# Patient Record
Sex: FEMALE | Race: WHITE | NOT HISPANIC OR LATINO | Employment: OTHER | URBAN - METROPOLITAN AREA
[De-identification: names, ages, dates, MRNs, and addresses within clinical notes are randomized per-mention and may not be internally consistent; named-entity substitution may affect disease eponyms.]

---

## 2017-01-31 ENCOUNTER — TELEPHONE (OUTPATIENT)
Dept: ONCOLOGY | Facility: HOSPITAL | Age: 72
End: 2017-01-31

## 2017-01-31 ENCOUNTER — LAB (OUTPATIENT)
Dept: LAB | Facility: HOSPITAL | Age: 72
End: 2017-01-31

## 2017-01-31 ENCOUNTER — OFFICE VISIT (OUTPATIENT)
Dept: ONCOLOGY | Facility: CLINIC | Age: 72
End: 2017-01-31

## 2017-01-31 VITALS
DIASTOLIC BLOOD PRESSURE: 70 MMHG | SYSTOLIC BLOOD PRESSURE: 120 MMHG | HEART RATE: 72 BPM | TEMPERATURE: 97.7 F | BODY MASS INDEX: 27.54 KG/M2 | RESPIRATION RATE: 16 BRPM | WEIGHT: 155.4 LBS | HEIGHT: 63 IN

## 2017-01-31 DIAGNOSIS — C50.512 MALIGNANT NEOPLASM OF LOWER-OUTER QUADRANT OF LEFT FEMALE BREAST (HCC): ICD-10-CM

## 2017-01-31 DIAGNOSIS — C50.512 MALIGNANT NEOPLASM OF LOWER-OUTER QUADRANT OF LEFT FEMALE BREAST (HCC): Primary | ICD-10-CM

## 2017-01-31 LAB
ALBUMIN SERPL-MCNC: 4 G/DL (ref 3.5–5.2)
ALBUMIN/GLOB SERPL: 1.4 G/DL (ref 1.1–2.4)
ALP SERPL-CCNC: 82 U/L (ref 38–116)
ALT SERPL W P-5'-P-CCNC: 15 U/L (ref 0–33)
ANION GAP SERPL CALCULATED.3IONS-SCNC: 10.3 MMOL/L
AST SERPL-CCNC: 22 U/L (ref 0–32)
BASOPHILS # BLD AUTO: 0.03 10*3/MM3 (ref 0–0.1)
BASOPHILS NFR BLD AUTO: 0.4 % (ref 0–1.1)
BILIRUB SERPL-MCNC: 0.4 MG/DL (ref 0.1–1.2)
BUN BLD-MCNC: 8 MG/DL (ref 6–20)
BUN/CREAT SERPL: 9.4 (ref 7.3–30)
CALCIUM SPEC-SCNC: 9.6 MG/DL (ref 8.5–10.2)
CHLORIDE SERPL-SCNC: 101 MMOL/L (ref 98–107)
CO2 SERPL-SCNC: 28.7 MMOL/L (ref 22–29)
CREAT BLD-MCNC: 0.85 MG/DL (ref 0.6–1.1)
DEPRECATED RDW RBC AUTO: 43.5 FL (ref 37–49)
EOSINOPHIL # BLD AUTO: 0.21 10*3/MM3 (ref 0–0.36)
EOSINOPHIL NFR BLD AUTO: 3 % (ref 1–5)
ERYTHROCYTE [DISTWIDTH] IN BLOOD BY AUTOMATED COUNT: 13.5 % (ref 11.7–14.5)
GFR SERPL CREATININE-BSD FRML MDRD: 66 ML/MIN/1.73
GLOBULIN UR ELPH-MCNC: 2.8 GM/DL (ref 1.8–3.5)
GLUCOSE BLD-MCNC: 91 MG/DL (ref 74–124)
HCT VFR BLD AUTO: 38.6 % (ref 34–45)
HGB BLD-MCNC: 12.4 G/DL (ref 11.5–14.9)
IMM GRANULOCYTES # BLD: 0.01 10*3/MM3 (ref 0–0.03)
IMM GRANULOCYTES NFR BLD: 0.1 % (ref 0–0.5)
LYMPHOCYTES # BLD AUTO: 1.44 10*3/MM3 (ref 1–3.5)
LYMPHOCYTES NFR BLD AUTO: 20.5 % (ref 20–49)
MCH RBC QN AUTO: 28.2 PG (ref 27–33)
MCHC RBC AUTO-ENTMCNC: 32.1 G/DL (ref 32–35)
MCV RBC AUTO: 87.7 FL (ref 83–97)
MONOCYTES # BLD AUTO: 0.4 10*3/MM3 (ref 0.25–0.8)
MONOCYTES NFR BLD AUTO: 5.7 % (ref 4–12)
NEUTROPHILS # BLD AUTO: 4.95 10*3/MM3 (ref 1.5–7)
NEUTROPHILS NFR BLD AUTO: 70.3 % (ref 39–75)
NRBC BLD MANUAL-RTO: 0 /100 WBC (ref 0–0)
PLATELET # BLD AUTO: 272 10*3/MM3 (ref 150–375)
PMV BLD AUTO: 9.4 FL (ref 8.9–12.1)
POTASSIUM BLD-SCNC: 3.9 MMOL/L (ref 3.5–4.7)
PROT SERPL-MCNC: 6.8 G/DL (ref 6.3–8)
RBC # BLD AUTO: 4.4 10*6/MM3 (ref 3.9–5)
SODIUM BLD-SCNC: 140 MMOL/L (ref 134–145)
WBC NRBC COR # BLD: 7.04 10*3/MM3 (ref 4–10)

## 2017-01-31 PROCEDURE — 99215 OFFICE O/P EST HI 40 MIN: CPT | Performed by: INTERNAL MEDICINE

## 2017-01-31 PROCEDURE — 85025 COMPLETE CBC W/AUTO DIFF WBC: CPT | Performed by: INTERNAL MEDICINE

## 2017-01-31 PROCEDURE — 80053 COMPREHEN METABOLIC PANEL: CPT | Performed by: INTERNAL MEDICINE

## 2017-01-31 PROCEDURE — 36415 COLL VENOUS BLD VENIPUNCTURE: CPT | Performed by: INTERNAL MEDICINE

## 2017-01-31 RX ORDER — DIPHENHYDRAMINE HCL 50 MG
CAPSULE ORAL
Qty: 1 CAPSULE | Refills: 0 | Status: SHIPPED | OUTPATIENT
Start: 2017-01-31 | End: 2023-01-24

## 2017-01-31 RX ORDER — PREDNISONE 50 MG/1
TABLET ORAL
Qty: 3 TABLET | Refills: 0 | Status: SHIPPED | OUTPATIENT
Start: 2017-01-31 | End: 2017-11-28

## 2017-01-31 NOTE — TELEPHONE ENCOUNTER
----- Message from Dennis Chanel II, MD sent at 1/31/2017 11:07 AM EST -----   She will have a CT abdomen with IV contrast.  She has an IV contrast allergy.  Can you please arrange for the IV contrast pre-medicines?.  Also, can you please give her the over-the-counter Benadryl instructions.  Thank you

## 2017-01-31 NOTE — TELEPHONE ENCOUNTER
escribed premedications of prednisone and benadryl with instructions to patients pharmacy. Pt saw MD today and is aware of scans.

## 2017-01-31 NOTE — PROGRESS NOTES
Subjective .     REASONS FOR FOLLOWUP:  Breast cancer    HISTORY OF PRESENT ILLNESS:  The patient is a 71 y.o. year old female  who is here for follow-up with the above-mentioned history.    States she had right low back pain radiating to the right leg since October.  Resolved with 1 week of steroids given 2 weeks ago by her PCP.  However, pain seemed to move into the right abdomen.  At times it feels it is below the right lower ribs and at other times it seems to be in the right upper quadrant.  States this makes it difficult to lift her disabled daughter.    Past Medical History   Diagnosis Date   • Arthritis    • Cancer      breast cancer   • Disease of thyroid gland      Hypothyroidism   • H/O TIA (transient ischemic attack) and stroke 2005   • Hyperlipidemia    • Hypertension    • Stroke 2005   • Trigeminal neuralgia      Past Surgical History   Procedure Laterality Date   • Hysterectomy       At age 31   • Tubal abdominal ligation  1974       HEMATOLOGIC/ONCOLOGIC HISTORY:  (History from previous dates can be found in the separate document.)    MEDICATIONS    Current Outpatient Prescriptions:   •  amitriptyline (ELAVIL) 25 MG tablet, Take 25 mg by mouth every night., Disp: , Rfl:   •  atorvastatin (LIPITOR) 10 MG tablet, Take 10 mg by mouth daily., Disp: , Rfl:   •  chlorthalidone (HYGROTEN) 25 MG tablet, Take 25 mg by mouth daily., Disp: , Rfl:   •  clopidogrel (PLAVIX) 75 MG tablet, Take 75 mg by mouth daily., Disp: , Rfl:   •  esomeprazole (NexIUM) 40 MG capsule, Take 40 mg by mouth every morning before breakfast., Disp: , Rfl:   •  exemestane (AROMASIN) 25 MG chemo tablet, Take 1 tablet by mouth daily., Disp: 90 tablet, Rfl: 3  •  levothyroxine (SYNTHROID, LEVOTHROID) 75 MCG tablet, Take 75 mcg by mouth. As Directed, Disp: , Rfl:   •  metoprolol succinate XL (TOPROL-XL) 25 MG 24 hr tablet, Take 25 mg by mouth 2 (two) times a day., Disp: , Rfl:   •  potassium chloride (KLOR-CON) 20 MEQ packet, Take 20 mEq  by mouth daily., Disp: , Rfl:     ALLERGIES:     Allergies   Allergen Reactions   • Contrast Dye      IVP Dye   • Lisinopril    • Tartrazine Hives       SOCIAL HISTORY:       Social History     Social History   • Marital status:      Spouse name: Jack   • Number of children: N/A   • Years of education: High School     Occupational History   •  Retired     Social History Main Topics   • Smoking status: Never Smoker   • Smokeless tobacco: Never Used   • Alcohol use Yes      Comment: rare   • Drug use: No   • Sexual activity: Not on file     Other Topics Concern   • Not on file     Social History Narrative         FAMILY HISTORY:  Family History   Problem Relation Age of Onset   • Cancer Mother      head and neck   • Stroke Mother    • Heart disease Mother    • Gallbladder disease Mother    • Breast cancer Sister 57   • Heart disease Sister    • Hypertension Sister    • Cancer Sister    • Gallbladder disease Sister    • Cancer Brother      head and neck   • Heart disease Brother    • Hypertension Brother    • Gallbladder disease Brother    • Heart disease Father    • Hypertension Father    • Gallbladder disease Father    • Cancer Maternal Aunt    • Cancer Maternal Uncle        REVIEW OF SYSTEMS:  GENERAL: No change in appetite or weight;   No fevers, chills, sweats.    SKIN: No nonhealing lesions.   No rashes.  HEME/LYMPH: No easy bruising, bleeding.   No swollen nodes.   EYES: No vision changes or diplopia.   ENT: No tinnitus, hearing loss, gum bleeding, epistaxis, hoarseness or dysphagia.   RESPIRATORY: No cough, shortness of breath, hemoptysis or wheezing.   CVS: No chest pain, palpitations, orthopnea, dyspnea on exertion or PND.   GI: see HPI   : No lower tract obstructive symptoms, dysuria or hematuria.   MUSCULOSKELETAL: see HPI   NEUROLOGICAL: No global weakness, loss of consciousness or seizures.   PSYCHIATRIC: No increased nervousness, mood changes or depression.        Objective    Vitals:     "01/31/17 1022   BP: 120/70   Pulse: 72   Resp: 16   Temp: 97.7 °F (36.5 °C)   Weight: 155 lb 6.4 oz (70.5 kg)   Height: 62.99\" (160 cm)   PainSc: 4  Comment: pain comes from her left side     Current Status 1/31/2017   ECOG score 0      PHYSICAL EXAM:    GENERAL:  Well-developed, well-nourished in no acute distress.   SKIN:  Warm, dry without rashes, purpura or petechiae.  HEAD:  Normocephalic.  EYES:  Pupils equal, round and reactive to light.  EOMs intact.  Conjunctivae normal.  EARS:  Hearing intact.  NOSE:  Septum midline.  No excoriations or nasal discharge.  MOUTH:  Tongue is well-papillated; no stomatitis or ulcers.  Lips normal.  THROAT:  Oropharynx without lesions or exudates.  NECK:  Supple with good range of motion; no thyromegaly or masses, no JVD.  LYMPHATICS:  No cervical, supraclavicular, axillary or inguinal adenopathy.  CHEST:  Lungs clear to percussion and auscultation. Good airflow.  CARDIAC:  Regular rate and rhythm without murmurs, rubs or gallops. Normal S1,S2.  BREAST: no masses by palpation or inspection   ABDOMEN:  Soft, nontender with no organomegaly or masses.  EXTREMITIES:  No clubbing, cyanosis or edema.  NEUROLOGICAL:  Cranial Nerves II-XII grossly intact.  No focal neurological deficits.  PSYCHIATRIC:  Normal affect and mood.      RECENT LABS:        WBC   Date/Time Value Ref Range Status   01/31/2017 10:13 AM 7.04 4.00 - 10.00 10*3/mm3 Final   03/13/2014 12:50 PM 7.19 4.50 - 10.70 K/Cumm Final     HEMOGLOBIN   Date/Time Value Ref Range Status   01/31/2017 10:13 AM 12.4 11.5 - 14.9 g/dL Final   03/13/2014 12:50 PM 12.3 11.9 - 15.5 g/dL Final     PLATELETS   Date/Time Value Ref Range Status   01/31/2017 10:13  150 - 375 10*3/mm3 Final   03/13/2014 12:50  140 - 500 K/Cumm Final       Assessment/Plan     ASSESSMENT:  Problem List Items Addressed This Visit     None         1.  Stage IIIA, grade 3, 3.9 cm left breast cancer. Four out of 24 nodes positive. ER 2%, FL negative, " HER2 negative. Completed FEC x3 followed by Taxotere x3, 11/28/2011. Completed radiation in February 2012.   Poor tolerance to anastrozole and letrozole.  completed 5 years hormonal therapy using Aromasin today, 1/31/17.  (She was initially a patient at [facility]. She transferred here as her son-in-law, Jeremiah Dumont, is a patient here).     2.  Chronic right low back/hip pain radiating anteriorly.  In May 2016, CAT scan and bone scan negative for cancer as the cause of the pain.  Defer further management of this to her PCP.    3.  In the past, she has complained of: Forgetfulness and transposition of numbers when she is writing numbers at times. This has been present since around April 2015. She states it is not worsening.   She did not complain of this today.    4.  History of stroke around 2005. Because of this I do not think she would be a good candidate for tamoxifen.     5.  Osteopenia, which has improved to normal bone density.  DEXA 5/17/16 normal bone density.  T score -0.6, compared to.   -1.1 2/20/14.  She is on calcium and vitamin D.  Defer further management of this to her PCP now that she is off aromatase inhibitors.    6.  Right upper quadrant/right lower anterior rib pain.  Present since October 2016.  CT abdomen and bone scan.     This was a new issue for me to address today with additional workup planned.    7.  Hypokalemia. Her PCP manages this.  Potassium is normal today.      8.  IV contrast allergy.  Previous rash.  I will have the triage nurse call her to arrange IV contrast pre-medicines which patient states it worked well in the past.    PLAN:   · CT abdomen with IV contrast (IV contrast allergy pre-medicines)  · Bone scan  · M.D. visit to review the results of the above  · Stop Aromasin as she has completed 5 years of therapy  · Last mammogram 11/1/16, BI-RADS 2.

## 2017-02-01 ENCOUNTER — TELEPHONE (OUTPATIENT)
Dept: ONCOLOGY | Facility: CLINIC | Age: 72
End: 2017-02-01

## 2017-02-01 NOTE — TELEPHONE ENCOUNTER
----- Message from Mimi Garcia sent at 2/1/2017 10:45 AM EST -----   Pt is calling because she is having a CT done tomorrow and is allergic to the dye        [phone]

## 2017-02-01 NOTE — TELEPHONE ENCOUNTER
Pt. States she is sched. For a ct of the abd tomorrow and she is allergic to the dye.  Will need to be pre medicated.  Informed pt. That dr. Chanel already sent the prednisone to her pharm.  Pt. Is aware to take the benadryl 50mg 1 hr prior to the scan.  V/u.

## 2017-02-02 ENCOUNTER — HOSPITAL ENCOUNTER (OUTPATIENT)
Dept: NUCLEAR MEDICINE | Facility: HOSPITAL | Age: 72
Discharge: HOME | End: 2017-02-02
Attending: INTERNAL MEDICINE

## 2017-02-02 ENCOUNTER — HOSPITAL ENCOUNTER (OUTPATIENT)
Dept: CT IMAGING | Facility: HOSPITAL | Age: 72
Discharge: HOME | End: 2017-02-02
Attending: INTERNAL MEDICINE | Admitting: INTERNAL MEDICINE

## 2017-02-02 DIAGNOSIS — C50.512 MALIGNANT NEOPLASM OF LOWER-OUTER QUADRANT OF LEFT FEMALE BREAST (HCC): ICD-10-CM

## 2017-02-02 LAB — CREAT BLDA-MCNC: 0.9 MG/DL (ref 0.6–1.3)

## 2017-02-02 PROCEDURE — 0 IOPAMIDOL 61 % SOLUTION: Performed by: INTERNAL MEDICINE

## 2017-02-02 PROCEDURE — 74160 CT ABDOMEN W/CONTRAST: CPT

## 2017-02-02 PROCEDURE — 78306 BONE IMAGING WHOLE BODY: CPT

## 2017-02-02 PROCEDURE — 82565 ASSAY OF CREATININE: CPT

## 2017-02-02 PROCEDURE — 0 TECHNETIUM MEDRONATE KIT: Performed by: INTERNAL MEDICINE

## 2017-02-02 PROCEDURE — A9503 TC99M MEDRONATE: HCPCS | Performed by: INTERNAL MEDICINE

## 2017-02-02 RX ORDER — TC 99M MEDRONATE 20 MG/10ML
21.3 INJECTION, POWDER, LYOPHILIZED, FOR SOLUTION INTRAVENOUS
Status: COMPLETED | OUTPATIENT
Start: 2017-02-02 | End: 2017-02-02

## 2017-02-02 RX ADMIN — Medication 21.3 MILLICURIE: at 10:45

## 2017-02-02 RX ADMIN — IOPAMIDOL 85 ML: 612 INJECTION, SOLUTION INTRAVENOUS at 15:15

## 2017-02-06 ENCOUNTER — APPOINTMENT (OUTPATIENT)
Dept: LAB | Facility: HOSPITAL | Age: 72
End: 2017-02-06

## 2017-02-06 ENCOUNTER — OFFICE VISIT (OUTPATIENT)
Dept: ONCOLOGY | Facility: CLINIC | Age: 72
End: 2017-02-06

## 2017-02-06 VITALS
BODY MASS INDEX: 27.71 KG/M2 | DIASTOLIC BLOOD PRESSURE: 70 MMHG | TEMPERATURE: 98.2 F | OXYGEN SATURATION: 95 % | SYSTOLIC BLOOD PRESSURE: 120 MMHG | HEART RATE: 86 BPM | WEIGHT: 156.4 LBS | RESPIRATION RATE: 16 BRPM | HEIGHT: 63 IN

## 2017-02-06 DIAGNOSIS — C50.512 MALIGNANT NEOPLASM OF LOWER-OUTER QUADRANT OF LEFT FEMALE BREAST (HCC): Primary | ICD-10-CM

## 2017-02-06 PROCEDURE — 99215 OFFICE O/P EST HI 40 MIN: CPT | Performed by: INTERNAL MEDICINE

## 2017-02-06 PROCEDURE — G0463 HOSPITAL OUTPT CLINIC VISIT: HCPCS | Performed by: INTERNAL MEDICINE

## 2017-02-06 NOTE — PROGRESS NOTES
Subjective .     REASONS FOR FOLLOWUP:  Breast cancer    HISTORY OF PRESENT ILLNESS:  The patient is a 72 y.o. year old female  who is here for follow-up with the above-mentioned history.    The pain in her right upper quadrant abdomen/right lower ribs seems to worsened over the past several days.  She reminds me she lifts her 125 pound disabled daughter frequently.  No change in baseline mild constipation.  No other new symptoms over the last week.  CT and bone scan show no clear evidence of recurrence.    Past Medical History   Diagnosis Date   • Arthritis    • Cancer      breast cancer   • Disease of thyroid gland      Hypothyroidism   • H/O TIA (transient ischemic attack) and stroke 2005   • Hyperlipidemia    • Hypertension    • Stroke 2005   • Trigeminal neuralgia      Past Surgical History   Procedure Laterality Date   • Hysterectomy       At age 31   • Tubal abdominal ligation  1974       HEMATOLOGIC/ONCOLOGIC HISTORY:  (History from previous dates can be found in the separate document.)    MEDICATIONS    Current Outpatient Prescriptions:   •  amitriptyline (ELAVIL) 25 MG tablet, Take 25 mg by mouth every night., Disp: , Rfl:   •  atorvastatin (LIPITOR) 10 MG tablet, Take 10 mg by mouth daily., Disp: , Rfl:   •  chlorthalidone (HYGROTEN) 25 MG tablet, Take 25 mg by mouth daily., Disp: , Rfl:   •  clopidogrel (PLAVIX) 75 MG tablet, Take 75 mg by mouth daily., Disp: , Rfl:   •  diphenhydrAMINE (BENADRYL) 50 MG capsule, Take 1 hour prior to CT scan, Disp: 1 capsule, Rfl: 0  •  esomeprazole (NexIUM) 40 MG capsule, Take 40 mg by mouth every morning before breakfast., Disp: , Rfl:   •  exemestane (AROMASIN) 25 MG chemo tablet, Take 1 tablet by mouth daily., Disp: 90 tablet, Rfl: 3  •  levothyroxine (SYNTHROID, LEVOTHROID) 75 MCG tablet, Take 75 mcg by mouth. As Directed, Disp: , Rfl:   •  metoprolol succinate XL (TOPROL-XL) 25 MG 24 hr tablet, Take 25 mg by mouth 2 (two) times a day., Disp: , Rfl:   •  potassium  chloride (KLOR-CON) 20 MEQ packet, Take 20 mEq by mouth daily., Disp: , Rfl:   •  predniSONE (DELTASONE) 50 MG tablet, Take one tablet 13 hrs, 7 hours and 1 hour prior to CT scan, Disp: 3 tablet, Rfl: 0    ALLERGIES:     Allergies   Allergen Reactions   • Contrast Dye      IVP Dye   • Lisinopril    • Tartrazine Hives       SOCIAL HISTORY:       Social History     Social History   • Marital status:      Spouse name: Jack   • Number of children: N/A   • Years of education: High School     Occupational History   •  Retired     Social History Main Topics   • Smoking status: Never Smoker   • Smokeless tobacco: Never Used   • Alcohol use Yes      Comment: rare   • Drug use: No   • Sexual activity: Not on file     Other Topics Concern   • Not on file     Social History Narrative         FAMILY HISTORY:  Family History   Problem Relation Age of Onset   • Cancer Mother      head and neck   • Stroke Mother    • Heart disease Mother    • Gallbladder disease Mother    • Breast cancer Sister 57   • Heart disease Sister    • Hypertension Sister    • Cancer Sister    • Gallbladder disease Sister    • Cancer Brother      head and neck   • Heart disease Brother    • Hypertension Brother    • Gallbladder disease Brother    • Heart disease Father    • Hypertension Father    • Gallbladder disease Father    • Cancer Maternal Aunt    • Cancer Maternal Uncle        REVIEW OF SYSTEMS:  GENERAL: No change in appetite or weight;   No fevers, chills, sweats.    SKIN: No nonhealing lesions.   No rashes.  HEME/LYMPH: No easy bruising, bleeding.   No swollen nodes.   EYES: No vision changes or diplopia.   ENT: No tinnitus, hearing loss, gum bleeding, epistaxis, hoarseness or dysphagia.   RESPIRATORY: No cough, shortness of breath, hemoptysis or wheezing.   CVS: No chest pain, palpitations, orthopnea, dyspnea on exertion or PND.   GI: see HPI   : No lower tract obstructive symptoms, dysuria or hematuria.   MUSCULOSKELETAL: see HPI  "  NEUROLOGICAL: No global weakness, loss of consciousness or seizures.   PSYCHIATRIC: No increased nervousness, mood changes or depression.        Objective    Vitals:    02/06/17 0803   BP: 120/70   Pulse: 86   Resp: 16   Temp: 98.2 °F (36.8 °C)   TempSrc: Oral   SpO2: 95%   Weight: 156 lb 6.4 oz (70.9 kg)   Height: 62.99\" (160 cm)   PainSc:   3   PainLoc: Hip     Current Status 2/6/2017   ECOG score 0      PHYSICAL EXAM:    GENERAL:  Well-developed, well-nourished in no acute distress.   SKIN:  Warm, dry without rashes, purpura or petechiae.  HEAD:  Normocephalic.  EYES:  Pupils equal, round and reactive to light.  EOMs intact.  Conjunctivae normal.  EARS:  Hearing intact.  NOSE:  Septum midline.  No excoriations or nasal discharge.  MOUTH:  Tongue is well-papillated; no stomatitis or ulcers.  Lips normal.  THROAT:  Oropharynx without lesions or exudates.  NECK:  Supple with good range of motion; no thyromegaly or masses, no JVD.  LYMPHATICS:  No cervical, supraclavicular, axillary or inguinal adenopathy.  CHEST:  Lungs clear to percussion and auscultation. Good airflow.  CARDIAC:  Regular rate and rhythm without murmurs, rubs or gallops. Normal S1,S2.  BREAST: no masses by palpation or inspection   ABDOMEN:  Soft, nontender with no organomegaly or masses.  EXTREMITIES:  No clubbing, cyanosis or edema.  NEUROLOGICAL:  Cranial Nerves II-XII grossly intact.  No focal neurological deficits.  PSYCHIATRIC:  Normal affect and mood.      RECENT LABS:        WBC   Date/Time Value Ref Range Status   01/31/2017 10:13 AM 7.04 4.00 - 10.00 10*3/mm3 Final   03/13/2014 12:50 PM 7.19 4.50 - 10.70 K/Cumm Final     HEMOGLOBIN   Date/Time Value Ref Range Status   01/31/2017 10:13 AM 12.4 11.5 - 14.9 g/dL Final   03/13/2014 12:50 PM 12.3 11.9 - 15.5 g/dL Final     PLATELETS   Date/Time Value Ref Range Status   01/31/2017 10:13  150 - 375 10*3/mm3 Final   03/13/2014 12:50  140 - 500 K/Cumm Final       Assessment/Plan "     ASSESSMENT:  Problem List Items Addressed This Visit        High    Malignant neoplasm of lower-outer quadrant of left female breast - Primary    Relevant Orders    CBC & Differential    Comprehensive Metabolic Panel         1.  Stage IIIA, grade 3, 3.9 cm left breast cancer. Four out of 24 nodes positive. ER 2%, [place] negative, HER2 negative. Completed FEC x3 followed by Taxotere x3, 11/28/2011. Completed radiation in February 2012.   Poor tolerance to anastrozole and letrozole.  completed 5 years hormonal therapy using Aromasin, 1/31/17.  (She was initially a patient at [facility]. She transferred here as her son-in-law, Jeremiah Dumont, is a patient here).     2.  Chronic right low back/hip pain radiating anteriorly.  In May 2016, CAT scan and bone scan negative for cancer as the cause of the pain.  Defer further management of this to her PCP.    3.  In the past, she has complained of: Forgetfulness and transposition of numbers when she is writing numbers at times. This had been present since around April 2015. She states it is not worsening.   She did not complain of this today.    4.  History of stroke around 2005. Because of this I do not think she would be a good candidate for tamoxifen.     5.  Osteopenia, which has improved to normal bone density.  DEXA 5/17/16 normal bone density.  T score -0.6, compared to.   -1.1 2/20/14.  She is on calcium and vitamin D.  Defer further management of this to her PCP now that she is off aromatase inhibitors.    6.  Right upper quadrant/right lower anterior rib pain.  Present since October 2016.    Due to RUQ abdominal pain/right lower anterior rib pain since October 2016, bone scan and CT abdomen with contrast 2/2/17: No evidence of recurrence.  (8 mm low-attenuation liver lesion seen on the 5/17/16 CT but less conspicuous on order CT.  This was felt to be due to older CT without contrast.  This was felt to likely be benign.  Plan no further scheduled follow-up of this-patient  agrees.).    7.  Hypokalemia. Her PCP manages this.  Potassium is normal today.      8.  IV contrast allergy.  Previous rash.  I will have the triage nurse call her to arrange IV contrast pre-medicines which patient states it worked well in the past.    PLAN:   · M.D. CBC CMP 3 months  · Last mammogram 11/1/16, BI-RADS 2.  · Off hormonal therapy    CT images personally reviewed by me.

## 2017-04-11 ENCOUNTER — DOCUMENTATION (OUTPATIENT)
Dept: ONCOLOGY | Facility: HOSPITAL | Age: 72
End: 2017-04-11

## 2017-05-02 ENCOUNTER — OFFICE VISIT (OUTPATIENT)
Dept: ONCOLOGY | Facility: CLINIC | Age: 72
End: 2017-05-02

## 2017-05-02 ENCOUNTER — LAB (OUTPATIENT)
Dept: LAB | Facility: HOSPITAL | Age: 72
End: 2017-05-02

## 2017-05-02 VITALS
HEART RATE: 76 BPM | HEIGHT: 63 IN | DIASTOLIC BLOOD PRESSURE: 70 MMHG | WEIGHT: 155.4 LBS | BODY MASS INDEX: 27.54 KG/M2 | RESPIRATION RATE: 16 BRPM | SYSTOLIC BLOOD PRESSURE: 122 MMHG | TEMPERATURE: 97.7 F

## 2017-05-02 DIAGNOSIS — C50.512 MALIGNANT NEOPLASM OF LOWER-OUTER QUADRANT OF LEFT FEMALE BREAST (HCC): Primary | ICD-10-CM

## 2017-05-02 LAB
ALBUMIN SERPL-MCNC: 4.2 G/DL (ref 3.5–5.2)
ALBUMIN/GLOB SERPL: 1.7 G/DL (ref 1.1–2.4)
ALP SERPL-CCNC: 110 U/L (ref 38–116)
ALT SERPL W P-5'-P-CCNC: 19 U/L (ref 0–33)
ANION GAP SERPL CALCULATED.3IONS-SCNC: 13.9 MMOL/L
AST SERPL-CCNC: 25 U/L (ref 0–32)
BASOPHILS # BLD AUTO: 0.03 10*3/MM3 (ref 0–0.1)
BASOPHILS NFR BLD AUTO: 0.4 % (ref 0–1.1)
BILIRUB SERPL-MCNC: 0.5 MG/DL (ref 0.1–1.2)
BUN BLD-MCNC: 8 MG/DL (ref 6–20)
BUN/CREAT SERPL: 11.3 (ref 7.3–30)
CALCIUM SPEC-SCNC: 9.7 MG/DL (ref 8.5–10.2)
CHLORIDE SERPL-SCNC: 99 MMOL/L (ref 98–107)
CO2 SERPL-SCNC: 27.1 MMOL/L (ref 22–29)
CREAT BLD-MCNC: 0.71 MG/DL (ref 0.6–1.1)
DEPRECATED RDW RBC AUTO: 43.8 FL (ref 37–49)
EOSINOPHIL # BLD AUTO: 0.16 10*3/MM3 (ref 0–0.36)
EOSINOPHIL NFR BLD AUTO: 2.2 % (ref 1–5)
ERYTHROCYTE [DISTWIDTH] IN BLOOD BY AUTOMATED COUNT: 13.4 % (ref 11.7–14.5)
GFR SERPL CREATININE-BSD FRML MDRD: 81 ML/MIN/1.73
GLOBULIN UR ELPH-MCNC: 2.5 GM/DL (ref 1.8–3.5)
GLUCOSE BLD-MCNC: 101 MG/DL (ref 74–124)
HCT VFR BLD AUTO: 38.6 % (ref 34–45)
HGB BLD-MCNC: 12.9 G/DL (ref 11.5–14.9)
HOLD SPECIMEN: NORMAL
IMM GRANULOCYTES # BLD: 0.02 10*3/MM3 (ref 0–0.03)
IMM GRANULOCYTES NFR BLD: 0.3 % (ref 0–0.5)
LYMPHOCYTES # BLD AUTO: 1.51 10*3/MM3 (ref 1–3.5)
LYMPHOCYTES NFR BLD AUTO: 21.1 % (ref 20–49)
MCH RBC QN AUTO: 29.7 PG (ref 27–33)
MCHC RBC AUTO-ENTMCNC: 33.4 G/DL (ref 32–35)
MCV RBC AUTO: 88.7 FL (ref 83–97)
MONOCYTES # BLD AUTO: 0.48 10*3/MM3 (ref 0.25–0.8)
MONOCYTES NFR BLD AUTO: 6.7 % (ref 4–12)
NEUTROPHILS # BLD AUTO: 4.97 10*3/MM3 (ref 1.5–7)
NEUTROPHILS NFR BLD AUTO: 69.3 % (ref 39–75)
NRBC BLD MANUAL-RTO: 0 /100 WBC (ref 0–0)
PLATELET # BLD AUTO: 259 10*3/MM3 (ref 150–375)
PMV BLD AUTO: 9 FL (ref 8.9–12.1)
POTASSIUM BLD-SCNC: 3.9 MMOL/L (ref 3.5–4.7)
PROT SERPL-MCNC: 6.7 G/DL (ref 6.3–8)
RBC # BLD AUTO: 4.35 10*6/MM3 (ref 3.9–5)
SODIUM BLD-SCNC: 140 MMOL/L (ref 134–145)
WBC NRBC COR # BLD: 7.17 10*3/MM3 (ref 4–10)

## 2017-05-02 PROCEDURE — 85025 COMPLETE CBC W/AUTO DIFF WBC: CPT

## 2017-05-02 PROCEDURE — 80053 COMPREHEN METABOLIC PANEL: CPT

## 2017-05-02 PROCEDURE — 99213 OFFICE O/P EST LOW 20 MIN: CPT | Performed by: INTERNAL MEDICINE

## 2017-05-02 PROCEDURE — 36415 COLL VENOUS BLD VENIPUNCTURE: CPT

## 2017-10-26 ENCOUNTER — APPOINTMENT (OUTPATIENT)
Dept: LAB | Facility: HOSPITAL | Age: 72
End: 2017-10-26

## 2017-10-26 ENCOUNTER — APPOINTMENT (OUTPATIENT)
Dept: ONCOLOGY | Facility: CLINIC | Age: 72
End: 2017-10-26

## 2017-11-28 ENCOUNTER — LAB (OUTPATIENT)
Dept: LAB | Facility: HOSPITAL | Age: 72
End: 2017-11-28

## 2017-11-28 ENCOUNTER — OFFICE VISIT (OUTPATIENT)
Dept: ONCOLOGY | Facility: CLINIC | Age: 72
End: 2017-11-28

## 2017-11-28 VITALS
HEART RATE: 76 BPM | DIASTOLIC BLOOD PRESSURE: 72 MMHG | TEMPERATURE: 97.5 F | WEIGHT: 156 LBS | SYSTOLIC BLOOD PRESSURE: 122 MMHG | RESPIRATION RATE: 16 BRPM | BODY MASS INDEX: 28.71 KG/M2 | HEIGHT: 62 IN

## 2017-11-28 DIAGNOSIS — C50.512 MALIGNANT NEOPLASM OF LOWER-OUTER QUADRANT OF LEFT FEMALE BREAST (HCC): ICD-10-CM

## 2017-11-28 DIAGNOSIS — Z12.31 ENCOUNTER FOR SCREENING MAMMOGRAM FOR BREAST CANCER: ICD-10-CM

## 2017-11-28 DIAGNOSIS — C50.512 MALIGNANT NEOPLASM OF LOWER-OUTER QUADRANT OF LEFT BREAST OF FEMALE, ESTROGEN RECEPTOR POSITIVE (HCC): Primary | ICD-10-CM

## 2017-11-28 DIAGNOSIS — Z17.0 MALIGNANT NEOPLASM OF LOWER-OUTER QUADRANT OF LEFT BREAST OF FEMALE, ESTROGEN RECEPTOR POSITIVE (HCC): Primary | ICD-10-CM

## 2017-11-28 LAB
BASOPHILS # BLD AUTO: 0.04 10*3/MM3 (ref 0–0.1)
BASOPHILS NFR BLD AUTO: 0.7 % (ref 0–1.1)
DEPRECATED RDW RBC AUTO: 42.9 FL (ref 37–49)
EOSINOPHIL # BLD AUTO: 0.17 10*3/MM3 (ref 0–0.36)
EOSINOPHIL NFR BLD AUTO: 3.1 % (ref 1–5)
ERYTHROCYTE [DISTWIDTH] IN BLOOD BY AUTOMATED COUNT: 13.2 % (ref 11.7–14.5)
HCT VFR BLD AUTO: 38 % (ref 34–45)
HGB BLD-MCNC: 13 G/DL (ref 11.5–14.9)
IMM GRANULOCYTES # BLD: 0.01 10*3/MM3 (ref 0–0.03)
IMM GRANULOCYTES NFR BLD: 0.2 % (ref 0–0.5)
LYMPHOCYTES # BLD AUTO: 1.29 10*3/MM3 (ref 1–3.5)
LYMPHOCYTES NFR BLD AUTO: 23.2 % (ref 20–49)
MCH RBC QN AUTO: 30.2 PG (ref 27–33)
MCHC RBC AUTO-ENTMCNC: 34.2 G/DL (ref 32–35)
MCV RBC AUTO: 88.4 FL (ref 83–97)
MONOCYTES # BLD AUTO: 0.46 10*3/MM3 (ref 0.25–0.8)
MONOCYTES NFR BLD AUTO: 8.3 % (ref 4–12)
NEUTROPHILS # BLD AUTO: 3.59 10*3/MM3 (ref 1.5–7)
NEUTROPHILS NFR BLD AUTO: 64.5 % (ref 39–75)
NRBC BLD MANUAL-RTO: 0 /100 WBC (ref 0–0)
PLATELET # BLD AUTO: 253 10*3/MM3 (ref 150–375)
PMV BLD AUTO: 9.9 FL (ref 8.9–12.1)
RBC # BLD AUTO: 4.3 10*6/MM3 (ref 3.9–5)
WBC NRBC COR # BLD: 5.56 10*3/MM3 (ref 4–10)

## 2017-11-28 PROCEDURE — 85025 COMPLETE CBC W/AUTO DIFF WBC: CPT | Performed by: INTERNAL MEDICINE

## 2017-11-28 PROCEDURE — 36416 COLLJ CAPILLARY BLOOD SPEC: CPT | Performed by: INTERNAL MEDICINE

## 2017-11-28 PROCEDURE — 99214 OFFICE O/P EST MOD 30 MIN: CPT | Performed by: INTERNAL MEDICINE

## 2017-11-28 RX ORDER — HYDROCHLOROTHIAZIDE 12.5 MG/1
12.5 TABLET ORAL DAILY
COMMUNITY
End: 2019-05-09 | Stop reason: SDUPTHER

## 2017-11-28 NOTE — PROGRESS NOTES
Subjective .     REASONS FOR FOLLOWUP:  Breast cancer    HISTORY OF PRESENT ILLNESS:  The patient is a 72 y.o. year old female  who is here for follow-up with the above-mentioned history.    Has noticed some swelling of her left arm, mild.  Nothing makes this worse or better.    Denies neurological symptoms.  Denies nausea   Denies weight loss.  Denies pain.      Past Medical History:   Diagnosis Date   • Arthritis    • Cancer     Left breast cancer   • CHF (congestive heart failure)    • Disease of thyroid gland     Hypothyroidism   • GERD (gastroesophageal reflux disease)    • H/O TIA (transient ischemic attack) and stroke 2005   • Hyperlipidemia    • Hypertension    • Peptic ulceration    • Stroke 2005    CVA   • Trigeminal neuralgia      Past Surgical History:   Procedure Laterality Date   • BREAST BIOPSY Left 2011   • HYSTERECTOMY      At age 31   • MASTECTOMY Left 06/30/2011    Dr. Kaitlyn Luna   • TUBAL ABDOMINAL LIGATION  1974       HEMATOLOGIC/ONCOLOGIC HISTORY:  (History from previous dates can be found in the separate document.)    MEDICATIONS    Current Outpatient Prescriptions:   •  amitriptyline (ELAVIL) 25 MG tablet, Take 25 mg by mouth every night., Disp: , Rfl:   •  atorvastatin (LIPITOR) 10 MG tablet, Take 10 mg by mouth daily., Disp: , Rfl:   •  chlorthalidone (HYGROTEN) 25 MG tablet, Take 25 mg by mouth daily., Disp: , Rfl:   •  clopidogrel (PLAVIX) 75 MG tablet, Take 75 mg by mouth daily., Disp: , Rfl:   •  diphenhydrAMINE (BENADRYL) 50 MG capsule, Take 1 hour prior to CT scan, Disp: 1 capsule, Rfl: 0  •  esomeprazole (NexIUM) 40 MG capsule, Take 40 mg by mouth every morning before breakfast., Disp: , Rfl:   •  hydrochlorothiazide (HYDRODIURIL) 12.5 MG tablet, Take 12.5 mg by mouth Daily., Disp: , Rfl:   •  levothyroxine (SYNTHROID, LEVOTHROID) 75 MCG tablet, Take 75 mcg by mouth. As Directed, Disp: , Rfl:   •  metoprolol succinate XL (TOPROL-XL) 25 MG 24 hr tablet, Take 25 mg by mouth 2  (two) times a day., Disp: , Rfl:   •  potassium chloride (KLOR-CON) 20 MEQ packet, Take 20 mEq by mouth 2 (Two) Times a Day. Two tabs in the morning and 1 tab in the evening, Disp: , Rfl:     ALLERGIES:     Allergies   Allergen Reactions   • Contrast Dye      IVP Dye   • Lisinopril    • Tartrazine Hives       SOCIAL HISTORY:       Social History     Social History   • Marital status:      Spouse name: Jack   • Number of children: 2   • Years of education: High School     Occupational History   •  Retired     Social History Main Topics   • Smoking status: Never Smoker   • Smokeless tobacco: Never Used   • Alcohol use Yes      Comment: rare   • Drug use: No   • Sexual activity: Not on file     Other Topics Concern   • Not on file     Social History Narrative         FAMILY HISTORY:  Family History   Problem Relation Age of Onset   • Cancer Mother 68     head and neck   • Stroke Mother    • Heart disease Mother    • Gallbladder disease Mother    • Throat cancer Mother 68   • Breast cancer Sister 57   • Heart disease Sister    • Hypertension Sister    • Cancer Sister    • Gallbladder disease Sister    • Diabetes Sister    • Cancer Brother      head and neck   • Heart disease Brother    • Hypertension Brother    • Gallbladder disease Brother    • Lung cancer Brother 67   • Throat cancer Brother 67   • Hypertension Father    • Gallbladder disease Father    • Emphysema Father    • Heart disease Father      Enlarged heart   • Cancer Maternal Aunt    • Cancer Maternal Uncle    • Heart attack Brother    • Alcohol abuse Brother    • Hypertension Sister    • Heart disease Sister    • Hypertension Sister    • Heart disease Sister    • Hypertension Sister        REVIEW OF SYSTEMS:  GENERAL: No change in appetite or weight;   No fevers, chills, sweats.    SKIN: No nonhealing lesions.   No rashes.  HEME/LYMPH: No easy bruising, bleeding.   No swollen nodes.   EYES: No vision changes or diplopia.   ENT: No tinnitus, hearing  "loss, gum bleeding, epistaxis, hoarseness or dysphagia.   RESPIRATORY: No cough, shortness of breath, hemoptysis or wheezing.   CVS: No chest pain, palpitations, orthopnea, dyspnea on exertion or PND.   GI: No melena or hematochezia.   No abdominal pain.  No nausea, vomiting, constipation, diarrhea  : No lower tract obstructive symptoms, dysuria or hematuria.   MUSCULOSKELETAL: No bone pain.  No joint stiffness.   NEUROLOGICAL: No global weakness, loss of consciousness or seizures.   PSYCHIATRIC: No increased nervousness, mood changes or depression.             Objective    Vitals:    11/28/17 1004   BP: 122/72   Pulse: 76   Resp: 16   Temp: 97.5 °F (36.4 °C)   Weight: 156 lb (70.8 kg)   Height: 62.21\" (158 cm)  Comment: new ht.   PainSc: 0-No pain     Current Status 11/28/2017   ECOG score 0      PHYSICAL EXAM:    GENERAL:  Well-developed, well-nourished in no acute distress.   SKIN:  Warm, dry without rashes, purpura or petechiae.  HEAD:  Normocephalic.  EYES:  Pupils equal, round and reactive to light.  EOMs intact.  Conjunctivae normal.  EARS:  Hearing intact.  NOSE:  Septum midline.  No excoriations or nasal discharge.  MOUTH:  Tongue is well-papillated; no stomatitis or ulcers.  Lips normal.  THROAT:  Oropharynx without lesions or exudates.  NECK:  Supple with good range of motion; no thyromegaly or masses, no JVD.  LYMPHATICS:  No cervical, supraclavicular, axillary or inguinal adenopathy.  CHEST:  Lungs clear to percussion and auscultation. Good airflow.  CARDIAC:  Regular rate and rhythm without murmurs, rubs or gallops. Normal S1,S2.  BREAST: no masses by palpation or inspection   ABDOMEN:  Soft, nontender with no organomegaly or masses.  EXTREMITIES:  No clubbing, cyanosis.  Mild swelling left upper extremity  NEUROLOGICAL:  Cranial Nerves II-XII grossly intact.  No focal neurological deficits.  PSYCHIATRIC:  Normal affect and mood.      RECENT LABS:        WBC   Date/Time Value Ref Range Status "   11/28/2017 09:46 AM 5.56 4.00 - 10.00 10*3/mm3 Final     Hemoglobin   Date/Time Value Ref Range Status   11/28/2017 09:46 AM 13.0 11.5 - 14.9 g/dL Final     Platelets   Date/Time Value Ref Range Status   11/28/2017 09:46  150 - 375 10*3/mm3 Final       Assessment/Plan     ASSESSMENT:  Problem List Items Addressed This Visit        High    Malignant neoplasm of lower-outer quadrant of left female breast - Primary    Relevant Orders    Mammo Screening Right With CAD    Ambulatory Referral to Physical Therapy Lymphedema    CBC & Differential       Medium    Encounter for screening mammogram for breast cancer    Relevant Orders    Mammo Screening Right With CAD    Ambulatory Referral to Physical Therapy Lymphedema    CBC & Differential         *Stage IIIA, grade 3, 3.9 cm left breast cancer. Four out of 24 nodes positive. ER 2%, RI negative, HER2 negative. Completed FEC x3 followed by Taxotere x3, 11/28/2011. Completed radiation in February 2012.   Poor tolerance to anastrozole and letrozole.  completed 5 years hormonal therapy using Aromasin, 1/31/17.  (She was initially a patient at [facility]. She transferred here as her son-in-law, Jeremiah Dumont, is a patient here).     *Chronic right low back/hip pain radiating anteriorly.  In May 2016, CAT scan and bone scan negative for cancer as the cause of the pain.  Defer further management of this to her PCP.  She did not complain of this today.    *In the past, she has complained of: Forgetfulness and transposition of numbers when she is writing numbers at times. This had been present since around April 2015. She states it is not worsening.   She did not complain of this today.    *History of stroke around 2005. Because of this I do not think she would be a good candidate for tamoxifen.     *Osteopenia, which has improved to normal bone density.  DEXA 5/17/16 normal bone density.  T score -0.6, compared to.   -1.1 2/20/14.  She is on calcium and vitamin D.  Defer further  management of this to her PCP now that she is off aromatase inhibitors.    *Right upper quadrant/right lower anterior rib pain.  Present since October 2016.    Due to RUQ abdominal pain/right lower anterior rib pain since October 2016, bone scan and CT abdomen with contrast 2/2/17: No evidence of recurrence.  (8 mm low-attenuation liver lesion seen on the 5/17/16 CT but less conspicuous on order CT.  This was felt to be due to older CT without contrast.  This was felt to likely be benign.  Plan no further scheduled follow-up of this-patient agrees.).  She did not complain of this pain today.    *Hypokalemia. Her PCP manages this.      *IV contrast allergy.  Previous rash.  Receives IV contrast pre-medicines with CT IV contrast.  (This has worked well in the past)    *Left arm lymphedema due to prior surgery for breast cancer.  Refer to the lymphedema clinic.  This was a new issue for me to address today.    PLAN:   · M.D. CBC 6 months  · Last mammogram 11/1/16, BI-RADS 2.  I ordered her next mammogram today  · Lymphedema clinic referral  · Off hormonal therapy

## 2017-12-12 ENCOUNTER — APPOINTMENT (OUTPATIENT)
Dept: WOMENS IMAGING | Facility: HOSPITAL | Age: 72
End: 2017-12-12

## 2017-12-12 PROCEDURE — 77063 BREAST TOMOSYNTHESIS BI: CPT | Performed by: RADIOLOGY

## 2017-12-12 PROCEDURE — G0202 SCR MAMMO BI INCL CAD: HCPCS | Performed by: RADIOLOGY

## 2017-12-19 ENCOUNTER — HOSPITAL ENCOUNTER (OUTPATIENT)
Dept: PHYSICAL THERAPY | Facility: HOSPITAL | Age: 72
Setting detail: THERAPIES SERIES
Discharge: HOME | End: 2017-12-19
Attending: INTERNAL MEDICINE

## 2017-12-19 DIAGNOSIS — Z17.0 MALIGNANT NEOPLASM OF LOWER-OUTER QUADRANT OF LEFT BREAST OF FEMALE, ESTROGEN RECEPTOR POSITIVE (HCC): ICD-10-CM

## 2017-12-19 DIAGNOSIS — I97.2 POSTMASTECTOMY LYMPHEDEMA SYNDROME: Primary | ICD-10-CM

## 2017-12-19 DIAGNOSIS — C50.512 MALIGNANT NEOPLASM OF LOWER-OUTER QUADRANT OF LEFT BREAST OF FEMALE, ESTROGEN RECEPTOR POSITIVE (HCC): ICD-10-CM

## 2017-12-19 PROCEDURE — 97161 PT EVAL LOW COMPLEX 20 MIN: CPT | Performed by: PHYSICAL THERAPIST

## 2017-12-19 PROCEDURE — 97110 THERAPEUTIC EXERCISES: CPT | Performed by: PHYSICAL THERAPIST

## 2017-12-19 PROCEDURE — G8985 CARRY GOAL STATUS: HCPCS | Performed by: PHYSICAL THERAPIST

## 2017-12-19 PROCEDURE — G8984 CARRY CURRENT STATUS: HCPCS | Performed by: PHYSICAL THERAPIST

## 2017-12-19 NOTE — THERAPY EVALUATION
Physical Therapy Lymphedema Initial Evaluation  [facility]     Patient Name: Carlotta Pires  : 1945  MRN: [id number]  Today's Date: 2017      Visit Date: 2017    Visit Dx:    ICD-10-CM ICD-9-CM   1. 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 Malignant neoplasm of lower-outer quadrant of left breast of female, estrogen receptor positive C50.512 174.5    Z17.0 V86.0       Patient Active Problem List   Diagnosis   • Malignant neoplasm of lower-outer quadrant of left female breast   • Osteopenia   • Encounter for screening mammogram for breast cancer        Past Medical History:   Diagnosis Date   • Arthritis    • Cancer     Left breast cancer   • CHF (congestive heart failure)    • Disease of thyroid gland     Hypothyroidism   • GERD (gastroesophageal reflux disease)    • H/O TIA (transient ischemic attack) and stroke    • Hyperlipidemia    • Hypertension    • Peptic ulceration    • Stroke     CVA   • Trigeminal neuralgia         Past Surgical History:   Procedure Laterality Date   • BREAST BIOPSY Left    • HYSTERECTOMY      At age 31   • MASTECTOMY Left 2011    Dr. Kaitlyn Luna   • TUBAL ABDOMINAL LIGATION         Visit Dx:    ICD-10-CM ICD-9-CM   1. 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 Malignant neoplasm of lower-outer quadrant of left breast of female, estrogen receptor positive C50.512 174.5    Z17.0 V86.0             Patient History       17 1100          History    Chief Complaint Other 1 (comment)   lymphedema left UE  -SC      Date Current Problem(s) Began --   2011      Brief Description of Current Complaint patient with left breast surgery with 4/24 L ALN (+) in , completed chemotherapy and radiation by 2012, took hormone blocker for 5 years, completed in 2017, states swelling in left abdomen, chest wall and left UE on/off since surgery however increased edema in left elbow, arm more recently, last 6 months, comes  and goes but more recently there. Mentioned to oncologist, referred to lymphedema clinic.  has been referred to lymphedema clinic the past but has never gone because she was never concerned until now, with what her elbow looks like (fat pad medial cubital fossa). 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 She states she is having more low back pain so she is using her arms more to manage her daughter, could be contributing to symptoms.   -SC      Patient/Caregiver Goals Decrease swelling;Know what to do to help the symptoms  -SC      Current Tobacco Use none  -SC      Smoking Status no  -SC      Patient's Rating of General Health Good  -SC      Hand Dominance right-handed  -SC      Occupation/sports/leisure activities caregiver  -SC      Patient seeing anyone else for problem(s)? Yes   Dr. Chanel  -SC      How has patient tried to help current problem? rest, elevate  -SC      History of Previous Related Injuries 2011 breast surgery  -SC      Are you or can you be pregnant No  -SC      Pain     Pain Location Elbow;Arm;Hand  -SC      Pain at Present 3  -SC      Pain at Best 1  -SC      Pain at Worst 5  -SC      Pain Frequency Constant/continuous  -SC      Pain Description Aching;Tightness  -SC      What Performance Factors Make the Current Problem(s) WORSE? increased activity, lifting, pushing, pulling, caregiving  -SC      What Performance Factors Make the Current Problem(s) BETTER? rest  -SC      Pain Comments mild left elbow and generalized left UE  -SC      Fall Risk Assessment    Any falls in the past year: No  -SC      Previous Functional Level --   Independent  -SC      Services    Are you currently receiving Home Health services No  -SC      Daily Activities    Primary Language English  -SC      Are you able to read Yes  -SC      Are you able to write Yes  -SC      How does patient learn best? Listening  -SC      Teaching needs identified Home  Exercise Program;Management of Condition  -SC      Patient is concerned about/has problems with Grasping objects lifting;Performing home management (household chores, shopping, care of dependents);Repetitive movements of the hand, arm, shoulder  -SC      Does patient have problems with the following? --   none listed  -SC      Barriers to learning None  -SC      Explanation of Functional Status Problem independent  -SC      Pt Participated in POC and Goals Yes  -SC      Safety    Are you being hurt, hit, or frightened by anyone at home or in your life? No  -SC      Are you being neglected by a caregiver No  -SC        User Key  (r) = Recorded By, (t) = Taken By, (c) = Cosigned By    Initials Name Provider Type    SC Emelia Hassan, PT Physical Therapist                Lymphedema       12/19/17 1100          Lymphedema Assessment    Lymphedema Classification LUE:;secondary;stage 1 (Spontaneously Reversible)  -SC      Lymphedema Cancer Related Sx axillary dissection;radical mastectomy  -SC      Lymphedema Surgery Comments 2011  -SC      Lymph Nodes Removed # 24  -SC      Positive Lymph Nodes # 4  -SC      Stage of Cancer Stage III   A  -SC      Chemo Received yes  -SC      Chemo Treatments #/Timeframe 2011  -SC      Radiation Therapy Received yes  -SC      Radiation Treatments #/Timeframe 2012  -SC      Infections or Cellulitis? no  -SC      Subjective Pain    Able to rate subjective pain? yes  -SC      Pre-Treatment Pain Level 3  -SC      Lymphedema Edema Assessment    Ptting Edema Category By grade out of 4  -SC      Pitting Edema + 1/4 (+1 of 4);Mild  -SC      Edema Assessment Comment very mild left elbow to axilla  -SC      Skin Changes/Observations    Skin Observations Comment no skin issues  -SC      Lymphedema Sensation    Lymphedema Sensation Tests light touch  -SC      Lymphedema Light Touch LUE:;WNL  -SC      Lymphedema Measurements    Measurement Type(s) Circumferential  -SC      Circumferential Areas  Upper extremities  -SC      LUE Circumferential (cm)    Measurement Location 1 axilla  -SC      Left 1 29.6 cm  -SC      Measurement Location 2 15 cm above elbow  -SC      Left 2 29 cm  -SC      Measurement Location 3 10 cm above elbow  -SC      Left 3 27.5 cm  -SC      Measurement Location 4 5 cm above elbow  -SC      Left 4 25.5 cm  -SC      Measurement Location 5 elbow  -SC      Left 5 22.9 cm  -SC      Measurement Location 6 5 cm below elbow  -SC      Left 6 21 cm  -SC      Measurement Location 7 10 cm below elbow  -SC      Left 7 18.5 cm  -SC      Measurement Location 8 15 cm below elbow  -SC      Left 8 14.9 cm  -SC      Measurement Location 9 wrist  -SC      Left 9 14.7 cm  -SC      Measurement Location 10 midpalm  -SC      Left 10 16.2 cm  -SC      Measurement Location 11 MCPs  -SC      Left 11 16 cm  -SC      RUE Circumferential (cm)    Right 1 28.5 cm  -SC      Right 2 26.5 cm  -SC      Right 3 25 cm  -SC      Right 4 24 cm  -SC      Right 5 22 cm  -SC      Right 6 21 cm  -SC      Right 7 18.5 cm  -SC      Right 8 15 cm  -SC      Right 9 14 cm  -SC      Right 10 15 cm  -SC      Right 11 16.5 cm  -SC      Compression/Skin Care    Compression/Skin Care Comments fitting completed for daytime OTS compression sleeve left UE  -SC        User Key  (r) = Recorded By, (t) = Taken By, (c) = Cosigned By    Initials Name Provider Type    SC Emelia Hassan, PT Physical Therapist                          Therapy Education  Education Details: compression, formal lymphatic CDT.   Given: HEP, Symptoms/condition management, Pain management, Mobility training, Edema management, Other (comment)  Program: New  How Provided: Verbal, Demonstration  Provided to: Patient  Level of Understanding: Teach back education performed, Verbalized, Demonstrated            Exercises       12/19/17 1100          Subjective Pain    Able to rate subjective pain? yes  -SC      Pre-Treatment Pain Level 3  -SC        User Key  (r) = Recorded  By, (t) = Taken By, (c) = Cosigned By    Initials Name Provider Type    MARYAM Hassan, PT Physical Therapist                              PT OP Goals       12/19/17 1100       Long Term Goals    LTG Date to Achieve 03/19/18  -SC     LTG 1 Patient independent and compliant with initial Home Exercise Program focused on diaphragmatic breathing, flexibility, range of motion, mobility and strength for improved posture, function, with improved sleeping patterns and decreased pain/symptoms of upper extremity.   -SC     LTG 2 Patient demonstrate proper awareness of What is Lymphedema and 18 Steps of Prevention for improved prevention, management, care of symptoms and ease of transition to self-care of condition.   -SC     LTG 3 Patient independent and compliant with daytime OTS prevention compression garments as indicated for decreased risk of lymphedema and infection and safety with transition of self-care of condition.   -SC     Time Calculation    PT Goal Re-Cert Due Date 03/19/18  -SC       User Key  (r) = Recorded By, (t) = Taken By, (c) = Cosigned By    Initials Name Provider Type    MARYAM Hassan, PT Physical Therapist                PT Assessment/Plan       12/19/17 1100       PT Assessment    Functional Limitations Limitation in home management;Limitations in community activities;Performance in leisure activities;Performance in self-care ADL  -SC     Impairments Edema;Impaired lymphatic circulation;Pain  -SC     Assessment Comments Patient is a 72 year old female, history left breast surgery with axillary dissection, completing chemoradiation [id number], completed 5 years of hormone blocker Jan 2017, states swelling on/off left chest/abomen and left UE since surgery however recent increase left elbow that was concerning in the last 6 months. Was coming and going however now symptoms more consistent. At this time patient appears to have stage 1 to early stage 2 post mastectomy lymphedema syndrome left  UE grossly. Non pitting, non fibrotic, soft. Responded well to trial of compression garment. Will order garment for patient to wear daily for 2 weeks then have patient return for reasessment and determine need for formal therapy. Patient to contact PT during that time with any marked changes in symptoms. Patient agrees.   -SC     Please refer to paper survey for additional self-reported information Yes  -SC     Rehab Potential Good  -SC     Patient/caregiver participated in establishment of treatment plan and goals Yes  -SC     Patient would benefit from skilled therapy intervention Yes  -SC     PT Plan    PT Frequency Other (comment)  -SC     Predicted Duration of Therapy Intervention (days/wks) return in 2 weeks unless otherwise indicated to assess edema with compression garment.   -SC     Planned CPT's? PT EVAL LOW COMPLEXITY: 24534;PT RE-EVAL: 45213;PT THER PROC EA 15 MIN: 86123;PT THER ACT EA 15 MIN: 99319;PT MANUAL THERAPY EA 15 MIN: 35981;PT SELF CARE/HOME MGMT/TRAIN EA 15: 76096;PT BIS XTRACELL FLUID ANALYSIS: 14113  -SC     PT Plan Comments possible bioimpedance, assess compression, assess circumferential measurements, determine need for formal therapy.   -SC       User Key  (r) = Recorded By, (t) = Taken By, (c) = Cosigned By    Initials Name Provider Type    SC Emelia Hassan, PT Physical Therapist             Outcome Measure Options: Disabilities of the Arm, Shoulder, and Hand (DASH)  DASH  Open a tight or new jar.: Severe Difficulty  Write: Mild Difficulty  Turn a key: No Difficulty  Prepare a meal: No Difficulty  Push open a heavy door: No Difficulty  Place an object on a shelf above your head: No Difficulty  Do heavy household chores (e.g., wash walls, wash floors): Moderate Difficulty  Garden or do yard work: Mild Difficulty  Make a bed: No Difficulty  Carry a shopping bag or briefcase: No Difficulty  Carry a heavy object (over 10 lbs): Mild Difficulty  Change a lightbulb overhead: Moderate  Difficulty  Wash or blow dry your hair: No Difficulty  Wash your back: No Difficulty  Put on a pullover sweater: Mild Difficulty  Use a knife to cut food: No Difficulty  Recreational activities in which require little effort (e.g., cardplaying, knitting, etc.): No Difficulty  Manage transportation needs (getting from one place to another): No Difficulty  During the past week, to what extent has your arm, shoulder, or hand problem interfered with your normal social activites with family, friends, neighbors or groups?: Not at all  During the past week, were you limited in your work or other regular daily activities as a result of your arm, shoulder or hand problem?: Moderately Limited  Arm, Shoulder, or hand pain: Moderate  Arm, shoulder or hand pain when you performed any specific activity: Moderate  Tingling (pins and needles) in your arm, shoulder, or hand: Mild  Weakness in your arm, shoulder or hand: Moderate  Stiffness in your arm, shoulder or hand: Moderate  During the past week, how much difficulty have you had sleeping because of the pain in your arm, shoulder or hand?: No difficulty  I feel less capable, less confident or less useful because of my arm, shoulder or hand problem: Agree  DASH Sum : 52  Number of Questions Answered: 27  DASH Score: 23.15         Time Calculation:   Start Time: 1100  Stop Time: 1145  Time Calculation (min): 45 min     Therapy Charges for Today     Code Description Service Date Service Provider Modifiers Qty    55018285349 HC PT CARRY MOV HAND OBJ CURRENT 12/19/2017 Emelia Hassan, PT GP, CJ 1    18408004142 HC PT CARRY MOV HAND OBJ PROJECTED 12/19/2017 Emelia Hassan PT GP, CI 1    52924165063 HC PT THER PROC EA 15 MIN 12/19/2017 Emelia Hassan PT GP 1    41677853922 HC PT EVAL LOW COMPLEXITY 2 12/19/2017 Emelia Hassan, PT GP 1          PT G-Codes  PT Professional Judgement Used?: Yes  Outcome Measure Options: Disabilities of the Arm, Shoulder, and Hand  (DASH)  Score: 23.5/100  Functional Limitation: Carrying, moving and handling objects  Carrying, Moving and Handling Objects Current Status (): At least 20 percent but less than 40 percent impaired, limited or restricted  Carrying, Moving and Handling Objects Goal Status (): At least 1 percent but less than 20 percent impaired, limited or restricted         Emelia Arroyo, PT  12/19/2017

## 2018-05-29 ENCOUNTER — OFFICE VISIT (OUTPATIENT)
Dept: ONCOLOGY | Facility: CLINIC | Age: 73
End: 2018-05-29

## 2018-05-29 ENCOUNTER — LAB (OUTPATIENT)
Dept: LAB | Facility: HOSPITAL | Age: 73
End: 2018-05-29

## 2018-05-29 VITALS
WEIGHT: 156.8 LBS | TEMPERATURE: 98.1 F | DIASTOLIC BLOOD PRESSURE: 82 MMHG | HEART RATE: 66 BPM | SYSTOLIC BLOOD PRESSURE: 132 MMHG | RESPIRATION RATE: 16 BRPM | HEIGHT: 62 IN | BODY MASS INDEX: 28.85 KG/M2 | OXYGEN SATURATION: 99 %

## 2018-05-29 DIAGNOSIS — C50.512 MALIGNANT NEOPLASM OF LOWER-OUTER QUADRANT OF LEFT BREAST OF FEMALE, ESTROGEN RECEPTOR POSITIVE (HCC): ICD-10-CM

## 2018-05-29 DIAGNOSIS — Z17.0 MALIGNANT NEOPLASM OF LOWER-OUTER QUADRANT OF LEFT BREAST OF FEMALE, ESTROGEN RECEPTOR POSITIVE (HCC): ICD-10-CM

## 2018-05-29 DIAGNOSIS — Z17.0 MALIGNANT NEOPLASM OF LOWER-OUTER QUADRANT OF LEFT BREAST OF FEMALE, ESTROGEN RECEPTOR POSITIVE (HCC): Primary | ICD-10-CM

## 2018-05-29 DIAGNOSIS — Z12.31 ENCOUNTER FOR SCREENING MAMMOGRAM FOR BREAST CANCER: ICD-10-CM

## 2018-05-29 DIAGNOSIS — C50.512 MALIGNANT NEOPLASM OF LOWER-OUTER QUADRANT OF LEFT BREAST OF FEMALE, ESTROGEN RECEPTOR POSITIVE (HCC): Primary | ICD-10-CM

## 2018-05-29 LAB
BASOPHILS # BLD AUTO: 0.03 10*3/MM3 (ref 0–0.1)
BASOPHILS NFR BLD AUTO: 0.5 % (ref 0–1.1)
DEPRECATED RDW RBC AUTO: 43.8 FL (ref 37–49)
EOSINOPHIL # BLD AUTO: 0.27 10*3/MM3 (ref 0–0.36)
EOSINOPHIL NFR BLD AUTO: 4.6 % (ref 1–5)
ERYTHROCYTE [DISTWIDTH] IN BLOOD BY AUTOMATED COUNT: 13.5 % (ref 11.7–14.5)
HCT VFR BLD AUTO: 39.2 % (ref 34–45)
HGB BLD-MCNC: 13.1 G/DL (ref 11.5–14.9)
IMM GRANULOCYTES # BLD: 0.03 10*3/MM3 (ref 0–0.03)
IMM GRANULOCYTES NFR BLD: 0.5 % (ref 0–0.5)
LYMPHOCYTES # BLD AUTO: 1.41 10*3/MM3 (ref 1–3.5)
LYMPHOCYTES NFR BLD AUTO: 23.8 % (ref 20–49)
MCH RBC QN AUTO: 29.8 PG (ref 27–33)
MCHC RBC AUTO-ENTMCNC: 33.4 G/DL (ref 32–35)
MCV RBC AUTO: 89.1 FL (ref 83–97)
MONOCYTES # BLD AUTO: 0.6 10*3/MM3 (ref 0.25–0.8)
MONOCYTES NFR BLD AUTO: 10.1 % (ref 4–12)
NEUTROPHILS # BLD AUTO: 3.58 10*3/MM3 (ref 1.5–7)
NEUTROPHILS NFR BLD AUTO: 60.5 % (ref 39–75)
NRBC BLD MANUAL-RTO: 0 /100 WBC (ref 0–0)
PLATELET # BLD AUTO: 215 10*3/MM3 (ref 150–375)
PMV BLD AUTO: 9.6 FL (ref 8.9–12.1)
RBC # BLD AUTO: 4.4 10*6/MM3 (ref 3.9–5)
WBC NRBC COR # BLD: 5.92 10*3/MM3 (ref 4–10)

## 2018-05-29 PROCEDURE — 99214 OFFICE O/P EST MOD 30 MIN: CPT | Performed by: INTERNAL MEDICINE

## 2018-05-29 PROCEDURE — 85025 COMPLETE CBC W/AUTO DIFF WBC: CPT | Performed by: INTERNAL MEDICINE

## 2018-05-29 PROCEDURE — 36416 COLLJ CAPILLARY BLOOD SPEC: CPT | Performed by: INTERNAL MEDICINE

## 2018-05-29 NOTE — PROGRESS NOTES
Subjective .     REASONS FOR FOLLOWUP:  Breast cancer    HISTORY OF PRESENT ILLNESS:  The patient is a 73 y.o. year old female  who is here for follow-up with the above-mentioned history.    Over the past week and a half, has had 3 episodes of room spinning.  States this was quite significant when occurred.  Around the same time, she had an upper respiratory infection/allergy flare.  During the exam today, when she laid down on the examining table, the room began to spin.    Denies headache, nausea, visual change, weakness or numbness.    Denies pain.    Past Medical History:   Diagnosis Date   • Arthritis    • Cancer     Left breast cancer   • CHF (congestive heart failure)    • Disease of thyroid gland     Hypothyroidism   • GERD (gastroesophageal reflux disease)    • H/O TIA (transient ischemic attack) and stroke 2005   • Hyperlipidemia    • Hypertension    • Peptic ulceration    • Stroke 2005    CVA   • Trigeminal neuralgia      Past Surgical History:   Procedure Laterality Date   • BREAST BIOPSY Left 2011   • HYSTERECTOMY      At age 31   • MASTECTOMY Left 06/30/2011    Dr. Kaitlyn Luna   • TUBAL ABDOMINAL LIGATION  1974       HEMATOLOGIC/ONCOLOGIC HISTORY:  (History from previous dates can be found in the separate document.)    MEDICATIONS    Current Outpatient Prescriptions:   •  amitriptyline (ELAVIL) 25 MG tablet, Take 25 mg by mouth every night., Disp: , Rfl:   •  atorvastatin (LIPITOR) 10 MG tablet, Take 10 mg by mouth daily., Disp: , Rfl:   •  chlorthalidone (HYGROTEN) 25 MG tablet, Take 25 mg by mouth daily., Disp: , Rfl:   •  clopidogrel (PLAVIX) 75 MG tablet, Take 75 mg by mouth daily., Disp: , Rfl:   •  diphenhydrAMINE (BENADRYL) 50 MG capsule, Take 1 hour prior to CT scan, Disp: 1 capsule, Rfl: 0  •  esomeprazole (NexIUM) 40 MG capsule, Take 40 mg by mouth every morning before breakfast., Disp: , Rfl:   •  hydrochlorothiazide (HYDRODIURIL) 12.5 MG tablet, Take 12.5 mg by mouth Daily., Disp: ,  Rfl:   •  levothyroxine (SYNTHROID, LEVOTHROID) 75 MCG tablet, Take 75 mcg by mouth. As Directed, Disp: , Rfl:   •  metoprolol succinate XL (TOPROL-XL) 25 MG 24 hr tablet, Take 25 mg by mouth 2 (two) times a day., Disp: , Rfl:   •  potassium chloride (KLOR-CON) 20 MEQ packet, Take 20 mEq by mouth 2 (Two) Times a Day. Two tabs in the morning and 1 tab in the evening, Disp: , Rfl:     ALLERGIES:     Allergies   Allergen Reactions   • Contrast Dye      IVP Dye   • Lisinopril    • Tartrazine Hives       SOCIAL HISTORY:       Social History     Social History   • Marital status:      Spouse name: Jack   • Number of children: 2   • Years of education: High School     Occupational History   •  Retired     Social History Main Topics   • Smoking status: Never Smoker   • Smokeless tobacco: Never Used   • Alcohol use Yes      Comment: rare   • Drug use: No   • Sexual activity: Defer     Other Topics Concern   • Not on file     Social History Narrative   • No narrative on file         FAMILY HISTORY:  Family History   Problem Relation Age of Onset   • Cancer Mother 68        head and neck   • Stroke Mother    • Heart disease Mother    • Gallbladder disease Mother    • Throat cancer Mother 68   • Breast cancer Sister 57   • Heart disease Sister    • Hypertension Sister    • Cancer Sister    • Gallbladder disease Sister    • Diabetes Sister    • Cancer Brother         head and neck   • Heart disease Brother    • Hypertension Brother    • Gallbladder disease Brother    • Lung cancer Brother 67   • Throat cancer Brother 67   • Hypertension Father    • Gallbladder disease Father    • Emphysema Father    • Heart disease Father         Enlarged heart   • Cancer Maternal Aunt    • Cancer Maternal Uncle    • Heart attack Brother    • Alcohol abuse Brother    • Hypertension Sister    • Heart disease Sister    • Hypertension Sister    • Heart disease Sister    • Hypertension Sister        REVIEW OF SYSTEMS:  Review of Systems  "  Constitutional: Negative for activity change.   HENT: Negative for nosebleeds and trouble swallowing.    Respiratory: Negative for shortness of breath and wheezing.    Cardiovascular: Negative for chest pain and palpitations.   Gastrointestinal: Negative for constipation, diarrhea and nausea.   Genitourinary: Negative for dysuria and hematuria.   Musculoskeletal: Negative for arthralgias and myalgias.   Skin: Negative for rash and wound.   Neurological: Negative for seizures and syncope.   Hematological: Negative for adenopathy. Does not bruise/bleed easily.   Psychiatric/Behavioral: Negative for confusion.           Objective    Vitals:    05/29/18 1009   BP: 132/82   Pulse: 66   Resp: 16   Temp: 98.1 °F (36.7 °C)   SpO2: 99%  Comment: at rest   Weight: 71.1 kg (156 lb 12.8 oz)   Height: 157 cm (61.81\")  Comment: new ht.   PainSc: 0-No pain     Current Status 5/29/2018   ECOG score 0      PHYSICAL EXAM:    CONSTITUTIONAL:  Vital signs reviewed.  No distress, looks comfortable.  EYES:  Conjunctiva and lids unremarkable.  PERRLA  EARS,NOSE,MOUTH,THROAT:  Ears and nose appear unremarkable.  Lips, teeth, gums appear unremarkable.  RESPIRATORY:  Normal respiratory effort.  Lungs clear to auscultation bilaterally.  CARDIOVASCULAR:  Normal S1, S2.  No murmurs rubs or gallops.  No significant lower extremity edema.  BREAST: no masses by palpation or inspection GASTROINTESTINAL: Abdomen appears unremarkable.  Nontender.  No hepatomegaly.  No splenomegaly.  LYMPHATIC:  No cervical, supraclavicular, axillary lymphadenopathy.  SKIN:  Warm.  No rashes.  PSYCHIATRIC:  Normal judgment and insight.  Normal mood and affect.     RECENT LABS:        WBC   Date/Time Value Ref Range Status   05/29/2018 09:40 AM 5.92 4.00 - 10.00 10*3/mm3 Final     Hemoglobin   Date/Time Value Ref Range Status   05/29/2018 09:40 AM 13.1 11.5 - 14.9 g/dL Final     Platelets   Date/Time Value Ref Range Status   05/29/2018 09:40  150 - 375 10*3/mm3 " Final       Assessment/Plan     ASSESSMENT:  Problem List Items Addressed This Visit        High    Malignant neoplasm of lower-outer quadrant of left female breast - Primary         *Stage IIIA, grade 3, 3.9 cm left breast cancer. Four out of 24 nodes positive. ER 2%, MA negative, HER2 negative. Completed FEC x3 followed by Taxotere x3, 11/28/2011. Completed radiation in February 2012.   Poor tolerance to anastrozole and letrozole.  completed 5 years hormonal therapy using Aromasin, 1/31/17.  (She was initially a patient at [facility]. She transferred here as her son-in-law, Jeremiah Dumont, is a patient here).   Remains in remission.    *Chronic right low back/hip pain radiating anteriorly.  In May 2016, CAT scan and bone scan negative for cancer as the cause of the pain.  Defer further management of this to her PCP.  She did not complain of this today.    *In the past, she has complained of: Forgetfulness and transposition of numbers when she is writing numbers at times. This had been present since around April 2015. She states it is not worsening.   She did not complain of this today.    *History of stroke around 2005. Because of this I do not think she would be a good candidate for tamoxifen.     *Osteopenia, which has improved to normal bone density.  DEXA 5/17/16 normal bone density.  T score -0.6, compared to.   -1.1 2/20/14.  She is on calcium and vitamin D.  Defer further management of this to her PCP now that she is off aromatase inhibitors.    *Right upper quadrant/right lower anterior rib pain.  Present since October 2016.    Due to RUQ abdominal pain/right lower anterior rib pain since October 2016, bone scan and CT abdomen with contrast 2/2/17: No evidence of recurrence.  (8 mm low-attenuation liver lesion seen on the 5/17/16 CT but less conspicuous on order CT.  This was felt to be due to older CT without contrast.  This was felt to likely be benign.  Plan no further scheduled follow-up of this-patient  agrees.).  She did not complain of this pain today.    *Hypokalemia. Her PCP manages this.      *IV contrast allergy.  Previous rash.  Receives IV contrast pre-medicines with CT IV contrast.  (This has worked well in the past)    *Left arm lymphedema due to prior surgery for breast cancer.  She was seen by the lymphedema clinic but has had some logistical issues obtaining the sleeve they prescribed.  I encouraged her to continue to call the lymphedema clinic for assistance with this.    *Vertigo.  Room spinning on 3 occasions over the past 1-1/2 weeks.  Symptoms began around the same time of an upper respiratory infection/allergy flare.  Room began to spin today when she laid down on the examining table.  No other neurological symptoms.  Therefore, I doubt this is a symptom of brain metastasis.  I told her if symptoms worsen or she develops new symptoms, she could call us and we can order an MRI of the brain.  However, I recommended she see an ENT physician.  This was a new issue for me to address today.    PLAN:   · M.D. CBC 6 months  · Last mammogram 12/12/17, BI-RADS 2.    · (We order her mammograms)  · ENT referral  · Off hormonal therapy

## 2018-08-06 ENCOUNTER — TELEPHONE (OUTPATIENT)
Dept: PHYSICAL THERAPY | Facility: HOSPITAL | Age: 73
End: 2018-08-06

## 2018-08-06 ENCOUNTER — DOCUMENTATION (OUTPATIENT)
Dept: PHYSICAL THERAPY | Facility: HOSPITAL | Age: 73
End: 2018-08-06

## 2018-08-06 DIAGNOSIS — C50.512 MALIGNANT NEOPLASM OF LOWER-OUTER QUADRANT OF LEFT BREAST OF FEMALE, ESTROGEN RECEPTOR POSITIVE (HCC): ICD-10-CM

## 2018-08-06 DIAGNOSIS — I97.2 POSTMASTECTOMY LYMPHEDEMA SYNDROME: Primary | ICD-10-CM

## 2018-08-06 DIAGNOSIS — Z17.0 MALIGNANT NEOPLASM OF LOWER-OUTER QUADRANT OF LEFT BREAST OF FEMALE, ESTROGEN RECEPTOR POSITIVE (HCC): ICD-10-CM

## 2018-08-06 NOTE — THERAPY DISCHARGE NOTE
Outpatient Physical Therapy Discharge Summary         Patient Name: Carlotta Pires  : 1945  MRN: [id number]    Today's Date: 2018    Visit Dx:    ICD-10-CM ICD-9-CM   1. 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 Malignant neoplasm of lower-outer quadrant of left breast of female, estrogen receptor positive (CMS/HCC) C50.512 174.5    Z17.0 V86.0             PT OP Goals     Row Name 18 1221          Long Term Goals    LTG 1 Patient independent and compliant with initial Home Exercise Program focused on diaphragmatic breathing, flexibility, range of motion, mobility and strength for improved posture, function, with improved sleeping patterns and decreased pain/symptoms of upper extremity.   -SC     LTG 1 Progress Not Met  -SC     LTG 1 Progress Comments did not return after initial evaluation  -SC     LTG 2 Patient demonstrate proper awareness of What is Lymphedema and 18 Steps of Prevention for improved prevention, management, care of symptoms and ease of transition to self-care of condition.   -SC     LTG 2 Progress Not Met  -SC     LTG 2 Progress Comments patient did not return after initial evaluation  -SC     LTG 3 Patient independent and compliant with daytime OTS prevention compression garments as indicated for decreased risk of lymphedema and infection and safety with transition of self-care of condition.   -SC     LTG 3 Progress Not Met  -SC     LTG 3 Progress Comments patient did not receive her compression and transfered to another facility.   -SC       User Key  (r) = Recorded By, (t) = Taken By, (c) = Cosigned By    Initials Name Provider Type    Emelia Jaquez, PT Physical Therapist          OP PT Discharge Summary  Date of Discharge: 18  Reason for Discharge: Transfer to other facility/level of care (patient did not receive her compression in a timely matter, switched lymphedema clinics)  Outcomes Achieved: Other (patient self-d/c)  Discharge Destination:  Outpatient services  Discharge Instructions/Additional Comments: patient transfered care to another lymphedema clinic due to did not receive compression in a timely fashion. We do have in our records that all her information was faxed to [facility]. Patient did not receive a phone call from [facility] to get compression per patient. Patient did not contact therapist that she has not received her products, so we were unaware of the situation. She did decide to transfer her care to another facility at this time.       Time Calculation:        Therapy Suggested Charges     Code   Minutes Charges    None                       Emelia Arroyo, PT  8/6/2018

## 2018-08-06 NOTE — TELEPHONE ENCOUNTER
Contacted patient to discuss return to therapy. Patient did not receive her compression garment. Patient is very unhappy with her care and would not like to return to the lymphedema clinic due to not receiving her compression. Patient was informed that we were having a great deal of trouble getting compression from the two medical reps that we are currently using. I did apologize to her that things were not taken care of properly. She states that she is receiving care at another location. Will be d/c from [facility] at this time. I did apologize to the patient and we are very sorry that this happened for this patient.

## 2018-08-30 ENCOUNTER — TELEPHONE (OUTPATIENT)
Dept: ONCOLOGY | Facility: CLINIC | Age: 73
End: 2018-08-30

## 2018-08-30 NOTE — TELEPHONE ENCOUNTER
Order for mastectomy supplies signed per dr. Chanel and faxed to jayson at [phone].  Confirmation rec.

## 2018-10-18 ENCOUNTER — OFFICE VISIT (OUTPATIENT)
Dept: GASTROENTEROLOGY | Facility: CLINIC | Age: 73
End: 2018-10-18

## 2018-10-18 VITALS
HEIGHT: 62 IN | WEIGHT: 155.5 LBS | SYSTOLIC BLOOD PRESSURE: 143 MMHG | HEART RATE: 76 BPM | DIASTOLIC BLOOD PRESSURE: 75 MMHG | BODY MASS INDEX: 28.61 KG/M2

## 2018-10-18 DIAGNOSIS — Z12.11 ENCOUNTER FOR SCREENING FOR MALIGNANT NEOPLASM OF COLON: Primary | ICD-10-CM

## 2018-10-18 DIAGNOSIS — R10.13 EPIGASTRIC PAIN: ICD-10-CM

## 2018-10-18 PROCEDURE — 99214 OFFICE O/P EST MOD 30 MIN: CPT | Performed by: INTERNAL MEDICINE

## 2018-10-18 RX ORDER — SODIUM CHLORIDE, SODIUM LACTATE, POTASSIUM CHLORIDE, CALCIUM CHLORIDE 600; 310; 30; 20 MG/100ML; MG/100ML; MG/100ML; MG/100ML
30 INJECTION, SOLUTION INTRAVENOUS CONTINUOUS
Status: CANCELLED | OUTPATIENT
Start: 2018-11-13

## 2018-10-18 RX ORDER — MELATONIN 10 MG
1 CAPSULE ORAL NIGHTLY PRN
COMMUNITY

## 2018-10-18 RX ORDER — MAGNESIUM 200 MG
TABLET ORAL
COMMUNITY
End: 2019-04-24

## 2018-10-18 RX ORDER — ASPIRIN 81 MG/1
81 TABLET ORAL DAILY
COMMUNITY

## 2018-10-18 NOTE — PROGRESS NOTES
Subjective   Carlotta Pires is a 73 y.o.. female is here today for follow-up.    Chief Complaint   Patient presents with   • Rectal Pain   • Constipation     Change Bowel Habits   • Diarrhea   • Chest Pain     Pressure   • Heartburn       History of Present Illness  Patient over the last several months is had intermittent suppressive epigastric pain that radiates up into her chest.  The pain was so severe that she underwent extensive cardiac evaluation up to and including cardiac catheterization and that evidently did not demonstrate any significant disease.  Her gallbladder is present.  She has breast cancer, so far thought to be in remission.  Her last colonoscopy was 2007 and she is due for updated screening.  She finds that Nexium does give her some relief.  She is not aware of anything that could've provoke this pain.    The following portions of the patient's history were reviewed and updated as appropriate: allergies, current medications, past family history, past medical history, past social history, past surgical history and problem list.      Current Outpatient Prescriptions:   •  amitriptyline (ELAVIL) 25 MG tablet, Take 25 mg by mouth every night., Disp: , Rfl:   •  aspirin 81 MG EC tablet, Take 81 mg by mouth Daily., Disp: , Rfl:   •  atorvastatin (LIPITOR) 10 MG tablet, Take 10 mg by mouth daily., Disp: , Rfl:   •  clopidogrel (PLAVIX) 75 MG tablet, Take 75 mg by mouth daily., Disp: , Rfl:   •  diphenhydrAMINE (BENADRYL) 50 MG capsule, Take 1 hour prior to CT scan, Disp: 1 capsule, Rfl: 0  •  esomeprazole (NexIUM) 40 MG capsule, Take 40 mg by mouth every morning before breakfast., Disp: , Rfl:   •  hydrochlorothiazide (HYDRODIURIL) 12.5 MG tablet, Take 12.5 mg by mouth Daily., Disp: , Rfl:   •  levothyroxine (SYNTHROID, LEVOTHROID) 75 MCG tablet, Take 75 mcg by mouth. As Directed, Disp: , Rfl:   •  Magnesium 200 MG tablet, Take  by mouth., Disp: , Rfl:   •  Melatonin 10 MG capsule, Take  by mouth.,  Disp: , Rfl:   •  metoprolol succinate XL (TOPROL-XL) 25 MG 24 hr tablet, Take 25 mg by mouth 2 (two) times a day., Disp: , Rfl:   •  potassium chloride (KLOR-CON) 20 MEQ packet, Take 20 mEq by mouth 2 (Two) Times a Day. Two tabs in the morning and 1 tab in the evening, Disp: , Rfl:     Family History   Problem Relation Age of Onset   • Cancer Mother 68        head and neck   • Stroke Mother    • Heart disease Mother    • Gallbladder disease Mother    • Throat cancer Mother 68   • Breast cancer Sister 57   • Heart disease Sister    • Hypertension Sister    • Cancer Sister    • Gallbladder disease Sister    • Diabetes Sister    • Cancer Brother         head and neck   • Heart disease Brother    • Hypertension Brother    • Gallbladder disease Brother    • Lung cancer Brother 67   • Throat cancer Brother 67   • Hypertension Father    • Gallbladder disease Father    • Emphysema Father    • Heart disease Father         Enlarged heart   • Cancer Maternal Aunt    • Cancer Maternal Uncle    • Colon cancer Maternal Uncle    • Heart attack Brother    • Alcohol abuse Brother    • Hypertension Sister    • Heart disease Sister    • Hypertension Sister    • Heart disease Sister    • Hypertension Sister        Review of Systems   Respiratory: Negative for shortness of breath.    Cardiovascular: Negative for chest pain.   All other systems reviewed and are negative.      Objective   Physical Exam   Constitutional: She appears well-developed and well-nourished.   Abdominal:   Her abdominal examination is benign.   Nursing note and vitals reviewed.      Pertinent laboratory results were reviewed.  and Pertinent old records were reviewed.     Assessment/Plan   Problems Addressed this Visit        Nervous and Auditory    Epigastric pain    Relevant Orders    US Liver    Case Request (Completed)       Other    Encounter for screening for malignant neoplasm of colon - Primary    Relevant Orders    US Liver    Case Request (Completed)         A lot to get a right upper quadrant ultrasound and an EGD to evaluate her episodic nausea and abdominal pain.  She is due for colonoscopy on a screening basis and that will be scheduled as well.

## 2018-10-22 ENCOUNTER — APPOINTMENT (OUTPATIENT)
Dept: ULTRASOUND IMAGING | Facility: HOSPITAL | Age: 73
End: 2018-10-22
Attending: INTERNAL MEDICINE

## 2018-10-22 ENCOUNTER — TELEPHONE (OUTPATIENT)
Dept: GASTROENTEROLOGY | Facility: CLINIC | Age: 73
End: 2018-10-22

## 2018-10-22 NOTE — TELEPHONE ENCOUNTER
----- Message from Adolph Martinez MD sent at 10/18/2018  7:53 PM EDT -----  Stop Plavix for 7 days.   ----- Message -----  From: Tiffanie Kirby MA  Sent: 10/18/2018   4:00 PM  To: Adolph Martinez MD, Monica Sung, RN    [birth year] do you want her to stop Plavix before her EGD and colonoscopy. If so how long?    Will contact patient Cardiologist to be sure it is ok for her to stop Plavix x 7 days.

## 2018-10-23 ENCOUNTER — HOSPITAL ENCOUNTER (OUTPATIENT)
Dept: ULTRASOUND IMAGING | Facility: HOSPITAL | Age: 73
Discharge: HOME | End: 2018-10-23
Attending: INTERNAL MEDICINE | Admitting: INTERNAL MEDICINE

## 2018-10-23 DIAGNOSIS — R10.13 EPIGASTRIC PAIN: ICD-10-CM

## 2018-10-23 DIAGNOSIS — Z12.11 ENCOUNTER FOR SCREENING FOR MALIGNANT NEOPLASM OF COLON: ICD-10-CM

## 2018-10-23 PROCEDURE — 76705 ECHO EXAM OF ABDOMEN: CPT

## 2018-10-25 DIAGNOSIS — R93.89 ABNORMAL FINDING ON RADIOLOGY EXAM: Primary | ICD-10-CM

## 2018-10-25 RX ORDER — PREDNISONE 1 MG/1
10 TABLET ORAL DAILY
Qty: 15 TABLET | Refills: 0 | Status: SHIPPED | OUTPATIENT
Start: 2018-10-25 | End: 2019-04-24

## 2018-10-29 ENCOUNTER — TELEPHONE (OUTPATIENT)
Dept: GASTROENTEROLOGY | Facility: CLINIC | Age: 73
End: 2018-10-29

## 2018-10-29 NOTE — TELEPHONE ENCOUNTER
----- Message from Dakota Menchaca sent at 10/29/2018  2:20 PM EDT -----  Regarding: MRI  Contact: [phone]  PT IS WANTING TO KNOW IF THE MRI WAS EVER SCHEDULED FOR HER. PLEASE CALL HER BACK  Let patient know CT is scheduled for 11-18-18. She is to arrive at 9 am. Prep information sent to patient.

## 2018-11-08 ENCOUNTER — HOSPITAL ENCOUNTER (OUTPATIENT)
Dept: CT IMAGING | Facility: HOSPITAL | Age: 73
Discharge: HOME | End: 2018-11-08
Attending: INTERNAL MEDICINE | Admitting: INTERNAL MEDICINE

## 2018-11-08 DIAGNOSIS — R93.89 ABNORMAL FINDING ON RADIOLOGY EXAM: ICD-10-CM

## 2018-11-08 LAB — CREAT BLDA-MCNC: 0.7 MG/DL (ref 0.6–1.3)

## 2018-11-08 PROCEDURE — 74170 CT ABD WO CNTRST FLWD CNTRST: CPT

## 2018-11-08 PROCEDURE — 25010000002 IOPAMIDOL 61 % SOLUTION: Performed by: INTERNAL MEDICINE

## 2018-11-08 PROCEDURE — 82565 ASSAY OF CREATININE: CPT

## 2018-11-08 RX ADMIN — IOPAMIDOL 85 ML: 612 INJECTION, SOLUTION INTRAVENOUS at 10:05

## 2018-11-09 ENCOUNTER — TELEPHONE (OUTPATIENT)
Dept: GASTROENTEROLOGY | Facility: CLINIC | Age: 73
End: 2018-11-09

## 2018-11-09 NOTE — TELEPHONE ENCOUNTER
Attempted to call pt regarding CT results but no answer.    Reached pt, notified of CT results as reviewed by Dr Martinez:  Nothing suspicious going on, all stable.

## 2018-11-09 NOTE — TELEPHONE ENCOUNTER
----- Message from Adolph Martinez MD sent at 11/9/2018  1:37 PM EST -----  Advise patient the results were normal. Nothing at all suspicious going on here.

## 2018-11-13 ENCOUNTER — HOSPITAL ENCOUNTER (OUTPATIENT)
Facility: HOSPITAL | Age: 73
Setting detail: HOSPITAL OUTPATIENT SURGERY
Discharge: HOME | End: 2018-11-13
Attending: INTERNAL MEDICINE | Admitting: INTERNAL MEDICINE

## 2018-11-13 ENCOUNTER — ANESTHESIA (OUTPATIENT)
Dept: GASTROENTEROLOGY | Facility: HOSPITAL | Age: 73
End: 2018-11-13

## 2018-11-13 ENCOUNTER — ANESTHESIA EVENT (OUTPATIENT)
Dept: GASTROENTEROLOGY | Facility: HOSPITAL | Age: 73
End: 2018-11-13

## 2018-11-13 VITALS
OXYGEN SATURATION: 95 % | SYSTOLIC BLOOD PRESSURE: 108 MMHG | TEMPERATURE: 98.4 F | RESPIRATION RATE: 18 BRPM | DIASTOLIC BLOOD PRESSURE: 62 MMHG | BODY MASS INDEX: 28.17 KG/M2 | WEIGHT: 154 LBS | HEART RATE: 71 BPM

## 2018-11-13 DIAGNOSIS — Z12.11 ENCOUNTER FOR SCREENING FOR MALIGNANT NEOPLASM OF COLON: ICD-10-CM

## 2018-11-13 DIAGNOSIS — R10.13 EPIGASTRIC PAIN: ICD-10-CM

## 2018-11-13 PROCEDURE — 45378 DIAGNOSTIC COLONOSCOPY: CPT | Performed by: INTERNAL MEDICINE

## 2018-11-13 PROCEDURE — 25010000002 PHENYLEPHRINE PER 1 ML: Performed by: ANESTHESIOLOGY

## 2018-11-13 PROCEDURE — 25010000002 PROPOFOL 10 MG/ML EMULSION: Performed by: ANESTHESIOLOGY

## 2018-11-13 PROCEDURE — 88305 TISSUE EXAM BY PATHOLOGIST: CPT | Performed by: INTERNAL MEDICINE

## 2018-11-13 PROCEDURE — 43239 EGD BIOPSY SINGLE/MULTIPLE: CPT | Performed by: INTERNAL MEDICINE

## 2018-11-13 RX ORDER — LIDOCAINE HYDROCHLORIDE 20 MG/ML
INJECTION, SOLUTION INFILTRATION; PERINEURAL AS NEEDED
Status: DISCONTINUED | OUTPATIENT
Start: 2018-11-13 | End: 2018-11-13 | Stop reason: SURG

## 2018-11-13 RX ORDER — PROPOFOL 10 MG/ML
VIAL (ML) INTRAVENOUS CONTINUOUS PRN
Status: DISCONTINUED | OUTPATIENT
Start: 2018-11-13 | End: 2018-11-13 | Stop reason: SURG

## 2018-11-13 RX ORDER — PROPOFOL 10 MG/ML
VIAL (ML) INTRAVENOUS AS NEEDED
Status: DISCONTINUED | OUTPATIENT
Start: 2018-11-13 | End: 2018-11-13 | Stop reason: SURG

## 2018-11-13 RX ORDER — SODIUM CHLORIDE, SODIUM LACTATE, POTASSIUM CHLORIDE, CALCIUM CHLORIDE 600; 310; 30; 20 MG/100ML; MG/100ML; MG/100ML; MG/100ML
1000 INJECTION, SOLUTION INTRAVENOUS CONTINUOUS
Status: DISCONTINUED | OUTPATIENT
Start: 2018-11-13 | End: 2018-11-13 | Stop reason: HOSPADM

## 2018-11-13 RX ADMIN — PHENYLEPHRINE HYDROCHLORIDE 100 MCG: 10 INJECTION INTRAVENOUS at 10:51

## 2018-11-13 RX ADMIN — EPHEDRINE SULFATE 10 MG: 50 INJECTION INTRAMUSCULAR; INTRAVENOUS; SUBCUTANEOUS at 10:55

## 2018-11-13 RX ADMIN — LIDOCAINE HYDROCHLORIDE 50 MG: 20 INJECTION, SOLUTION INFILTRATION; PERINEURAL at 10:38

## 2018-11-13 RX ADMIN — PROPOFOL 140 MCG/KG/MIN: 10 INJECTION, EMULSION INTRAVENOUS at 10:38

## 2018-11-13 RX ADMIN — PHENYLEPHRINE HYDROCHLORIDE 100 MCG: 10 INJECTION INTRAVENOUS at 11:05

## 2018-11-13 RX ADMIN — EPHEDRINE SULFATE 10 MG: 50 INJECTION INTRAMUSCULAR; INTRAVENOUS; SUBCUTANEOUS at 10:47

## 2018-11-13 RX ADMIN — PHENYLEPHRINE HYDROCHLORIDE 100 MCG: 10 INJECTION INTRAVENOUS at 10:55

## 2018-11-13 RX ADMIN — EPHEDRINE SULFATE 10 MG: 50 INJECTION INTRAMUSCULAR; INTRAVENOUS; SUBCUTANEOUS at 10:50

## 2018-11-13 RX ADMIN — SODIUM CHLORIDE, POTASSIUM CHLORIDE, SODIUM LACTATE AND CALCIUM CHLORIDE 1000 ML: 600; 310; 30; 20 INJECTION, SOLUTION INTRAVENOUS at 10:21

## 2018-11-13 RX ADMIN — PROPOFOL 125 MG: 10 INJECTION, EMULSION INTRAVENOUS at 10:38

## 2018-11-13 RX ADMIN — PHENYLEPHRINE HYDROCHLORIDE 100 MCG: 10 INJECTION INTRAVENOUS at 11:00

## 2018-11-13 NOTE — ANESTHESIA POSTPROCEDURE EVALUATION
Patient: Carlotta Pires    Procedure Summary     Date:  11/13/18 Room / Location:   [facility]    Anesthesia Start:  1031 Anesthesia Stop:  1116    Procedures:       ESOPHAGOGASTRODUODENOSCOPY with bx (N/A Esophagus)      COLONOSCOPY to cecum (N/A ) Diagnosis:       Epigastric pain      Encounter for screening for malignant neoplasm of colon      (Epigastric pain [R10.13])      (Encounter for screening for malignant neoplasm of colon [Z12.11])    Surgeon:  Adolph Martinez MD Provider:  Dane Parr MD    Anesthesia Type:  MAC ASA Status:  3          Anesthesia Type: MAC  Last vitals  BP   (!) 86/27 (11/13/18 1119)   Temp   36.9 °C (98.4 °F) (11/13/18 1007)   Pulse   75 (11/13/18 1119)   Resp   14 (11/13/18 1119)     SpO2   98 % (11/13/18 1119)     Post Anesthesia Care and Evaluation    Patient location during evaluation: bedside  Patient participation: complete - patient participated  Level of consciousness: awake and alert  Pain score: 0  Pain management: adequate  Airway patency: patent  Anesthetic complications: No anesthetic complications  PONV Status: none  Cardiovascular status: acceptable  Respiratory status: acceptable  Hydration status: acceptable  Post Neuraxial Block status: Motor and sensory function returned to baseline

## 2018-11-13 NOTE — ANESTHESIA PREPROCEDURE EVALUATION
Anesthesia Evaluation     Patient summary reviewed                Airway   Mallampati: III  TM distance: >3 FB  Neck ROM: full  No difficulty expected and Possible difficult intubation  Dental    (+) partials    Pulmonary     breath sounds clear to auscultation  Cardiovascular   Exercise tolerance: good (4-7 METS)    Rhythm: regular  Rate: normal        Neuro/Psych  GI/Hepatic/Renal/Endo      Musculoskeletal     Abdominal    Substance History      OB/GYN          Other                        Anesthesia Plan    ASA 3     MAC     intravenous induction   Anesthetic plan, all risks, benefits, and alternatives have been provided, discussed and informed consent has been obtained with: patient.

## 2018-11-14 LAB
CYTO UR: NORMAL
LAB AP CASE REPORT: NORMAL
PATH REPORT.FINAL DX SPEC: NORMAL
PATH REPORT.GROSS SPEC: NORMAL

## 2018-11-26 ENCOUNTER — OFFICE VISIT (OUTPATIENT)
Dept: ONCOLOGY | Facility: CLINIC | Age: 73
End: 2018-11-26

## 2018-11-26 ENCOUNTER — LAB (OUTPATIENT)
Dept: LAB | Facility: HOSPITAL | Age: 73
End: 2018-11-26

## 2018-11-26 VITALS
WEIGHT: 156.6 LBS | BODY MASS INDEX: 28.82 KG/M2 | OXYGEN SATURATION: 99 % | HEART RATE: 78 BPM | RESPIRATION RATE: 14 BRPM | HEIGHT: 62 IN | TEMPERATURE: 98 F | DIASTOLIC BLOOD PRESSURE: 76 MMHG | SYSTOLIC BLOOD PRESSURE: 153 MMHG

## 2018-11-26 DIAGNOSIS — Z17.0 MALIGNANT NEOPLASM OF LOWER-OUTER QUADRANT OF LEFT BREAST OF FEMALE, ESTROGEN RECEPTOR POSITIVE (HCC): Primary | ICD-10-CM

## 2018-11-26 DIAGNOSIS — C50.512 MALIGNANT NEOPLASM OF LOWER-OUTER QUADRANT OF LEFT BREAST OF FEMALE, ESTROGEN RECEPTOR POSITIVE (HCC): Primary | ICD-10-CM

## 2018-11-26 DIAGNOSIS — C50.512 MALIGNANT NEOPLASM OF LOWER-OUTER QUADRANT OF LEFT BREAST OF FEMALE, ESTROGEN RECEPTOR POSITIVE (HCC): ICD-10-CM

## 2018-11-26 DIAGNOSIS — Z17.0 MALIGNANT NEOPLASM OF LOWER-OUTER QUADRANT OF LEFT BREAST OF FEMALE, ESTROGEN RECEPTOR POSITIVE (HCC): ICD-10-CM

## 2018-11-26 LAB
BASOPHILS # BLD AUTO: 0.04 10*3/MM3 (ref 0–0.1)
BASOPHILS NFR BLD AUTO: 0.7 % (ref 0–1.1)
DEPRECATED RDW RBC AUTO: 42.5 FL (ref 37–49)
EOSINOPHIL # BLD AUTO: 0.2 10*3/MM3 (ref 0–0.36)
EOSINOPHIL NFR BLD AUTO: 3.7 % (ref 1–5)
ERYTHROCYTE [DISTWIDTH] IN BLOOD BY AUTOMATED COUNT: 13.2 % (ref 11.7–14.5)
HCT VFR BLD AUTO: 39.9 % (ref 34–45)
HGB BLD-MCNC: 13.7 G/DL (ref 11.5–14.9)
IMM GRANULOCYTES # BLD: 0.02 10*3/MM3 (ref 0–0.03)
IMM GRANULOCYTES NFR BLD: 0.4 % (ref 0–0.5)
LYMPHOCYTES # BLD AUTO: 1.41 10*3/MM3 (ref 1–3.5)
LYMPHOCYTES NFR BLD AUTO: 26.2 % (ref 20–49)
MCH RBC QN AUTO: 30 PG (ref 27–33)
MCHC RBC AUTO-ENTMCNC: 34.3 G/DL (ref 32–35)
MCV RBC AUTO: 87.5 FL (ref 83–97)
MONOCYTES # BLD AUTO: 0.61 10*3/MM3 (ref 0.25–0.8)
MONOCYTES NFR BLD AUTO: 11.3 % (ref 4–12)
NEUTROPHILS # BLD AUTO: 3.11 10*3/MM3 (ref 1.5–7)
NEUTROPHILS NFR BLD AUTO: 57.7 % (ref 39–75)
NRBC BLD MANUAL-RTO: 0 /100 WBC (ref 0–0)
PLATELET # BLD AUTO: 211 10*3/MM3 (ref 150–375)
PMV BLD AUTO: 9.9 FL (ref 8.9–12.1)
RBC # BLD AUTO: 4.56 10*6/MM3 (ref 3.9–5)
WBC NRBC COR # BLD: 5.39 10*3/MM3 (ref 4–10)

## 2018-11-26 PROCEDURE — 99214 OFFICE O/P EST MOD 30 MIN: CPT | Performed by: INTERNAL MEDICINE

## 2018-11-26 PROCEDURE — 85025 COMPLETE CBC W/AUTO DIFF WBC: CPT | Performed by: INTERNAL MEDICINE

## 2018-11-26 PROCEDURE — 36415 COLL VENOUS BLD VENIPUNCTURE: CPT | Performed by: INTERNAL MEDICINE

## 2018-11-26 NOTE — PROGRESS NOTES
Subjective .     REASONS FOR FOLLOWUP:  Breast cancer    HISTORY OF PRESENT ILLNESS:  The patient is a 73 y.o. year old female  who is here for follow-up with the above-mentioned history.    Denies pain other than heartburn for which she saw Dr. Earnest Martinez and had an EGD and colonoscopy.  The heartburn is now better.    Denies shortness of breath or weight change.  No significant problems with nausea.    Past Medical History:   Diagnosis Date   • Arthritis    • Cancer (CMS/HCC)     Left breast cancer   • CHF (congestive heart failure) (CMS/HCC)    • Disease of thyroid gland     Hypothyroidism   • GERD (gastroesophageal reflux disease)    • H/O TIA (transient ischemic attack) and stroke 2005   • Hyperlipidemia    • Hypertension    • Peptic ulceration    • Stroke (CMS/HCC) 2005    CVA   • Trigeminal neuralgia      Past Surgical History:   Procedure Laterality Date   • BREAST BIOPSY Left 2011   • COLONOSCOPY  09/28/2012    Adolph Martinez MD   • EYE SURGERY     • HYSTERECTOMY      At age 31   • MASTECTOMY Left 06/30/2011    Dr. Kaitlyn Luna   • TUBAL ABDOMINAL LIGATION  1974   • UPPER GASTROINTESTINAL ENDOSCOPY  09/28/2012    Adolph Martinez MD       HEMATOLOGIC/ONCOLOGIC HISTORY:  (History from previous dates can be found in the separate document.)    MEDICATIONS    Current Outpatient Medications:   •  amitriptyline (ELAVIL) 25 MG tablet, Take 25 mg by mouth every night., Disp: , Rfl:   •  aspirin 81 MG EC tablet, Take 81 mg by mouth Daily., Disp: , Rfl:   •  atorvastatin (LIPITOR) 10 MG tablet, Take 10 mg by mouth daily., Disp: , Rfl:   •  clopidogrel (PLAVIX) 75 MG tablet, Take 75 mg by mouth daily., Disp: , Rfl:   •  diphenhydrAMINE (BENADRYL) 50 MG capsule, Take 1 hour prior to CT scan, Disp: 1 capsule, Rfl: 0  •  esomeprazole (NexIUM) 40 MG capsule, Take 40 mg by mouth every morning before breakfast., Disp: , Rfl:   •  hydrochlorothiazide (HYDRODIURIL) 12.5 MG tablet, Take 12.5 mg by mouth Daily., Disp: ,  Rfl:   •  levothyroxine (SYNTHROID, LEVOTHROID) 75 MCG tablet, Take 75 mcg by mouth. As Directed, Disp: , Rfl:   •  Magnesium 200 MG tablet, Take  by mouth., Disp: , Rfl:   •  Melatonin 10 MG capsule, Take  by mouth., Disp: , Rfl:   •  metoprolol succinate XL (TOPROL-XL) 25 MG 24 hr tablet, Take 25 mg by mouth 2 (two) times a day., Disp: , Rfl:   •  potassium chloride (KLOR-CON) 20 MEQ packet, Take 20 mEq by mouth 2 (Two) Times a Day. Two tabs in the morning and 1 tab in the evening, Disp: , Rfl:   •  predniSONE (DELTASONE) 5 MG tablet, Take 2 tablets by mouth Daily., Disp: 15 tablet, Rfl: 0    ALLERGIES:     Allergies   Allergen Reactions   • Lisinopril Cough   • Contrast Dye Itching and Rash     IVP Dye   • Tartrazine Hives       SOCIAL HISTORY:       Social History     Socioeconomic History   • Marital status:      Spouse name: Jack   • Number of children: 2   • Years of education: High School   • Highest education level: Not on file   Social Needs   • Financial resource strain: Not on file   • Food insecurity - worry: Not on file   • Food insecurity - inability: Not on file   • Transportation needs - medical: Not on file   • Transportation needs - non-medical: Not on file   Occupational History     Employer: RETIRED   Tobacco Use   • Smoking status: Never Smoker   • Smokeless tobacco: Never Used   Substance and Sexual Activity   • Alcohol use: Yes     Comment: rare   • Drug use: No   • Sexual activity: Defer   Other Topics Concern   • Not on file   Social History Narrative   • Not on file         FAMILY HISTORY:  Family History   Problem Relation Age of Onset   • Cancer Mother 68        head and neck   • Stroke Mother    • Heart disease Mother    • Gallbladder disease Mother    • Throat cancer Mother 68   • Breast cancer Sister 57   • Heart disease Sister    • Hypertension Sister    • Cancer Sister    • Gallbladder disease Sister    • Diabetes Sister    • Cancer Brother         head and neck   • Heart  "disease Brother    • Hypertension Brother    • Gallbladder disease Brother    • Lung cancer Brother 67   • Throat cancer Brother 67   • Hypertension Father    • Gallbladder disease Father    • Emphysema Father    • Heart disease Father         Enlarged heart   • Cancer Maternal Aunt    • Cancer Maternal Uncle    • Colon cancer Maternal Uncle    • Heart attack Brother    • Alcohol abuse Brother    • Hypertension Sister    • Heart disease Sister    • Hypertension Sister    • Heart disease Sister    • Hypertension Sister        REVIEW OF SYSTEMS:  Review of Systems   Constitutional: Negative for activity change.   HENT: Negative for nosebleeds and trouble swallowing.    Respiratory: Negative for shortness of breath and wheezing.    Cardiovascular: Negative for chest pain and palpitations.   Gastrointestinal: Negative for constipation, diarrhea and nausea.   Genitourinary: Negative for dysuria and hematuria.   Musculoskeletal: Negative for arthralgias and myalgias.   Skin: Negative for rash and wound.   Neurological: Negative for seizures and syncope.   Hematological: Negative for adenopathy. Does not bruise/bleed easily.   Psychiatric/Behavioral: Negative for confusion.           Objective    Vitals:    11/26/18 1154   BP: 153/76   Pulse: 78   Resp: 14   Temp: 98 °F (36.7 °C)   TempSrc: Oral   SpO2: 99%   Weight: 71 kg (156 lb 9.6 oz)   Height: 157.2 cm (61.9\")  Comment: new ht   PainSc: 0-No pain     Current Status 11/26/2018   ECOG score 0      PHYSICAL EXAM:    CONSTITUTIONAL:  Vital signs reviewed.  No distress, looks comfortable.  EYES:  Conjunctiva and lids unremarkable.  PERRLA  EARS,NOSE,MOUTH,THROAT:  Ears and nose appear unremarkable.  Lips, teeth, gums appear unremarkable.  RESPIRATORY:  Normal respiratory effort.  Lungs clear to auscultation bilaterally.  CARDIOVASCULAR:  Normal S1, S2.  No murmurs rubs or gallops.  No significant lower extremity edema.  BREAST: no masses by palpation or inspection "   GASTROINTESTINAL: Abdomen appears unremarkable.  Nontender.  No hepatomegaly.  No splenomegaly.  LYMPHATIC:  No cervical, supraclavicular, axillary lymphadenopathy.  SKIN:  Warm.  No rashes.  PSYCHIATRIC:  Normal judgment and insight.  Normal mood and affect.    RECENT LABS:        WBC   Date/Time Value Ref Range Status   11/26/2018 11:37 AM 5.39 4.00 - 10.00 10*3/mm3 Final     Hemoglobin   Date/Time Value Ref Range Status   11/26/2018 11:37 AM 13.7 11.5 - 14.9 g/dL Final     Platelets   Date/Time Value Ref Range Status   11/26/2018 11:37  150 - 375 10*3/mm3 Final       Assessment/Plan     ASSESSMENT:  Problem List Items Addressed This Visit     None         *Stage IIIA, grade 3, 3.9 cm left breast cancer. Four out of 24 nodes positive. ER 2%, FL negative, HER2 negative. Completed FEC x3 followed by Taxotere x3, 11/28/2011. Completed radiation in February 2012.   Poor tolerance to anastrozole and letrozole.  completed 5 years hormonal therapy using Aromasin, 1/31/17.  (She was initially a patient at [facility]. She transferred here as her son-in-law, Jeremiah Dumont, is a patient here).   Remission continues.    *Chronic right low back/hip pain radiating anteriorly.  In May 2016, CAT scan and bone scan negative for cancer as the cause of the pain.  Defer further management of this to her PCP.  She did not complain of this today.    *In the past, she has complained of: Forgetfulness and transposition of numbers when she is writing numbers at times. This had been present since around April 2015. She states it is not worsening.   She did not complain of this today.    *History of stroke around 2005. Because of this I do not think she would be a good candidate for tamoxifen.     *Osteopenia, which has improved to normal bone density.  DEXA 5/17/16 normal bone density.  T score -0.6, compared to.   -1.1 2/20/14.  She is on calcium and vitamin D.  Defer further management of this to her PCP now that she is off aromatase  inhibitors.    *Right upper quadrant/right lower anterior rib pain.  Present since October 2016.    Due to RUQ abdominal pain/right lower anterior rib pain since October 2016, bone scan and CT abdomen with contrast 2/2/17: No evidence of recurrence.  (8 mm low-attenuation liver lesion seen on the 5/17/16 CT but less conspicuous on order CT.  This was felt to be due to older CT without contrast.  This was felt to likely be benign.  Plan no further scheduled follow-up of this-patient agrees.).  She did not complain of this pain today.    *Hypokalemia. Her PCP manages this.      *IV contrast allergy.  Previous rash.  Receives IV contrast pre-medicines with CT IV contrast.  (This has worked well in the past)    *Left arm lymphedema due to prior surgery for breast cancer.  She was seen by the lymphedema clinic but has had some logistical issues obtaining the sleeve they prescribed.  I encouraged her to continue to call the lymphedema clinic for assistance with this.    *Previously complained of vertigo and was referred to her ENT.    *Heartburn.  Had EGD and colonoscopy by Dr. Martinez November 2018.  No malignancy was found.  Reports reviewed and the pathology reviewed.    PLAN:   · M.D. CBC 6 months  · Last mammogram 12/12/17, BI-RADS 2.    · (We order her mammograms)  · Off hormonal therapy

## 2018-12-13 ENCOUNTER — APPOINTMENT (OUTPATIENT)
Dept: WOMENS IMAGING | Facility: HOSPITAL | Age: 73
End: 2018-12-13

## 2018-12-13 PROCEDURE — 77067 SCR MAMMO BI INCL CAD: CPT | Performed by: RADIOLOGY

## 2018-12-13 PROCEDURE — 77063 BREAST TOMOSYNTHESIS BI: CPT | Performed by: RADIOLOGY

## 2019-04-24 ENCOUNTER — OFFICE VISIT (OUTPATIENT)
Dept: CARDIOLOGY | Facility: CLINIC | Age: 74
End: 2019-04-24

## 2019-04-24 VITALS
HEART RATE: 75 BPM | HEIGHT: 62 IN | BODY MASS INDEX: 29.3 KG/M2 | DIASTOLIC BLOOD PRESSURE: 100 MMHG | WEIGHT: 159.2 LBS | SYSTOLIC BLOOD PRESSURE: 158 MMHG

## 2019-04-24 DIAGNOSIS — I10 ESSENTIAL HYPERTENSION: Primary | ICD-10-CM

## 2019-04-24 DIAGNOSIS — I42.0 DILATED CARDIOMYOPATHY (HCC): ICD-10-CM

## 2019-04-24 PROCEDURE — 99204 OFFICE O/P NEW MOD 45 MIN: CPT | Performed by: INTERNAL MEDICINE

## 2019-04-24 PROCEDURE — 93000 ELECTROCARDIOGRAM COMPLETE: CPT | Performed by: INTERNAL MEDICINE

## 2019-04-24 RX ORDER — LOSARTAN POTASSIUM 50 MG/1
50 TABLET ORAL DAILY
Qty: 90 TABLET | Refills: 3 | Status: SHIPPED | OUTPATIENT
Start: 2019-04-24 | End: 2019-05-09 | Stop reason: SDUPTHER

## 2019-04-24 NOTE — PROGRESS NOTES
Date of Office Visit: 2019  Encounter Provider: Reginald Barker MD  Place of Service: [facility] CARDIOLOGY  Patient Name: Carlotta Pires  :1945  [id number]    Chief Complaint   Patient presents with   • Cardiomyopathy   :     HPI: Carlotta Pires is a 74 y.o. female she is here as a new patient she previously seen Dr. Lemos but is switching care over to us.  She has a little bit of an interesting story that sounds like she had some chest pain at one point had a cath that was normal she says that she had heart failure and had a cardiomyopathy at one point but does not have PND orthopnea or edema now she is under a lot of stress she says better and that is why she thinks her blood pressure is a little bit high she has had prior metastatic breast cancer treated with chemo of which for sure she received Adriamycin.  She is not having syncope palpitations chest pain now her daughter is quite ill and she has been taking care of her for a long period of time    Past Medical History:   Diagnosis Date   • Allergic rhinitis    • Arthritis    • Cancer (CMS/HCC)     Left breast cancer   • Chest pain    • CHF (congestive heart failure) (CMS/HCC)    • Cough    • Degenerative arthritis of thumb    • Disease of thyroid gland     Hypothyroidism   • Encounter for removal of sutures    • GERD (gastroesophageal reflux disease)    • H/O TIA (transient ischemic attack) and stroke    • Hyperkalemia    • Hyperlipidemia    • Hypertension    • Hypothyroidism    • Insomnia    • Lumbar radiculopathy    • Osteoporosis    • Palpitations    • Peptic ulceration    • Personal history of malignant neoplasm of breast    • Scalp laceration    • Sciatica    • Stroke (CMS/HCC)     CVA   • Trigeminal neuralgia    • Trigeminal neuralgia        Past Surgical History:   Procedure Laterality Date   • BREAST BIOPSY Left    • COLONOSCOPY  2012    Adolph Martinez MD   • COLONOSCOPY N/A 2018     Procedure: COLONOSCOPY to cecum;  Surgeon: Adolph Martinez MD;  Location: [facility];  Service: Gastroenterology   • ENDOSCOPY N/A 11/13/2018    Procedure: ESOPHAGOGASTRODUODENOSCOPY with bx;  Surgeon: Adolph Martinez MD;  Location: [facility];  Service: Gastroenterology   • EYE SURGERY     • HYSTERECTOMY      At age 31   • MASTECTOMY Left 06/30/2011    Dr. Kaitlyn Luna   • TUBAL ABDOMINAL LIGATION  1974   • UPPER GASTROINTESTINAL ENDOSCOPY  09/28/2012    Adolph Martinez MD       Social History     Socioeconomic History   • Marital status:      Spouse name: Jack   • Number of children: 2   • Years of education: High School   • Highest education level: Not on file   Occupational History     Employer: RETIRED   Tobacco Use   • Smoking status: Never Smoker   • Smokeless tobacco: Never Used   Substance and Sexual Activity   • Alcohol use: Yes     Comment: rare   • Drug use: No   • Sexual activity: Defer       Family History   Problem Relation Age of Onset   • Cancer Mother 68        head and neck   • Stroke Mother    • Heart disease Mother    • Gallbladder disease Mother    • Throat cancer Mother 68   • Breast cancer Sister 57   • Heart disease Sister    • Hypertension Sister    • Cancer Sister    • Gallbladder disease Sister    • Diabetes Sister    • Cancer Brother         head and neck   • Heart disease Brother    • Hypertension Brother    • Gallbladder disease Brother    • Lung cancer Brother 67   • Throat cancer Brother 67   • Hypertension Father    • Gallbladder disease Father    • Emphysema Father    • Heart disease Father         Enlarged heart   • Cancer Maternal Aunt    • Cancer Maternal Uncle    • Colon cancer Maternal Uncle    • Heart attack Brother    • Alcohol abuse Brother    • Hypertension Sister    • Heart disease Sister    • Hypertension Sister    • Heart disease Sister    • Hypertension Sister        Review of Systems   Constitution: Negative for decreased appetite, fever,  malaise/fatigue and weight loss.   HENT: Negative for nosebleeds.    Eyes: Negative for double vision.   Cardiovascular: Negative for chest pain, claudication, cyanosis, dyspnea on exertion, irregular heartbeat, leg swelling, near-syncope, orthopnea, palpitations, paroxysmal nocturnal dyspnea and syncope.   Respiratory: Negative for cough, hemoptysis and shortness of breath.    Hematologic/Lymphatic: Negative for bleeding problem.   Skin: Negative for rash.   Musculoskeletal: Negative for falls and myalgias.   Gastrointestinal: Negative for hematochezia, jaundice, melena, nausea and vomiting.   Genitourinary: Negative for hematuria.   Neurological: Negative for dizziness and seizures.   Psychiatric/Behavioral: Negative for altered mental status and memory loss.       Allergies   Allergen Reactions   • Lisinopril Cough   • Contrast Dye Itching and Rash     IVP Dye   • Tartrazine Hives         Current Outpatient Medications:   •  amitriptyline (ELAVIL) 25 MG tablet, Take 25 mg by mouth every night., Disp: , Rfl:   •  aspirin 81 MG EC tablet, Take 81 mg by mouth Daily., Disp: , Rfl:   •  atorvastatin (LIPITOR) 10 MG tablet, Take 10 mg by mouth daily., Disp: , Rfl:   •  clopidogrel (PLAVIX) 75 MG tablet, Take 75 mg by mouth daily., Disp: , Rfl:   •  diphenhydrAMINE (BENADRYL) 50 MG capsule, Take 1 hour prior to CT scan, Disp: 1 capsule, Rfl: 0  •  esomeprazole (NexIUM) 40 MG capsule, Take 40 mg by mouth every morning before breakfast., Disp: , Rfl:   •  hydrochlorothiazide (HYDRODIURIL) 12.5 MG tablet, Take 12.5 mg by mouth Daily., Disp: , Rfl:   •  levothyroxine (SYNTHROID, LEVOTHROID) 75 MCG tablet, Take 75 mcg by mouth. As Directed, Disp: , Rfl:   •  Melatonin 10 MG capsule, Take  by mouth., Disp: , Rfl:   •  metoprolol succinate XL (TOPROL-XL) 25 MG 24 hr tablet, Take 50 mg by mouth 2 (Two) Times a Day., Disp: , Rfl:   •  losartan (COZAAR) 50 MG tablet, Take 1 tablet by mouth Daily., Disp: 90 tablet, Rfl: 3     "  Objective:     Vitals:    04/24/19 1153   BP: 158/100   Pulse: 75   Weight: 72.2 kg (159 lb 3.2 oz)   Height: 156.2 cm (61.5\")     Body mass index is 29.59 kg/m².    Physical Exam   Constitutional: She is oriented to person, place, and time. She appears well-developed and well-nourished.   HENT:   Head: Normocephalic.   Eyes: No scleral icterus.   Neck: No JVD present. No thyromegaly present.   Cardiovascular: Normal rate, regular rhythm and normal heart sounds. Exam reveals no gallop and no friction rub.   No murmur heard.  Pulmonary/Chest: Effort normal and breath sounds normal. She has no wheezes. She has no rales.   Abdominal: Soft. There is no hepatosplenomegaly. There is no tenderness.   Musculoskeletal: Normal range of motion. She exhibits no edema.   Lymphadenopathy:     She has no cervical adenopathy.   Neurological: She is alert and oriented to person, place, and time.   Skin: Skin is warm and dry. No rash noted.   Psychiatric: She has a normal mood and affect.         ECG 12 Lead  Date/Time: 4/24/2019 12:40 PM  Performed by: Reginald Barker MD  Authorized by: Reginald Barker MD   Previous ECG: no previous ECG available  Rhythm: sinus rhythm  Other findings: non-specific ST-T wave changes    Clinical impression: abnormal EKG             Assessment:       Diagnosis Plan   1. Essential hypertension  Adult Transthoracic Echo Complete W/ Cont if Necessary Per Protocol    Basic Metabolic Panel   2. Dilated cardiomyopathy (CMS/HCC)  Adult Transthoracic Echo Complete W/ Cont if Necessary Per Protocol    Basic Metabolic Panel          Plan:       I think she is probably doing pretty well although she has this history of cardiomyopathy and received Adriamycin before.  I am going to ask that we get an echo on her and see what her LV function is like.  I am going to start her on losartan because her blood pressure is high.  That may also help her hold onto potassium.  She had had some low potassium and magnesium " which is probably related to her diuretic.  If that recurs, we may have to adjust that.  I will have her come back and see me in a year or sooner if she has trouble.          As always, it has been a pleasure to participate in your patient's care.      Sincerely,       Reginald Barker MD

## 2019-05-03 ENCOUNTER — HOSPITAL ENCOUNTER (OUTPATIENT)
Dept: CARDIOLOGY | Facility: HOSPITAL | Age: 74
Discharge: HOME | End: 2019-05-03
Admitting: INTERNAL MEDICINE

## 2019-05-03 VITALS
BODY MASS INDEX: 29.26 KG/M2 | WEIGHT: 159 LBS | DIASTOLIC BLOOD PRESSURE: 60 MMHG | HEIGHT: 62 IN | SYSTOLIC BLOOD PRESSURE: 110 MMHG

## 2019-05-03 DIAGNOSIS — I10 ESSENTIAL HYPERTENSION: ICD-10-CM

## 2019-05-03 DIAGNOSIS — I42.0 DILATED CARDIOMYOPATHY (HCC): ICD-10-CM

## 2019-05-03 PROCEDURE — 93306 TTE W/DOPPLER COMPLETE: CPT | Performed by: INTERNAL MEDICINE

## 2019-05-03 PROCEDURE — 25010000002 PERFLUTREN (DEFINITY) 8.476 MG IN SODIUM CHLORIDE 0.9 % 10 ML INJECTION: Performed by: INTERNAL MEDICINE

## 2019-05-03 PROCEDURE — 0399T ADULT TRANSTHORACIC ECHO COMPLETE W/ CONT IF NECESSARY PER PROTOCOL: CPT | Performed by: INTERNAL MEDICINE

## 2019-05-03 PROCEDURE — 0399T HC MYOCARDL STRAIN IMAG QUAN ASSMT PER SESS: CPT

## 2019-05-03 PROCEDURE — 93306 TTE W/DOPPLER COMPLETE: CPT

## 2019-05-03 RX ADMIN — PERFLUTREN 1.5 ML: 6.52 INJECTION, SUSPENSION INTRAVENOUS at 13:26

## 2019-05-06 LAB
ASCENDING AORTA: 2.5 CM
BH CV ECHO MEAS - ACS: 2 CM
BH CV ECHO MEAS - AO MAX PG (FULL): 0.99 MMHG
BH CV ECHO MEAS - AO MAX PG: 3.1 MMHG
BH CV ECHO MEAS - AO MEAN PG (FULL): 0.78 MMHG
BH CV ECHO MEAS - AO MEAN PG: 1.9 MMHG
BH CV ECHO MEAS - AO ROOT AREA (BSA CORRECTED): 1.9
BH CV ECHO MEAS - AO ROOT AREA: 8.8 CM^2
BH CV ECHO MEAS - AO ROOT DIAM: 3.3 CM
BH CV ECHO MEAS - AO V2 MAX: 88.6 CM/SEC
BH CV ECHO MEAS - AO V2 MEAN: 66.6 CM/SEC
BH CV ECHO MEAS - AO V2 VTI: 24.1 CM
BH CV ECHO MEAS - AVA(I,A): 2.3 CM^2
BH CV ECHO MEAS - AVA(I,D): 2.3 CM^2
BH CV ECHO MEAS - AVA(V,A): 2.7 CM^2
BH CV ECHO MEAS - AVA(V,D): 2.7 CM^2
BH CV ECHO MEAS - BSA(HAYCOCK): 1.8 M^2
BH CV ECHO MEAS - BSA: 1.7 M^2
BH CV ECHO MEAS - BZI_BMI: 29.1 KILOGRAMS/M^2
BH CV ECHO MEAS - BZI_METRIC_HEIGHT: 157.5 CM
BH CV ECHO MEAS - BZI_METRIC_WEIGHT: 72.1 KG
BH CV ECHO MEAS - EDV(MOD-SP2): 95 ML
BH CV ECHO MEAS - EDV(MOD-SP4): 89 ML
BH CV ECHO MEAS - EF(MOD-BP): 56 %
BH CV ECHO MEAS - EF(MOD-SP2): 54.7 %
BH CV ECHO MEAS - EF(MOD-SP4): 57.3 %
BH CV ECHO MEAS - ESV(MOD-SP2): 43 ML
BH CV ECHO MEAS - ESV(MOD-SP4): 38 ML
BH CV ECHO MEAS - IVSD: 0.9 CM
BH CV ECHO MEAS - LAT PEAK E' VEL: 8 CM/SEC
BH CV ECHO MEAS - LV DIASTOLIC VOL/BSA (35-75): 51.3 ML/M^2
BH CV ECHO MEAS - LV MAX PG: 2.1 MMHG
BH CV ECHO MEAS - LV MEAN PG: 1.1 MMHG
BH CV ECHO MEAS - LV SYSTOLIC VOL/BSA (12-30): 21.9 ML/M^2
BH CV ECHO MEAS - LV V1 MAX: 73.2 CM/SEC
BH CV ECHO MEAS - LV V1 MEAN: 50.7 CM/SEC
BH CV ECHO MEAS - LV V1 VTI: 17.5 CM
BH CV ECHO MEAS - LVIDD: 5.6 CM
BH CV ECHO MEAS - LVIDS: 4.5 CM
BH CV ECHO MEAS - LVLD AP2: 6.6 CM
BH CV ECHO MEAS - LVLD AP4: 6.3 CM
BH CV ECHO MEAS - LVLS AP2: 5.1 CM
BH CV ECHO MEAS - LVLS AP4: 5.2 CM
BH CV ECHO MEAS - LVOT AREA (M): 3.1 CM^2
BH CV ECHO MEAS - LVOT AREA: 3.2 CM^2
BH CV ECHO MEAS - LVOT DIAM: 2 CM
BH CV ECHO MEAS - LVPWD: 0.9 CM
BH CV ECHO MEAS - MED PEAK E' VEL: 7 CM/SEC
BH CV ECHO MEAS - MR MAX PG: 15.3 MMHG
BH CV ECHO MEAS - MR MAX VEL: 195.5 CM/SEC
BH CV ECHO MEAS - MV A DUR: 0.11 SEC
BH CV ECHO MEAS - MV A MAX VEL: 82 CM/SEC
BH CV ECHO MEAS - MV DEC SLOPE: 122.8 CM/SEC^2
BH CV ECHO MEAS - MV DEC TIME: 0.36 SEC
BH CV ECHO MEAS - MV E MAX VEL: 44.6 CM/SEC
BH CV ECHO MEAS - MV E/A: 0.54
BH CV ECHO MEAS - MV MAX PG: 3 MMHG
BH CV ECHO MEAS - MV MEAN PG: 0.92 MMHG
BH CV ECHO MEAS - MV P1/2T MAX VEL: 45.1 CM/SEC
BH CV ECHO MEAS - MV P1/2T: 107.6 MSEC
BH CV ECHO MEAS - MV V2 MAX: 86.2 CM/SEC
BH CV ECHO MEAS - MV V2 MEAN: 43.2 CM/SEC
BH CV ECHO MEAS - MV V2 VTI: 16.9 CM
BH CV ECHO MEAS - MVA P1/2T LCG: 4.9 CM^2
BH CV ECHO MEAS - MVA(P1/2T): 2 CM^2
BH CV ECHO MEAS - MVA(VTI): 3.3 CM^2
BH CV ECHO MEAS - PA ACC TIME: 0.15 SEC
BH CV ECHO MEAS - PA MAX PG (FULL): 2.7 MMHG
BH CV ECHO MEAS - PA MAX PG: 3.9 MMHG
BH CV ECHO MEAS - PA PR(ACCEL): 10.9 MMHG
BH CV ECHO MEAS - PA V2 MAX: 98.2 CM/SEC
BH CV ECHO MEAS - PULM A REVS DUR: 0.09 SEC
BH CV ECHO MEAS - PULM A REVS VEL: 35.9 CM/SEC
BH CV ECHO MEAS - PULM DIAS VEL: 43.2 CM/SEC
BH CV ECHO MEAS - PULM S/D: 1.6
BH CV ECHO MEAS - PULM SYS VEL: 66.9 CM/SEC
BH CV ECHO MEAS - PVA(V,A): 1.5 CM^2
BH CV ECHO MEAS - PVA(V,D): 1.5 CM^2
BH CV ECHO MEAS - QP/QS: 0.63
BH CV ECHO MEAS - RV MAX PG: 1.1 MMHG
BH CV ECHO MEAS - RV MEAN PG: 0.64 MMHG
BH CV ECHO MEAS - RV V1 MAX: 52.9 CM/SEC
BH CV ECHO MEAS - RV V1 MEAN: 37.6 CM/SEC
BH CV ECHO MEAS - RV V1 VTI: 12.6 CM
BH CV ECHO MEAS - RVOT AREA: 2.8 CM^2
BH CV ECHO MEAS - RVOT DIAM: 1.9 CM
BH CV ECHO MEAS - SI(AO): 122.3 ML/M^2
BH CV ECHO MEAS - SI(LVOT): 32.6 ML/M^2
BH CV ECHO MEAS - SI(MOD-SP2): 30 ML/M^2
BH CV ECHO MEAS - SI(MOD-SP4): 29.4 ML/M^2
BH CV ECHO MEAS - SUP REN AO DIAM: 1.9 CM
BH CV ECHO MEAS - SV(AO): 212.1 ML
BH CV ECHO MEAS - SV(LVOT): 56.5 ML
BH CV ECHO MEAS - SV(MOD-SP2): 52 ML
BH CV ECHO MEAS - SV(MOD-SP4): 51 ML
BH CV ECHO MEAS - SV(RVOT): 35.3 ML
BH CV ECHO MEAS - TAPSE (>1.6): 1.9 CM2
BH CV ECHO MEASUREMENTS AVERAGE E/E' RATIO: 5.95
BH CV XLRA - RV BASE: 2.9 CM
BH CV XLRA - TDI S': 10 CM/SEC
LEFT ATRIUM VOLUME INDEX: 29 ML/M2
MAXIMAL PREDICTED HEART RATE: 148 BPM
SINUS: 3.1 CM
STJ: 2.3 CM
STRESS TARGET HR: 126 BPM

## 2019-05-09 ENCOUNTER — TELEPHONE (OUTPATIENT)
Dept: CARDIOLOGY | Facility: CLINIC | Age: 74
End: 2019-05-09

## 2019-05-09 RX ORDER — CLOPIDOGREL BISULFATE 75 MG/1
75 TABLET ORAL DAILY
Qty: 90 TABLET | Refills: 3 | Status: SHIPPED | OUTPATIENT
Start: 2019-05-09 | End: 2020-04-09 | Stop reason: SDUPTHER

## 2019-05-09 RX ORDER — LOSARTAN POTASSIUM 50 MG/1
50 TABLET ORAL DAILY
Qty: 90 TABLET | Refills: 3 | Status: SHIPPED | OUTPATIENT
Start: 2019-05-09 | End: 2019-05-09 | Stop reason: SDUPTHER

## 2019-05-09 RX ORDER — HYDROCHLOROTHIAZIDE 12.5 MG/1
12.5 TABLET ORAL DAILY
Qty: 90 TABLET | Refills: 2 | Status: SHIPPED | OUTPATIENT
Start: 2019-05-09 | End: 2019-05-09 | Stop reason: SDUPTHER

## 2019-05-09 RX ORDER — METOPROLOL SUCCINATE 50 MG/1
50 TABLET, EXTENDED RELEASE ORAL 2 TIMES DAILY
Qty: 180 TABLET | Refills: 3 | Status: SHIPPED | OUTPATIENT
Start: 2019-05-09 | End: 2020-04-09 | Stop reason: SDUPTHER

## 2019-05-09 RX ORDER — CLOPIDOGREL BISULFATE 75 MG/1
75 TABLET ORAL DAILY
Qty: 90 TABLET | Refills: 3 | Status: SHIPPED | OUTPATIENT
Start: 2019-05-09 | End: 2019-05-09 | Stop reason: SDUPTHER

## 2019-05-09 RX ORDER — ATORVASTATIN CALCIUM 10 MG/1
10 TABLET, FILM COATED ORAL DAILY
Qty: 90 TABLET | Refills: 3 | Status: SHIPPED | OUTPATIENT
Start: 2019-05-09 | End: 2020-04-09 | Stop reason: SDUPTHER

## 2019-05-09 RX ORDER — METOPROLOL SUCCINATE 50 MG/1
50 TABLET, EXTENDED RELEASE ORAL 2 TIMES DAILY
Qty: 180 TABLET | Refills: 3 | Status: SHIPPED | OUTPATIENT
Start: 2019-05-09 | End: 2019-05-09 | Stop reason: SDUPTHER

## 2019-05-09 RX ORDER — LOSARTAN POTASSIUM 50 MG/1
50 TABLET ORAL DAILY
Qty: 90 TABLET | Refills: 3 | Status: SHIPPED | OUTPATIENT
Start: 2019-05-09 | End: 2020-04-09 | Stop reason: SDUPTHER

## 2019-05-09 RX ORDER — HYDROCHLOROTHIAZIDE 12.5 MG/1
12.5 TABLET ORAL DAILY
Qty: 90 TABLET | Refills: 2 | Status: SHIPPED | OUTPATIENT
Start: 2019-05-09 | End: 2020-04-09 | Stop reason: SDUPTHER

## 2019-05-29 ENCOUNTER — TELEPHONE (OUTPATIENT)
Dept: CARDIOLOGY | Facility: CLINIC | Age: 74
End: 2019-05-29

## 2019-05-29 ENCOUNTER — LAB (OUTPATIENT)
Dept: LAB | Facility: HOSPITAL | Age: 74
End: 2019-05-29

## 2019-05-29 DIAGNOSIS — I10 ESSENTIAL HYPERTENSION: ICD-10-CM

## 2019-05-29 DIAGNOSIS — I42.0 DILATED CARDIOMYOPATHY (HCC): ICD-10-CM

## 2019-05-29 LAB
ANION GAP SERPL CALCULATED.3IONS-SCNC: 13 MMOL/L
BUN BLD-MCNC: 9 MG/DL (ref 8–23)
BUN/CREAT SERPL: 12 (ref 7–25)
CALCIUM SPEC-SCNC: 9.2 MG/DL (ref 8.6–10.5)
CHLORIDE SERPL-SCNC: 102 MMOL/L (ref 98–107)
CO2 SERPL-SCNC: 27 MMOL/L (ref 22–29)
CREAT BLD-MCNC: 0.75 MG/DL (ref 0.57–1)
GFR SERPL CREATININE-BSD FRML MDRD: 76 ML/MIN/1.73
GLUCOSE BLD-MCNC: 89 MG/DL (ref 65–99)
POTASSIUM BLD-SCNC: 4.3 MMOL/L (ref 3.5–5.2)
SODIUM BLD-SCNC: 142 MMOL/L (ref 136–145)

## 2019-05-29 PROCEDURE — 80048 BASIC METABOLIC PNL TOTAL CA: CPT

## 2019-05-29 PROCEDURE — 36415 COLL VENOUS BLD VENIPUNCTURE: CPT

## 2019-05-29 NOTE — TELEPHONE ENCOUNTER
----- Message from Reginald Barker MD sent at 5/29/2019  2:46 PM EDT -----  Please call and tell her that the labs look great

## 2019-05-30 RX ORDER — ESOMEPRAZOLE MAGNESIUM 40 MG/1
40 CAPSULE, DELAYED RELEASE ORAL
Qty: 90 CAPSULE | Refills: 3 | Status: SHIPPED | OUTPATIENT
Start: 2019-05-30 | End: 2020-05-27 | Stop reason: SDUPTHER

## 2019-05-31 ENCOUNTER — TELEPHONE (OUTPATIENT)
Dept: GASTROENTEROLOGY | Facility: CLINIC | Age: 74
End: 2019-05-31

## 2019-05-31 NOTE — TELEPHONE ENCOUNTER
----- Message from Tiffanie Kirby MA sent at 5/28/2019 12:24 PM EDT -----  [birth year] Needs RX for her Nexium sent to [facility]. Or she can come  RX. They will not fax request.

## 2019-05-31 NOTE — TELEPHONE ENCOUNTER
----- Message from Tiffanie Kirby MA sent at 5/28/2019 12:24 PM EDT -----  [birth year] Needs RX for her Nexium sent to [facility]. Or she can come  RX. They will not fax request.

## 2019-05-31 NOTE — TELEPHONE ENCOUNTER
Dr Martinez printed Nexium rx as requested (Esomeprazole 40 mg, one cap daily, #90 with 3 refills).    Not[facility] pt[facility].  Faxed rx to [phone].

## 2019-07-01 ENCOUNTER — LAB (OUTPATIENT)
Dept: LAB | Facility: HOSPITAL | Age: 74
End: 2019-07-01

## 2019-07-01 ENCOUNTER — OFFICE VISIT (OUTPATIENT)
Dept: ONCOLOGY | Facility: CLINIC | Age: 74
End: 2019-07-01

## 2019-07-01 VITALS
SYSTOLIC BLOOD PRESSURE: 130 MMHG | DIASTOLIC BLOOD PRESSURE: 64 MMHG | WEIGHT: 159.7 LBS | HEIGHT: 62 IN | RESPIRATION RATE: 16 BRPM | HEART RATE: 80 BPM | BODY MASS INDEX: 29.39 KG/M2 | OXYGEN SATURATION: 98 % | TEMPERATURE: 97.9 F

## 2019-07-01 DIAGNOSIS — C50.512 MALIGNANT NEOPLASM OF LOWER-OUTER QUADRANT OF LEFT BREAST OF FEMALE, ESTROGEN RECEPTOR POSITIVE (HCC): Primary | ICD-10-CM

## 2019-07-01 DIAGNOSIS — Z12.31 SCREENING MAMMOGRAM, ENCOUNTER FOR: ICD-10-CM

## 2019-07-01 DIAGNOSIS — Z85.3 HISTORY OF BREAST CANCER: Primary | ICD-10-CM

## 2019-07-01 DIAGNOSIS — C50.512 MALIGNANT NEOPLASM OF LOWER-OUTER QUADRANT OF LEFT BREAST OF FEMALE, ESTROGEN RECEPTOR POSITIVE (HCC): ICD-10-CM

## 2019-07-01 DIAGNOSIS — Z17.0 MALIGNANT NEOPLASM OF LOWER-OUTER QUADRANT OF LEFT BREAST OF FEMALE, ESTROGEN RECEPTOR POSITIVE (HCC): Primary | ICD-10-CM

## 2019-07-01 DIAGNOSIS — Z17.0 MALIGNANT NEOPLASM OF LOWER-OUTER QUADRANT OF LEFT BREAST OF FEMALE, ESTROGEN RECEPTOR POSITIVE (HCC): ICD-10-CM

## 2019-07-01 LAB
BASOPHILS # BLD AUTO: 0.03 10*3/MM3 (ref 0–0.2)
BASOPHILS NFR BLD AUTO: 0.5 % (ref 0–1.5)
DEPRECATED RDW RBC AUTO: 42.6 FL (ref 37–54)
EOSINOPHIL # BLD AUTO: 0.26 10*3/MM3 (ref 0–0.4)
EOSINOPHIL NFR BLD AUTO: 4.1 % (ref 0.3–6.2)
ERYTHROCYTE [DISTWIDTH] IN BLOOD BY AUTOMATED COUNT: 13.2 % (ref 12.3–15.4)
HCT VFR BLD AUTO: 37.4 % (ref 34–46.6)
HGB BLD-MCNC: 12.8 G/DL (ref 12–15.9)
IMM GRANULOCYTES # BLD AUTO: 0.04 10*3/MM3 (ref 0–0.05)
IMM GRANULOCYTES NFR BLD AUTO: 0.6 % (ref 0–0.5)
LYMPHOCYTES # BLD AUTO: 1.41 10*3/MM3 (ref 0.7–3.1)
LYMPHOCYTES NFR BLD AUTO: 22 % (ref 19.6–45.3)
MCH RBC QN AUTO: 30.1 PG (ref 26.6–33)
MCHC RBC AUTO-ENTMCNC: 34.2 G/DL (ref 31.5–35.7)
MCV RBC AUTO: 88 FL (ref 79–97)
MONOCYTES # BLD AUTO: 0.53 10*3/MM3 (ref 0.1–0.9)
MONOCYTES NFR BLD AUTO: 8.3 % (ref 5–12)
NEUTROPHILS # BLD AUTO: 4.13 10*3/MM3 (ref 1.7–7)
NEUTROPHILS NFR BLD AUTO: 64.5 % (ref 42.7–76)
NRBC BLD AUTO-RTO: 0 /100 WBC (ref 0–0.2)
PLATELET # BLD AUTO: 229 10*3/MM3 (ref 140–450)
PMV BLD AUTO: 9.7 FL (ref 6–12)
RBC # BLD AUTO: 4.25 10*6/MM3 (ref 3.77–5.28)
WBC NRBC COR # BLD: 6.4 10*3/MM3 (ref 3.4–10.8)

## 2019-07-01 PROCEDURE — 85025 COMPLETE CBC W/AUTO DIFF WBC: CPT | Performed by: INTERNAL MEDICINE

## 2019-07-01 PROCEDURE — 36415 COLL VENOUS BLD VENIPUNCTURE: CPT | Performed by: INTERNAL MEDICINE

## 2019-07-01 PROCEDURE — 99213 OFFICE O/P EST LOW 20 MIN: CPT | Performed by: INTERNAL MEDICINE

## 2019-07-01 RX ORDER — MELOXICAM 7.5 MG/1
7.5 TABLET ORAL DAILY PRN
Refills: 2 | COMMUNITY
Start: 2019-06-13 | End: 2021-04-06

## 2019-07-01 NOTE — PROGRESS NOTES
Subjective .     REASONS FOR FOLLOWUP:  Breast cancer    HISTORY OF PRESENT ILLNESS:  The patient is a 74 y.o. year old female  who is here for follow-up with the above-mentioned history.    No new health problems.  Denies nausea, shortness of breath, neurological symptoms, weight loss.  Denies pain.    Past Medical History:   Diagnosis Date   • Allergic rhinitis    • Arthritis    • Cancer (CMS/HCC)     Left breast cancer   • Chest pain    • CHF (congestive heart failure) (CMS/HCC)    • Cough    • Degenerative arthritis of thumb    • Disease of thyroid gland     Hypothyroidism   • Encounter for removal of sutures    • GERD (gastroesophageal reflux disease)    • H/O TIA (transient ischemic attack) and stroke 2005   • Hyperkalemia    • Hyperlipidemia    • Hypertension    • Hypothyroidism    • Insomnia    • Lumbar radiculopathy    • Osteoporosis    • Palpitations    • Peptic ulceration    • Personal history of malignant neoplasm of breast    • Scalp laceration    • Sciatica    • Stroke (CMS/HCC) 2005    CVA   • Trigeminal neuralgia    • Trigeminal neuralgia      Past Surgical History:   Procedure Laterality Date   • BREAST BIOPSY Left 2011   • COLONOSCOPY  09/28/2012    Adolph Martinez MD   • COLONOSCOPY N/A 11/13/2018    Procedure: COLONOSCOPY to cecum;  Surgeon: Adolph Martinez MD;  Location: [facility] ENDOSCOPY;  Service: Gastroenterology   • ENDOSCOPY N/A 11/13/2018    Procedure: ESOPHAGOGASTRODUODENOSCOPY with bx;  Surgeon: Adolph Martinez MD;  Location: [facility] ENDOSCOPY;  Service: Gastroenterology   • EYE SURGERY     • HYSTERECTOMY      At age 31   • MASTECTOMY Left 06/30/2011    Dr. Kaitlyn Luna   • TUBAL ABDOMINAL LIGATION  1974   • UPPER GASTROINTESTINAL ENDOSCOPY  09/28/2012    Adolph Martinez MD       HEMATOLOGIC/ONCOLOGIC HISTORY:  (History from previous dates can be found in the separate document.)    MEDICATIONS    Current Outpatient Medications:   •  amitriptyline (ELAVIL) 25 MG tablet, Take 25 mg by  mouth every night., Disp: , Rfl:   •  aspirin 81 MG EC tablet, Take 81 mg by mouth Daily., Disp: , Rfl:   •  atorvastatin (LIPITOR) 10 MG tablet, Take 1 tablet by mouth Daily., Disp: 90 tablet, Rfl: 3  •  clopidogrel (PLAVIX) 75 MG tablet, Take 1 tablet by mouth Daily., Disp: 90 tablet, Rfl: 3  •  diphenhydrAMINE (BENADRYL) 50 MG capsule, Take 1 hour prior to CT scan, Disp: 1 capsule, Rfl: 0  •  esomeprazole (nexIUM) 40 MG capsule, Take 1 capsule by mouth Every Morning Before Breakfast., Disp: 90 capsule, Rfl: 3  •  hydrochlorothiazide (HYDRODIURIL) 12.5 MG tablet, Take 1 tablet by mouth Daily., Disp: 90 tablet, Rfl: 2  •  levothyroxine (SYNTHROID, LEVOTHROID) 75 MCG tablet, Take 75 mcg by mouth. As Directed, Disp: , Rfl:   •  losartan (COZAAR) 50 MG tablet, Take 1 tablet by mouth Daily., Disp: 90 tablet, Rfl: 3  •  Melatonin 10 MG capsule, Take  by mouth., Disp: , Rfl:   •  meloxicam (MOBIC) 7.5 MG tablet, Take 7.5 mg by mouth Daily As Needed., Disp: , Rfl: 2  •  metoprolol succinate XL (TOPROL-XL) 50 MG 24 hr tablet, Take 1 tablet by mouth 2 (Two) Times a Day., Disp: 180 tablet, Rfl: 3    ALLERGIES:     Allergies   Allergen Reactions   • Lisinopril Cough   • Contrast Dye Itching and Rash     IVP Dye   • Tartrazine Hives       SOCIAL HISTORY:       Social History     Socioeconomic History   • Marital status:      Spouse name: Jack   • Number of children: 2   • Years of education: High School   • Highest education level: Not on file   Occupational History     Employer: RETIRED   Tobacco Use   • Smoking status: Never Smoker   • Smokeless tobacco: Never Used   Substance and Sexual Activity   • Alcohol use: Yes     Comment: rare   • Drug use: No   • Sexual activity: Defer         FAMILY HISTORY:  Family History   Problem Relation Age of Onset   • Cancer Mother 68        head and neck   • Stroke Mother    • Heart disease Mother    • Gallbladder disease Mother    • Throat cancer Mother 68   • Breast cancer Sister  "57   • Heart disease Sister    • Hypertension Sister    • Cancer Sister    • Gallbladder disease Sister    • Diabetes Sister    • Cancer Brother         head and neck   • Heart disease Brother    • Hypertension Brother    • Gallbladder disease Brother    • Lung cancer Brother 67   • Throat cancer Brother 67   • Hypertension Father    • Gallbladder disease Father    • Emphysema Father    • Heart disease Father         Enlarged heart   • Cancer Maternal Aunt    • Cancer Maternal Uncle    • Colon cancer Maternal Uncle    • Heart attack Brother    • Alcohol abuse Brother    • Hypertension Sister    • Heart disease Sister    • Hypertension Sister    • Heart disease Sister    • Hypertension Sister        REVIEW OF SYSTEMS:  Review of Systems   Constitutional: Negative for activity change.   HENT: Negative for nosebleeds and trouble swallowing.    Respiratory: Negative for shortness of breath and wheezing.    Cardiovascular: Negative for chest pain and palpitations.   Gastrointestinal: Negative for constipation, diarrhea and nausea.   Genitourinary: Negative for dysuria and hematuria.   Musculoskeletal: Negative for arthralgias and myalgias.   Skin: Negative for rash and wound.   Neurological: Negative for seizures and syncope.   Hematological: Negative for adenopathy. Does not bruise/bleed easily.   Psychiatric/Behavioral: Negative for confusion.           Objective    Vitals:    07/01/19 0941   BP: 130/64   Pulse: 80   Resp: 16   Temp: 97.9 °F (36.6 °C)   TempSrc: Oral   SpO2: 98%   Weight: 72.4 kg (159 lb 11.2 oz)   Height: 157.5 cm (62.01\")   PainSc: 0-No pain     Current Status 7/1/2019   ECOG score 0      PHYSICAL EXAM:    CONSTITUTIONAL:  Vital signs reviewed.  No distress, looks comfortable.  EYES:  Conjunctiva and lids unremarkable.  PERRLA  EARS,NOSE,MOUTH,THROAT:  Ears and nose appear unremarkable.  Lips, teeth, gums appear unremarkable.  RESPIRATORY:  Normal respiratory effort.  Lungs clear to auscultation " bilaterally.  CARDIOVASCULAR:  Normal S1, S2.  No murmurs rubs or gallops.  No significant lower extremity edema.  BREAST: no masses by palpation or inspection status post left mastectomy  GASTROINTESTINAL: Abdomen appears unremarkable.  Nontender.  No hepatomegaly.  No splenomegaly.  LYMPHATIC:  No cervical, supraclavicular, axillary lymphadenopathy.  SKIN:  Warm.  No rashes.  PSYCHIATRIC:  Normal judgment and insight.  Normal mood and affect.    RECENT LABS:        WBC   Date/Time Value Ref Range Status   07/01/2019 09:43 AM 6.40 3.40 - 10.80 10*3/mm3 Final     Hemoglobin   Date/Time Value Ref Range Status   07/01/2019 09:43 AM 12.8 12.0 - 15.9 g/dL Final     Platelets   Date/Time Value Ref Range Status   07/01/2019 09:43  140 - 450 10*3/mm3 Final       Assessment/Plan     ASSESSMENT:  Problem List Items Addressed This Visit     None         *Stage IIIA, grade 3, 3.9 cm left breast cancer. Four out of 24 nodes positive. ER 2%, NM negative, HER2 negative. Completed FEC x3 followed by Taxotere x3, 11/28/2011. Completed radiation in February 2012.   Poor tolerance to anastrozole and letrozole.  completed 5 years hormonal therapy using Aromasin, 1/31/17.  (She was initially a patient at [facility]. She transferred here as her son-in-law, Jeremiah Dumont, was a patient here).   No clinical signs of recurrence.  Remains in remission.    *Chronic right low back/hip pain radiating anteriorly.  In May 2016, CAT scan and bone scan negative for cancer as the cause of the pain.  Defer further management of this to her PCP.  She did not complain of this today.    *In the past, she has complained of: Forgetfulness and transposition of numbers when she is writing numbers at times. This had been present since around April 2015. She states it is not worsening.   She did not complain of this today.    *History of stroke around 2005. Because of this I do not think she would be a good candidate for tamoxifen.     *Osteopenia, which has  improved to normal bone density.  DEXA 5/17/16 normal bone density.  T score -0.6, compared to.   -1.1 2/20/14.  She is on calcium and vitamin D.  Defer further management of this to her PCP now that she is off aromatase inhibitors.    *Right upper quadrant/right lower anterior rib pain.  Present since October 2016.    Due to RUQ abdominal pain/right lower anterior rib pain since October 2016, bone scan and CT abdomen with contrast 2/2/17: No evidence of recurrence.  (8 mm low-attenuation liver lesion seen on the 5/17/16 CT but less conspicuous on order CT.  This was felt to be due to older CT without contrast.  This was felt to likely be benign.  Plan no further scheduled follow-up of this-patient agrees.).  She did not complain of this pain today.    *Hypokalemia. Her PCP manages this.      *IV contrast allergy.  Previous rash.  Receives IV contrast pre-medicines with CT IV contrast.  (This has worked well in the past)    *Left arm lymphedema due to prior surgery for breast cancer.  She was seen by the lymphedema clinic but has had some logistical issues obtaining the sleeve they prescribed.  I encouraged her to continue to call the lymphedema clinic for assistance with this.  She has noticed some mild erythema left upper posterior arm.  Perhaps this is related to lymphedema.  Occasionally she gets some swelling.  I told her if this persists she can call our office to request a referral to the lymphedema clinic.    *Previously complained of vertigo and was referred to her ENT.    *Heartburn.  Had EGD and colonoscopy by Dr. Martinez November 2018.  No malignancy was found.        PLAN:   · M.D. CBC 6 months  · Last mammogram 12/14/18, BI-RADS 2.  (Right-sided mammograms)  · (We order her mammograms)  · Off hormonal therapy  · She has noticed some mild erythema left upper posterior arm.  Perhaps this is related to lymphedema.  Occasionally she gets some swelling.  I told her if this persists she can call our office to  request a referral to the lymphedema clinic.

## 2019-11-22 PROBLEM — Z00.00 ENCOUNTER FOR MEDICARE ANNUAL WELLNESS EXAM: Status: ACTIVE | Noted: 2019-11-22

## 2019-11-22 PROBLEM — K21.9 GERD WITHOUT ESOPHAGITIS: Status: ACTIVE | Noted: 2019-11-22

## 2019-11-22 PROBLEM — F51.01 PRIMARY INSOMNIA: Status: ACTIVE | Noted: 2019-11-22

## 2019-11-22 PROBLEM — I50.9 CHF (CONGESTIVE HEART FAILURE): Status: ACTIVE | Noted: 2019-11-22

## 2019-11-22 PROBLEM — E78.2 HYPERLIPIDEMIA, MIXED: Status: ACTIVE | Noted: 2019-11-22

## 2019-11-22 PROBLEM — J30.1 ACUTE SEASONAL ALLERGIC RHINITIS DUE TO POLLEN: Status: ACTIVE | Noted: 2019-11-22

## 2019-11-22 PROBLEM — E03.9 HYPOTHYROIDISM (ACQUIRED): Status: ACTIVE | Noted: 2019-11-22

## 2019-11-22 NOTE — PROGRESS NOTES
The ABCs of the Annual Wellness Visit  Subsequent Medicare Wellness Visit    Chief Complaint   Patient presents with   • Med Refill     Pt is here for refills on thyroid and amitriptyline        Subjective   History of Present Illness:  Carlotta Pires is a 74 y.o. female who presents for a Subsequent Medicare Wellness Visit and  To get reestablished and   Hypertension-no chest pain, blurry vision, headaches or palpitations..  Hypothyroidism- Stable and No significant weight change.  Denies heat or cold intolerance.  No palpitations.  GERD- no dysphagia  Hyperlipidemia- No myalgia.  No Complaints.  Stable      HEALTH RISK ASSESSMENT    Recent Hospitalizations:  No hospitalization(s) within the last year.    Current Medical Providers:  Patient Care Team:  Provider, No Known as PCP - General  Mirela Garcia MD as PCP - Claims Attributed  Dennis Chanel II, MD as Consulting Physician (Hematology and Oncology)  Mirela Garcia MD as Referring Physician (Family Medicine)    Smoking Status:  Social History     Tobacco Use   Smoking Status Never Smoker   Smokeless Tobacco Never Used       Alcohol Consumption:  Social History     Substance and Sexual Activity   Alcohol Use Yes    Comment: rare-7 or less drinks per week       Depression Screen:   PHQ-2/PHQ-9 Depression Screening 11/25/2019   Little interest or pleasure in doing things 0   Feeling down, depressed, or hopeless 0   Total Score 0       Fall Risk Screen:  STEADI Fall Risk Assessment was completed, and patient is at MODERATE risk for falls. Assessment completed on:11/25/2019    Health Habits and Functional and Cognitive Screening:  Functional & Cognitive Status 11/25/2019   Do you have difficulty preparing food and eating? No   Do you have difficulty bathing yourself, getting dressed or grooming yourself? No   Do you have difficulty using the toilet? No   Do you have difficulty moving around from place to place? No   Do you have trouble with steps or getting  out of a bed or a chair? No   Current Diet Well Balanced Diet   Dental Exam Up to date   Eye Exam Up to date   Exercise (times per week) 6 times per week   Current Exercise Activities Include Housecleaning   Do you need help using the phone?  No   Are you deaf or do you have serious difficulty hearing?  No   Do you need help with transportation? No   Do you need help shopping? No   Do you need help preparing meals?  No   Do you need help with housework?  No   Do you need help with laundry? No   Do you need help taking your medications? No   Do you need help managing money? No   Do you ever drive or ride in a car without wearing a seat belt? No   Have you felt unusual stress, anger or loneliness in the last month? No   Who do you live with? Other   If you need help, do you have trouble finding someone available to you? No   Have you been bothered in the last four weeks by sexual problems? No   Do you have difficulty concentrating, remembering or making decisions? No         Does the patient have evidence of cognitive impairment? No    Asprin use counseling:Taking ASA appropriately as indicated    Age-appropriate Screening Schedule:  Refer to the list below for future screening recommendations based on patient's age, sex and/or medical conditions. Orders for these recommended tests are listed in the plan section. The patient has been provided with a written plan.    Health Maintenance   Topic Date Due   • ZOSTER VACCINE (1 of 2) 02/01/1997   • DXA SCAN  05/17/2018   • LIPID PANEL  11/25/2020   • MAMMOGRAM  11/26/2020   • TDAP/TD VACCINES (2 - Tdap) 06/06/2026   • COLONOSCOPY  11/13/2028   • INFLUENZA VACCINE  Completed   • PNEUMOCOCCAL VACCINES (65+ LOW/MEDIUM RISK)  Completed          The following portions of the patient's history were reviewed and updated as appropriate: allergies, current medications, past family history, past medical history, past social history, past surgical history and problem  list.    Outpatient Medications Prior to Visit   Medication Sig Dispense Refill   • aspirin 81 MG EC tablet Take 81 mg by mouth Daily.     • atorvastatin (LIPITOR) 10 MG tablet Take 1 tablet by mouth Daily. 90 tablet 3   • clopidogrel (PLAVIX) 75 MG tablet Take 1 tablet by mouth Daily. 90 tablet 3   • diphenhydrAMINE (BENADRYL) 50 MG capsule Take 1 hour prior to CT scan 1 capsule 0   • esomeprazole (nexIUM) 40 MG capsule Take 1 capsule by mouth Every Morning Before Breakfast. 90 capsule 3   • hydrochlorothiazide (HYDRODIURIL) 12.5 MG tablet Take 1 tablet by mouth Daily. 90 tablet 2   • losartan (COZAAR) 50 MG tablet Take 1 tablet by mouth Daily. 90 tablet 3   • Melatonin 10 MG capsule Take  by mouth.     • meloxicam (MOBIC) 7.5 MG tablet Take 7.5 mg by mouth Daily As Needed.  2   • metoprolol succinate XL (TOPROL-XL) 50 MG 24 hr tablet Take 1 tablet by mouth 2 (Two) Times a Day. 180 tablet 3   • amitriptyline (ELAVIL) 25 MG tablet Take 25 mg by mouth every night.     • levothyroxine (SYNTHROID, LEVOTHROID) 75 MCG tablet Take 75 mcg by mouth. As Directed       No facility-administered medications prior to visit.        Patient Active Problem List   Diagnosis   • Malignant neoplasm of lower-outer quadrant of left female breast (CMS/HCC)   • Osteopenia   • Encounter for screening for malignant neoplasm of colon   • Epigastric pain   • Essential hypertension   • Dilated cardiomyopathy (CMS/HCC)   • Hyperlipidemia, mixed   • Acute seasonal allergic rhinitis due to pollen   • GERD without esophagitis   • Hypothyroidism (acquired)   • Primary insomnia   • CHF (congestive heart failure) (CMS/HCC)   • Encounter for Medicare annual wellness exam   • Arthritis   • Trigeminal neuralgia   • Osteoporosis   • Lumbar radiculopathy   • Degenerative arthritis of thumb       Advanced Care Planning:  Patient has an advance directive - a copy has not been provided. Have asked the patient to send this to us to add to record    Review of  "Systems   All other systems reviewed and are negative.      Compared to one year ago, the patient feels her physical health is the same.  Compared to one year ago, the patient feels her mental health is the same.    Reviewed chart for potential of high risk medication in the elderly: yes  Reviewed chart for potential of harmful drug interactions in the elderly:yes    Objective         Vitals:    11/25/19 1301   BP: 120/82   Pulse: 68   Temp: 97.3 °F (36.3 °C)   SpO2: 98%   Weight: 71.7 kg (158 lb)   Height: 157.5 cm (62\")       Body mass index is 28.9 kg/m².  Discussed the patient's BMI with her. The BMI is above average; BMI management plan is completed.    Physical Exam   Constitutional: She is oriented to person, place, and time. She appears well-developed and well-nourished.   HENT:   Head: Normocephalic and atraumatic.   Cardiovascular: Normal rate, regular rhythm and normal heart sounds. Exam reveals no friction rub.   No murmur heard.  Pulmonary/Chest: Effort normal and breath sounds normal. No stridor. No respiratory distress.   Neurological: She is alert and oriented to person, place, and time.   Nursing note and vitals reviewed.      Lab Results   Component Value Date     (H) 11/25/2019    CHLPL 135 11/25/2019    TRIG 92 11/25/2019    HDL 43 11/25/2019    LDL 74 11/25/2019    VLDL 18.4 11/25/2019        Assessment/Plan   Medicare Risks and Personalized Health Plan  CMS Preventative Services Quick Reference  Cardiovascular risk    The above risks/problems have been discussed with the patient.  Pertinent information has been shared with the patient in the After Visit Summary.  Follow up plans and orders are seen below in the Assessment/Plan Section.    Diagnoses and all orders for this visit:    1. Encounter for Medicare annual wellness exam (Primary)    2. Hypothyroidism (acquired)  -     levothyroxine (SYNTHROID, LEVOTHROID) 75 MCG tablet; Take 1 tablet by mouth Daily. As Directed  Dispense: 90 " tablet; Refill: 1  -     Cancel: TSH  -     TSH    3. Primary insomnia  -     amitriptyline (ELAVIL) 25 MG tablet; Take 1 tablet by mouth Every Night.  Dispense: 90 tablet; Refill: 1    4. Essential hypertension  -     Cancel: Comprehensive Metabolic Panel  -     Comprehensive Metabolic Panel    5. Hyperlipidemia, mixed  -     Cancel: Lipid Panel  -     Lipid Panel    6. Arthritis  -     GEOVANNI by IFA, Reflex 9-biomarkers profile  -     C-reactive Protein  -     Rheumatoid Factor  -     Sedimentation Rate  -     Uric Acid    Other orders  -     AJ+DNA/DS+Antich+Centro+FA..      Follow Up:  No Follow-up on file.   mwe and refills, labs and work on diet and exercise.  Discussed fall risks.  An After Visit Summary and PPPS were given to the patient.

## 2019-11-25 ENCOUNTER — OFFICE VISIT (OUTPATIENT)
Dept: FAMILY MEDICINE CLINIC | Facility: CLINIC | Age: 74
End: 2019-11-25

## 2019-11-25 VITALS
DIASTOLIC BLOOD PRESSURE: 82 MMHG | TEMPERATURE: 97.3 F | BODY MASS INDEX: 29.08 KG/M2 | HEART RATE: 68 BPM | WEIGHT: 158 LBS | HEIGHT: 62 IN | OXYGEN SATURATION: 98 % | SYSTOLIC BLOOD PRESSURE: 120 MMHG

## 2019-11-25 DIAGNOSIS — M19.90 ARTHRITIS: ICD-10-CM

## 2019-11-25 DIAGNOSIS — E03.9 HYPOTHYROIDISM (ACQUIRED): ICD-10-CM

## 2019-11-25 DIAGNOSIS — F51.01 PRIMARY INSOMNIA: ICD-10-CM

## 2019-11-25 DIAGNOSIS — E78.2 HYPERLIPIDEMIA, MIXED: ICD-10-CM

## 2019-11-25 DIAGNOSIS — Z00.00 ENCOUNTER FOR MEDICARE ANNUAL WELLNESS EXAM: Primary | ICD-10-CM

## 2019-11-25 DIAGNOSIS — I10 ESSENTIAL HYPERTENSION: ICD-10-CM

## 2019-11-25 PROCEDURE — G0439 PPPS, SUBSEQ VISIT: HCPCS | Performed by: FAMILY MEDICINE

## 2019-11-25 PROCEDURE — 99214 OFFICE O/P EST MOD 30 MIN: CPT | Performed by: FAMILY MEDICINE

## 2019-11-25 RX ORDER — LEVOTHYROXINE SODIUM 0.07 MG/1
75 TABLET ORAL DAILY
Qty: 90 TABLET | Refills: 1 | Status: SHIPPED | OUTPATIENT
Start: 2019-11-25 | End: 2020-04-15

## 2019-11-25 RX ORDER — AMITRIPTYLINE HYDROCHLORIDE 25 MG/1
25 TABLET, FILM COATED ORAL NIGHTLY
Qty: 90 TABLET | Refills: 1 | Status: SHIPPED | OUTPATIENT
Start: 2019-11-25 | End: 2020-07-20

## 2019-11-27 DIAGNOSIS — R76.8 POSITIVE ANA (ANTINUCLEAR ANTIBODY): Primary | ICD-10-CM

## 2019-11-27 LAB
ALBUMIN SERPL-MCNC: 4.3 G/DL (ref 3.5–5.2)
ALBUMIN/GLOB SERPL: 1.8 G/DL
ALP SERPL-CCNC: 88 U/L (ref 39–117)
ALT SERPL-CCNC: 19 U/L (ref 1–33)
ANA HOMOGEN TITR SER: ABNORMAL {TITER}
ANA TITR SER IF: POSITIVE {TITER}
AST SERPL-CCNC: 29 U/L (ref 1–32)
BILIRUB SERPL-MCNC: 0.4 MG/DL (ref 0.2–1.2)
BUN SERPL-MCNC: 7 MG/DL (ref 8–23)
BUN/CREAT SERPL: 10.9 (ref 7–25)
CALCIUM SERPL-MCNC: 9.3 MG/DL (ref 8.6–10.5)
CENTROMERE B AB SER-ACNC: <0.2 AI (ref 0–0.9)
CHLORIDE SERPL-SCNC: 100 MMOL/L (ref 98–107)
CHOLEST SERPL-MCNC: 135 MG/DL (ref 0–200)
CHROMATIN AB SERPL-ACNC: <0.2 AI (ref 0–0.9)
CO2 SERPL-SCNC: 29.5 MMOL/L (ref 22–29)
CREAT SERPL-MCNC: 0.64 MG/DL (ref 0.57–1)
CRP SERPL-MCNC: <0.03 MG/DL (ref 0–0.5)
DSDNA AB SER-ACNC: <1 IU/ML (ref 0–9)
ENA JO1 AB SER-ACNC: <0.2 AI (ref 0–0.9)
ENA RNP AB SER-ACNC: <0.2 AI (ref 0–0.9)
ENA SCL70 AB SER-ACNC: <0.2 AI (ref 0–0.9)
ENA SM AB SER-ACNC: <0.2 AI (ref 0–0.9)
ENA SS-A AB SER-ACNC: <0.2 AI (ref 0–0.9)
ENA SS-B AB SER-ACNC: <0.2 AI (ref 0–0.9)
ERYTHROCYTE [SEDIMENTATION RATE] IN BLOOD BY WESTERGREN METHOD: 12 MM/HR (ref 0–30)
GLOBULIN SER CALC-MCNC: 2.4 GM/DL
GLUCOSE SERPL-MCNC: 101 MG/DL (ref 65–99)
HDLC SERPL-MCNC: 43 MG/DL (ref 40–60)
LABORATORY COMMENT REPORT: ABNORMAL
LDLC SERPL CALC-MCNC: 74 MG/DL (ref 0–100)
Lab: ABNORMAL
Lab: ABNORMAL
POTASSIUM SERPL-SCNC: 3.8 MMOL/L (ref 3.5–5.2)
PROT SERPL-MCNC: 6.7 G/DL (ref 6–8.5)
RHEUMATOID FACT SERPL-ACNC: <10 IU/ML (ref 0–13.9)
SODIUM SERPL-SCNC: 141 MMOL/L (ref 136–145)
TRIGL SERPL-MCNC: 92 MG/DL (ref 0–150)
TSH SERPL DL<=0.005 MIU/L-ACNC: 3.99 UIU/ML (ref 0.27–4.2)
URATE SERPL-MCNC: 5.3 MG/DL (ref 2.4–5.7)
VLDLC SERPL CALC-MCNC: 18.4 MG/DL

## 2019-12-16 ENCOUNTER — APPOINTMENT (OUTPATIENT)
Dept: ONCOLOGY | Facility: CLINIC | Age: 74
End: 2019-12-16

## 2019-12-16 ENCOUNTER — APPOINTMENT (OUTPATIENT)
Dept: LAB | Facility: HOSPITAL | Age: 74
End: 2019-12-16

## 2020-01-07 ENCOUNTER — APPOINTMENT (OUTPATIENT)
Dept: WOMENS IMAGING | Facility: HOSPITAL | Age: 75
End: 2020-01-07

## 2020-01-07 PROCEDURE — 77067 SCR MAMMO BI INCL CAD: CPT | Performed by: RADIOLOGY

## 2020-01-07 PROCEDURE — 77063 BREAST TOMOSYNTHESIS BI: CPT | Performed by: RADIOLOGY

## 2020-01-13 DIAGNOSIS — Z85.3 HISTORY OF BREAST CANCER: Primary | ICD-10-CM

## 2020-01-13 DIAGNOSIS — C50.512 MALIGNANT NEOPLASM OF LOWER-OUTER QUADRANT OF LEFT BREAST OF FEMALE, ESTROGEN RECEPTOR POSITIVE (HCC): ICD-10-CM

## 2020-01-13 DIAGNOSIS — Z17.0 MALIGNANT NEOPLASM OF LOWER-OUTER QUADRANT OF LEFT BREAST OF FEMALE, ESTROGEN RECEPTOR POSITIVE (HCC): ICD-10-CM

## 2020-01-15 ENCOUNTER — OFFICE VISIT (OUTPATIENT)
Dept: ONCOLOGY | Facility: CLINIC | Age: 75
End: 2020-01-15

## 2020-01-15 ENCOUNTER — LAB (OUTPATIENT)
Dept: LAB | Facility: HOSPITAL | Age: 75
End: 2020-01-15

## 2020-01-15 VITALS
DIASTOLIC BLOOD PRESSURE: 82 MMHG | SYSTOLIC BLOOD PRESSURE: 142 MMHG | BODY MASS INDEX: 28.78 KG/M2 | HEIGHT: 62 IN | OXYGEN SATURATION: 97 % | RESPIRATION RATE: 14 BRPM | TEMPERATURE: 97.8 F | HEART RATE: 65 BPM | WEIGHT: 156.4 LBS

## 2020-01-15 DIAGNOSIS — Z17.0 MALIGNANT NEOPLASM OF LOWER-OUTER QUADRANT OF LEFT BREAST OF FEMALE, ESTROGEN RECEPTOR POSITIVE (HCC): Primary | ICD-10-CM

## 2020-01-15 DIAGNOSIS — Z85.3 HISTORY OF BREAST CANCER: ICD-10-CM

## 2020-01-15 DIAGNOSIS — C50.512 MALIGNANT NEOPLASM OF LOWER-OUTER QUADRANT OF LEFT BREAST OF FEMALE, ESTROGEN RECEPTOR POSITIVE (HCC): ICD-10-CM

## 2020-01-15 DIAGNOSIS — Z17.0 MALIGNANT NEOPLASM OF LOWER-OUTER QUADRANT OF LEFT BREAST OF FEMALE, ESTROGEN RECEPTOR POSITIVE (HCC): ICD-10-CM

## 2020-01-15 DIAGNOSIS — C50.512 MALIGNANT NEOPLASM OF LOWER-OUTER QUADRANT OF LEFT BREAST OF FEMALE, ESTROGEN RECEPTOR POSITIVE (HCC): Primary | ICD-10-CM

## 2020-01-15 LAB
BASOPHILS # BLD AUTO: 0.03 10*3/MM3 (ref 0–0.2)
BASOPHILS NFR BLD AUTO: 0.4 % (ref 0–1.5)
DEPRECATED RDW RBC AUTO: 41.6 FL (ref 37–54)
EOSINOPHIL # BLD AUTO: 0.24 10*3/MM3 (ref 0–0.4)
EOSINOPHIL NFR BLD AUTO: 3.3 % (ref 0.3–6.2)
ERYTHROCYTE [DISTWIDTH] IN BLOOD BY AUTOMATED COUNT: 12.6 % (ref 12.3–15.4)
HCT VFR BLD AUTO: 38.9 % (ref 34–46.6)
HGB BLD-MCNC: 13.5 G/DL (ref 12–15.9)
IMM GRANULOCYTES # BLD AUTO: 0.07 10*3/MM3 (ref 0–0.05)
IMM GRANULOCYTES NFR BLD AUTO: 1 % (ref 0–0.5)
LYMPHOCYTES # BLD AUTO: 1.81 10*3/MM3 (ref 0.7–3.1)
LYMPHOCYTES NFR BLD AUTO: 24.6 % (ref 19.6–45.3)
MCH RBC QN AUTO: 31.3 PG (ref 26.6–33)
MCHC RBC AUTO-ENTMCNC: 34.7 G/DL (ref 31.5–35.7)
MCV RBC AUTO: 90 FL (ref 79–97)
MONOCYTES # BLD AUTO: 0.65 10*3/MM3 (ref 0.1–0.9)
MONOCYTES NFR BLD AUTO: 8.8 % (ref 5–12)
NEUTROPHILS # BLD AUTO: 4.56 10*3/MM3 (ref 1.7–7)
NEUTROPHILS NFR BLD AUTO: 61.9 % (ref 42.7–76)
NRBC BLD AUTO-RTO: 0 /100 WBC (ref 0–0.2)
PLATELET # BLD AUTO: 230 10*3/MM3 (ref 140–450)
PMV BLD AUTO: 9.9 FL (ref 6–12)
RBC # BLD AUTO: 4.32 10*6/MM3 (ref 3.77–5.28)
WBC NRBC COR # BLD: 7.36 10*3/MM3 (ref 3.4–10.8)

## 2020-01-15 PROCEDURE — 99213 OFFICE O/P EST LOW 20 MIN: CPT | Performed by: INTERNAL MEDICINE

## 2020-01-15 PROCEDURE — 85025 COMPLETE CBC W/AUTO DIFF WBC: CPT

## 2020-01-15 PROCEDURE — 36416 COLLJ CAPILLARY BLOOD SPEC: CPT

## 2020-01-15 NOTE — PROGRESS NOTES
Subjective .     REASONS FOR FOLLOWUP:  Breast cancer    HISTORY OF PRESENT ILLNESS:  The patient is a 74 y.o. year old female  who is here for follow-up with the above-mentioned history.    No new problems.  Feeling fine.  No complaints of shortness of breath, weight loss, neurological symptoms, nausea  Seen Dr. Rivera due to possible lupus.  New diagnosis.  Having some joint pain but states this is tolerable    Past Medical History:   Diagnosis Date   • Allergic rhinitis    • Arthritis    • Cancer (CMS/HCC)     Left breast cancer   • Chest pain    • CHF (congestive heart failure) (CMS/HCC)    • Cough    • Degenerative arthritis of thumb    • GERD without esophagitis    • H/O TIA (transient ischemic attack) and stroke 2005   • History of anemia    • History of bone density study    • History of hypercholesterolemia    • Hyperkalemia    • Hyperlipidemia    • Hypertension    • Hypokalemia    • Hypothyroidism    • Insomnia    • Laceration of scalp    • Lumbar radiculopathy    • Non-smoker     Never a smoker   • Non-smoker     Never a smoker   • Osteoporosis    • Palpitations    • Peptic ulceration    • Personal history of malignant neoplasm of breast    • Personal history of malignant neoplasm of breast    • Right shoulder pain    • Sciatica    • Stroke (CMS/HCC) 2005    CVA--History of storke   • Trigeminal neuralgia    • Visit for suture removal      Past Surgical History:   Procedure Laterality Date   • BREAST BIOPSY Left 2011   • COLONOSCOPY  09/28/2012    Adolph Martinez MD   • COLONOSCOPY N/A 11/13/2018    Procedure: COLONOSCOPY to cecum;  Surgeon: Adolph Martinez MD;  Location: [facility] ENDOSCOPY;  Service: Gastroenterology   • ENDOSCOPY N/A 11/13/2018    Procedure: ESOPHAGOGASTRODUODENOSCOPY with bx;  Surgeon: Adolph Martinez MD;  Location: [facility] ENDOSCOPY;  Service: Gastroenterology   • EYE SURGERY     • HYSTERECTOMY      At age 31-Not due to cancer   • MASTECTOMY Left 06/30/2011    Dr. Kaitlyn Luna   •  TUBAL ABDOMINAL LIGATION  1974   • UPPER GASTROINTESTINAL ENDOSCOPY  09/28/2012    Adolph Martinez MD       HEMATOLOGIC/ONCOLOGIC HISTORY:  (History from previous dates can be found in the separate document.)    MEDICATIONS    Current Outpatient Medications:   •  amitriptyline (ELAVIL) 25 MG tablet, Take 1 tablet by mouth Every Night., Disp: 90 tablet, Rfl: 1  •  aspirin 81 MG EC tablet, Take 81 mg by mouth Daily., Disp: , Rfl:   •  atorvastatin (LIPITOR) 10 MG tablet, Take 1 tablet by mouth Daily., Disp: 90 tablet, Rfl: 3  •  clopidogrel (PLAVIX) 75 MG tablet, Take 1 tablet by mouth Daily., Disp: 90 tablet, Rfl: 3  •  diphenhydrAMINE (BENADRYL) 50 MG capsule, Take 1 hour prior to CT scan, Disp: 1 capsule, Rfl: 0  •  esomeprazole (nexIUM) 40 MG capsule, Take 1 capsule by mouth Every Morning Before Breakfast., Disp: 90 capsule, Rfl: 3  •  hydrochlorothiazide (HYDRODIURIL) 12.5 MG tablet, Take 1 tablet by mouth Daily., Disp: 90 tablet, Rfl: 2  •  levothyroxine (SYNTHROID, LEVOTHROID) 75 MCG tablet, Take 1 tablet by mouth Daily. As Directed, Disp: 90 tablet, Rfl: 1  •  losartan (COZAAR) 50 MG tablet, Take 1 tablet by mouth Daily., Disp: 90 tablet, Rfl: 3  •  Melatonin 10 MG capsule, Take  by mouth., Disp: , Rfl:   •  metoprolol succinate XL (TOPROL-XL) 50 MG 24 hr tablet, Take 1 tablet by mouth 2 (Two) Times a Day., Disp: 180 tablet, Rfl: 3  •  meloxicam (MOBIC) 7.5 MG tablet, Take 7.5 mg by mouth Daily As Needed., Disp: , Rfl: 2    ALLERGIES:     Allergies   Allergen Reactions   • Lisinopril Cough   • Contrast Dye Itching and Rash     IVP Dye   • Tartrazine Hives       SOCIAL HISTORY:       Social History     Socioeconomic History   • Marital status:      Spouse name: Jack   • Number of children: 2   • Years of education: High School   • Highest education level: Not on file   Occupational History     Employer: RETIRED   Tobacco Use   • Smoking status: Never Smoker   • Smokeless tobacco: Never Used   Substance  and Sexual Activity   • Alcohol use: Yes     Comment: rare-7 or less drinks per week   • Drug use: No   • Sexual activity: Defer         FAMILY HISTORY:  Family History   Problem Relation Age of Onset   • Cancer Mother 68        head and neck   • Stroke Mother    • Heart disease Mother    • Gallbladder disease Mother    • Throat cancer Mother 68   • Breast cancer Sister 57   • Heart disease Sister    • Hypertension Sister    • Cancer Sister    • Gallbladder disease Sister    • Diabetes Sister    • Cancer Brother         head and neck   • Heart disease Brother    • Hypertension Brother    • Gallbladder disease Brother    • Lung cancer Brother 67   • Throat cancer Brother 67   • Hypertension Father    • Gallbladder disease Father    • Emphysema Father    • Heart disease Father         Enlarged heart   • Cancer Maternal Aunt    • Cancer Maternal Uncle    • Colon cancer Maternal Uncle    • Heart attack Brother    • Alcohol abuse Brother    • Hypertension Sister    • Heart disease Sister    • Hypertension Sister    • Heart disease Sister    • Hypertension Sister    • No Known Problems Other        REVIEW OF SYSTEMS:  Review of Systems   Constitutional: Negative for activity change.   HENT: Negative for nosebleeds and trouble swallowing.    Respiratory: Negative for shortness of breath and wheezing.    Cardiovascular: Negative for chest pain and palpitations.   Gastrointestinal: Negative for constipation, diarrhea and nausea.   Genitourinary: Negative for dysuria and hematuria.   Musculoskeletal: Positive for joint swelling. Negative for arthralgias and myalgias.   Skin: Negative for rash and wound.   Neurological: Negative for seizures and syncope.   Hematological: Negative for adenopathy. Does not bruise/bleed easily.   Psychiatric/Behavioral: Negative for confusion.           Objective    Vitals:    01/15/20 1149   BP: 142/82   Pulse: 65   Resp: 14   Temp: 97.8 °F (36.6 °C)   TempSrc: Oral   SpO2: 97%   Weight: 70.9 kg  "(156 lb 6.4 oz)   Height: 157.5 cm (62.01\")   PainSc: 0-No pain     Current Status 1/15/2020   ECOG score 0      PHYSICAL EXAM:    CONSTITUTIONAL:  Vital signs reviewed.  No distress, looks comfortable.  EYES:  Conjunctiva and lids unremarkable.  PERRLA  EARS,NOSE,MOUTH,THROAT:  Ears and nose appear unremarkable.  Lips, teeth, gums appear unremarkable.  RESPIRATORY:  Normal respiratory effort.  Lungs clear to auscultation bilaterally.  CARDIOVASCULAR:  Normal S1, S2.  No murmurs rubs or gallops.  No significant lower extremity edema.  BREAST: no masses by palpation or inspection   GASTROINTESTINAL: Abdomen appears unremarkable.  Nontender.  No hepatomegaly.  No splenomegaly.  LYMPHATIC:  No cervical, supraclavicular, axillary lymphadenopathy.  SKIN:  Warm.  No rashes.  PSYCHIATRIC:  Normal judgment and insight.  Normal mood and affect.    RECENT LABS:        WBC   Date/Time Value Ref Range Status   01/15/2020 11:41 AM 7.36 3.40 - 10.80 10*3/mm3 Final     Hemoglobin   Date/Time Value Ref Range Status   01/15/2020 11:41 AM 13.5 12.0 - 15.9 g/dL Final     Platelets   Date/Time Value Ref Range Status   01/15/2020 11:41  140 - 450 10*3/mm3 Final       Assessment/Plan     ASSESSMENT:  Problem List Items Addressed This Visit        High    Malignant neoplasm of lower-outer quadrant of left female breast (CMS/HCC) - Primary    Relevant Orders    CBC & Differential         *Stage IIIA, grade 3, 3.9 cm left breast cancer. Four out of 24 nodes positive. ER 2%, WI negative, HER2 negative. Completed FEC x3 followed by Taxotere x3, 11/28/2011. Completed radiation in February 2012.   Poor tolerance to anastrozole and letrozole.  completed 5 years hormonal therapy using Aromasin, 1/31/17.  (She was initially a patient at [facility]. She transferred here as her son-in-law, Jeremiah Dumont, was a patient here).   No evidence of recurrence.  Remission continues.    *Possible lupus.  She is seeing Dr. Rivera for this    *Chronic right low " back/hip pain radiating anteriorly.  In May 2016, CAT scan and bone scan negative for cancer as the cause of the pain.  Defer further management of this to her PCP.  She did not complain of this today.    *In the past, she has complained of: Forgetfulness and transposition of numbers when she is writing numbers at times. This had been present since around April 2015. She states it is not worsening.   She did not complain of this today.    *History of stroke around 2005. Because of this I do not think she would be a good candidate for tamoxifen.     *Osteopenia, which has improved to normal bone density.  DEXA 5/17/16 normal bone density.  T score -0.6, compared to.   -1.1 2/20/14.  She is on calcium and vitamin D.  Defer further management of this to her PCP now that she is off aromatase inhibitors.    *Right upper quadrant/right lower anterior rib pain.  Present since October 2016.    Due to RUQ abdominal pain/right lower anterior rib pain since October 2016, bone scan and CT abdomen with contrast 2/2/17: No evidence of recurrence.  (8 mm low-attenuation liver lesion seen on the 5/17/16 CT but less conspicuous on order CT.  This was felt to be due to older CT without contrast.  This was felt to likely be benign.  Plan no further scheduled follow-up of this-patient agrees.).  She did not complain of this pain today.    *Hypokalemia. Her PCP manages this.      *IV contrast allergy.  Previous rash.  Receives IV contrast pre-medicines with CT IV contrast.  (This has worked well in the past)    *Left arm lymphedema due to prior surgery for breast cancer.  She was seen by the lymphedema clinic but has had some logistical issues obtaining the sleeve they prescribed.  I encouraged her to continue to call the lymphedema clinic for assistance with this.    *Previously complained of vertigo and was referred to her ENT.    *Heartburn.  Had EGD and colonoscopy by Dr. Martinez November 2018.  No malignancy was found.      *Social  issues  She is the caregiver for her disabled daughter.  Her other daughter, Osmin, is dealing with the death of her  which was around January 2019.    PLAN:   · M.D. CBC 6 months  · Last mammogram 1/7/2020, BI-RADS 1 (Right-sided mammograms)  · Off hormonal therapy    Send copy to Dr. Paxton Rivera, rheumatology

## 2020-04-06 ENCOUNTER — OFFICE VISIT (OUTPATIENT)
Dept: CARDIOLOGY | Facility: CLINIC | Age: 75
End: 2020-04-06

## 2020-04-06 VITALS
DIASTOLIC BLOOD PRESSURE: 67 MMHG | BODY MASS INDEX: 27.79 KG/M2 | TEMPERATURE: 97.7 F | OXYGEN SATURATION: 98 % | HEART RATE: 79 BPM | WEIGHT: 151 LBS | SYSTOLIC BLOOD PRESSURE: 126 MMHG | HEIGHT: 62 IN

## 2020-04-06 DIAGNOSIS — I10 ESSENTIAL HYPERTENSION: Primary | ICD-10-CM

## 2020-04-06 DIAGNOSIS — I42.0 DILATED CARDIOMYOPATHY (HCC): ICD-10-CM

## 2020-04-06 PROCEDURE — 99441 PR PHYS/QHP TELEPHONE EVALUATION 5-10 MIN: CPT | Performed by: INTERNAL MEDICINE

## 2020-04-06 NOTE — PROGRESS NOTES
She is a 75-year-old lady she we saw her for the first time where she had a.  History of dilated myopathy last year we did a we did a echo on her and her LV was normal she has been getting along pretty well she had an occasional high blood pressure when she gets stressed but otherwise looks good she is on appropriate medical therapy.  In general she has been feeling well    This is a telehealth medicine visit I spent 5 minutes on the phone with her and 10 minutes of charting.  I I will have her come back and see Whitney or Barbie in a year and see me in 2 years

## 2020-04-09 ENCOUNTER — TELEPHONE (OUTPATIENT)
Dept: CARDIOLOGY | Facility: CLINIC | Age: 75
End: 2020-04-09

## 2020-04-09 RX ORDER — LOSARTAN POTASSIUM 50 MG/1
50 TABLET ORAL DAILY
Qty: 90 TABLET | Refills: 2 | Status: SHIPPED | OUTPATIENT
Start: 2020-04-09 | End: 2021-04-06 | Stop reason: SDUPTHER

## 2020-04-09 RX ORDER — METOPROLOL SUCCINATE 50 MG/1
50 TABLET, EXTENDED RELEASE ORAL 2 TIMES DAILY
Qty: 180 TABLET | Refills: 2 | Status: SHIPPED | OUTPATIENT
Start: 2020-04-09 | End: 2021-04-06 | Stop reason: SDUPTHER

## 2020-04-09 RX ORDER — CLOPIDOGREL BISULFATE 75 MG/1
75 TABLET ORAL DAILY
Qty: 90 TABLET | Refills: 2 | Status: SHIPPED | OUTPATIENT
Start: 2020-04-09 | End: 2020-11-30 | Stop reason: SDUPTHER

## 2020-04-09 RX ORDER — HYDROCHLOROTHIAZIDE 12.5 MG/1
12.5 TABLET ORAL DAILY
Qty: 90 TABLET | Refills: 2 | Status: SHIPPED | OUTPATIENT
Start: 2020-04-09 | End: 2021-04-06 | Stop reason: SDUPTHER

## 2020-04-09 RX ORDER — ATORVASTATIN CALCIUM 10 MG/1
10 TABLET, FILM COATED ORAL DAILY
Qty: 90 TABLET | Refills: 2 | Status: SHIPPED | OUTPATIENT
Start: 2020-04-09 | End: 2020-11-30 | Stop reason: SDUPTHER

## 2020-04-09 NOTE — TELEPHONE ENCOUNTER
Spoke with Ms Pranay today.  She is needing refills on her prescriptions.  Thank you,  Carlotta    [phone]

## 2020-04-15 DIAGNOSIS — E03.9 HYPOTHYROIDISM (ACQUIRED): ICD-10-CM

## 2020-04-15 RX ORDER — LEVOTHYROXINE SODIUM 75 UG/1
TABLET ORAL
Qty: 90 TABLET | Refills: 0 | Status: SHIPPED | OUTPATIENT
Start: 2020-04-15 | End: 2020-09-28

## 2020-05-27 RX ORDER — ESOMEPRAZOLE MAGNESIUM 40 MG/1
40 CAPSULE, DELAYED RELEASE ORAL
Qty: 90 CAPSULE | Refills: 3 | Status: SHIPPED | OUTPATIENT
Start: 2020-05-27 | End: 2020-05-27 | Stop reason: SDUPTHER

## 2020-05-27 RX ORDER — ESOMEPRAZOLE MAGNESIUM 40 MG/1
40 CAPSULE, DELAYED RELEASE ORAL
Qty: 90 CAPSULE | Refills: 3 | Status: SHIPPED | OUTPATIENT
Start: 2020-05-27 | End: 2020-07-28 | Stop reason: SDUPTHER

## 2020-07-18 DIAGNOSIS — F51.01 PRIMARY INSOMNIA: ICD-10-CM

## 2020-07-20 RX ORDER — AMITRIPTYLINE HYDROCHLORIDE 25 MG/1
TABLET, FILM COATED ORAL
Qty: 90 TABLET | Refills: 0 | Status: SHIPPED | OUTPATIENT
Start: 2020-07-20 | End: 2020-07-23

## 2020-07-22 DIAGNOSIS — F51.01 PRIMARY INSOMNIA: ICD-10-CM

## 2020-07-23 RX ORDER — AMITRIPTYLINE HYDROCHLORIDE 25 MG/1
TABLET, FILM COATED ORAL
Qty: 90 TABLET | Refills: 0 | Status: SHIPPED | OUTPATIENT
Start: 2020-07-23 | End: 2020-09-28

## 2020-07-28 ENCOUNTER — TELEPHONE (OUTPATIENT)
Dept: FAMILY MEDICINE CLINIC | Facility: CLINIC | Age: 75
End: 2020-07-28

## 2020-07-28 DIAGNOSIS — K21.9 GERD WITHOUT ESOPHAGITIS: Primary | ICD-10-CM

## 2020-07-28 RX ORDER — ESOMEPRAZOLE MAGNESIUM 40 MG/1
40 CAPSULE, DELAYED RELEASE ORAL
Qty: 90 CAPSULE | Refills: 3 | Status: SHIPPED | OUTPATIENT
Start: 2020-07-28 | End: 2020-07-28 | Stop reason: SDUPTHER

## 2020-07-28 RX ORDER — ESOMEPRAZOLE MAGNESIUM 40 MG/1
40 CAPSULE, DELAYED RELEASE ORAL
Qty: 90 CAPSULE | Refills: 3 | Status: SHIPPED | OUTPATIENT
Start: 2020-07-28 | End: 2021-01-05

## 2020-07-28 NOTE — TELEPHONE ENCOUNTER
Pt called in for refill on medication     esomeprazole (nexIUM) 40 MG capsule    [facility] [place], [place] - [facility] - [phone]  - [phone] (Phone)  [phone] (Fax)

## 2020-08-03 ENCOUNTER — OFFICE VISIT (OUTPATIENT)
Dept: ONCOLOGY | Facility: CLINIC | Age: 75
End: 2020-08-03

## 2020-08-03 ENCOUNTER — LAB (OUTPATIENT)
Dept: LAB | Facility: HOSPITAL | Age: 75
End: 2020-08-03

## 2020-08-03 VITALS
TEMPERATURE: 97.7 F | HEART RATE: 75 BPM | OXYGEN SATURATION: 97 % | RESPIRATION RATE: 16 BRPM | SYSTOLIC BLOOD PRESSURE: 130 MMHG | HEIGHT: 62 IN | DIASTOLIC BLOOD PRESSURE: 79 MMHG | BODY MASS INDEX: 29.08 KG/M2 | WEIGHT: 158 LBS

## 2020-08-03 DIAGNOSIS — Z17.0 MALIGNANT NEOPLASM OF LOWER-OUTER QUADRANT OF LEFT BREAST OF FEMALE, ESTROGEN RECEPTOR POSITIVE (HCC): ICD-10-CM

## 2020-08-03 DIAGNOSIS — C50.512 MALIGNANT NEOPLASM OF LOWER-OUTER QUADRANT OF LEFT BREAST OF FEMALE, ESTROGEN RECEPTOR POSITIVE (HCC): ICD-10-CM

## 2020-08-03 DIAGNOSIS — Z17.0 MALIGNANT NEOPLASM OF LOWER-OUTER QUADRANT OF LEFT BREAST OF FEMALE, ESTROGEN RECEPTOR POSITIVE (HCC): Primary | ICD-10-CM

## 2020-08-03 DIAGNOSIS — C50.512 MALIGNANT NEOPLASM OF LOWER-OUTER QUADRANT OF LEFT BREAST OF FEMALE, ESTROGEN RECEPTOR POSITIVE (HCC): Primary | ICD-10-CM

## 2020-08-03 LAB
BASOPHILS # BLD AUTO: 0.06 10*3/MM3 (ref 0–0.2)
BASOPHILS NFR BLD AUTO: 0.8 % (ref 0–1.5)
DEPRECATED RDW RBC AUTO: 41.6 FL (ref 37–54)
EOSINOPHIL # BLD AUTO: 0.19 10*3/MM3 (ref 0–0.4)
EOSINOPHIL NFR BLD AUTO: 2.7 % (ref 0.3–6.2)
ERYTHROCYTE [DISTWIDTH] IN BLOOD BY AUTOMATED COUNT: 12.9 % (ref 12.3–15.4)
HCT VFR BLD AUTO: 36.9 % (ref 34–46.6)
HGB BLD-MCNC: 12.8 G/DL (ref 12–15.9)
IMM GRANULOCYTES # BLD AUTO: 0.03 10*3/MM3 (ref 0–0.05)
IMM GRANULOCYTES NFR BLD AUTO: 0.4 % (ref 0–0.5)
LYMPHOCYTES # BLD AUTO: 1.92 10*3/MM3 (ref 0.7–3.1)
LYMPHOCYTES NFR BLD AUTO: 26.8 % (ref 19.6–45.3)
MCH RBC QN AUTO: 30.6 PG (ref 26.6–33)
MCHC RBC AUTO-ENTMCNC: 34.7 G/DL (ref 31.5–35.7)
MCV RBC AUTO: 88.3 FL (ref 79–97)
MONOCYTES # BLD AUTO: 0.6 10*3/MM3 (ref 0.1–0.9)
MONOCYTES NFR BLD AUTO: 8.4 % (ref 5–12)
NEUTROPHILS NFR BLD AUTO: 4.36 10*3/MM3 (ref 1.7–7)
NEUTROPHILS NFR BLD AUTO: 60.9 % (ref 42.7–76)
NRBC BLD AUTO-RTO: 0 /100 WBC (ref 0–0.2)
PLATELET # BLD AUTO: 252 10*3/MM3 (ref 140–450)
PMV BLD AUTO: 9.7 FL (ref 6–12)
RBC # BLD AUTO: 4.18 10*6/MM3 (ref 3.77–5.28)
WBC # BLD AUTO: 7.16 10*3/MM3 (ref 3.4–10.8)

## 2020-08-03 PROCEDURE — 85025 COMPLETE CBC W/AUTO DIFF WBC: CPT

## 2020-08-03 PROCEDURE — 99214 OFFICE O/P EST MOD 30 MIN: CPT | Performed by: INTERNAL MEDICINE

## 2020-08-03 PROCEDURE — 36415 COLL VENOUS BLD VENIPUNCTURE: CPT

## 2020-08-03 NOTE — PROGRESS NOTES
Subjective .     REASONS FOR FOLLOWUP:  Breast cancer    HISTORY OF PRESENT ILLNESS:  The patient is a 75 y.o. year old female  who is here for follow-up with the above-mentioned history.    Overall doing well.  Has had some right-sided chest pain over the past 6 weeks or so.  She thinks this is due to helping her disabled daughter move around.    She is noticed a bulging around her left mastectomy site for the past couple of months or so.    Denies pain otherwise.  Denies shortness of breath or nausea.    Past Medical History:   Diagnosis Date   • Allergic rhinitis    • Arthritis    • Cancer (CMS/HCC)     Left breast cancer   • Chest pain    • CHF (congestive heart failure) (CMS/HCC)    • Cough    • Degenerative arthritis of thumb    • GERD without esophagitis    • H/O TIA (transient ischemic attack) and stroke 2005   • History of anemia    • History of bone density study    • History of hypercholesterolemia    • Hyperkalemia    • Hyperlipidemia    • Hypertension    • Hypokalemia    • Hypothyroidism    • Insomnia    • Laceration of scalp    • Lumbar radiculopathy    • Non-smoker     Never a smoker   • Non-smoker     Never a smoker   • Osteoporosis    • Palpitations    • Peptic ulceration    • Personal history of malignant neoplasm of breast    • Personal history of malignant neoplasm of breast    • Right shoulder pain    • Sciatica    • Stroke (CMS/HCC) 2005    CVA--History of storke   • Trigeminal neuralgia    • Visit for suture removal      Past Surgical History:   Procedure Laterality Date   • BREAST BIOPSY Left 2011   • COLONOSCOPY  09/28/2012    Adolph Martinez MD   • COLONOSCOPY N/A 11/13/2018    Procedure: COLONOSCOPY to cecum;  Surgeon: Adolph Martinez MD;  Location: [facility] ENDOSCOPY;  Service: Gastroenterology   • ENDOSCOPY N/A 11/13/2018    Procedure: ESOPHAGOGASTRODUODENOSCOPY with bx;  Surgeon: Adolph Martinez MD;  Location: [facility] ENDOSCOPY;  Service: Gastroenterology   • EYE SURGERY     •  HYSTERECTOMY      At age 31-Not due to cancer   • MASTECTOMY Left 06/30/2011    Dr. Kaitlyn Luna   • TUBAL ABDOMINAL LIGATION  1974   • UPPER GASTROINTESTINAL ENDOSCOPY  09/28/2012    Adolph Martinez MD       HEMATOLOGIC/ONCOLOGIC HISTORY:  (History from previous dates can be found in the separate document.)    MEDICATIONS    Current Outpatient Medications:   •  amitriptyline (ELAVIL) 25 MG tablet, TAKE 1 TABLET BY MOUTH ONCE DAILY AT NIGHT, Disp: 90 tablet, Rfl: 0  •  aspirin 81 MG EC tablet, Take 81 mg by mouth Daily., Disp: , Rfl:   •  atorvastatin (LIPITOR) 10 MG tablet, Take 1 tablet by mouth Daily., Disp: 90 tablet, Rfl: 2  •  clopidogrel (PLAVIX) 75 MG tablet, Take 1 tablet by mouth Daily., Disp: 90 tablet, Rfl: 2  •  diphenhydrAMINE (BENADRYL) 50 MG capsule, Take 1 hour prior to CT scan, Disp: 1 capsule, Rfl: 0  •  esomeprazole (nexIUM) 40 MG capsule, Take 1 capsule by mouth Every Morning Before Breakfast., Disp: 90 capsule, Rfl: 3  •  EUTHYROX 75 MCG tablet, TAKE 1 TABLET BY MOUTH ONCE DAILY AS DIRECTED, Disp: 90 tablet, Rfl: 0  •  hydroCHLOROthiazide (HYDRODIURIL) 12.5 MG tablet, Take 1 tablet by mouth Daily., Disp: 90 tablet, Rfl: 2  •  losartan (COZAAR) 50 MG tablet, Take 1 tablet by mouth Daily., Disp: 90 tablet, Rfl: 2  •  Melatonin 10 MG capsule, Take  by mouth., Disp: , Rfl:   •  meloxicam (MOBIC) 7.5 MG tablet, Take 7.5 mg by mouth Daily As Needed., Disp: , Rfl: 2  •  metoprolol succinate XL (TOPROL-XL) 50 MG 24 hr tablet, Take 1 tablet by mouth 2 (Two) Times a Day., Disp: 180 tablet, Rfl: 2    ALLERGIES:     Allergies   Allergen Reactions   • Lisinopril Cough   • Contrast Dye Itching and Rash     IVP Dye   • Tartrazine Hives       SOCIAL HISTORY:       Social History     Socioeconomic History   • Marital status:      Spouse name: Jack   • Number of children: 2   • Years of education: High School   • Highest education level: Not on file   Occupational History     Employer: RETIRED   Tobacco  Use   • Smoking status: Never Smoker   • Smokeless tobacco: Never Used   Substance and Sexual Activity   • Alcohol use: Yes     Comment: rare-7 or less drinks per week   • Drug use: No   • Sexual activity: Defer         FAMILY HISTORY:  Family History   Problem Relation Age of Onset   • Cancer Mother 68        head and neck   • Stroke Mother    • Heart disease Mother    • Gallbladder disease Mother    • Throat cancer Mother 68   • Breast cancer Sister 57   • Heart disease Sister    • Hypertension Sister    • Cancer Sister    • Gallbladder disease Sister    • Diabetes Sister    • Cancer Brother         head and neck   • Heart disease Brother    • Hypertension Brother    • Gallbladder disease Brother    • Lung cancer Brother 67   • Throat cancer Brother 67   • Hypertension Father    • Gallbladder disease Father    • Emphysema Father    • Heart disease Father         Enlarged heart   • Cancer Maternal Aunt    • Cancer Maternal Uncle    • Colon cancer Maternal Uncle    • Heart attack Brother    • Alcohol abuse Brother    • Hypertension Sister    • Heart disease Sister    • Hypertension Sister    • Heart disease Sister    • Hypertension Sister    • No Known Problems Other        REVIEW OF SYSTEMS:  Review of Systems   Constitutional: Negative for activity change.   HENT: Negative for nosebleeds and trouble swallowing.    Respiratory: Negative for shortness of breath and wheezing.    Cardiovascular: Positive for chest pain. Negative for palpitations.   Gastrointestinal: Negative for constipation, diarrhea and nausea.   Genitourinary: Negative for dysuria and hematuria.   Musculoskeletal: Negative for arthralgias and myalgias.   Skin: Negative for rash and wound.   Neurological: Negative for seizures and syncope.   Hematological: Negative for adenopathy. Does not bruise/bleed easily.   Psychiatric/Behavioral: Negative for confusion.           Objective    Vitals:    08/03/20 1419   BP: 130/79   Pulse: 75   Resp: 16   Temp:  "97.7 °F (36.5 °C)   TempSrc: Skin   SpO2: 97%   Weight: 71.7 kg (158 lb)   Height: 157.5 cm (62.01\")   PainSc: 0-No pain     Current Status 8/3/2020   ECOG score 0      PHYSICAL EXAM:        CONSTITUTIONAL:  Vital signs reviewed.  No distress, looks comfortable.  EYES:  Conjunctiva and lids unremarkable.  PERRLA  EARS,NOSE,MOUTH,THROAT:  Ears and nose appear unremarkable.  Lips, teeth, gums appear unremarkable.  RESPIRATORY:  Normal respiratory effort.  Lungs clear to auscultation bilaterally.  CARDIOVASCULAR:  Normal S1, S2.  No murmurs rubs or gallops.  No significant lower extremity edema.  BREAST: Left mastectomy.  Bulging/swelling inferior lateral to the mastectomy site.  The area is soft.  No masses appreciated.  No masses otherwise.  GASTROINTESTINAL: Abdomen appears unremarkable.  Nontender.  No hepatomegaly.  No splenomegaly.  LYMPHATIC:  No cervical, supraclavicular, axillary lymphadenopathy.  SKIN:  Warm.  No rashes.  PSYCHIATRIC:  Normal judgment and insight.  Normal mood and affect.         RECENT LABS:        WBC   Date/Time Value Ref Range Status   08/03/2020 02:02 PM 7.16 3.40 - 10.80 10*3/mm3 Final     Hemoglobin   Date/Time Value Ref Range Status   08/03/2020 02:02 PM 12.8 12.0 - 15.9 g/dL Final     Platelets   Date/Time Value Ref Range Status   08/03/2020 02:02  140 - 450 10*3/mm3 Final       Assessment/Plan     ASSESSMENT:  Problem List Items Addressed This Visit     None         *Stage IIIA, grade 3, 3.9 cm left breast cancer. Four out of 24 nodes positive. ER 2%, TX negative, HER2 negative. Completed FEC x3 followed by Taxotere x3, 11/28/2011. Completed radiation in February 2012.   Poor tolerance to anastrozole and letrozole.  completed 5 years hormonal therapy using Aromasin, 1/31/17.  (She was initially a patient at [facility]. She transferred here as her son-in-law, Jeremiah Dumont, was a patient here).   · Suspect she remains in remission.  However, with the bulging/swelling of skin around her " mastectomy site on the left and the new onset right-sided chest pain, check a CT of the chest to assess for recurrence.    *Possible lupus.  She is seeing Dr. Rivera for this    *Chronic right low back/hip pain radiating anteriorly.  In May 2016, CAT scan and bone scan negative for cancer as the cause of the pain.  Defer further management of this to her PCP.  She did not complain of this today.    *In the past, she has complained of: Forgetfulness and transposition of numbers when she is writing numbers at times. This had been present since around April 2015. She states it is not worsening.   She did not complain of this today.    *History of stroke around 2005. Because of this I do not think she would be a good candidate for tamoxifen.     *Osteopenia, which has improved to normal bone density.  DEXA 5/17/16 normal bone density.  T score -0.6, compared to.   -1.1 2/20/14.  She is on calcium and vitamin D.  Defer further management of this to her PCP now that she is off aromatase inhibitors.    *Right upper quadrant/right lower anterior rib pain.  Present since October 2016.    Due to RUQ abdominal pain/right lower anterior rib pain since October 2016, bone scan and CT abdomen with contrast 2/2/17: No evidence of recurrence.  (8 mm low-attenuation liver lesion seen on the 5/17/16 CT but less conspicuous on order CT.  This was felt to be due to older CT without contrast.  This was felt to likely be benign.  Plan no further scheduled follow-up of this-patient agrees.).  She did not complain of this pain today.    *Hypokalemia. Her PCP manages this.      *IV contrast allergy.  Previous rash.  Receives IV contrast pre-medicines with CT IV contrast.  (This has worked well in the past)    *Left arm lymphedema due to prior surgery for breast cancer.  She was seen by the lymphedema clinic but has had some logistical issues obtaining the sleeve they prescribed.  I encouraged her to continue to call the lymphedema clinic for  assistance with this.    *Previously complained of vertigo and was referred to her ENT.    *Heartburn.  Had EGD and colonoscopy by Dr. Martinez November 2018.  No malignancy was found.      *Social issues  She is the caregiver for her disabled daughter.  Her other daughter, Osmin, is dealing with the death of her  which was around January 2019.    PLAN:   CT chest.  MD visit a few days later.    Usual plan is:  · M.D. CBC 6 months  · Last mammogram 1/7/2020, BI-RADS 1 (Right-sided mammograms)  · Off hormonal therapy    Send copy to Dr. Paxton Rivera, rheumatology

## 2020-08-10 ENCOUNTER — HOSPITAL ENCOUNTER (OUTPATIENT)
Dept: CT IMAGING | Facility: HOSPITAL | Age: 75
Discharge: HOME | End: 2020-08-10
Admitting: INTERNAL MEDICINE

## 2020-08-10 DIAGNOSIS — Z17.0 MALIGNANT NEOPLASM OF LOWER-OUTER QUADRANT OF LEFT BREAST OF FEMALE, ESTROGEN RECEPTOR POSITIVE (HCC): ICD-10-CM

## 2020-08-10 DIAGNOSIS — C50.512 MALIGNANT NEOPLASM OF LOWER-OUTER QUADRANT OF LEFT BREAST OF FEMALE, ESTROGEN RECEPTOR POSITIVE (HCC): ICD-10-CM

## 2020-08-10 PROCEDURE — 71250 CT THORAX DX C-: CPT

## 2020-08-13 NOTE — PROGRESS NOTES
Subjective .     REASONS FOR FOLLOWUP:  Breast cancer    HISTORY OF PRESENT ILLNESS:  The patient is a 75 y.o. year old female  who is here for follow-up with the above-mentioned history.    No new issues.  Continuing to have intermittent right chest pain which mainly occurs with moving her arms in certain directions.  She thinks this is related to repeatedly picking up her 125 pound disabled daughter.    CT chest shows no evidence of recurrence.    Past Medical History:   Diagnosis Date   • Allergic rhinitis    • Arthritis    • Cancer (CMS/HCC)     Left breast cancer   • Chest pain    • CHF (congestive heart failure) (CMS/HCC)    • Cough    • Degenerative arthritis of thumb    • GERD without esophagitis    • H/O TIA (transient ischemic attack) and stroke 2005   • History of anemia    • History of bone density study    • History of hypercholesterolemia    • Hyperkalemia    • Hyperlipidemia    • Hypertension    • Hypokalemia    • Hypothyroidism    • Insomnia    • Laceration of scalp    • Lumbar radiculopathy    • Non-smoker     Never a smoker   • Non-smoker     Never a smoker   • Osteoporosis    • Palpitations    • Peptic ulceration    • Personal history of malignant neoplasm of breast    • Personal history of malignant neoplasm of breast    • Right shoulder pain    • Sciatica    • Stroke (CMS/HCC) 2005    CVA--History of storke   • Trigeminal neuralgia    • Visit for suture removal      Past Surgical History:   Procedure Laterality Date   • BREAST BIOPSY Left 2011   • COLONOSCOPY  09/28/2012    Adolph Martinez MD   • COLONOSCOPY N/A 11/13/2018    Procedure: COLONOSCOPY to cecum;  Surgeon: Adolph Martinez MD;  Location: [facility] ENDOSCOPY;  Service: Gastroenterology   • ENDOSCOPY N/A 11/13/2018    Procedure: ESOPHAGOGASTRODUODENOSCOPY with bx;  Surgeon: Adolph Martinez MD;  Location: [facility] ENDOSCOPY;  Service: Gastroenterology   • EYE SURGERY     • HYSTERECTOMY      At age 31-Not due to cancer   • MASTECTOMY Left  06/30/2011    Dr. Kaitlyn Luna   • TUBAL ABDOMINAL LIGATION  1974   • UPPER GASTROINTESTINAL ENDOSCOPY  09/28/2012    Aodlph Martinez MD       HEMATOLOGIC/ONCOLOGIC HISTORY:  (History from previous dates can be found in the separate document.)    MEDICATIONS    Current Outpatient Medications:   •  amitriptyline (ELAVIL) 25 MG tablet, TAKE 1 TABLET BY MOUTH ONCE DAILY AT NIGHT, Disp: 90 tablet, Rfl: 0  •  aspirin 81 MG EC tablet, Take 81 mg by mouth Daily., Disp: , Rfl:   •  atorvastatin (LIPITOR) 10 MG tablet, Take 1 tablet by mouth Daily., Disp: 90 tablet, Rfl: 2  •  clopidogrel (PLAVIX) 75 MG tablet, Take 1 tablet by mouth Daily., Disp: 90 tablet, Rfl: 2  •  diphenhydrAMINE (BENADRYL) 50 MG capsule, Take 1 hour prior to CT scan, Disp: 1 capsule, Rfl: 0  •  esomeprazole (nexIUM) 40 MG capsule, Take 1 capsule by mouth Every Morning Before Breakfast., Disp: 90 capsule, Rfl: 3  •  EUTHYROX 75 MCG tablet, TAKE 1 TABLET BY MOUTH ONCE DAILY AS DIRECTED, Disp: 90 tablet, Rfl: 0  •  hydroCHLOROthiazide (HYDRODIURIL) 12.5 MG tablet, Take 1 tablet by mouth Daily., Disp: 90 tablet, Rfl: 2  •  losartan (COZAAR) 50 MG tablet, Take 1 tablet by mouth Daily., Disp: 90 tablet, Rfl: 2  •  Melatonin 10 MG capsule, Take  by mouth., Disp: , Rfl:   •  meloxicam (MOBIC) 7.5 MG tablet, Take 7.5 mg by mouth Daily As Needed., Disp: , Rfl: 2  •  metoprolol succinate XL (TOPROL-XL) 50 MG 24 hr tablet, Take 1 tablet by mouth 2 (Two) Times a Day., Disp: 180 tablet, Rfl: 2    ALLERGIES:     Allergies   Allergen Reactions   • Lisinopril Cough   • Contrast Dye Itching and Rash     IVP Dye   • Tartrazine Hives       SOCIAL HISTORY:       Social History     Socioeconomic History   • Marital status:      Spouse name: Jack   • Number of children: 2   • Years of education: High School   • Highest education level: Not on file   Occupational History     Employer: RETIRED   Tobacco Use   • Smoking status: Never Smoker   • Smokeless tobacco: Never  Used   Substance and Sexual Activity   • Alcohol use: Yes     Comment: rare-7 or less drinks per week   • Drug use: No   • Sexual activity: Defer         FAMILY HISTORY:  Family History   Problem Relation Age of Onset   • Cancer Mother 68        head and neck   • Stroke Mother    • Heart disease Mother    • Gallbladder disease Mother    • Throat cancer Mother 68   • Breast cancer Sister 57   • Heart disease Sister    • Hypertension Sister    • Cancer Sister    • Gallbladder disease Sister    • Diabetes Sister    • Cancer Brother         head and neck   • Heart disease Brother    • Hypertension Brother    • Gallbladder disease Brother    • Lung cancer Brother 67   • Throat cancer Brother 67   • Hypertension Father    • Gallbladder disease Father    • Emphysema Father    • Heart disease Father         Enlarged heart   • Cancer Maternal Aunt    • Cancer Maternal Uncle    • Colon cancer Maternal Uncle    • Heart attack Brother    • Alcohol abuse Brother    • Hypertension Sister    • Heart disease Sister    • Hypertension Sister    • Heart disease Sister    • Hypertension Sister    • No Known Problems Other        REVIEW OF SYSTEMS:  Review of Systems   Constitutional: Negative for activity change.   HENT: Negative for nosebleeds and trouble swallowing.    Respiratory: Negative for shortness of breath and wheezing.    Cardiovascular: Positive for chest pain. Negative for palpitations.   Gastrointestinal: Negative for constipation, diarrhea and nausea.   Genitourinary: Negative for dysuria and hematuria.   Musculoskeletal: Negative for arthralgias and myalgias.   Skin: Negative for rash and wound.   Neurological: Negative for seizures and syncope.   Hematological: Negative for adenopathy. Does not bruise/bleed easily.   Psychiatric/Behavioral: Negative for confusion.           Objective    Vitals:    08/14/20 1059   BP: 130/77   Pulse: 70   Resp: 16   Temp: 97.7 °F (36.5 °C)   TempSrc: Skin   SpO2: 97%   Weight: 71.7 kg  "(158 lb)   Height: 157.5 cm (62.01\")   PainSc: 0-No pain     Current Status 8/14/2020   ECOG score 0      PHYSICAL EXAM:      CONSTITUTIONAL:  Vital signs reviewed.    No distress, looks comfortable.  EYES:  Conjunctiva and lids unremarkable.  Extraocular eye movements intact.  EARS,NOSE,MOUTH,THROAT:  Ears and nose appear unremarkable.  Lips, teeth, gums appear unremarkable.  RESPIRATORY:  Normal respiratory effort.    CARDIOVASCULAR:    No significant lower extremity edema.  SKIN: No wounds.  No rashes.  MUSCULOSKELETAL/EXTREMITIES: No clubbing or cyanosis.  No apparent unilateral weakness.  NEURO: CN 2-12 appear intact. No focal neurological deficits noted.  PSYCHIATRIC:  Normal judgment and insight.  Normal mood and affect.     RECENT LABS:        WBC   Date/Time Value Ref Range Status   08/03/2020 02:02 PM 7.16 3.40 - 10.80 10*3/mm3 Final     Hemoglobin   Date/Time Value Ref Range Status   08/03/2020 02:02 PM 12.8 12.0 - 15.9 g/dL Final     Platelets   Date/Time Value Ref Range Status   08/03/2020 02:02  140 - 450 10*3/mm3 Final       Assessment/Plan     ASSESSMENT:  Problem List Items Addressed This Visit        High    Malignant neoplasm of lower-outer quadrant of left female breast (CMS/HCC) - Primary    Relevant Orders    CBC & Differential         *Stage IIIA, grade 3, 3.9 cm left breast cancer. Four out of 24 nodes positive. ER 2%, IN negative, HER2 negative. Completed FEC x3 followed by Taxotere x3, 11/28/2011. Completed radiation in February 2012.   Poor tolerance to anastrozole and letrozole.  completed 5 years hormonal therapy using Aromasin, 1/31/17.  (She was initially a patient at [facility]. She transferred here as her son-in-law, Jeremiah Dumont, was a patient here).   · Suspect she remains in remission.  However, with the bulging/swelling of skin around her mastectomy site on the left and the new onset right-sided chest pain, check a CT of the chest to assess for recurrence.  · CT chest 8/10/2020: No " evidence of recurrence.  · CT images personally viewed by me  · I discussed with Dr. Braxton-she stated we could do an ultrasound if we wanted to evaluate the superficial areas more.  Notably the bulging is on the left side and the right side has chest pain.  She states an ultrasound often shows fat necrosis better than a CT.  She suspects the CT would have picked up malignancy if it was present.    *Possible lupus.  She is seeing Dr. Rivera for this    *Chronic right low back/hip pain radiating anteriorly.  In May 2016, CAT scan and bone scan negative for cancer as the cause of the pain.  Defer further management of this to her PCP.  She did not complain of this today.    *In the past, she has complained of: Forgetfulness and transposition of numbers when she is writing numbers at times. This had been present since around April 2015. She states it is not worsening.   She did not complain of this today.    *History of stroke around 2005. Because of this I do not think she would be a good candidate for tamoxifen.     *Osteopenia, which has improved to normal bone density.  DEXA 5/17/16 normal bone density.  T score -0.6, compared to.   -1.1 2/20/14.  She is on calcium and vitamin D.  Defer further management of this to her PCP now that she is off aromatase inhibitors.    *Right upper quadrant/right lower anterior rib pain.  Present since October 2016.    Due to RUQ abdominal pain/right lower anterior rib pain since October 2016, bone scan and CT abdomen with contrast 2/2/17: No evidence of recurrence.  (8 mm low-attenuation liver lesion seen on the 5/17/16 CT but less conspicuous on order CT.  This was felt to be due to older CT without contrast.  This was felt to likely be benign.  Plan no further scheduled follow-up of this-patient agrees.).  She did not complain of this pain today.    *Hypokalemia. Her PCP manages this.      *IV contrast allergy.  Previous rash.  Receives IV contrast pre-medicines with CT IV  contrast.  (This has worked well in the past)    *Left arm lymphedema due to prior surgery for breast cancer.  She was seen by the lymphedema clinic but has had some logistical issues obtaining the sleeve they prescribed.  I encouraged her to continue to call the lymphedema clinic for assistance with this.    *Previously complained of vertigo and was referred to her ENT.    *Heartburn.  Had EGD and colonoscopy by Dr. Martinez November 2018.  No malignancy was found.      *Social issues  She is the caregiver for her disabled daughter.  Her other daughter, Osmin, is dealing with the death of her  which was around January 2019.    PLAN:   · M.D. CBC 6 months  · Last mammogram 1/7/2020, BI-RADS 1 (Right-sided mammograms)  · Off hormonal therapy    Send copy to Dr. Paxton Rivera, rheumatology

## 2020-08-14 ENCOUNTER — LAB (OUTPATIENT)
Dept: OTHER | Facility: HOSPITAL | Age: 75
End: 2020-08-14

## 2020-08-14 ENCOUNTER — OFFICE VISIT (OUTPATIENT)
Dept: ONCOLOGY | Facility: CLINIC | Age: 75
End: 2020-08-14

## 2020-08-14 VITALS
BODY MASS INDEX: 29.08 KG/M2 | OXYGEN SATURATION: 97 % | HEIGHT: 62 IN | HEART RATE: 70 BPM | DIASTOLIC BLOOD PRESSURE: 77 MMHG | WEIGHT: 158 LBS | SYSTOLIC BLOOD PRESSURE: 130 MMHG | TEMPERATURE: 97.7 F | RESPIRATION RATE: 16 BRPM

## 2020-08-14 DIAGNOSIS — Z17.0 MALIGNANT NEOPLASM OF LOWER-OUTER QUADRANT OF LEFT BREAST OF FEMALE, ESTROGEN RECEPTOR POSITIVE (HCC): Primary | ICD-10-CM

## 2020-08-14 DIAGNOSIS — C50.512 MALIGNANT NEOPLASM OF LOWER-OUTER QUADRANT OF LEFT BREAST OF FEMALE, ESTROGEN RECEPTOR POSITIVE (HCC): Primary | ICD-10-CM

## 2020-08-14 PROCEDURE — 99213 OFFICE O/P EST LOW 20 MIN: CPT | Performed by: INTERNAL MEDICINE

## 2020-09-27 DIAGNOSIS — E03.9 HYPOTHYROIDISM (ACQUIRED): ICD-10-CM

## 2020-09-27 DIAGNOSIS — F51.01 PRIMARY INSOMNIA: ICD-10-CM

## 2020-09-28 RX ORDER — AMITRIPTYLINE HYDROCHLORIDE 25 MG/1
TABLET, FILM COATED ORAL
Qty: 90 TABLET | Refills: 0 | Status: SHIPPED | OUTPATIENT
Start: 2020-09-28 | End: 2020-12-01 | Stop reason: SDUPTHER

## 2020-09-28 RX ORDER — LEVOTHYROXINE SODIUM 75 UG/1
TABLET ORAL
Qty: 90 TABLET | Refills: 0 | Status: SHIPPED | OUTPATIENT
Start: 2020-09-28 | End: 2020-12-01 | Stop reason: SDUPTHER

## 2020-11-17 ENCOUNTER — TELEPHONE (OUTPATIENT)
Dept: FAMILY MEDICINE CLINIC | Facility: CLINIC | Age: 75
End: 2020-11-17

## 2020-11-30 RX ORDER — ATORVASTATIN CALCIUM 10 MG/1
10 TABLET, FILM COATED ORAL DAILY
Qty: 90 TABLET | Refills: 4 | Status: SHIPPED | OUTPATIENT
Start: 2020-11-30 | End: 2022-02-09 | Stop reason: SDUPTHER

## 2020-11-30 RX ORDER — CLOPIDOGREL BISULFATE 75 MG/1
75 TABLET ORAL DAILY
Qty: 90 TABLET | Refills: 4 | Status: SHIPPED | OUTPATIENT
Start: 2020-11-30 | End: 2022-05-10 | Stop reason: SDUPTHER

## 2020-12-01 DIAGNOSIS — F51.01 PRIMARY INSOMNIA: ICD-10-CM

## 2020-12-01 DIAGNOSIS — E03.9 HYPOTHYROIDISM (ACQUIRED): ICD-10-CM

## 2020-12-01 RX ORDER — AMITRIPTYLINE HYDROCHLORIDE 25 MG/1
25 TABLET, FILM COATED ORAL NIGHTLY
Qty: 90 TABLET | Refills: 0 | Status: SHIPPED | OUTPATIENT
Start: 2020-12-01 | End: 2021-03-22 | Stop reason: SDUPTHER

## 2020-12-01 RX ORDER — LEVOTHYROXINE SODIUM 0.07 MG/1
75 TABLET ORAL DAILY
Qty: 90 TABLET | Refills: 0 | Status: SHIPPED | OUTPATIENT
Start: 2020-12-01 | End: 2021-03-22

## 2020-12-01 NOTE — TELEPHONE ENCOUNTER
**OK FOR HUB TO READ**    CALLED AND LEFT MESSAGE WITH SISTER TO CALL BACK AND SCHEDULE AN APPT. LAST APPT WAS A YEAR AGO. MEDICATIONS WILL BE REFILLED TO GET HER THROUGH TO HER APPT.       For Dr. Garcia:  LOV 11/2019  NOV not on file. Left message to call back to schedule appt.  Last refill 9/28/2020

## 2020-12-01 NOTE — TELEPHONE ENCOUNTER
Caller: Carlotta Pires    Relationship: Self    Best call back number: [phone]    Medication needed:   Requested Prescriptions     Pending Prescriptions Disp Refills   • levothyroxine (Euthyrox) 75 MCG tablet 90 tablet 0     Sig: Take 1 tablet by mouth Daily. as directed   • amitriptyline (ELAVIL) 25 MG tablet 90 tablet 0     Sig: Take 1 tablet by mouth Every Night.       What is the patient's preferred pharmacy: [facility] [place] [address] [facility] [phone] [facility] [phone]

## 2020-12-03 ENCOUNTER — OFFICE VISIT (OUTPATIENT)
Dept: FAMILY MEDICINE CLINIC | Facility: CLINIC | Age: 75
End: 2020-12-03

## 2020-12-03 VITALS
TEMPERATURE: 97.2 F | HEART RATE: 88 BPM | HEIGHT: 62 IN | WEIGHT: 153 LBS | DIASTOLIC BLOOD PRESSURE: 82 MMHG | SYSTOLIC BLOOD PRESSURE: 134 MMHG | BODY MASS INDEX: 28.16 KG/M2 | OXYGEN SATURATION: 96 %

## 2020-12-03 DIAGNOSIS — E03.9 HYPOTHYROIDISM (ACQUIRED): ICD-10-CM

## 2020-12-03 DIAGNOSIS — E78.2 HYPERLIPIDEMIA, MIXED: ICD-10-CM

## 2020-12-03 DIAGNOSIS — I10 ESSENTIAL HYPERTENSION: Primary | ICD-10-CM

## 2020-12-03 PROCEDURE — 99214 OFFICE O/P EST MOD 30 MIN: CPT | Performed by: FAMILY MEDICINE

## 2020-12-03 NOTE — PROGRESS NOTES
Chief Complaint   Patient presents with   • Hypothyroidism   • Hyperlipidemia       Subjective   Carlotta Pires is a 75 y.o. female.     History of Present Illness   PT is here for refills, labs and  Hypertension-no chest pain, blurry vision, headaches or palpitations.  Hyperlipidemia- No myalgia.  No Complaints.  Stable.   Hypothyroidism- Stable and No significant weight change.  Denies heat or cold intolerance.  No palpitations.    The following portions of the patient's history were reviewed and updated as appropriate: allergies, current medications, past family history, past medical history, past social history, past surgical history and problem list.    Review of Systems   Constitutional: Negative for fatigue.   Eyes: Negative for blurred vision.   Respiratory: Negative for cough, chest tightness and shortness of breath.    Cardiovascular: Negative for chest pain, palpitations and leg swelling.   Neurological: Negative for dizziness, light-headedness and headache.       Patient Active Problem List   Diagnosis   • Malignant neoplasm of lower-outer quadrant of left female breast (CMS/HCC)   • Osteopenia   • Encounter for screening for malignant neoplasm of colon   • Epigastric pain   • Essential hypertension   • Dilated cardiomyopathy (CMS/HCC)   • Hyperlipidemia, mixed   • Acute seasonal allergic rhinitis due to pollen   • GERD without esophagitis   • Hypothyroidism (acquired)   • Primary insomnia   • CHF (congestive heart failure) (CMS/[facility])   • Encounter for Medicare annual wellness exam   • Arthritis   • Trigeminal neuralgia   • Osteoporosis   • Lumbar radiculopathy   • Degenerative arthritis of thumb       Allergies   Allergen Reactions   • Lisinopril Cough   • Contrast Dye Itching and Rash     IVP Dye   • Tartrazine Hives         Current Outpatient Medications:   •  amitriptyline (ELAVIL) 25 MG tablet, Take 1 tablet by mouth Every Night., Disp: 90 tablet, Rfl: 0  •  aspirin 81 MG EC tablet, Take 81 mg by  mouth Daily., Disp: , Rfl:   •  atorvastatin (LIPITOR) 10 MG tablet, Take 1 tablet by mouth Daily., Disp: 90 tablet, Rfl: 4  •  clopidogrel (PLAVIX) 75 MG tablet, Take 1 tablet by mouth Daily., Disp: 90 tablet, Rfl: 4  •  diphenhydrAMINE (BENADRYL) 50 MG capsule, Take 1 hour prior to CT scan, Disp: 1 capsule, Rfl: 0  •  esomeprazole (nexIUM) 40 MG capsule, Take 1 capsule by mouth Every Morning Before Breakfast., Disp: 90 capsule, Rfl: 3  •  hydroCHLOROthiazide (HYDRODIURIL) 12.5 MG tablet, Take 1 tablet by mouth Daily., Disp: 90 tablet, Rfl: 2  •  levothyroxine (Euthyrox) 75 MCG tablet, Take 1 tablet by mouth Daily. as directed, Disp: 90 tablet, Rfl: 0  •  losartan (COZAAR) 50 MG tablet, Take 1 tablet by mouth Daily., Disp: 90 tablet, Rfl: 2  •  Melatonin 10 MG capsule, Take  by mouth., Disp: , Rfl:   •  meloxicam (MOBIC) 7.5 MG tablet, Take 7.5 mg by mouth Daily As Needed., Disp: , Rfl: 2  •  metoprolol succinate XL (TOPROL-XL) 50 MG 24 hr tablet, Take 1 tablet by mouth 2 (Two) Times a Day., Disp: 180 tablet, Rfl: 2  •  promethazine-dextromethorphan (PROMETHAZINE-DM) 6.25-15 MG/5ML syrup, Take 5 mL by mouth 4 (Four) Times a Day As Needed for Cough., Disp: 180 mL, Rfl: 0    Past Medical History:   Diagnosis Date   • Allergic rhinitis    • Arthritis    • Cancer (CMS/HCC)     Left breast cancer   • CHF (congestive heart failure) (CMS/HCC)    • Cough    • Degenerative arthritis of thumb    • GERD without esophagitis    • H/O TIA (transient ischemic attack) and stroke 2005   • History of bone density study    • Lumbar radiculopathy    • Osteoporosis    • Palpitations    • Peptic ulceration    • Personal history of malignant neoplasm of breast    • Personal history of malignant neoplasm of breast    • Sciatica    • Stroke (CMS/HCC) 2005    CVA--History of storke   • Trigeminal neuralgia        Past Surgical History:   Procedure Laterality Date   • BREAST BIOPSY Left 2011   • COLONOSCOPY  09/28/2012    Adolph Martinez MD  "  • COLONOSCOPY N/A 11/13/2018    Procedure: COLONOSCOPY to cecum;  Surgeon: Adolph Martinez MD;  Location:  [facility]COPY;  Service: Gastroenterology   • ENDOSCOPY N/A 11/13/2018    Procedure: ESOPHAGOGASTRODUODENOSCOPY with bx;  Surgeon: Adolph Martinez MD;  Location: [facility] ENDOSCOPY;  Service: Gastroenterology   • EYE SURGERY     • HYSTERECTOMY      At age 31-Not due to cancer   • MASTECTOMY Left 06/30/2011    Dr. Kaitlyn Luna   • TUBAL ABDOMINAL LIGATION  1974   • UPPER GASTROINTESTINAL ENDOSCOPY  09/28/2012    Adolph Martinez MD       Family History   Problem Relation Age of Onset   • Cancer Mother 68        head and neck   • Stroke Mother    • Heart disease Mother    • Gallbladder disease Mother    • Throat cancer Mother 68   • Breast cancer Sister 57   • Heart disease Sister    • Hypertension Sister    • Cancer Sister    • Gallbladder disease Sister    • Diabetes Sister    • Cancer Brother         head and neck   • Heart disease Brother    • Hypertension Brother    • Gallbladder disease Brother    • Lung cancer Brother 67   • Throat cancer Brother 67   • Hypertension Father    • Gallbladder disease Father    • Emphysema Father    • Heart disease Father         Enlarged heart   • Cancer Maternal Aunt    • Cancer Maternal Uncle    • Colon cancer Maternal Uncle    • Heart attack Brother    • Alcohol abuse Brother    • Hypertension Sister    • Heart disease Sister    • Hypertension Sister    • Heart disease Sister    • Hypertension Sister    • No Known Problems Other        Social History     Tobacco Use   • Smoking status: Never Smoker   • Smokeless tobacco: Never Used   Substance Use Topics   • Alcohol use: Yes     Comment: rare-7 or less drinks per week            Objective     Visit Vitals  /82   Pulse 88   Temp 97.2 °F (36.2 °C)   Ht 157.5 cm (62\")   Wt 69.4 kg (153 lb)   SpO2 96%   BMI 27.98 kg/m²       Physical Exam  Vitals signs and nursing note reviewed.   Constitutional:       Appearance: " Normal appearance. She is normal weight.   HENT:      Head: Normocephalic and atraumatic.   Cardiovascular:      Rate and Rhythm: Normal rate and regular rhythm.      Heart sounds: Normal heart sounds. No murmur. No friction rub.   Pulmonary:      Effort: Pulmonary effort is normal. No respiratory distress.      Breath sounds: Normal breath sounds. No stridor. No wheezing or rhonchi.   Neurological:      Mental Status: She is alert.         Lab Results   Component Value Date    GLUCOSE 89 05/29/2019    BUN 7 (L) 11/25/2019    CREATININE 0.64 11/25/2019    EGFRIFNONA 91 11/25/2019    EGFRIFAFRI 111 11/25/2019    BCR 10.9 11/25/2019    K 3.8 11/25/2019    CO2 29.5 (H) 11/25/2019    CALCIUM 9.3 11/25/2019    PROTENTOTREF 6.7 11/25/2019    ALBUMIN 4.30 11/25/2019    LABIL2 1.8 11/25/2019    AST 29 11/25/2019    ALT 19 11/25/2019       WBC   Date Value Ref Range Status   08/03/2020 7.16 3.40 - 10.80 10*3/mm3 Final     RBC   Date Value Ref Range Status   08/03/2020 4.18 3.77 - 5.28 10*6/mm3 Final     Hemoglobin   Date Value Ref Range Status   08/03/2020 12.8 12.0 - 15.9 g/dL Final     Hematocrit   Date Value Ref Range Status   08/03/2020 36.9 34.0 - 46.6 % Final     MCV   Date Value Ref Range Status   08/03/2020 88.3 79.0 - 97.0 fL Final     MCH   Date Value Ref Range Status   08/03/2020 30.6 26.6 - 33.0 pg Final     MCHC   Date Value Ref Range Status   08/03/2020 34.7 31.5 - 35.7 g/dL Final     RDW   Date Value Ref Range Status   08/03/2020 12.9 12.3 - 15.4 % Final     RDW-SD   Date Value Ref Range Status   08/03/2020 41.6 37.0 - 54.0 fl Final     MPV   Date Value Ref Range Status   08/03/2020 9.7 6.0 - 12.0 fL Final     Platelets   Date Value Ref Range Status   08/03/2020 252 140 - 450 10*3/mm3 Final     Neutrophil %   Date Value Ref Range Status   08/03/2020 60.9 42.7 - 76.0 % Final     Lymphocyte %   Date Value Ref Range Status   08/03/2020 26.8 19.6 - 45.3 % Final     Monocyte %   Date Value Ref Range Status    08/03/2020 8.4 5.0 - 12.0 % Final     Eosinophil %   Date Value Ref Range Status   08/03/2020 2.7 0.3 - 6.2 % Final     Basophil %   Date Value Ref Range Status   08/03/2020 0.8 0.0 - 1.5 % Final     Immature Grans %   Date Value Ref Range Status   08/03/2020 0.4 0.0 - 0.5 % Final     Neutrophils, Absolute   Date Value Ref Range Status   08/03/2020 4.36 1.70 - 7.00 10*3/mm3 Final     Lymphocytes, Absolute   Date Value Ref Range Status   08/03/2020 1.92 0.70 - 3.10 10*3/mm3 Final     Monocytes, Absolute   Date Value Ref Range Status   08/03/2020 0.60 0.10 - 0.90 10*3/mm3 Final     Eosinophils, Absolute   Date Value Ref Range Status   08/03/2020 0.19 0.00 - 0.40 10*3/mm3 Final     Basophils, Absolute   Date Value Ref Range Status   08/03/2020 0.06 0.00 - 0.20 10*3/mm3 Final     Immature Grans, Absolute   Date Value Ref Range Status   08/03/2020 0.03 0.00 - 0.05 10*3/mm3 Final     nRBC   Date Value Ref Range Status   08/03/2020 0.0 0.0 - 0.2 /100 WBC Final       No results found for: HGBA1C    No results found for: [id number]    TSH   Date Value Ref Range Status   11/25/2019 3.990 0.270 - 4.200 uIU/mL Final       No results found for: CHOL  Lab Results   Component Value Date    TRIG 92 11/25/2019     Lab Results   Component Value Date    HDL 43 11/25/2019     Lab Results   Component Value Date    LDL 74 11/25/2019     Lab Results   Component Value Date    VLDL 18.4 11/25/2019     No results found for: LDLHDL      Procedures    Assessment/Plan   Problems Addressed this Visit        Cardiovascular and Mediastinum    Essential hypertension - Primary    Relevant Orders    TSH    Hyperlipidemia, mixed    Relevant Orders    Comprehensive Metabolic Panel       Endocrine    Hypothyroidism (acquired)    Relevant Orders    Lipid Panel      Diagnoses       Codes Comments    Essential hypertension    -  Primary ICD-10-CM: I10  ICD-9-CM: 401.9     Hyperlipidemia, mixed     ICD-10-CM: E78.2  ICD-9-CM: 272.2     Hypothyroidism  (acquired)     ICD-10-CM: E03.9  ICD-9-CM: 244.9           Orders Placed This Encounter   Procedures   • Lipid Panel   • Comprehensive Metabolic Panel   • TSH       Current Outpatient Medications   Medication Sig Dispense Refill   • amitriptyline (ELAVIL) 25 MG tablet Take 1 tablet by mouth Every Night. 90 tablet 0   • aspirin 81 MG EC tablet Take 81 mg by mouth Daily.     • atorvastatin (LIPITOR) 10 MG tablet Take 1 tablet by mouth Daily. 90 tablet 4   • clopidogrel (PLAVIX) 75 MG tablet Take 1 tablet by mouth Daily. 90 tablet 4   • diphenhydrAMINE (BENADRYL) 50 MG capsule Take 1 hour prior to CT scan 1 capsule 0   • esomeprazole (nexIUM) 40 MG capsule Take 1 capsule by mouth Every Morning Before Breakfast. 90 capsule 3   • hydroCHLOROthiazide (HYDRODIURIL) 12.5 MG tablet Take 1 tablet by mouth Daily. 90 tablet 2   • levothyroxine (Euthyrox) 75 MCG tablet Take 1 tablet by mouth Daily. as directed 90 tablet 0   • losartan (COZAAR) 50 MG tablet Take 1 tablet by mouth Daily. 90 tablet 2   • Melatonin 10 MG capsule Take  by mouth.     • meloxicam (MOBIC) 7.5 MG tablet Take 7.5 mg by mouth Daily As Needed.  2   • metoprolol succinate XL (TOPROL-XL) 50 MG 24 hr tablet Take 1 tablet by mouth 2 (Two) Times a Day. 180 tablet 2   • promethazine-dextromethorphan (PROMETHAZINE-DM) 6.25-15 MG/5ML syrup Take 5 mL by mouth 4 (Four) Times a Day As Needed for Cough. 180 mL 0     No current facility-administered medications for this visit.        Return in about 6 months (around 6/3/2021).    There are no Patient Instructions on file for this visit.  Refills and labs   refills and labs.

## 2020-12-04 LAB
ALBUMIN SERPL-MCNC: 4.3 G/DL (ref 3.5–5.2)
ALBUMIN/GLOB SERPL: 1.9 G/DL
ALP SERPL-CCNC: 93 U/L (ref 39–117)
ALT SERPL-CCNC: 15 U/L (ref 1–33)
AST SERPL-CCNC: 21 U/L (ref 1–32)
BILIRUB SERPL-MCNC: 0.3 MG/DL (ref 0–1.2)
BUN SERPL-MCNC: 7 MG/DL (ref 8–23)
BUN/CREAT SERPL: 8.4 (ref 7–25)
CALCIUM SERPL-MCNC: 9.5 MG/DL (ref 8.6–10.5)
CHLORIDE SERPL-SCNC: 97 MMOL/L (ref 98–107)
CHOLEST SERPL-MCNC: 144 MG/DL (ref 0–200)
CO2 SERPL-SCNC: 29.7 MMOL/L (ref 22–29)
CREAT SERPL-MCNC: 0.83 MG/DL (ref 0.57–1)
GLOBULIN SER CALC-MCNC: 2.3 GM/DL
GLUCOSE SERPL-MCNC: 76 MG/DL (ref 65–99)
HDLC SERPL-MCNC: 47 MG/DL (ref 40–60)
LDLC SERPL CALC-MCNC: 69 MG/DL (ref 0–100)
POTASSIUM SERPL-SCNC: 3.6 MMOL/L (ref 3.5–5.2)
PROT SERPL-MCNC: 6.6 G/DL (ref 6–8.5)
SODIUM SERPL-SCNC: 139 MMOL/L (ref 136–145)
TRIGL SERPL-MCNC: 163 MG/DL (ref 0–150)
TSH SERPL DL<=0.005 MIU/L-ACNC: 4.37 UIU/ML (ref 0.27–4.2)
VLDLC SERPL CALC-MCNC: 28 MG/DL (ref 5–40)

## 2021-01-05 ENCOUNTER — TRANSCRIBE ORDERS (OUTPATIENT)
Dept: LAB | Facility: HOSPITAL | Age: 76
End: 2021-01-05

## 2021-01-05 ENCOUNTER — TELEPHONE (OUTPATIENT)
Dept: GASTROENTEROLOGY | Facility: CLINIC | Age: 76
End: 2021-01-05

## 2021-01-05 ENCOUNTER — OFFICE VISIT (OUTPATIENT)
Dept: GASTROENTEROLOGY | Facility: CLINIC | Age: 76
End: 2021-01-05

## 2021-01-05 VITALS — BODY MASS INDEX: 27.97 KG/M2 | WEIGHT: 152 LBS | TEMPERATURE: 96.5 F | HEIGHT: 62 IN

## 2021-01-05 DIAGNOSIS — Z80.0 FH: COLON CANCER: ICD-10-CM

## 2021-01-05 DIAGNOSIS — Z85.3 HISTORY OF BREAST CANCER: ICD-10-CM

## 2021-01-05 DIAGNOSIS — R63.4 WEIGHT LOSS, ABNORMAL: ICD-10-CM

## 2021-01-05 DIAGNOSIS — K62.5 RECTAL BLEEDING: Primary | ICD-10-CM

## 2021-01-05 DIAGNOSIS — Z01.818 OTHER SPECIFIED PRE-OPERATIVE EXAMINATION: Primary | ICD-10-CM

## 2021-01-05 DIAGNOSIS — R63.0 DECREASED APPETITE: ICD-10-CM

## 2021-01-05 DIAGNOSIS — K21.9 GASTROESOPHAGEAL REFLUX DISEASE WITHOUT ESOPHAGITIS: ICD-10-CM

## 2021-01-05 PROCEDURE — 99214 OFFICE O/P EST MOD 30 MIN: CPT | Performed by: NURSE PRACTITIONER

## 2021-01-05 RX ORDER — DEXLANSOPRAZOLE 60 MG/1
60 CAPSULE, DELAYED RELEASE ORAL DAILY
Qty: 90 CAPSULE | Refills: 3 | Status: SHIPPED | OUTPATIENT
Start: 2021-01-05 | End: 2021-10-14

## 2021-01-05 NOTE — TELEPHONE ENCOUNTER
Message sent to Dr Bonilla for approval to hold plavix prior to egd and c/s on 01/27/2021 with Dr Wilcox.

## 2021-01-05 NOTE — TELEPHONE ENCOUNTER
She is on the Plavix because she had a stroke I think he can be held for 5 days before the endoscopies and she is having acceptable risk for the

## 2021-01-05 NOTE — PROGRESS NOTES
Chief Complaint   Patient presents with   • Heartburn       Carlotta Pires is a  75 y.o. female here for a follow up visit for GERD.    HPI  75-year-old linda Martinez patient presents today as a new patient to our clinic for a follow-up for GERD.  She was last seen in the office by Dr. Martinez on 10/2018.  She has a history of GERD/gastritis/small hiatal hernia and admits she has been doing well on Nexium 40 mg daily.  She does have a history of liver lesions and this is being closely followed by her oncologist.  She does have a history of breast cancer and CVA.  She tells me starting in October of last year she started noticing a decrease in her appetite and she is been losing weight.  She tells me that since October she has been having some episodes of rectal bleeding with worsening constipation.  All the symptoms are very alarming to her.  Her last EGD and colonoscopy by Dr. Martinez were done on 11/2018.  She did undergo chemotherapy for her breast cancer in 2011.  She admits the rectal bleeding is visible when she wipes after bowel movement several times a week.  She does have a maternal uncle who had colon cancer.  She has been taking Metamucil for her constipation and this helps a little bit but it is not great.  Definitely not where she wants it to be.  She does have a history of peptic ulcer disease in the past.  She denies any dysphagia, nausea and vomiting, diarrhea or melena.  She tells me she has lost 10 pounds since October.  She admits she is even tried gaining weight and she is not having any luck.  Past Medical History:   Diagnosis Date   • Allergic rhinitis    • Arthritis    • Cancer (CMS/HCC)     Left breast cancer   • CHF (congestive heart failure) (CMS/HCC)    • Cough    • COVID-19 11/2020   • Degenerative arthritis of thumb    • GERD without esophagitis    • H/O TIA (transient ischemic attack) and stroke 2005   • History of bone density study    • Lumbar radiculopathy    • Osteoporosis    •  Palpitations    • Peptic ulceration    • Personal history of malignant neoplasm of breast    • Personal history of malignant neoplasm of breast    • Sciatica    • Stroke (CMS/HCC) 2005    CVA--History of storke   • Trigeminal neuralgia        Past Surgical History:   Procedure Laterality Date   • BREAST BIOPSY Left 2011   • COLONOSCOPY  09/28/2012    Adolph Martinez MD   • COLONOSCOPY N/A 11/13/2018    Procedure: COLONOSCOPY to cecum;  Surgeon: Adolph Martinez MD;  Location:  [facility];  Service: Gastroenterology   • ENDOSCOPY N/A 11/13/2018    Procedure: ESOPHAGOGASTRODUODENOSCOPY with bx;  Surgeon: Adolph Martinez MD;  Location: [facility];  Service: Gastroenterology   • EYE SURGERY     • HYSTERECTOMY      At age 31-Not due to cancer   • MASTECTOMY Left 06/30/2011    Dr. Kaitlyn Luna   • TUBAL ABDOMINAL LIGATION  1974   • UPPER GASTROINTESTINAL ENDOSCOPY  09/28/2012    Adolph Martinez MD       Scheduled Meds:    Continuous Infusions:No current facility-administered medications for this visit.       PRN Meds:.    Allergies   Allergen Reactions   • Lisinopril Cough   • Contrast Dye Itching and Rash     IVP Dye   • Tartrazine Hives       Social History     Socioeconomic History   • Marital status:      Spouse name: Jack   • Number of children: 2   • Years of education: High School   • Highest education level: Not on file   Occupational History     Employer: RETIRED   Tobacco Use   • Smoking status: Never Smoker   • Smokeless tobacco: Never Used   Substance and Sexual Activity   • Alcohol use: Yes     Comment: rare-7 or less drinks per week   • Drug use: No   • Sexual activity: Defer       Family History   Problem Relation Age of Onset   • Cancer Mother 68        head and neck   • Stroke Mother    • Heart disease Mother    • Gallbladder disease Mother    • Throat cancer Mother 68   • Breast cancer Sister 57   • Heart disease Sister    • Hypertension Sister    • Cancer Sister    • Gallbladder  disease Sister    • Diabetes Sister    • Cancer Brother         head and neck   • Heart disease Brother    • Hypertension Brother    • Gallbladder disease Brother    • Lung cancer Brother 67   • Throat cancer Brother 67   • Hypertension Father    • Gallbladder disease Father    • Emphysema Father    • Heart disease Father         Enlarged heart   • Cancer Maternal Aunt    • Cancer Maternal Uncle    • Colon cancer Maternal Uncle    • Heart attack Brother    • Alcohol abuse Brother    • Hypertension Sister    • Heart disease Sister    • Hypertension Sister    • Heart disease Sister    • Hypertension Sister    • No Known Problems Other        Review of Systems   Constitutional: Positive for appetite change and unexpected weight change. Negative for chills, diaphoresis, fatigue and fever.   HENT: Negative for nosebleeds, postnasal drip, sore throat, trouble swallowing and voice change.    Respiratory: Negative for cough, choking, chest tightness, shortness of breath and wheezing.    Cardiovascular: Negative for chest pain, palpitations and leg swelling.   Gastrointestinal: Positive for abdominal distention, anal bleeding and constipation. Negative for abdominal pain, blood in stool, diarrhea, nausea, rectal pain and vomiting.   Endocrine: Negative for polydipsia, polyphagia and polyuria.   Musculoskeletal: Negative for gait problem.   Skin: Negative for rash and wound.   Allergic/Immunologic: Negative for food allergies.   Neurological: Negative for dizziness, speech difficulty and light-headedness.   Psychiatric/Behavioral: Negative for confusion, self-injury, sleep disturbance and suicidal ideas.       Vitals:    01/05/21 1101   Temp: 96.5 °F (35.8 °C)       Physical Exam  Constitutional:       General: She is not in acute distress.     Appearance: She is well-developed. She is not ill-appearing.   HENT:      Head: Normocephalic.   Eyes:      Pupils: Pupils are equal, round, and reactive to light.   Cardiovascular:       Rate and Rhythm: Normal rate and regular rhythm.      Heart sounds: Normal heart sounds.   Pulmonary:      Effort: Pulmonary effort is normal.      Breath sounds: Normal breath sounds.   Abdominal:      General: Bowel sounds are normal. There is no distension.      Palpations: Abdomen is soft. There is no mass.      Tenderness: There is no abdominal tenderness. There is no guarding or rebound.      Hernia: No hernia is present.   Musculoskeletal: Normal range of motion.   Skin:     General: Skin is warm and dry.   Neurological:      Mental Status: She is alert and oriented to person, place, and time.   Psychiatric:         Speech: Speech normal.         Behavior: Behavior normal.         Judgment: Judgment normal.         No radiology results for the last 7 days     Assessment and plan     1. Rectal bleeding  - Case Request; Standing  - Case Request    2. Gastroesophageal reflux disease without esophagitis  - Case Request; Standing  - Case Request    3. Weight loss, abnormal  - Case Request; Standing  - Case Request    4. Decreased appetite  - Case Request; Standing  - Case Request    5. History of breast cancer  - Case Request; Standing  - Case Request    6. FH: colon cancer  - Case Request; Standing  - Case Request    Given her history and new onset of symptoms recommend EGD and colonoscopy with Dr. Wilcox for further evaluation.  Patient is agreeable to the scopes.  Will need cardiac clearance from Dr. Barker to hold the Plavix before her procedures.  Will have nursing work on this.  Will need to change the Nexium to Dexilant since she is on Plavix.  Will have her try Dexilant 60 mg daily and see how she does.  Continue GERD precautions.  Patient to call the office next week with an update.  Patient to follow-up with me after her scopes.

## 2021-01-05 NOTE — TELEPHONE ENCOUNTER
----- Message from URBAN Ruiz sent at 1/5/2021  1:28 PM EST -----  Please obtain cardiac clearance with Dr. Barker for EGD and colonoscopy since the patient is on plavix. Thanks.

## 2021-01-07 NOTE — TELEPHONE ENCOUNTER
Called pt and advised of Dr Barker's note. Advised last day to take plavix would be 01/21.  She verb understanding . Update sent to Dr Wilcox.

## 2021-01-20 ENCOUNTER — APPOINTMENT (OUTPATIENT)
Dept: WOMENS IMAGING | Facility: HOSPITAL | Age: 76
End: 2021-01-20

## 2021-01-20 PROCEDURE — 77067 SCR MAMMO BI INCL CAD: CPT | Performed by: RADIOLOGY

## 2021-01-20 PROCEDURE — 77063 BREAST TOMOSYNTHESIS BI: CPT | Performed by: RADIOLOGY

## 2021-01-25 ENCOUNTER — LAB (OUTPATIENT)
Dept: LAB | Facility: HOSPITAL | Age: 76
End: 2021-01-25

## 2021-01-25 DIAGNOSIS — Z01.818 OTHER SPECIFIED PRE-OPERATIVE EXAMINATION: ICD-10-CM

## 2021-01-25 PROCEDURE — U0004 COV-19 TEST NON-CDC HGH THRU: HCPCS

## 2021-01-25 PROCEDURE — C9803 HOPD COVID-19 SPEC COLLECT: HCPCS

## 2021-01-26 LAB — SARS-COV-2 RNA RESP QL NAA+PROBE: NOT DETECTED

## 2021-01-27 ENCOUNTER — ANESTHESIA EVENT (OUTPATIENT)
Dept: GASTROENTEROLOGY | Facility: HOSPITAL | Age: 76
End: 2021-01-27

## 2021-01-27 ENCOUNTER — ANESTHESIA (OUTPATIENT)
Dept: GASTROENTEROLOGY | Facility: HOSPITAL | Age: 76
End: 2021-01-27

## 2021-01-27 ENCOUNTER — HOSPITAL ENCOUNTER (OUTPATIENT)
Facility: HOSPITAL | Age: 76
Setting detail: HOSPITAL OUTPATIENT SURGERY
Discharge: HOME | End: 2021-01-27
Attending: INTERNAL MEDICINE | Admitting: INTERNAL MEDICINE

## 2021-01-27 VITALS
HEIGHT: 62 IN | RESPIRATION RATE: 14 BRPM | TEMPERATURE: 98.4 F | DIASTOLIC BLOOD PRESSURE: 75 MMHG | HEART RATE: 73 BPM | OXYGEN SATURATION: 97 % | BODY MASS INDEX: 27.23 KG/M2 | WEIGHT: 148 LBS | SYSTOLIC BLOOD PRESSURE: 162 MMHG

## 2021-01-27 DIAGNOSIS — K62.5 RECTAL BLEEDING: ICD-10-CM

## 2021-01-27 DIAGNOSIS — R63.4 WEIGHT LOSS, ABNORMAL: ICD-10-CM

## 2021-01-27 DIAGNOSIS — Z85.3 HISTORY OF BREAST CANCER: ICD-10-CM

## 2021-01-27 DIAGNOSIS — Z80.0 FH: COLON CANCER: ICD-10-CM

## 2021-01-27 DIAGNOSIS — R63.0 DECREASED APPETITE: ICD-10-CM

## 2021-01-27 DIAGNOSIS — K21.9 GASTROESOPHAGEAL REFLUX DISEASE WITHOUT ESOPHAGITIS: ICD-10-CM

## 2021-01-27 PROCEDURE — 45378 DIAGNOSTIC COLONOSCOPY: CPT | Performed by: INTERNAL MEDICINE

## 2021-01-27 PROCEDURE — 43239 EGD BIOPSY SINGLE/MULTIPLE: CPT | Performed by: INTERNAL MEDICINE

## 2021-01-27 PROCEDURE — 25010000002 PROPOFOL 10 MG/ML EMULSION: Performed by: NURSE ANESTHETIST, CERTIFIED REGISTERED

## 2021-01-27 PROCEDURE — 88305 TISSUE EXAM BY PATHOLOGIST: CPT | Performed by: INTERNAL MEDICINE

## 2021-01-27 RX ORDER — SODIUM CHLORIDE 0.9 % (FLUSH) 0.9 %
3 SYRINGE (ML) INJECTION EVERY 12 HOURS SCHEDULED
Status: DISCONTINUED | OUTPATIENT
Start: 2021-01-27 | End: 2021-01-27 | Stop reason: HOSPADM

## 2021-01-27 RX ORDER — GLYCOPYRROLATE 0.2 MG/ML
INJECTION INTRAMUSCULAR; INTRAVENOUS AS NEEDED
Status: DISCONTINUED | OUTPATIENT
Start: 2021-01-27 | End: 2021-01-27 | Stop reason: SURG

## 2021-01-27 RX ORDER — SODIUM CHLORIDE, SODIUM LACTATE, POTASSIUM CHLORIDE, CALCIUM CHLORIDE 600; 310; 30; 20 MG/100ML; MG/100ML; MG/100ML; MG/100ML
30 INJECTION, SOLUTION INTRAVENOUS CONTINUOUS PRN
Status: DISCONTINUED | OUTPATIENT
Start: 2021-01-27 | End: 2021-01-27 | Stop reason: HOSPADM

## 2021-01-27 RX ORDER — PROPOFOL 10 MG/ML
VIAL (ML) INTRAVENOUS CONTINUOUS PRN
Status: DISCONTINUED | OUTPATIENT
Start: 2021-01-27 | End: 2021-01-27 | Stop reason: SURG

## 2021-01-27 RX ORDER — LIDOCAINE HYDROCHLORIDE 20 MG/ML
INJECTION, SOLUTION INFILTRATION; PERINEURAL AS NEEDED
Status: DISCONTINUED | OUTPATIENT
Start: 2021-01-27 | End: 2021-01-27 | Stop reason: SURG

## 2021-01-27 RX ORDER — SODIUM CHLORIDE 0.9 % (FLUSH) 0.9 %
10 SYRINGE (ML) INJECTION AS NEEDED
Status: DISCONTINUED | OUTPATIENT
Start: 2021-01-27 | End: 2021-01-27 | Stop reason: HOSPADM

## 2021-01-27 RX ORDER — PROPOFOL 10 MG/ML
VIAL (ML) INTRAVENOUS AS NEEDED
Status: DISCONTINUED | OUTPATIENT
Start: 2021-01-27 | End: 2021-01-27 | Stop reason: SURG

## 2021-01-27 RX ADMIN — PROPOFOL 100 MG: 10 INJECTION, EMULSION INTRAVENOUS at 10:03

## 2021-01-27 RX ADMIN — LIDOCAINE HYDROCHLORIDE 60 MG: 20 INJECTION, SOLUTION INFILTRATION; PERINEURAL at 10:03

## 2021-01-27 RX ADMIN — SODIUM CHLORIDE, POTASSIUM CHLORIDE, SODIUM LACTATE AND CALCIUM CHLORIDE 30 ML/HR: 600; 310; 30; 20 INJECTION, SOLUTION INTRAVENOUS at 09:52

## 2021-01-27 RX ADMIN — PROPOFOL 200 MCG/KG/MIN: 10 INJECTION, EMULSION INTRAVENOUS at 10:07

## 2021-01-27 RX ADMIN — GLYCOPYRROLATE 0.1 MG: 0.2 INJECTION INTRAMUSCULAR; INTRAVENOUS at 10:39

## 2021-01-27 RX ADMIN — GLYCOPYRROLATE 0.1 MG: 0.2 INJECTION INTRAMUSCULAR; INTRAVENOUS at 10:36

## 2021-01-27 NOTE — ANESTHESIA PREPROCEDURE EVALUATION
Anesthesia Evaluation     Patient summary reviewed and Nursing notes reviewed   no history of anesthetic complications:  NPO Solid Status: > 8 hours  NPO Liquid Status: > 2 hours           Airway   Mallampati: II  TM distance: >3 FB  Neck ROM: full  no difficulty expected  Dental - normal exam   (+) upper dentures and lower dentures    Pulmonary - negative pulmonary ROS and normal exam   (-) COPD, asthma, not a smoker, lung cancer  Cardiovascular - normal exam  Exercise tolerance: good (4-7 METS)    ECG reviewed  PT is on anticoagulation therapy  Patient on routine beta blocker and Beta blocker given within 24 hours of surgery  Rhythm: regular  Rate: normal    (+) hypertension well controlled 2 medications or greater, CHF Diastolic >=55%, hyperlipidemia,   (-) valvular problems/murmurs, past MI, CAD, dysrhythmias, angina, cardiac stents, CABG    ROS comment: Single prior episode of CHF 15yrs ago, stable now.    TTE 05/2019:  ·Left ventricular systolic function is normal.  ·Left ventricular diastolic dysfunction (grade I) consistent with impaired relaxation.  ·Calculated EF = 56%.  ·Global Longitudinal LV strain = -17.1%.    Neuro/Psych  (+) TIA, CVA, numbness,     (-) seizures, headaches    ROS Comment: CVA 2006  GI/Hepatic/Renal/Endo    (+)  GERD poorly controlled, PUD, GI bleeding lower active bleeding,   (-) hepatitis, liver disease, no renal disease, diabetes, no thyroid disorder    Musculoskeletal     Abdominal  - normal exam   Substance History - negative use     OB/GYN negative ob/gyn ROS         Other   arthritis,    history of cancer remission      Other Comment: Hx/o BRCA.                  Anesthesia Plan    ASA 3     MAC     intravenous induction     Anesthetic plan, all risks, benefits, and alternatives have been provided, discussed and informed consent has been obtained with: patient.    Plan discussed with CRNA.

## 2021-01-27 NOTE — ANESTHESIA POSTPROCEDURE EVALUATION
Patient: Carlotta Pires    Procedure Summary     Date: 01/27/21 Room / Location:  [facility]    Anesthesia Start: 1002 Anesthesia Stop: 1053    Procedures:       ESOPHAGOGASTRODUODENOSCOPY WITH COLD BX (N/A Esophagus)      COLONOSCOPY TO CECUM AND TERMINAL ILEUM (N/A ) Diagnosis:       Rectal bleeding      Gastroesophageal reflux disease without esophagitis      Weight loss, abnormal      Decreased appetite      History of breast cancer      FH: colon cancer      (Rectal bleeding [K62.5])      (Gastroesophageal reflux disease without esophagitis [K21.9])      (Weight loss, abnormal [R63.4])      (Decreased appetite [R63.0])      (History of breast cancer [Z85.3])      (FH: colon cancer [Z80.0])    Surgeon: Emelia Wilcox MD Provider: Prashanth Mccarthy MD    Anesthesia Type: MAC ASA Status: 3          Anesthesia Type: MAC    Vitals  Vitals Value Taken Time   /52 01/27/21 1101   Temp     Pulse 64 01/27/21 1101   Resp 14 01/27/21 1101   SpO2 99 % 01/27/21 1101           Post Anesthesia Care and Evaluation    Patient location during evaluation: bedside  Patient participation: complete - patient participated  Level of consciousness: responsive to light touch, responsive to verbal stimuli and sleepy but conscious  Pain score: 0  Pain management: adequate  Airway patency: patent  Anesthetic complications: No anesthetic complications  PONV Status: none  Cardiovascular status: acceptable and hemodynamically stable  Respiratory status: acceptable  Hydration status: acceptable

## 2021-01-29 ENCOUNTER — APPOINTMENT (OUTPATIENT)
Dept: OTHER | Facility: HOSPITAL | Age: 76
End: 2021-01-29

## 2021-02-02 ENCOUNTER — TELEPHONE (OUTPATIENT)
Dept: GASTROENTEROLOGY | Facility: CLINIC | Age: 76
End: 2021-02-02

## 2021-02-02 NOTE — TELEPHONE ENCOUNTER
Call to pt.  Advise per DR Wilcox note.  Verb understanding.     F/u appt scheduled with BISMARK Casas for 5/10 @ 11am.

## 2021-02-02 NOTE — TELEPHONE ENCOUNTER
----- Message from Emelia Wilcox MD sent at 1/28/2021  2:21 PM EST -----  Biopsies from her EGD showed a normal small bowel, mild inflammation of the stomach body, no significant esophagitis    Dexilant every other day    Limit NSAIDs    Rectal bleeding coming from internal and external hemorrhoids.  She needs to be on a daily fiber supplement and OTC Preparation H cream    Office follow-up in ~ 4 months, thanks

## 2021-02-16 ENCOUNTER — APPOINTMENT (OUTPATIENT)
Dept: OTHER | Facility: HOSPITAL | Age: 76
End: 2021-02-16

## 2021-02-22 NOTE — PROGRESS NOTES
Subjective .     REASONS FOR FOLLOWUP:  Breast cancer    HISTORY OF PRESENT ILLNESS:  The patient is a 76 y.o. year old female  who is here for follow-up with the above-mentioned history.    Diagnosed with COVID-19 due to sudden loss of taste and other symptoms November 2020.  Taste is not returned.  Does not have an appetite or any desire to eat.  Lost some weight during the Covid diagnosis.  Weights have been stable since then.    Denies pain, shortness of breath, nausea        Past Medical History:   Diagnosis Date   • Allergic rhinitis    • Arthritis    • Cancer (CMS/HCC)     Left breast cancer   • CHF (congestive heart failure) (CMS/HCC)    • Cough    • COVID-19 11/2020   • Degenerative arthritis of thumb    • Disease of thyroid gland    • GERD without esophagitis    • H/O TIA (transient ischemic attack) and stroke 2005   • History of bone density study    • Hyperlipidemia    • Hypertension    • Lumbar radiculopathy    • Osteoporosis    • Palpitations    • Peptic ulceration    • Personal history of malignant neoplasm of breast    • Personal history of malignant neoplasm of breast    • Sciatica    • Stroke (CMS/HCC) 2005    CVA--History of storke   • Trigeminal neuralgia      Past Surgical History:   Procedure Laterality Date   • BREAST BIOPSY Left 2011   • CATARACT EXTRACTION, BILATERAL     • COLONOSCOPY  09/28/2012    Adolph Martinez MD   • COLONOSCOPY N/A 11/13/2018    Procedure: COLONOSCOPY to cecum;  Surgeon: Adolph Martinez MD;  Location: [facility];  Service: Gastroenterology   • COLONOSCOPY N/A 1/27/2021    Procedure: COLONOSCOPY TO CECUM AND TERMINAL ILEUM;  Surgeon: Emelia Wilcox MD;  Location: [facility];  Service: Gastroenterology;  Laterality: N/A;  RECTAL BLEEDING  --DIVERTICULOSIS, HEMORRHOIDS, TORTUOUS COLON   • ENDOSCOPY N/A 11/13/2018    Procedure: ESOPHAGOGASTRODUODENOSCOPY with bx;  Surgeon: Adolph Martinez MD;  Location: [facility];  Service: Gastroenterology   •  ENDOSCOPY N/A 1/27/2021    Procedure: ESOPHAGOGASTRODUODENOSCOPY WITH COLD BX;  Surgeon: Emelia Wilcox MD;  Location: [facility];  Service: Gastroenterology;  Laterality: N/A;  GERD  --GASTRITIS, HIATAL HERNIA   • EYE SURGERY     • HYSTERECTOMY      At age 31-Not due to cancer   • MASTECTOMY Left 06/30/2011    Dr. Kaitlyn Luna   • TUBAL ABDOMINAL LIGATION  1974   • UPPER GASTROINTESTINAL ENDOSCOPY  09/28/2012    Adolph Martinez MD       HEMATOLOGIC/ONCOLOGIC HISTORY:  (History from previous dates can be found in the separate document.)    MEDICATIONS    Current Outpatient Medications:   •  amitriptyline (ELAVIL) 25 MG tablet, Take 1 tablet by mouth Every Night., Disp: 90 tablet, Rfl: 0  •  aspirin 81 MG EC tablet, Take 81 mg by mouth Daily., Disp: , Rfl:   •  atorvastatin (LIPITOR) 10 MG tablet, Take 1 tablet by mouth Daily., Disp: 90 tablet, Rfl: 4  •  clopidogrel (PLAVIX) 75 MG tablet, Take 1 tablet by mouth Daily., Disp: 90 tablet, Rfl: 4  •  dexlansoprazole (Dexilant) 60 MG capsule, Take 1 capsule by mouth Daily., Disp: 90 capsule, Rfl: 3  •  diphenhydrAMINE (BENADRYL) 50 MG capsule, Take 1 hour prior to CT scan, Disp: 1 capsule, Rfl: 0  •  hydroCHLOROthiazide (HYDRODIURIL) 12.5 MG tablet, Take 1 tablet by mouth Daily., Disp: 90 tablet, Rfl: 2  •  levothyroxine (Euthyrox) 75 MCG tablet, Take 1 tablet by mouth Daily. as directed, Disp: 90 tablet, Rfl: 0  •  losartan (COZAAR) 50 MG tablet, Take 1 tablet by mouth Daily., Disp: 90 tablet, Rfl: 2  •  Melatonin 10 MG capsule, Take  by mouth., Disp: , Rfl:   •  meloxicam (MOBIC) 7.5 MG tablet, Take 7.5 mg by mouth Daily As Needed., Disp: , Rfl: 2  •  metoprolol succinate XL (TOPROL-XL) 50 MG 24 hr tablet, Take 1 tablet by mouth 2 (Two) Times a Day., Disp: 180 tablet, Rfl: 2  •  promethazine-dextromethorphan (PROMETHAZINE-DM) 6.25-15 MG/5ML syrup, Take 5 mL by mouth 4 (Four) Times a Day As Needed for Cough., Disp: 180 mL, Rfl: 0    ALLERGIES:     Allergies    Allergen Reactions   • Lisinopril Cough   • Contrast Dye Itching and Rash     IVP Dye   • Tartrazine Hives       SOCIAL HISTORY:       Social History     Socioeconomic History   • Marital status:      Spouse name: Jack   • Number of children: 2   • Years of education: High School   • Highest education level: Not on file   Occupational History     Employer: RETIRED   Tobacco Use   • Smoking status: Never Smoker   • Smokeless tobacco: Never Used   Substance and Sexual Activity   • Alcohol use: Yes     Comment: rare-7 or less drinks per week   • Drug use: No   • Sexual activity: Defer         FAMILY HISTORY:  Family History   Problem Relation Age of Onset   • Cancer Mother 68        head and neck   • Stroke Mother    • Heart disease Mother    • Gallbladder disease Mother    • Throat cancer Mother 68   • Breast cancer Sister 57   • Heart disease Sister    • Hypertension Sister    • Cancer Sister    • Gallbladder disease Sister    • Diabetes Sister    • Cancer Brother         head and neck   • Heart disease Brother    • Hypertension Brother    • Gallbladder disease Brother    • Lung cancer Brother 67   • Throat cancer Brother 67   • Hypertension Father    • Gallbladder disease Father    • Emphysema Father    • Heart disease Father         Enlarged heart   • Cancer Maternal Aunt    • Cancer Maternal Uncle    • Colon cancer Maternal Uncle    • Heart attack Brother    • Alcohol abuse Brother    • Hypertension Sister    • Heart disease Sister    • Hypertension Sister    • Heart disease Sister    • Hypertension Sister    • No Known Problems Other        REVIEW OF SYSTEMS:  Review of Systems   Constitutional: Negative for activity change.   HENT: Negative for nosebleeds and trouble swallowing.    Respiratory: Negative for shortness of breath and wheezing.    Cardiovascular: Negative for chest pain and palpitations.   Gastrointestinal: Negative for constipation, diarrhea and nausea.   Genitourinary: Negative for  "dysuria and hematuria.   Musculoskeletal: Negative for arthralgias and myalgias.   Skin: Negative for rash and wound.   Neurological: Negative for seizures and syncope.   Hematological: Negative for adenopathy. Does not bruise/bleed easily.   Psychiatric/Behavioral: Negative for confusion.           Objective    Vitals:    02/23/21 1500   BP: 136/79   Pulse: 81   Resp: 16   Temp: 97.3 °F (36.3 °C)   TempSrc: Skin   SpO2: 99%   Weight: 66.9 kg (147 lb 8 oz)   Height: 157.5 cm (62.01\")   PainSc: 0-No pain     Current Status 8/14/2020   ECOG score 0      PHYSICAL EXAM:        CONSTITUTIONAL:  Vital signs reviewed.  No distress, looks comfortable.  EYES:  Conjunctiva and lids unremarkable.  PERRLA  EARS,NOSE,MOUTH,THROAT:  Ears and nose appear unremarkable.  Lips, teeth, gums appear unremarkable.  RESPIRATORY:  Normal respiratory effort.  Lungs clear to auscultation bilaterally.  CARDIOVASCULAR:  Normal S1, S2.  No murmurs rubs or gallops.  No significant lower extremity edema.  GASTROINTESTINAL: Abdomen appears unremarkable.  Nontender.  No hepatomegaly.  No splenomegaly.  LYMPHATIC:  No cervical, supraclavicular, axillary lymphadenopathy.  SKIN:  Warm.  No rashes.  PSYCHIATRIC:  Normal judgment and insight.  Normal mood and affect.         RECENT LABS:        WBC   Date/Time Value Ref Range Status   02/23/2021 02:52 PM 7.39 3.40 - 10.80 10*3/mm3 Final     Hemoglobin   Date/Time Value Ref Range Status   02/23/2021 02:52 PM 13.1 12.0 - 15.9 g/dL Final     Platelets   Date/Time Value Ref Range Status   02/23/2021 02:52  140 - 450 10*3/mm3 Final       Assessment/Plan     ASSESSMENT:  Problem List Items Addressed This Visit        High    Malignant neoplasm of lower-outer quadrant of left female breast (CMS/HCC) - Primary    Relevant Orders    CBC & Differential         *Stage IIIA, grade 3, 3.9 cm left breast cancer. Four out of 24 nodes positive. ER 2%, RI negative, HER2 negative. Completed FEC x3 followed by " [facility] x3, 11/28/2011. Completed radiation in February 2012.   Poor tolerance to anastrozole and letrozole.  completed 5 years hormonal therapy using Aromasin, 1/31/17.  (She was initially a patient at [facility]. She transferred here as her son-in-law, Jeremiah Dumont, was a patient here).   · Suspect she remains in remission.  However, with the bulging/swelling of skin around her mastectomy site on the left and the new onset right-sided chest pain, check a CT of the chest to assess for recurrence.  · CT chest 8/10/2020: No evidence of recurrence.  · I discussed with Dr. Braxton-she stated we could do an ultrasound if we wanted to evaluate the superficial areas more.  Notably the bulging is on the left side and the right side has chest pain.  She states an ultrasound often shows fat necrosis better than a CT.  She suspects the CT would have picked up malignancy if it was present.  Patient did not want to pursue an ultrasound.  Symptoms resolved.  No clinical signs of recurrence.  Remission continues.    *Possible lupus.  She is seeing Dr. Rivera for this    *Chronic right low back/hip pain radiating anteriorly.  In May 2016, CAT scan and bone scan negative for cancer as the cause of the pain.  Defer further management of this to her PCP.  She did not complain of this today.    *In the past, she has complained of: Forgetfulness and transposition of numbers when she is writing numbers at times. This had been present since around April 2015. She states it is not worsening.   She did not complain of this today.    *History of stroke around 2005. Because of this I do not think she would be a good candidate for tamoxifen.     *Osteopenia, which has improved to normal bone density.  DEXA 5/17/16 normal bone density.  T score -0.6, compared to.   -1.1 2/20/14.  She is on calcium and vitamin D.  Defer further management of this to her PCP now that she is off aromatase inhibitors.    *Right upper quadrant/right lower anterior rib  pain.  Present since October 2016.    Due to RUQ abdominal pain/right lower anterior rib pain since October 2016, bone scan and CT abdomen with contrast 2/2/17: No evidence of recurrence.  (8 mm low-attenuation liver lesion seen on the 5/17/16 CT but less conspicuous on order CT.  This was felt to be due to older CT without contrast.  This was felt to likely be benign.  Plan no further scheduled follow-up of this-patient agrees.).  She did not complain of this pain today.    *Hypokalemia. Her PCP manages this.      *IV contrast allergy.  Previous rash.  Receives IV contrast pre-medicines with CT IV contrast.  (This has worked well in the past)    *Left arm lymphedema due to prior surgery for breast cancer.  She was seen by the lymphedema clinic but has had some logistical issues obtaining the sleeve they prescribed.  I encouraged her to continue to call the lymphedema clinic for assistance with this.    *Previously complained of vertigo and was referred to her ENT.    *Heartburn.  Had EGD and colonoscopy by Dr. Martinez November 2018.  No malignancy was found.      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  · Her other daughter, Osmin, is dealing with the death of her  which was around January 2019.    PLAN:   · M.D. CBC 6 months  · Last mammogram 1/20/2021, BI-RADS 1 (Right-sided mammograms)  · Off hormonal therapy    Send copy to Dr. Paxton Rivera, rheumatology

## 2021-02-23 ENCOUNTER — LAB (OUTPATIENT)
Dept: OTHER | Facility: HOSPITAL | Age: 76
End: 2021-02-23

## 2021-02-23 ENCOUNTER — OFFICE VISIT (OUTPATIENT)
Dept: ONCOLOGY | Facility: CLINIC | Age: 76
End: 2021-02-23

## 2021-02-23 VITALS
HEART RATE: 81 BPM | OXYGEN SATURATION: 99 % | DIASTOLIC BLOOD PRESSURE: 79 MMHG | WEIGHT: 147.5 LBS | RESPIRATION RATE: 16 BRPM | HEIGHT: 62 IN | BODY MASS INDEX: 27.14 KG/M2 | SYSTOLIC BLOOD PRESSURE: 136 MMHG | TEMPERATURE: 97.3 F

## 2021-02-23 DIAGNOSIS — Z17.0 MALIGNANT NEOPLASM OF LOWER-OUTER QUADRANT OF LEFT BREAST OF FEMALE, ESTROGEN RECEPTOR POSITIVE (HCC): ICD-10-CM

## 2021-02-23 DIAGNOSIS — C50.512 MALIGNANT NEOPLASM OF LOWER-OUTER QUADRANT OF LEFT BREAST OF FEMALE, ESTROGEN RECEPTOR POSITIVE (HCC): Primary | ICD-10-CM

## 2021-02-23 DIAGNOSIS — C50.512 MALIGNANT NEOPLASM OF LOWER-OUTER QUADRANT OF LEFT BREAST OF FEMALE, ESTROGEN RECEPTOR POSITIVE (HCC): ICD-10-CM

## 2021-02-23 DIAGNOSIS — Z17.0 MALIGNANT NEOPLASM OF LOWER-OUTER QUADRANT OF LEFT BREAST OF FEMALE, ESTROGEN RECEPTOR POSITIVE (HCC): Primary | ICD-10-CM

## 2021-02-23 LAB
BASOPHILS # BLD AUTO: 0.04 10*3/MM3 (ref 0–0.2)
BASOPHILS NFR BLD AUTO: 0.5 % (ref 0–1.5)
DEPRECATED RDW RBC AUTO: 41.4 FL (ref 37–54)
EOSINOPHIL # BLD AUTO: 0.23 10*3/MM3 (ref 0–0.4)
EOSINOPHIL NFR BLD AUTO: 3.1 % (ref 0.3–6.2)
ERYTHROCYTE [DISTWIDTH] IN BLOOD BY AUTOMATED COUNT: 12.8 % (ref 12.3–15.4)
HCT VFR BLD AUTO: 37.8 % (ref 34–46.6)
HGB BLD-MCNC: 13.1 G/DL (ref 12–15.9)
IMM GRANULOCYTES # BLD AUTO: 0.02 10*3/MM3 (ref 0–0.05)
IMM GRANULOCYTES NFR BLD AUTO: 0.3 % (ref 0–0.5)
LYMPHOCYTES # BLD AUTO: 1.93 10*3/MM3 (ref 0.7–3.1)
LYMPHOCYTES NFR BLD AUTO: 26.1 % (ref 19.6–45.3)
MCH RBC QN AUTO: 30.4 PG (ref 26.6–33)
MCHC RBC AUTO-ENTMCNC: 34.7 G/DL (ref 31.5–35.7)
MCV RBC AUTO: 87.7 FL (ref 79–97)
MONOCYTES # BLD AUTO: 0.57 10*3/MM3 (ref 0.1–0.9)
MONOCYTES NFR BLD AUTO: 7.7 % (ref 5–12)
NEUTROPHILS NFR BLD AUTO: 4.6 10*3/MM3 (ref 1.7–7)
NEUTROPHILS NFR BLD AUTO: 62.3 % (ref 42.7–76)
NRBC BLD AUTO-RTO: 0 /100 WBC (ref 0–0.2)
PLATELET # BLD AUTO: 266 10*3/MM3 (ref 140–450)
PMV BLD AUTO: 10 FL (ref 6–12)
RBC # BLD AUTO: 4.31 10*6/MM3 (ref 3.77–5.28)
WBC # BLD AUTO: 7.39 10*3/MM3 (ref 3.4–10.8)

## 2021-02-23 PROCEDURE — 99214 OFFICE O/P EST MOD 30 MIN: CPT | Performed by: INTERNAL MEDICINE

## 2021-02-23 PROCEDURE — 36415 COLL VENOUS BLD VENIPUNCTURE: CPT

## 2021-02-23 PROCEDURE — 85025 COMPLETE CBC W/AUTO DIFF WBC: CPT | Performed by: INTERNAL MEDICINE

## 2021-03-22 ENCOUNTER — OFFICE VISIT (OUTPATIENT)
Dept: FAMILY MEDICINE CLINIC | Facility: CLINIC | Age: 76
End: 2021-03-22

## 2021-03-22 VITALS
HEART RATE: 68 BPM | TEMPERATURE: 97.9 F | HEIGHT: 62 IN | BODY MASS INDEX: 27.09 KG/M2 | WEIGHT: 147.2 LBS | OXYGEN SATURATION: 98 % | SYSTOLIC BLOOD PRESSURE: 120 MMHG | DIASTOLIC BLOOD PRESSURE: 72 MMHG

## 2021-03-22 DIAGNOSIS — E78.2 HYPERLIPIDEMIA, MIXED: ICD-10-CM

## 2021-03-22 DIAGNOSIS — E03.9 HYPOTHYROIDISM (ACQUIRED): ICD-10-CM

## 2021-03-22 DIAGNOSIS — I10 ESSENTIAL HYPERTENSION: Primary | ICD-10-CM

## 2021-03-22 DIAGNOSIS — F51.01 PRIMARY INSOMNIA: ICD-10-CM

## 2021-03-22 PROCEDURE — 99214 OFFICE O/P EST MOD 30 MIN: CPT | Performed by: FAMILY MEDICINE

## 2021-03-22 PROCEDURE — G0439 PPPS, SUBSEQ VISIT: HCPCS | Performed by: FAMILY MEDICINE

## 2021-03-22 RX ORDER — LEVOTHYROXINE SODIUM 0.07 MG/1
TABLET ORAL
Qty: 110 TABLET | Refills: 1 | Status: SHIPPED | OUTPATIENT
Start: 2021-03-22 | End: 2021-08-24 | Stop reason: SDUPTHER

## 2021-03-22 RX ORDER — AMITRIPTYLINE HYDROCHLORIDE 25 MG/1
25 TABLET, FILM COATED ORAL NIGHTLY
Qty: 90 TABLET | Refills: 1 | Status: SHIPPED | OUTPATIENT
Start: 2021-03-22 | End: 2021-08-24 | Stop reason: SDUPTHER

## 2021-03-22 NOTE — PROGRESS NOTES
"Chief Complaint  Annual Exam and Med Refill (Pt is needing refills on thyroid, insomnia, discuss thyroiod medication dosage. )    Subjective          Carlotta Pires presents to [facility]  History of Present Illness    Objective   Vital Signs:   /72   Pulse 68   Temp 97.9 °F (36.6 °C)   Ht 157.5 cm (62.01\")   Wt 66.8 kg (147 lb 3.2 oz)   SpO2 98%   BMI 26.91 kg/m²     Physical Exam   Result Review :                 Assessment and Plan    There are no diagnoses linked to this encounter.    Follow Up   No follow-ups on file.  Patient was given instructions and counseling regarding her condition or for health maintenance advice. Please see specific information pulled into the AVS if appropriate.       "

## 2021-03-22 NOTE — PROGRESS NOTES
The ABCs of the Annual Wellness Visit  Subsequent Medicare Wellness Visit    Chief Complaint   Patient presents with   • Annual Exam   • Med Refill     Pt is needing refills on thyroid, insomnia, discuss thyroiod medication dosage.        Subjective   History of Present Illness:  Carlotta Pires is a 76 y.o. female who presents for a Subsequent Medicare Wellness Visit.  Hypothyroidism- Stable and No significant weight change.  Denies heat or cold intolerance.  No palpitations.  Insomnia- refills today  Hypertension-no chest pain, blurry vision, headaches or palpitations.        HEALTH RISK ASSESSMENT    Recent Hospitalizations:  No hospitalization(s) within the last year.    Current Medical Providers:  Patient Care Team:  Mirela Garcia MD as PCP - General (Family Medicine)  Yanique, Dennis DUVALL II, MD as Consulting Physician (Hematology and Oncology)  Mirela Garcia MD as Referring Physician (Family Medicine)    Smoking Status:  Social History     Tobacco Use   Smoking Status Never Smoker   Smokeless Tobacco Never Used       Alcohol Consumption:  Social History     Substance and Sexual Activity   Alcohol Use Yes    Comment: rare-7 or less drinks per week       Depression Screen:   PHQ-2/PHQ-9 Depression Screening 3/22/2021   Little interest or pleasure in doing things 0   Feeling down, depressed, or hopeless 0   Total Score 0       Fall Risk Screen:  STEADI Fall Risk Assessment has not been completed.    Health Habits and Functional and Cognitive Screening:  Functional & Cognitive Status 3/22/2021   Do you have difficulty preparing food and eating? No   Do you have difficulty bathing yourself, getting dressed or grooming yourself? No   Do you have difficulty using the toilet? No   Do you have difficulty moving around from place to place? No   Do you have trouble with steps or getting out of a bed or a chair? No   Current Diet Well Balanced Diet   Dental Exam Up to date   Eye Exam Up to date   Exercise (times per  week) 7 times per week   Current Exercise Activities Include Light Weight/Kettebells   Do you need help using the phone?  No   Are you deaf or do you have serious difficulty hearing?  No   Do you need help with transportation? No   Do you need help shopping? No   Do you need help preparing meals?  No   Do you need help with housework?  No   Do you need help with laundry? No   Do you need help taking your medications? No   Do you need help managing money? No   Do you ever drive or ride in a car without wearing a seat belt? No   Have you felt unusual stress, anger or loneliness in the last month? Yes   Who do you live with? Spouse   If you need help, do you have trouble finding someone available to you? No   Have you been bothered in the last four weeks by sexual problems? No   Do you have difficulty concentrating, remembering or making decisions? No         Does the patient have evidence of cognitive impairment? No    Asprin use counseling:Taking ASA appropriately as indicated    Age-appropriate Screening Schedule:  Refer to the list below for future screening recommendations based on patient's age, sex and/or medical conditions. Orders for these recommended tests are listed in the plan section. The patient has been provided with a written plan.    Health Maintenance   Topic Date Due   • ZOSTER VACCINE (1 of 2) Never done   • DXA SCAN  05/17/2018   • LIPID PANEL  12/03/2021   • MAMMOGRAM  01/20/2022   • TDAP/TD VACCINES (2 - Tdap) 06/06/2026   • COLONOSCOPY  01/27/2031   • INFLUENZA VACCINE  Completed          The following portions of the patient's history were reviewed and updated as appropriate: allergies, current medications, past family history, past medical history, past social history, past surgical history and problem list.    Outpatient Medications Prior to Visit   Medication Sig Dispense Refill   • aspirin 81 MG EC tablet Take 81 mg by mouth Daily.     • atorvastatin (LIPITOR) 10 MG tablet Take 1 tablet by  mouth Daily. 90 tablet 4   • clopidogrel (PLAVIX) 75 MG tablet Take 1 tablet by mouth Daily. 90 tablet 4   • dexlansoprazole (Dexilant) 60 MG capsule Take 1 capsule by mouth Daily. 90 capsule 3   • diphenhydrAMINE (BENADRYL) 50 MG capsule Take 1 hour prior to CT scan 1 capsule 0   • hydroCHLOROthiazide (HYDRODIURIL) 12.5 MG tablet Take 1 tablet by mouth Daily. 90 tablet 2   • losartan (COZAAR) 50 MG tablet Take 1 tablet by mouth Daily. 90 tablet 2   • Melatonin 10 MG capsule Take  by mouth.     • meloxicam (MOBIC) 7.5 MG tablet Take 7.5 mg by mouth Daily As Needed.  2   • metoprolol succinate XL (TOPROL-XL) 50 MG 24 hr tablet Take 1 tablet by mouth 2 (Two) Times a Day. 180 tablet 2   • promethazine-dextromethorphan (PROMETHAZINE-DM) 6.25-15 MG/5ML syrup Take 5 mL by mouth 4 (Four) Times a Day As Needed for Cough. 180 mL 0   • amitriptyline (ELAVIL) 25 MG tablet Take 1 tablet by mouth Every Night. 90 tablet 0   • levothyroxine (Euthyrox) 75 MCG tablet Take 1 tablet by mouth Daily. as directed 90 tablet 0     No facility-administered medications prior to visit.       Patient Active Problem List   Diagnosis   • Malignant neoplasm of lower-outer quadrant of left female breast (CMS/HCC)   • Osteopenia   • Encounter for screening for malignant neoplasm of colon   • Epigastric pain   • Essential hypertension   • Dilated cardiomyopathy (CMS/HCC)   • Hyperlipidemia, mixed   • Acute seasonal allergic rhinitis due to pollen   • GERD without esophagitis   • Hypothyroidism (acquired)   • Primary insomnia   • CHF (congestive heart failure) (CMS/HCC)   • Encounter for Medicare annual wellness exam   • Arthritis   • Trigeminal neuralgia   • Osteoporosis   • Lumbar radiculopathy   • Degenerative arthritis of thumb   • Rectal bleeding   • Gastroesophageal reflux disease without esophagitis   • Weight loss, abnormal   • Decreased appetite   • History of breast cancer   • FH: colon cancer       Advanced Care Planning:  ACP discussion  "was held with the patient during this visit. Patient has an advance directive (not in EMR), copy requested.    Review of Systems    Compared to one year ago, the patient feels her physical health is the same.  Compared to one year ago, the patient feels her mental health is the same.    Reviewed chart for potential of high risk medication in the elderly: yes  Reviewed chart for potential of harmful drug interactions in the elderly:yes    Objective         Vitals:    03/22/21 1103   BP: 120/72   Pulse: 68   Temp: 97.9 °F (36.6 °C)   SpO2: 98%   Weight: 66.8 kg (147 lb 3.2 oz)   Height: 157.5 cm (62.01\")       Body mass index is 26.91 kg/m².  Discussed the patient's BMI with her. The BMI is in the acceptable range.    Physical Exam  Vitals and nursing note reviewed.   Constitutional:       Appearance: Normal appearance.   HENT:      Head: Normocephalic and atraumatic.   Cardiovascular:      Rate and Rhythm: Normal rate and regular rhythm.      Heart sounds: Normal heart sounds. No friction rub.   Pulmonary:      Effort: Pulmonary effort is normal. No respiratory distress.      Breath sounds: Normal breath sounds. No stridor. No wheezing or rhonchi.   Neurological:      Mental Status: She is alert.               Assessment/Plan   Medicare Risks and Personalized Health Plan  CMS Preventative Services Quick Reference  Cardiovascular risk    The above risks/problems have been discussed with the patient.  Pertinent information has been shared with the patient in the After Visit Summary.  Follow up plans and orders are seen below in the Assessment/Plan Section.    Diagnoses and all orders for this visit:    1. Essential hypertension (Primary)    2. Hypothyroidism (acquired)  -     levothyroxine (Euthyrox) 75 MCG tablet; Take one po bd except on Sunday take 2.  Dispense: 110 tablet; Refill: 1  -     TSH    3. Primary insomnia  -     amitriptyline (ELAVIL) 25 MG tablet; Take 1 tablet by mouth Every Night.  Dispense: 90 " tablet; Refill: 1    4. Hyperlipidemia, mixed      Follow Up:  Return in about 4 months (around 7/22/2021) for hypertension, hyperlipidema, hypothyroidism.     An After Visit Summary and PPPS were given to the patient.       MWE done and patient to work on diet and exercise for Preventive Counseling.   Refills and labs.    Labs and refills today

## 2021-03-23 ENCOUNTER — TELEPHONE (OUTPATIENT)
Dept: FAMILY MEDICINE CLINIC | Facility: CLINIC | Age: 76
End: 2021-03-23

## 2021-03-23 LAB — TSH SERPL DL<=0.005 MIU/L-ACNC: 3.19 UIU/ML (ref 0.45–4.5)

## 2021-03-23 NOTE — TELEPHONE ENCOUNTER
----- Message from Mirela Garcia MD sent at 3/23/2021 11:14 AM EDT -----  Thyroid levels look good. Continue current plan.

## 2021-04-06 ENCOUNTER — OFFICE VISIT (OUTPATIENT)
Dept: CARDIOLOGY | Facility: CLINIC | Age: 76
End: 2021-04-06

## 2021-04-06 VITALS
HEART RATE: 68 BPM | OXYGEN SATURATION: 98 % | SYSTOLIC BLOOD PRESSURE: 136 MMHG | WEIGHT: 147 LBS | BODY MASS INDEX: 27.05 KG/M2 | RESPIRATION RATE: 18 BRPM | DIASTOLIC BLOOD PRESSURE: 68 MMHG | HEIGHT: 62 IN

## 2021-04-06 DIAGNOSIS — I10 ESSENTIAL HYPERTENSION: ICD-10-CM

## 2021-04-06 DIAGNOSIS — Z86.79 HISTORY OF CARDIOMYOPATHY: Primary | ICD-10-CM

## 2021-04-06 PROCEDURE — 93000 ELECTROCARDIOGRAM COMPLETE: CPT | Performed by: NURSE PRACTITIONER

## 2021-04-06 PROCEDURE — 99214 OFFICE O/P EST MOD 30 MIN: CPT | Performed by: NURSE PRACTITIONER

## 2021-04-06 RX ORDER — HYDROCHLOROTHIAZIDE 12.5 MG/1
12.5 TABLET ORAL DAILY
Qty: 90 TABLET | Refills: 2 | Status: SHIPPED | OUTPATIENT
Start: 2021-04-06 | End: 2022-02-09 | Stop reason: SDUPTHER

## 2021-04-06 RX ORDER — LOSARTAN POTASSIUM 50 MG/1
50 TABLET ORAL DAILY
Qty: 90 TABLET | Refills: 2 | Status: SHIPPED | OUTPATIENT
Start: 2021-04-06 | End: 2021-11-09

## 2021-04-06 RX ORDER — METOPROLOL SUCCINATE 50 MG/1
50 TABLET, EXTENDED RELEASE ORAL 2 TIMES DAILY
Qty: 180 TABLET | Refills: 2 | Status: SHIPPED | OUTPATIENT
Start: 2021-04-06 | End: 2022-02-09 | Stop reason: SDUPTHER

## 2021-04-06 NOTE — PROGRESS NOTES
Date of Office Visit: 2021  Encounter Provider: URBAN De La Cruz  Place of Service: [facility] CARDIOLOGY  Patient Name: Carlotta Pires  :1945    Chief Complaint   Patient presents with   • Congestive Heart Failure     6 MTH FOLLOW UP   :     HPI: Carlotta Pires is a 76 y.o. female who presents today for follow-up.  Old records have been obtained and reviewed by me.  She is a patient of Dr. 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  Echocardiogram from  revealed normal LV function with an EF of 56%.  In 2020, she had a telehealth call with Dr. Barker at which time she was overall doing well with no complaints of angina or heart failure.  No changes were made to her medical regimen, and she was advised to follow-up in 1 year.   Overall she has been doing well.  She denies any chest pain, palpitations, dizziness, or syncope.  She denies any bleeding difficulties or melena.  She has some shortness of breath on exertion that is unchanged.  She had Covid in November but fortunately was never too sick.  She has occasional left lower extremity swelling in the evenings that resolves with elevation.  She attributes this to her left mastectomy as she also develops left upper extremity swelling at times.  She has been on aspirin and Plavix ever since a stroke 20 years ago.  She denies any recurrent strokelike symptoms.  She spends her days caring for her 50-year-old disabled daughter.    Past Medical History:   Diagnosis Date   • Allergic rhinitis    • Arthritis    • Cancer (CMS/HCC)     Left breast cancer   • CHF (congestive heart failure) (CMS/HCC)    • Cough    • COVID-19 2020   • Degenerative arthritis of thumb    • GERD without esophagitis    • H/O TIA (transient ischemic attack) and stroke    • History of bone density study    • Lumbar radiculopathy    • Osteoporosis    • Palpitations    • Peptic ulceration    •  Personal history of malignant neoplasm of breast    • Personal history of malignant neoplasm of breast    • Sciatica    • Stroke (CMS/HCC) 2005    CVA--History of storke   • Trigeminal neuralgia        Past Surgical History:   Procedure Laterality Date   • BREAST BIOPSY Left 2011   • CATARACT EXTRACTION, BILATERAL     • COLONOSCOPY  09/28/2012    Adolph Martinez MD   • COLONOSCOPY N/A 11/13/2018    Procedure: COLONOSCOPY to cecum;  Surgeon: Adolph Martinez MD;  Location: [facility] END[facility]COPY;  Service: Gastroenterology   • COLONOSCOPY N/A 1/27/2021    Procedure: COLONOSCOPY TO CECUM AND TERMINAL ILEUM;  Surgeon: Emelia Wilcox MD;  Location: [facility] ENDOSCOPY;  Service: Gastroenterology;  Laterality: N/A;  RECTAL BLEEDING  --DIVERTICULOSIS, HEMORRHOIDS, TORTUOUS COLON   • ENDOSCOPY N/A 11/13/2018    Procedure: ESOPHAGOGASTRODUODENOSCOPY with bx;  Surgeon: Adolph Martinez MD;  Location: [facility] ENDOSCOPY;  Service: Gastroenterology   • ENDOSCOPY N/A 1/27/2021    Procedure: ESOPHAGOGASTRODUODENOSCOPY WITH COLD BX;  Surgeon: Emelia Wilcox MD;  Location: [facility] ENDOSCOPY;  Service: Gastroenterology;  Laterality: N/A;  GERD  --GASTRITIS, HIATAL HERNIA   • EYE SURGERY     • HYSTERECTOMY      At age 31-Not due to cancer   • MASTECTOMY Left 06/30/2011    Dr. Kaitlyn Luna   • TUBAL ABDOMINAL LIGATION  1974   • UPPER GASTROINTESTINAL ENDOSCOPY  09/28/2012    Adolph Martinez MD       Social History     Socioeconomic History   • Marital status:      Spouse name: Jack   • Number of children: 2   • Years of education: High School   • Highest education level: Not on file   Tobacco Use   • Smoking status: Never Smoker   • Smokeless tobacco: Never Used   • Tobacco comment: CAFFEINE USE: 4-5 CUPS 1/2 & 1/2 COFFEE DAILY   Vaping Use   • Vaping Use: Never used   Substance and Sexual Activity   • Alcohol use: Not Currently     Comment: rare-7 or less drinks per week   • Drug use: No   • Sexual activity: Defer       Family  History   Problem Relation Age of Onset   • Cancer Mother 68        head and neck   • Stroke Mother    • Heart disease Mother    • Gallbladder disease Mother    • Throat cancer Mother 68   • Breast cancer Sister 57   • Heart disease Sister    • Hypertension Sister    • Cancer Sister    • Gallbladder disease Sister    • Diabetes Sister    • Cancer Brother         head and neck   • Heart disease Brother    • Hypertension Brother    • Gallbladder disease Brother    • Lung cancer Brother 67   • Throat cancer Brother 67   • Hypertension Father    • Gallbladder disease Father    • Emphysema Father    • Heart disease Father         Enlarged heart   • Cancer Maternal Aunt    • Cancer Maternal Uncle    • Colon cancer Maternal Uncle    • Heart attack Brother    • Alcohol abuse Brother    • Hypertension Sister    • Heart disease Sister    • Hypertension Sister    • Heart disease Sister    • Hypertension Sister    • No Known Problems Other        Review of Systems   Constitutional: Negative.   Cardiovascular: Positive for dyspnea on exertion and leg swelling (LLE). Negative for chest pain, orthopnea, paroxysmal nocturnal dyspnea and syncope.   Respiratory: Negative.    Hematologic/Lymphatic: Negative for bleeding problem.   Musculoskeletal: Negative for falls.   Gastrointestinal: Negative for melena.   Neurological: Negative for dizziness and light-headedness.       Allergies   Allergen Reactions   • Lisinopril Cough   • Contrast Dye Itching and Rash     IVP Dye   • Tartrazine Hives         Current Outpatient Medications:   •  amitriptyline (ELAVIL) 25 MG tablet, Take 1 tablet by mouth Every Night., Disp: 90 tablet, Rfl: 1  •  aspirin 81 MG EC tablet, Take 81 mg by mouth Daily., Disp: , Rfl:   •  atorvastatin (LIPITOR) 10 MG tablet, Take 1 tablet by mouth Daily., Disp: 90 tablet, Rfl: 4  •  clopidogrel (PLAVIX) 75 MG tablet, Take 1 tablet by mouth Daily., Disp: 90 tablet, Rfl: 4  •  dexlansoprazole (Dexilant) 60 MG capsule,  "Take 1 capsule by mouth Daily., Disp: 90 capsule, Rfl: 3  •  diphenhydrAMINE (BENADRYL) 50 MG capsule, Take 1 hour prior to CT scan, Disp: 1 capsule, Rfl: 0  •  hydroCHLOROthiazide (HYDRODIURIL) 12.5 MG tablet, Take 1 tablet by mouth Daily., Disp: 90 tablet, Rfl: 2  •  levothyroxine (Euthyrox) 75 MCG tablet, Take one po bd except on Sunday take 2., Disp: 110 tablet, Rfl: 1  •  losartan (COZAAR) 50 MG tablet, Take 1 tablet by mouth Daily., Disp: 90 tablet, Rfl: 2  •  Melatonin 10 MG capsule, Take  by mouth., Disp: , Rfl:   •  metoprolol succinate XL (TOPROL-XL) 50 MG 24 hr tablet, Take 1 tablet by mouth 2 (Two) Times a Day., Disp: 180 tablet, Rfl: 2  •  promethazine-dextromethorphan (PROMETHAZINE-DM) 6.25-15 MG/5ML syrup, Take 5 mL by mouth 4 (Four) Times a Day As Needed for Cough., Disp: 180 mL, Rfl: 0      Objective:     Vitals:    04/06/21 1059   BP: 136/68   BP Location: Right arm   Patient Position: Sitting   Pulse: 68   Resp: 18   SpO2: 98%   Weight: 66.7 kg (147 lb)   Height: 157.5 cm (62\")     Body mass index is 26.89 kg/m².    PHYSICAL EXAM:    Neck:      Vascular: No JVD.   Pulmonary:      Effort: Pulmonary effort is normal.      Breath sounds: Normal breath sounds.   Cardiovascular:      Normal rate. Regular rhythm.      Murmurs: There is no murmur.      No gallop. No click. No rub.   Pulses:     Intact distal pulses.           ECG 12 Lead    Date/Time: 4/6/2021 11:20 AM  Performed by: Barbie Orta APRN  Authorized by: Barbie Orta APRN   Comparison: compared with previous ECG from 4/24/2019  Similar to previous ECG  Rhythm: sinus rhythm  Rate: normal  BPM: 63  T inversion: V3 and V1  Other findings: non-specific ST-T wave changes    Clinical impression: abnormal EKG  Comments: Indication: Hypertension              Assessment:       Diagnosis Plan   1. History of cardiomyopathy     2. Essential hypertension  ECG 12 Lead     Orders Placed This Encounter   Procedures   • ECG 12 Lead     This " order was created via procedure documentation     Order Specific Question:   Release to patient     Answer:   Immediate          Plan:       1.  History of cardiomyopathy.  Echocardiogram from 2019 revealed normal LV function.  She denies any symptoms of heart failure.  She is on Toprol and losartan.      2.  Hypertension.  Her blood pressure is stable.  Continue hydrochlorothiazide, losartan, and Toprol.      I think she is stable and doing well.  She denies any symptoms of angina or heart failure.  I am not going to make any changes today, and she will follow-up with Dr. Barker in 1 year or sooner if needed.      As always, it has been a pleasure to participate in your patient's care.      Sincerely,         URBAN Pettit

## 2021-05-10 ENCOUNTER — OFFICE VISIT (OUTPATIENT)
Dept: GASTROENTEROLOGY | Facility: CLINIC | Age: 76
End: 2021-05-10

## 2021-05-10 VITALS — HEIGHT: 62 IN | BODY MASS INDEX: 27.2 KG/M2 | WEIGHT: 147.8 LBS

## 2021-05-10 DIAGNOSIS — K62.5 RECTAL BLEEDING: ICD-10-CM

## 2021-05-10 DIAGNOSIS — K59.00 CONSTIPATION, UNSPECIFIED CONSTIPATION TYPE: ICD-10-CM

## 2021-05-10 DIAGNOSIS — K57.90 DIVERTICULOSIS: ICD-10-CM

## 2021-05-10 DIAGNOSIS — K21.9 GERD WITHOUT ESOPHAGITIS: Primary | ICD-10-CM

## 2021-05-10 PROCEDURE — 99214 OFFICE O/P EST MOD 30 MIN: CPT | Performed by: NURSE PRACTITIONER

## 2021-05-10 NOTE — PROGRESS NOTES
Chief Complaint   Patient presents with   • Follow-up   • Constipation       Carlotta Pires is a  76 y.o. female here for a follow up visit for constipation.    HPI  76-year-old female presents today for follow-up visit for constipation.  She is a patient of Dr. Wilcox.  She was last seen in the office by me on 1/5/2021.  She underwent EGD and colonoscopy on 1/27/2021.  EGD showed mild gastritis and hiatal hernia.  Path was negative.  Colonoscopy showed nonbleeding internal and external hemorrhoids with diverticulosis.  Path was negative.  She does have a history of GERD/gastritis/hiatal hernia and is happy to report since we changed her medication to Dexilant 60 mg daily she is doing so much better.  She is no longer having any breakthrough reflux at this time.  She does have a history of constipation and admits she still not having a bowel movement every every 4 to 6 days.  She takes Metamucil fiber packet every day and then occasionally maybe once a week she might take a Senokot.  She tells me definitely not being regular makes her have more episodes of nausea gas and bloating.  She denies any dysphagia, reflux, vomiting, diarrhea, rectal bleeding or melena.  She admits her appetite is good and her weight is stable.  She was having issues with rectal bleeding and hemorrhoids but she is very happy to report since her scopes that she has been using over-the-counter remedies and the rectal bleeding has stopped.  Past Medical History:   Diagnosis Date   • Allergic rhinitis    • Arthritis    • Cancer (CMS/HCC)     Left breast cancer   • CHF (congestive heart failure) (CMS/HCC)    • Cough    • COVID-19 11/2020   • Degenerative arthritis of thumb    • GERD without esophagitis    • H/O TIA (transient ischemic attack) and stroke 2005   • History of bone density study    • Lumbar radiculopathy    • Osteoporosis    • Palpitations    • Peptic ulceration    • Personal history of malignant neoplasm of breast    • Personal  history of malignant neoplasm of breast    • Sciatica    • Stroke (CMS/HCC) 2005    CVA--History of storke   • Trigeminal neuralgia        Past Surgical History:   Procedure Laterality Date   • BREAST BIOPSY Left 2011   • CATARACT EXTRACTION, BILATERAL     • COLONOSCOPY  09/28/2012    Adolph Martinez MD   • COLONOSCOPY N/A 11/13/2018    Procedure: COLONOSCOPY to cecum;  Surgeon: Adolph Martinez MD;  Location: [facility] ENDOSCOPY;  Service: Gastroenterology   • COLONOSCOPY N/A 1/27/2021    Procedure: COLONOSCOPY TO CECUM AND TERMINAL ILEUM;  Surgeon: Emelia Wilcox MD;  Location[facility] END[facility]COPY;  Service: Gastroenterology;  Laterality: N/A;  RECTAL BLEEDING  --DIVERTICULOSIS, HEMORRHOIDS, TORTUOUS COLON   • ENDOSCOPY N/A 11/13/2018    Procedure: ESOPHAGOGASTRODUODENOSCOPY with bx;  Surgeon: Adolph Martinez MD;  Location: [facility] ENDOSCOPY;  Service: Gastroenterology   • ENDOSCOPY N/A 1/27/2021    Procedure: ESOPHAGOGASTRODUODENOSCOPY WITH COLD BX;  Surgeon: Emelia Wilcox MD;  Location: [facility] ENDOSCOPY;  Service: Gastroenterology;  Laterality: N/A;  GERD  --GASTRITIS, HIATAL HERNIA   • EYE SURGERY     • HYSTERECTOMY      At age 31-Not due to cancer   • MASTECTOMY Left 06/30/2011    Dr. Kaitlyn Luna   • TUBAL ABDOMINAL LIGATION  1974   • UPPER GASTROINTESTINAL ENDOSCOPY  09/28/2012    Adolph Martinez MD       Scheduled Meds:    Continuous Infusions:No current facility-administered medications for this visit.      PRN Meds:.    Allergies   Allergen Reactions   • Lisinopril Cough   • Contrast Dye Itching and Rash     IVP Dye   • Tartrazine Hives       Social History     Socioeconomic History   • Marital status:      Spouse name: Jack   • Number of children: 2   • Years of education: High School   • Highest education level: Not on file   Tobacco Use   • Smoking status: Never Smoker   • Smokeless tobacco: Never Used   • Tobacco comment: CAFFEINE USE: 4-5 CUPS 1/2 & 1/2 COFFEE DAILY   Vaping Use   • Vaping  Use: Never used   Substance and Sexual Activity   • Alcohol use: Not Currently     Comment: rare-7 or less drinks per week   • Drug use: No   • Sexual activity: Defer       Family History   Problem Relation Age of Onset   • Cancer Mother 68        head and neck   • Stroke Mother    • Heart disease Mother    • Gallbladder disease Mother    • Throat cancer Mother 68   • Breast cancer Sister 57   • Heart disease Sister    • Hypertension Sister    • Cancer Sister    • Gallbladder disease Sister    • Diabetes Sister    • Cancer Brother         head and neck   • Heart disease Brother    • Hypertension Brother    • Gallbladder disease Brother    • Lung cancer Brother 67   • Throat cancer Brother 67   • Hypertension Father    • Gallbladder disease Father    • Emphysema Father    • Heart disease Father         Enlarged heart   • Cancer Maternal Aunt    • Cancer Maternal Uncle    • Colon cancer Maternal Uncle    • Heart attack Brother    • Alcohol abuse Brother    • Hypertension Sister    • Heart disease Sister    • Hypertension Sister    • Heart disease Sister    • Hypertension Sister    • No Known Problems Other        Review of Systems   Constitutional: Negative for appetite change, chills, diaphoresis, fatigue, fever and unexpected weight change.   HENT: Negative for nosebleeds, postnasal drip, sore throat, trouble swallowing and voice change.    Respiratory: Negative for cough, choking, chest tightness, shortness of breath and wheezing.    Cardiovascular: Negative for chest pain, palpitations and leg swelling.   Gastrointestinal: Positive for abdominal distention and constipation. Negative for abdominal pain, anal bleeding, blood in stool, diarrhea, nausea, rectal pain and vomiting.   Endocrine: Negative for polydipsia, polyphagia and polyuria.   Musculoskeletal: Negative for gait problem.   Skin: Negative for rash and wound.   Allergic/Immunologic: Negative for food allergies.   Neurological: Negative for dizziness,  speech difficulty and light-headedness.   Psychiatric/Behavioral: Negative for confusion, self-injury, sleep disturbance and suicidal ideas.       There were no vitals filed for this visit.    Physical Exam  Constitutional:       General: She is not in acute distress.     Appearance: She is well-developed. She is not ill-appearing.   HENT:      Head: Normocephalic.   Eyes:      Pupils: Pupils are equal, round, and reactive to light.   Cardiovascular:      Rate and Rhythm: Normal rate and regular rhythm.      Heart sounds: Normal heart sounds.   Pulmonary:      Effort: Pulmonary effort is normal.      Breath sounds: Normal breath sounds.   Abdominal:      General: Bowel sounds are normal. There is distension.      Palpations: Abdomen is soft. There is no mass.      Tenderness: There is no abdominal tenderness. There is no guarding or rebound.      Hernia: No hernia is present.   Musculoskeletal:         General: Normal range of motion.   Skin:     General: Skin is warm and dry.   Neurological:      Mental Status: She is alert and oriented to person, place, and time.   Psychiatric:         Speech: Speech normal.         Behavior: Behavior normal.         Judgment: Judgment normal.         No radiology results for the last 7 days     Assessment and plan     1. GERD without esophagitis    2. Constipation, unspecified constipation type    3. Rectal bleeding    4. Diverticulosis    Reviewed EGD and colonoscopy results with her today.  Rectal bleeding has completely stopped.  GERD seems much better on Dexilant 60 mg daily.  Will stay on the Dexilant for 3 months and then in 3 months if she still doing well we will consider taking her down to Dexilant 30 mg daily.  Continue GERD precautions.  Constipation is still an issue.  I really want her taking the daily Metamucil every day and I think she would benefit from increasing the Senokot to every 2 to 3 days instead of waiting till she is a week behind and then trying to take  the Senokot.  She needs to increase her water and her exercise as tolerated.  Patient to call the office next week with an update.  Patient to follow-up with me in 3 months.  Patient is agreeable to the plan.

## 2021-07-07 ENCOUNTER — TELEPHONE (OUTPATIENT)
Dept: ONCOLOGY | Facility: CLINIC | Age: 76
End: 2021-07-07

## 2021-07-07 NOTE — TELEPHONE ENCOUNTER
Caller: Carlotta Pires    Relationship: Self    Best call back number: [phone]    What orders are you requesting (i.e. lab or imaging):  PRESP. FOR PROSTHESIS BRAS     In what timeframe would the patient need to come in: ASAP    Where will you receive your lab/imaging services: [facility] -[phone]    Additional notes:  [facility] HAS SENT OVER TWO ORDERS OVER ON 6/6 & 6/11 BUT HAVE NOT HEARD BACK SO PATIENT CALLED IN.

## 2021-08-17 ENCOUNTER — APPOINTMENT (OUTPATIENT)
Dept: OTHER | Facility: HOSPITAL | Age: 76
End: 2021-08-17

## 2021-08-17 ENCOUNTER — TELEPHONE (OUTPATIENT)
Dept: ONCOLOGY | Facility: CLINIC | Age: 76
End: 2021-08-17

## 2021-08-17 NOTE — TELEPHONE ENCOUNTER
Caller: Carlotta Pires    Relationship to patient: Self    Best call back number: [phone]    Chief complaint: HAS BEEN FEELING BAD THE LAST TWO DAYS HAS APPT TODAY DOES NOT HAVE FEVER AT MOMENT , WANTED TO SEE IF CAN STILL COME IN OR IF NEEDS TO RESCHEDULE APPT LAB AND FOLLOW UP WITH DR AKNIS    Type of visit: LAB AND FOLLOW UP

## 2021-08-17 NOTE — TELEPHONE ENCOUNTER
Patient has a fever 101 and feels bad . todays appt is a follow up and she wants to reschedule so she doesn't get anyone else sick. She has had the COVID vaccine but is going to go get tested. In basket message to scheduling and Dr Chanel clinic nurse.

## 2021-08-18 ENCOUNTER — TELEPHONE (OUTPATIENT)
Dept: FAMILY MEDICINE CLINIC | Facility: CLINIC | Age: 76
End: 2021-08-18

## 2021-08-18 NOTE — TELEPHONE ENCOUNTER
FYI: Patient wanted to make sure that you knew she went to [facility] today, 8/18/21 to be checked out & covid tested.

## 2021-08-20 ENCOUNTER — TELEPHONE (OUTPATIENT)
Dept: FAMILY MEDICINE CLINIC | Facility: CLINIC | Age: 76
End: 2021-08-20

## 2021-08-20 NOTE — TELEPHONE ENCOUNTER
Dr. Garcia was informed of the following situation and stated to call patient and inform her she need to directly to the Urgent Care or ER. Pt was called and informed to go directly to the Urgent Care or ER. Pt informed stated she was not going to the ER or Urgent care she knows her body.

## 2021-08-20 NOTE — TELEPHONE ENCOUNTER
[facility] staff attempted to follow warm transfer process and was unsuccessful     Caller: Carlotta Pires    Relationship to patient: Self    Best call back number: [phone]    Patient is needing: PATIENT CALLED STATING THAT SHE IS HAVING ISSUES WITH HER POTASSIUM AND WITH THIS SHE WAS HAVING A SORT OF CHEST PAIN. WHEN I ADVISED PATIENT TO GO TO ER DUE TO CHEST PAIN, PATIENT THEN STATED THAT IT IS NOT REALLY A CHEST PAIN THAT IT IS MORE IN HER STERNUM AND THIS PAIN HAPPENS WHEN HER POTASSIUM IS LOW.  PATIENT ALSO STATED SHE HAS HAD THIS PAIN OFF AND ON FOR A LONG TIME AND DR SMITH IS AWARE OF THE ISSUE. PATIENT STATED SHE SEEN YESTERDAY 08/19 AT URGENT CARE FOR THE ONGOING ISSUES HOWEVER THEY WERE NOT ABLE TO DO ANY LAB WORK. PATIENT IS SCHEDULE, PER PATIENT REQUEST, FOR TUESDAY 08/24 WITH DR SMITH. [facility] ATTEMPTED TO WARM TRANSFER PATIENT AFTER SCHEDULING AND PATIENT HUNG UP. [facility] SPOKE WITH  HARDY IN OFFICE AND WAS ADVISED TO TAKE THIS MESSAGE.

## 2021-08-24 ENCOUNTER — OFFICE VISIT (OUTPATIENT)
Dept: FAMILY MEDICINE CLINIC | Facility: CLINIC | Age: 76
End: 2021-08-24

## 2021-08-24 VITALS
DIASTOLIC BLOOD PRESSURE: 60 MMHG | HEART RATE: 88 BPM | TEMPERATURE: 98.7 F | HEIGHT: 62 IN | WEIGHT: 146.2 LBS | BODY MASS INDEX: 26.91 KG/M2 | SYSTOLIC BLOOD PRESSURE: 138 MMHG | OXYGEN SATURATION: 98 %

## 2021-08-24 DIAGNOSIS — E03.9 HYPOTHYROIDISM (ACQUIRED): ICD-10-CM

## 2021-08-24 DIAGNOSIS — E78.2 HYPERLIPIDEMIA, MIXED: ICD-10-CM

## 2021-08-24 DIAGNOSIS — R07.9 CHEST PAIN IN ADULT: ICD-10-CM

## 2021-08-24 DIAGNOSIS — F51.01 PRIMARY INSOMNIA: ICD-10-CM

## 2021-08-24 DIAGNOSIS — I10 ESSENTIAL HYPERTENSION: Primary | ICD-10-CM

## 2021-08-24 PROCEDURE — 99214 OFFICE O/P EST MOD 30 MIN: CPT | Performed by: FAMILY MEDICINE

## 2021-08-24 PROCEDURE — 93000 ELECTROCARDIOGRAM COMPLETE: CPT | Performed by: FAMILY MEDICINE

## 2021-08-24 RX ORDER — AMITRIPTYLINE HYDROCHLORIDE 25 MG/1
25 TABLET, FILM COATED ORAL NIGHTLY
Qty: 90 TABLET | Refills: 1 | Status: SHIPPED | OUTPATIENT
Start: 2021-08-24 | End: 2021-12-13 | Stop reason: SDUPTHER

## 2021-08-24 RX ORDER — LEVOTHYROXINE SODIUM 0.07 MG/1
TABLET ORAL
Qty: 110 TABLET | Refills: 1 | Status: SHIPPED | OUTPATIENT
Start: 2021-08-24 | End: 2021-09-01 | Stop reason: SDUPTHER

## 2021-08-24 NOTE — PROGRESS NOTES
"Chief Complaint  Chest Pain    Subjective          Carlotta Pires presents to [facility]  History of Present Illness  Pt has been having some discomfort in her chest for the past week off and on .  No radiation of the pain.  She states that in the past , when this occurred it was due to low K levels.  She wants to get the levels checked again today.  She currently has no chest pains.  She has some fatigue and was tested for covid but her results came back negative yesterday.    HTN- no headaches;  She states her bp has been all over the place for past 1 days  Insomnia need refills and doing well  Hypothyroidism- need refills.    Objective   Vital Signs:   /60 (BP Location: Left arm, Patient Position: Sitting)   Pulse 88   Temp 98.7 °F (37.1 °C)   Ht 157.5 cm (62.01\")   Wt 66.3 kg (146 lb 3.2 oz)   SpO2 98%   BMI 26.73 kg/m²     Physical Exam  Vitals and nursing note reviewed.   Constitutional:       Appearance: Normal appearance.   HENT:      Head: Normocephalic and atraumatic.   Cardiovascular:      Rate and Rhythm: Normal rate and regular rhythm.      Heart sounds: Normal heart sounds. No murmur heard.     Pulmonary:      Effort: Pulmonary effort is normal. No respiratory distress.      Breath sounds: Normal breath sounds. No stridor.   Neurological:      General: No focal deficit present.      Mental Status: She is alert and oriented to person, place, and time.   Psychiatric:         Mood and Affect: Mood normal.        Result Review :            ECG 12 Lead    Date/Time: 8/24/2021 9:06 AM  Performed by: Mirela Garcia MD  Authorized by: Mirela Garcia MD   Comparison: not compared with previous ECG   Rhythm: sinus rhythm  Rate: normal  ST Segments: ST segments normal  T Waves: T waves normal  QRS axis: normal  Other: no other findings    Clinical impression: normal ECG              Assessment and Plan    Diagnoses and all orders for this visit:    1. Essential " hypertension (Primary)  -     Comprehensive Metabolic Panel    2. Hypothyroidism (acquired)  -     levothyroxine (Euthyrox) 75 MCG tablet; Take one po bd except on Sunday take 2.  Dispense: 110 tablet; Refill: 1  -     TSH    3. Primary insomnia  -     amitriptyline (ELAVIL) 25 MG tablet; Take 1 tablet by mouth Every Night.  Dispense: 90 tablet; Refill: 1    4. Hyperlipidemia, mixed  -     Lipid Panel    5. Chest pain in adult  -     ECG 12 Lead        Follow Up   No follow-ups on file.  Patient was given instructions and counseling regarding her condition or for health maintenance advice. Please see specific information pulled into the AVS if appropriate.     EKG is normal and will get labs today.  Warning signs to ER discussed.    Given warning signs for stroke and MI.    She will continue to monitor her BP and let me know how it is.  Labs today and refills.

## 2021-08-25 LAB
ALBUMIN SERPL-MCNC: 3.8 G/DL (ref 3.5–5.2)
ALBUMIN/GLOB SERPL: 1.7 G/DL
ALP SERPL-CCNC: 74 U/L (ref 39–117)
ALT SERPL-CCNC: 11 U/L (ref 1–33)
AST SERPL-CCNC: 21 U/L (ref 1–32)
BILIRUB SERPL-MCNC: 0.4 MG/DL (ref 0–1.2)
BUN SERPL-MCNC: 11 MG/DL (ref 8–23)
BUN/CREAT SERPL: 14.5 (ref 7–25)
CALCIUM SERPL-MCNC: 9.6 MG/DL (ref 8.6–10.5)
CHLORIDE SERPL-SCNC: 104 MMOL/L (ref 98–107)
CHOLEST SERPL-MCNC: 126 MG/DL (ref 0–200)
CO2 SERPL-SCNC: 28.2 MMOL/L (ref 22–29)
CREAT SERPL-MCNC: 0.76 MG/DL (ref 0.57–1)
GLOBULIN SER CALC-MCNC: 2.2 GM/DL
GLUCOSE SERPL-MCNC: 93 MG/DL (ref 65–99)
HDLC SERPL-MCNC: 42 MG/DL (ref 40–60)
LDLC SERPL CALC-MCNC: 65 MG/DL (ref 0–100)
POTASSIUM SERPL-SCNC: 4.2 MMOL/L (ref 3.5–5.2)
PROT SERPL-MCNC: 6 G/DL (ref 6–8.5)
SODIUM SERPL-SCNC: 142 MMOL/L (ref 136–145)
TRIGL SERPL-MCNC: 100 MG/DL (ref 0–150)
TSH SERPL DL<=0.005 MIU/L-ACNC: 3.11 UIU/ML (ref 0.27–4.2)
VLDLC SERPL CALC-MCNC: 19 MG/DL (ref 5–40)

## 2021-08-31 ENCOUNTER — TELEPHONE (OUTPATIENT)
Dept: FAMILY MEDICINE CLINIC | Facility: CLINIC | Age: 76
End: 2021-08-31

## 2021-08-31 NOTE — TELEPHONE ENCOUNTER
Caller: Carlotta Pires    Relationship: Self    Best call back number:[phone]    Caller requesting test results: PATIENT     What test was performed: LAB WORK     When was the test performed: 8/24/21    Where was the test performed: OFFICE

## 2021-09-01 ENCOUNTER — TELEPHONE (OUTPATIENT)
Dept: FAMILY MEDICINE CLINIC | Facility: CLINIC | Age: 76
End: 2021-09-01

## 2021-09-01 DIAGNOSIS — E03.9 HYPOTHYROIDISM (ACQUIRED): ICD-10-CM

## 2021-09-01 RX ORDER — LEVOTHYROXINE SODIUM 0.07 MG/1
75 TABLET ORAL DAILY
Qty: 110 TABLET | Refills: 1 | Status: SHIPPED | OUTPATIENT
Start: 2021-09-01 | End: 2021-12-13 | Stop reason: SDUPTHER

## 2021-09-01 NOTE — TELEPHONE ENCOUNTER
Sydnie (ph#:[phone]) with [facility] pharmacy called and need clarification on the direction for the following medication,   levothyroxine (Euthyrox) 75 MCG tablet        Sig: Take one po bd except on Sunday take 2.

## 2021-10-13 ENCOUNTER — TELEPHONE (OUTPATIENT)
Dept: ONCOLOGY | Facility: CLINIC | Age: 76
End: 2021-10-13

## 2021-10-13 NOTE — TELEPHONE ENCOUNTER
Caller: FAINA     Relationship to patient: PATIENT    Best call back number: [phone]     Type of visit: FOLLOW UP     Requested date: NEXT AVAILABLE APPT    If rescheduling, when is the original appointment: 08/17/21    Additional notes: HUB UNABLE TO R/S WITHIN TIMEFRAME

## 2021-10-14 ENCOUNTER — APPOINTMENT (OUTPATIENT)
Dept: CT IMAGING | Facility: HOSPITAL | Age: 76
End: 2021-10-14

## 2021-10-14 ENCOUNTER — HOSPITAL ENCOUNTER (EMERGENCY)
Facility: HOSPITAL | Age: 76
Discharge: HOME | End: 2021-10-14
Attending: EMERGENCY MEDICINE | Admitting: EMERGENCY MEDICINE

## 2021-10-14 VITALS
TEMPERATURE: 98.3 F | WEIGHT: 142 LBS | SYSTOLIC BLOOD PRESSURE: 145 MMHG | BODY MASS INDEX: 26.13 KG/M2 | OXYGEN SATURATION: 99 % | HEIGHT: 62 IN | HEART RATE: 80 BPM | RESPIRATION RATE: 18 BRPM | DIASTOLIC BLOOD PRESSURE: 52 MMHG

## 2021-10-14 DIAGNOSIS — S06.9X9A HEAD INJURY WITH LOSS OF CONSCIOUSNESS (HCC): Primary | ICD-10-CM

## 2021-10-14 DIAGNOSIS — S01.01XA LACERATION OF SCALP, INITIAL ENCOUNTER: ICD-10-CM

## 2021-10-14 DIAGNOSIS — S00.03XA SCALP HEMATOMA, INITIAL ENCOUNTER: ICD-10-CM

## 2021-10-14 PROCEDURE — 99283 EMERGENCY DEPT VISIT LOW MDM: CPT

## 2021-10-14 PROCEDURE — 70450 CT HEAD/BRAIN W/O DYE: CPT

## 2021-10-14 RX ORDER — ACETAMINOPHEN 500 MG
1000 TABLET ORAL ONCE
Status: COMPLETED | OUTPATIENT
Start: 2021-10-14 | End: 2021-10-14

## 2021-10-14 RX ADMIN — ACETAMINOPHEN 1000 MG: 500 TABLET, FILM COATED ORAL at 18:13

## 2021-10-14 NOTE — ED NOTES
Pt fell while cutting grass and hit her one week ago. Pt was seen at [facility] today and was referred to ED due to being on plavix. Intermittent nausea and dizziness since fall.     Patient was placed in face mask during triage process. Patient was wearing facemask when I entered the room and throughout our encounter. I wore full protective equipment throughout this patient encounter including a face mask, eye protection, and gloves. Hand hygiene was performed before donning protective equipment and again following doffing of PPE after leaving the room.       Deanne Woods, RN  10/14/21 [id number]

## 2021-10-14 NOTE — ED PROVIDER NOTES
EMERGENCY DEPARTMENT ENCOUNTER    Room Number:  18/18  Date of encounter:  10/14/2021  PCP: Mirela Garcia MD  Historian: Patient      HPI:  Chief Complaint: Head injury  A complete HPI/ROS/PMH/PSH/SH/FH are unobtainable due to: N/A    Context: Carlotta Pires is a 76 y.o. female who presents to the ED c/o scalp laceration/head injury.  She was mowing her yard on Sunday and slipped and fell backwards, striking her head on a rock.  She is lose consciousness briefly.  She states that was a lot of blood coming from the left occipital scalp wound that eventually stopped.  She was unable to come to the hospital because she cares for her disabled daughter.  She is continued to have left-sided headache and tenderness in this area.  Low-grade fever today reported she was unaware.  Mild nausea, no vomiting or diarrhea.  She is occasionally dizzy.  Pain is moderate, aching, located in the left occipital area and does not radiate.  It is worsened with palpation or touching the area.  There are no alleviating factors.    She reports recent sinus congestion but no cough or shortness of breath.  She has been fully vaccinated for COVID-19.      The patient was placed in a mask in triage, hand hygiene was performed before and after my interaction with the patient.  I wore a mask, safety glasses and gloves during my entire interaction with the patient.    PAST MEDICAL HISTORY  Active Ambulatory Problems     Diagnosis Date Noted   • Malignant neoplasm of lower-outer quadrant of left female breast (HCC) 05/09/2016   • Osteopenia 05/10/2016   • Encounter for screening for malignant neoplasm of colon 11/28/2017   • Epigastric pain 10/18/2018   • Essential hypertension 04/24/2019   • History of cardiomyopathy 04/24/2019   • Hyperlipidemia, mixed 11/22/2019   • Acute seasonal allergic rhinitis due to pollen 11/22/2019   • GERD without esophagitis 11/22/2019   • Hypothyroidism (acquired) 11/22/2019   • Primary insomnia 11/22/2019   •  CHF (congestive heart failure) (HCC) 11/22/2019   • Encounter for Medicare annual wellness exam 11/22/2019   • Arthritis 11/25/2019   • Trigeminal neuralgia    • Osteoporosis    • Lumbar radiculopathy    • Degenerative arthritis of thumb    • Rectal bleeding 01/05/2021   • Gastroesophageal reflux disease without esophagitis 01/05/2021   • Weight loss, abnormal 01/05/2021   • Decreased appetite 01/05/2021   • History of breast cancer 01/05/2021   • FH: colon cancer 01/05/2021     Resolved Ambulatory Problems     Diagnosis Date Noted   • No Resolved Ambulatory Problems     Past Medical History:   Diagnosis Date   • Allergic rhinitis    • Cancer (HCC)    • Cough    • COVID-19 11/2020   • H/O TIA (transient ischemic attack) and stroke 2005   • History of bone density study    • Palpitations    • Peptic ulceration    • Personal history of malignant neoplasm of breast    • Personal history of malignant neoplasm of breast    • Sciatica    • Stroke (HCC) 2005         PAST SURGICAL HISTORY  Past Surgical History:   Procedure Laterality Date   • BREAST BIOPSY Left 2011   • CATARACT EXTRACTION, BILATERAL     • COLONOSCOPY  09/28/2012    Adolph Martinez MD   • COLONOSCOPY N/A 11/13/2018    Procedure: COLONOSCOPY to cecum;  Surgeon: Adolph Martinez MD;  Location: [facility] ENDOSCOPY;  Service: Gastroenterology   • COLONOSCOPY N/A 1/27/2021    Procedure: COLONOSCOPY TO CECUM AND TERMINAL ILEUM;  Surgeon: Emelia Wilcox MD;  Location: [facility] ENDOSCOPY;  Service: Gastroenterology;  Laterality: N/A;  RECTAL BLEEDING  --DIVERTICULOSIS, HEMORRHOIDS, TORTUOUS COLON   • ENDOSCOPY N/A 11/13/2018    Procedure: ESOPHAGOGASTRODUODENOSCOPY with bx;  Surgeon: Adolph Martinez MD;  Location: [facility] ENDOSCOPY;  Service: Gastroenterology   • ENDOSCOPY N/A 1/27/2021    Procedure: ESOPHAGOGASTRODUODENOSCOPY WITH COLD BX;  Surgeon: Emelia Wilcox MD;  Location: [facility] ENDOSCOPY;  Service: Gastroenterology;  Laterality: N/A;  GERD  --GASTRITIS,  HIATAL HERNIA   • EYE SURGERY     • HYSTERECTOMY      At age 31-Not due to cancer   • MASTECTOMY Left 06/30/2011    Dr. Kaitlyn Luna   • TUBAL ABDOMINAL LIGATION  1974   • UPPER GASTROINTESTINAL ENDOSCOPY  09/28/2012    Adolph Martinez MD         FAMILY HISTORY  Family History   Problem Relation Age of Onset   • Cancer Mother 68        head and neck   • Stroke Mother    • Heart disease Mother    • Gallbladder disease Mother    • Throat cancer Mother 68   • Breast cancer Sister 57   • Heart disease Sister    • Hypertension Sister    • Cancer Sister    • Gallbladder disease Sister    • Diabetes Sister    • Cancer Brother         head and neck   • Heart disease Brother    • Hypertension Brother    • Gallbladder disease Brother    • Lung cancer Brother 67   • Throat cancer Brother 67   • Hypertension Father    • Gallbladder disease Father    • Emphysema Father    • Heart disease Father         Enlarged heart   • Cancer Maternal Aunt    • Cancer Maternal Uncle    • Colon cancer Maternal Uncle    • Heart attack Brother    • Alcohol abuse Brother    • Hypertension Sister    • Heart disease Sister    • Hypertension Sister    • Heart disease Sister    • Hypertension Sister    • No Known Problems Other          SOCIAL HISTORY  Social History     Socioeconomic History   • Marital status:      Spouse name: Jack   • Number of children: 2   • Years of education: High School   Tobacco Use   • Smoking status: Never Smoker   • Smokeless tobacco: Never Used   • Tobacco comment: CAFFEINE USE: 4-5 CUPS 1/2 & 1/2 COFFEE DAILY   Vaping Use   • Vaping Use: Never used   Substance and Sexual Activity   • Alcohol use: Not Currently     Comment: rare-7 or less drinks per week   • Drug use: No   • Sexual activity: Defer         ALLERGIES  Lisinopril, Contrast dye, and Tartrazine        REVIEW OF SYSTEMS  Review of Systems   Constitutional: Positive for fever. Negative for chills.   HENT: Positive for postnasal drip, rhinorrhea and  sinus pain.    Respiratory: Negative for cough and shortness of breath.    Cardiovascular: Negative for chest pain.   Gastrointestinal: Positive for nausea. Negative for abdominal pain, diarrhea and vomiting.   Musculoskeletal: Negative for back pain, myalgias and neck pain.   Skin: Positive for wound.   Neurological: Positive for headaches.      All systems reviewed and negative except for those discussed in HPI.       PHYSICAL EXAM    I have reviewed the triage vital signs and nursing notes.    ED Triage Vitals [10/14/21 1723]   Temp Heart Rate Resp BP SpO2   (!) 100.6 °F (38.1 °C) 106 20 145/85 92 %      Temp src Heart Rate Source Patient Position BP Location FiO2 (%)   -- -- -- -- --       Physical Exam   Constitutional: Pt. is oriented to person, place, and time and well-developed, well-nourished, and in no distress.   HENT: Normocephalic and atraumatic. Oropharynx moist/nonerythematous.  There is a hematoma in the left occipital area with a scabbed over laceration.  There is mild surrounding erythema.  There is no fluctuance, however the area is tender to palpation.  Neck: Normal range of motion. Neck supple. No JVD present.  No midline tenderness to palpation.  Cardiovascular: Normal rate, regular rhythm and normal heart sounds. Exam reveals no gallop and no friction rub.   No murmur heard.  Pulmonary/Chest: Effort normal and breath sounds normal. No stridor. No respiratory distress. No wheezes, no rales.   Abdominal: Soft. Bowel sounds are normal. No distension. There is no tenderness. There is no rebound and no guarding.   Musculoskeletal: Normal range of motion. No edema, tenderness or deformity.   Neurological: Pt. is alert and oriented to person, place, and time.  She has no focal neurologic deficits  Skin: Skin is warm and dry. No rash noted. Pt. is not diaphoretic. No erythema.   Psychiatric: Mood, affect and judgment normal.  She is pleasant and cooperative.  Nursing note and vitals  reviewed.        LAB RESULTS  No results found for this or any previous visit (from the past 24 hour(s)).    Ordered the above labs and independently reviewed the results.        RADIOLOGY  No Radiology Exams Resulted Within Past 24 Hours    I ordered the above noted radiological studies. Reviewed by me and discussed with radiologist.  See dictation for official radiology interpretation.      PROCEDURES    Procedures      MEDICATIONS GIVEN IN ER    Medications   acetaminophen (TYLENOL) tablet 1,000 mg (1,000 mg Oral Given 10/14/21 1813)         PROGRESS, DATA ANALYSIS, CONSULTS, AND MEDICAL DECISION MAKING    Any/all labs have been independently reviewed by me.  Any/all radiology studies have been reviewed by me and discussed with radiologist dictating the report.   EKG's independently viewed and interpreted by me.  Discussion below represents my analysis of pertinent findings related to patient's condition, differential diagnosis, treatment plan and final disposition.      ED Course as of 10/14/21 1839   Thu Oct 14, 2021   1834 CT of the brain was independently viewed by me and discussed with Dr. Higginbotham (radiology)-there are no acute processes identified.  For official interpretation, see dictated report. [WC]   1838 Labs and/or imaging as above.  There is no evidence for any acute processes such as: Intracranial hemorrhage, hydrocephalus, tumor/mass effect, infectious process, temporal arteritis, acute angle-closure glaucoma, stroke/CVA, etc... The patient's pain has been addressed and has improved.  The patient is stable for discharge with close outpatient follow-up.   [WC]   1838 Given that her wound is 5 days old and appears to be healing well, no further interventions are necessary.  We discussed local wound care and the usual expected course for her head injury. [WC]      ED Course User Index  [WC] Reginald Varner MD       AS OF 18:39 EDT VITALS:    BP - 145/85  HR - 91  TEMP - 98.9 °F (37.2 °C) (Oral)  02  SATS - 100%        DIAGNOSIS  Final diagnoses:   Head injury with loss of consciousness (HCC)   Scalp hematoma, initial encounter   Laceration of scalp, initial encounter         DISPOSITION  Discharged           Reginald Varner MD  10/14/21 9427

## 2021-10-21 ENCOUNTER — LAB (OUTPATIENT)
Dept: OTHER | Facility: HOSPITAL | Age: 76
End: 2021-10-21

## 2021-10-21 ENCOUNTER — OFFICE VISIT (OUTPATIENT)
Dept: ONCOLOGY | Facility: CLINIC | Age: 76
End: 2021-10-21

## 2021-10-21 VITALS
WEIGHT: 141 LBS | HEART RATE: 69 BPM | BODY MASS INDEX: 25.95 KG/M2 | TEMPERATURE: 97.1 F | DIASTOLIC BLOOD PRESSURE: 83 MMHG | SYSTOLIC BLOOD PRESSURE: 149 MMHG | HEIGHT: 62 IN | RESPIRATION RATE: 16 BRPM | OXYGEN SATURATION: 98 %

## 2021-10-21 DIAGNOSIS — Z17.0 MALIGNANT NEOPLASM OF LOWER-OUTER QUADRANT OF LEFT BREAST OF FEMALE, ESTROGEN RECEPTOR POSITIVE (HCC): Primary | ICD-10-CM

## 2021-10-21 DIAGNOSIS — Z17.0 MALIGNANT NEOPLASM OF LOWER-OUTER QUADRANT OF LEFT BREAST OF FEMALE, ESTROGEN RECEPTOR POSITIVE (HCC): ICD-10-CM

## 2021-10-21 DIAGNOSIS — C50.512 MALIGNANT NEOPLASM OF LOWER-OUTER QUADRANT OF LEFT BREAST OF FEMALE, ESTROGEN RECEPTOR POSITIVE (HCC): Primary | ICD-10-CM

## 2021-10-21 DIAGNOSIS — C50.512 MALIGNANT NEOPLASM OF LOWER-OUTER QUADRANT OF LEFT BREAST OF FEMALE, ESTROGEN RECEPTOR POSITIVE (HCC): ICD-10-CM

## 2021-10-21 LAB
BASOPHILS # BLD AUTO: 0.04 10*3/MM3 (ref 0–0.2)
BASOPHILS NFR BLD AUTO: 0.6 % (ref 0–1.5)
DEPRECATED RDW RBC AUTO: 43.2 FL (ref 37–54)
EOSINOPHIL # BLD AUTO: 0.24 10*3/MM3 (ref 0–0.4)
EOSINOPHIL NFR BLD AUTO: 3.7 % (ref 0.3–6.2)
ERYTHROCYTE [DISTWIDTH] IN BLOOD BY AUTOMATED COUNT: 13.3 % (ref 12.3–15.4)
HCT VFR BLD AUTO: 35.3 % (ref 34–46.6)
HGB BLD-MCNC: 12.4 G/DL (ref 12–15.9)
IMM GRANULOCYTES # BLD AUTO: 0.02 10*3/MM3 (ref 0–0.05)
IMM GRANULOCYTES NFR BLD AUTO: 0.3 % (ref 0–0.5)
LYMPHOCYTES # BLD AUTO: 1.76 10*3/MM3 (ref 0.7–3.1)
LYMPHOCYTES NFR BLD AUTO: 27 % (ref 19.6–45.3)
MCH RBC QN AUTO: 30.9 PG (ref 26.6–33)
MCHC RBC AUTO-ENTMCNC: 35.1 G/DL (ref 31.5–35.7)
MCV RBC AUTO: 88 FL (ref 79–97)
MONOCYTES # BLD AUTO: 0.38 10*3/MM3 (ref 0.1–0.9)
MONOCYTES NFR BLD AUTO: 5.8 % (ref 5–12)
NEUTROPHILS NFR BLD AUTO: 4.09 10*3/MM3 (ref 1.7–7)
NEUTROPHILS NFR BLD AUTO: 62.6 % (ref 42.7–76)
NRBC BLD AUTO-RTO: 0 /100 WBC (ref 0–0.2)
PLATELET # BLD AUTO: 288 10*3/MM3 (ref 140–450)
PMV BLD AUTO: 9.8 FL (ref 6–12)
RBC # BLD AUTO: 4.01 10*6/MM3 (ref 3.77–5.28)
WBC # BLD AUTO: 6.53 10*3/MM3 (ref 3.4–10.8)

## 2021-10-21 PROCEDURE — 85025 COMPLETE CBC W/AUTO DIFF WBC: CPT | Performed by: INTERNAL MEDICINE

## 2021-10-21 PROCEDURE — 36415 COLL VENOUS BLD VENIPUNCTURE: CPT

## 2021-10-21 PROCEDURE — 99215 OFFICE O/P EST HI 40 MIN: CPT | Performed by: INTERNAL MEDICINE

## 2021-10-21 NOTE — PROGRESS NOTES
Couple of  Subjective .     REASONS FOR FOLLOWUP:  Breast cancer    HISTORY OF PRESENT ILLNESS:  The patient is a 76 y.o. year old female  who is here for follow-up with the above-mentioned history.    Over the past year she has had intermittent chest pain.  She states this improved with some medicines through GI for heartburn but then returned.  She thinks her last stress test was 4 to 5 years ago.    Her systolic blood pressures at home have been running in the 190s.  She states her blood pressure machine at home sometimes notifies her that her heart rate is irregular.    On average every 2 weeks or so for the past 2 months she has had a temperature of 100.5 or higher.  The last time she had this was about 3 weeks ago but she had an upper respiratory infection at the time.    No desire to eat.  Sometimes feels nauseous when she forces herself to eat anyway.    Past Medical History:   Diagnosis Date   • Allergic rhinitis    • Arthritis    • Cancer (HCC)     Left breast cancer   • CHF (congestive heart failure) ([facility])    • Cough    • COVID-19 11/2020   • Degenerative arthritis of thumb    • GERD without esophagitis    • H/O TIA (transient ischemic attack) and stroke 2005   • History of bone density study    • Lumbar radiculopathy    • Osteoporosis    • Palpitations    • Peptic ulceration    • Personal history of malignant neoplasm of breast    • Personal history of malignant neoplasm of breast    • Sciatica    • Stroke (HCC) 2005    CVA--History of storke   • Trigeminal neuralgia      Past Surgical History:   Procedure Laterality Date   • BREAST BIOPSY Left 2011   • CATARACT EXTRACTION, BILATERAL     • COLONOSCOPY  09/28/2012    Adolph Martinez MD   • COLONOSCOPY N/A 11/13/2018    Procedure: COLONOSCOPY to cecum;  Surgeon: Adolph Martinez MD;  Location: [facility] ENDOSCOPY;  Service: Gastroenterology   • COLONOSCOPY N/A 1/27/2021    Procedure: COLONOSCOPY TO CECUM AND TERMINAL ILEUM;  Surgeon: Emelia Wilcox MD;   Location:  [facility] ENDOSCOPY;  Service: Gastroenterology;  Laterality: N/A;  RECTAL BLEEDING  --DIVERTICULOSIS, HEMORRHOIDS, TORTUOUS COLON   • ENDOSCOPY N/A 11/13/2018    Procedure: ESOPHAGOGASTRODUODENOSCOPY with bx;  Surgeon: Adolph Martinez MD;  Location:  [facility] ENDOSCOPY;  Service: Gastroenterology   • ENDOSCOPY N/A 1/27/2021    Procedure: ESOPHAGOGASTRODUODENOSCOPY WITH COLD BX;  Surgeon: Emelia Wilcox MD;  Location:  [facility] ENDOSCOPY;  Service: Gastroenterology;  Laterality: N/A;  GERD  --GASTRITIS, HIATAL HERNIA   • EYE SURGERY     • HYSTERECTOMY      At age 31-Not due to cancer   • MASTECTOMY Left 06/30/2011    Dr. Kaitlyn Luna   • TUBAL ABDOMINAL LIGATION  1974   • UPPER GASTROINTESTINAL ENDOSCOPY  09/28/2012    Adolph Martinez MD       HEMATOLOGIC/ONCOLOGIC HISTORY:  (History from previous dates can be found in the separate document.)    MEDICATIONS    Current Outpatient Medications:   •  amitriptyline (ELAVIL) 25 MG tablet, Take 1 tablet by mouth Every Night., Disp: 90 tablet, Rfl: 1  •  aspirin 81 MG EC tablet, Take 81 mg by mouth Daily., Disp: , Rfl:   •  atorvastatin (LIPITOR) 10 MG tablet, Take 1 tablet by mouth Daily., Disp: 90 tablet, Rfl: 4  •  clopidogrel (PLAVIX) 75 MG tablet, Take 1 tablet by mouth Daily., Disp: 90 tablet, Rfl: 4  •  diphenhydrAMINE (BENADRYL) 50 MG capsule, Take 1 hour prior to CT scan, Disp: 1 capsule, Rfl: 0  •  hydroCHLOROthiazide (HYDRODIURIL) 12.5 MG tablet, Take 1 tablet by mouth Daily., Disp: 90 tablet, Rfl: 2  •  levothyroxine (Euthyrox) 75 MCG tablet, Take 1 tablet by mouth Daily. Take one po qd except on Sunday take 2., Disp: 110 tablet, Rfl: 1  •  losartan (COZAAR) 50 MG tablet, Take 1 tablet by mouth Daily., Disp: 90 tablet, Rfl: 2  •  Melatonin 10 MG capsule, Take  by mouth., Disp: , Rfl:   •  metoprolol succinate XL (TOPROL-XL) 50 MG 24 hr tablet, Take 1 tablet by mouth 2 (Two) Times a Day., Disp: 180 tablet, Rfl: 2    ALLERGIES:     Allergies   Allergen  Reactions   • Lisinopril Cough   • Contrast Dye Itching and Rash     IVP Dye   • Tartrazine Hives       SOCIAL HISTORY:       Social History     Socioeconomic History   • Marital status:      Spouse name: Jack   • Number of children: 2   • Years of education: High School   Tobacco Use   • Smoking status: Never Smoker   • Smokeless tobacco: Never Used   • Tobacco comment: CAFFEINE USE: 4-5 CUPS 1/2 & 1/2 COFFEE DAILY   Vaping Use   • Vaping Use: Never used   Substance and Sexual Activity   • Alcohol use: Not Currently     Comment: rare-7 or less drinks per week   • Drug use: No   • Sexual activity: Defer         FAMILY HISTORY:  Family History   Problem Relation Age of Onset   • Cancer Mother 68        head and neck   • Stroke Mother    • Heart disease Mother    • Gallbladder disease Mother    • Throat cancer Mother 68   • Breast cancer Sister 57   • Heart disease Sister    • Hypertension Sister    • Cancer Sister    • Gallbladder disease Sister    • Diabetes Sister    • Cancer Brother         head and neck   • Heart disease Brother    • Hypertension Brother    • Gallbladder disease Brother    • Lung cancer Brother 67   • Throat cancer Brother 67   • Hypertension Father    • Gallbladder disease Father    • Emphysema Father    • Heart disease Father         Enlarged heart   • Cancer Maternal Aunt    • Cancer Maternal Uncle    • Colon cancer Maternal Uncle    • Heart attack Brother    • Alcohol abuse Brother    • Hypertension Sister    • Heart disease Sister    • Hypertension Sister    • Heart disease Sister    • Hypertension Sister    • No Known Problems Other        REVIEW OF SYSTEMS:  Review of Systems   Constitutional: Negative for activity change.   HENT: Negative for nosebleeds and trouble swallowing.    Respiratory: Negative for shortness of breath and wheezing.    Cardiovascular: Negative for chest pain and palpitations.   Gastrointestinal: Negative for constipation, diarrhea and nausea.   Genitourinary:  "Negative for dysuria and hematuria.   Musculoskeletal: Negative for arthralgias and myalgias.   Skin: Negative for rash and wound.   Neurological: Negative for seizures and syncope.   Hematological: Negative for adenopathy. Does not bruise/bleed easily.   Psychiatric/Behavioral: Negative for confusion.           Objective    Vitals:    10/21/21 1334   BP: 149/83   Pulse: 69   Resp: 16   Temp: 97.1 °F (36.2 °C)   TempSrc: Temporal   SpO2: 98%   Weight: 64 kg (141 lb)   Height: 157 cm (61.81\")   PainSc: 0-No pain     Current Status 10/21/2021   ECOG score 0      PHYSICAL EXAM:        CONSTITUTIONAL:  Vital signs reviewed.  No distress, looks comfortable.  EYES:  Conjunctiva and lids unremarkable.  PERRLA  EARS,NOSE,MOUTH,THROAT:  Ears and nose appear unremarkable.  Lips, teeth, gums appear unremarkable.  RESPIRATORY:  Normal respiratory effort.  Lungs clear to auscultation bilaterally.  CARDIOVASCULAR:  Normal S1, S2.  No murmurs rubs or gallops.  No significant lower extremity edema.  BREAST: no masses by palpation or inspection   GASTROINTESTINAL: Abdomen appears unremarkable.  Nontender.  No hepatomegaly.  No splenomegaly.  LYMPHATIC:  No cervical, supraclavicular, axillary lymphadenopathy.  SKIN:  Warm.  No rashes.  PSYCHIATRIC:  Normal judgment and insight.  Normal mood and affect.       RECENT LABS:        WBC   Date/Time Value Ref Range Status   10/21/2021 01:29 PM 6.53 3.40 - 10.80 10*3/mm3 Final     Hemoglobin   Date/Time Value Ref Range Status   10/21/2021 01:29 PM 12.4 12.0 - 15.9 g/dL Final     Platelets   Date/Time Value Ref Range Status   10/21/2021 01:29  140 - 450 10*3/mm3 Final       Assessment/Plan     ASSESSMENT:  Problem List Items Addressed This Visit     None         *Stage IIIA, grade 3, 3.9 cm left breast cancer. Four out of 24 nodes positive. ER 2%, LA negative, HER2 negative. Completed FEC x3 followed by Taxotere x3, 11/28/2011. Completed radiation in February 2012.   Poor tolerance to " anastrozole and letrozole.  completed 5 years hormonal therapy using Aromasin, 1/31/17.  (She was initially a patient at [facility]. She transferred here as her son-in-law, Jeremiah Dumont, was a patient here).   · Suspect she remains in remission.  However, with the bulging/swelling of skin around her mastectomy site on the left and the new onset right-sided chest pain, check a CT of the chest to assess for recurrence.  · CT chest 8/10/2020: No evidence of recurrence.  · I discussed with Dr. Braxton-she stated we could do an ultrasound if we wanted to evaluate the superficial areas more.  Notably the bulging is on the left side and the right side has chest pain.  She states an ultrasound often shows fat necrosis better than a CT.  She suspects the CT would have picked up malignancy if it was present.  Patient did not want to pursue an ultrasound.  Symptoms resolved.  No evidence of recurrence.  Remission continues.    *Possible lupus.  Was evaluated by Dr. Rivera for this  · 10/21/2021 visit: Fever of 100.5 or higher on average every 2 weeks or so for the past 2 months.  I told her sometimes this can occur with a rheumatologic disorder.  I advise she discuss with her PCP or Dr. Rivera about this.  (CBC unremarkable.  Exam unremarkable.  No clear signs of leukemia or lymphoma.  I told her we could do CTs looking for LAD which is not in areas that can be palpated.  However, I think it is unlikely this is the case.  She declines CTs at this time)    *Chronic right low back/hip pain radiating anteriorly.  In May 2016, CAT scan and bone scan negative for cancer as the cause of the pain.  Defer further management of this to her PCP.  She did not complain of this today.    *In the past, she has complained of: Forgetfulness and transposition of numbers when she is writing numbers at times. This had been present since around April 2015. She states it is not worsening.   She did not complain of this today.    *History of stroke around  2005. Because of this I do not think she would be a good candidate for tamoxifen.     *Osteopenia, which has improved to normal bone density.  DEXA 5/17/16 normal bone density.  T score -0.6, compared to.   -1.1 2/20/14.  She is on calcium and vitamin D.  Defer further management of this to her PCP now that she is off aromatase inhibitors.    *Right upper quadrant/right lower anterior rib pain.  Present since October 2016.    Due to RUQ abdominal pain/right lower anterior rib pain since October 2016, bone scan and CT abdomen with contrast 2/2/17: No evidence of recurrence.  (8 mm low-attenuation liver lesion seen on the 5/17/16 CT but less conspicuous on order CT.  This was felt to be due to older CT without contrast.  This was felt to likely be benign.  Plan no further scheduled follow-up of this-patient agrees.).  She did not complain of this pain today.    *Hypokalemia. Her PCP manages this.      *IV contrast allergy.  Previous rash.  Receives IV contrast pre-medicines with CT IV contrast.  (This has worked well in the past)    *Left arm lymphedema due to prior surgery for breast cancer.  She was seen by the lymphedema clinic but has had some logistical issues obtaining the sleeve they prescribed.  I encouraged her to continue to call the lymphedema clinic for assistance with this.    *Previously complained of vertigo and was referred to her ENT.    *Heartburn.  Had EGD and colonoscopy by Dr. Martinez November 2018.  No malignancy was found.      *Intermittent chest pain x2 years, SBP's sometimes in the 190s.  Her blood pressure machine has been reading irregular sometimes for her heart rate.  Coronary calcifications on last CT.  · Referral to her cardiologist, Dr. Barker    *Social issues  · She is the caregiver for her disabled daughter.  · Her other daughter, Osmin, is dealing with the death of her  which was around January 2019.    PLAN:   · Referral to Dr. Barker for those cardiac issues mentioned  above.  · Defer nausea with eating to PCP and/or Dr. Alonzo, her GI MD.  · Defer intermittent fever to PCP and/or her rheumatologist, Dr. Rivera.  · M.D. CBC 6 months  · Last mammogram 1/20/2021, BI-RADS 1 (Right-sided mammograms)  · Off hormonal therapy    Send copy to Dr. Paxton Rivera, rheumatology    41 minutes.  Total time.  Same day.

## 2021-11-09 ENCOUNTER — OFFICE VISIT (OUTPATIENT)
Dept: CARDIOLOGY | Facility: CLINIC | Age: 76
End: 2021-11-09

## 2021-11-09 ENCOUNTER — IMMUNIZATION (OUTPATIENT)
Dept: VACCINE CLINIC | Facility: HOSPITAL | Age: 76
End: 2021-11-09

## 2021-11-09 VITALS
BODY MASS INDEX: 26.54 KG/M2 | HEART RATE: 68 BPM | HEIGHT: 62 IN | SYSTOLIC BLOOD PRESSURE: 128 MMHG | DIASTOLIC BLOOD PRESSURE: 88 MMHG | WEIGHT: 144.2 LBS

## 2021-11-09 DIAGNOSIS — I10 ESSENTIAL HYPERTENSION: ICD-10-CM

## 2021-11-09 DIAGNOSIS — Z86.79 HISTORY OF CARDIOMYOPATHY: Primary | ICD-10-CM

## 2021-11-09 DIAGNOSIS — C50.512 MALIGNANT NEOPLASM OF LOWER-OUTER QUADRANT OF LEFT BREAST OF FEMALE, ESTROGEN RECEPTOR POSITIVE (HCC): ICD-10-CM

## 2021-11-09 DIAGNOSIS — R07.89 CHEST PAIN, ATYPICAL: ICD-10-CM

## 2021-11-09 DIAGNOSIS — Z17.0 MALIGNANT NEOPLASM OF LOWER-OUTER QUADRANT OF LEFT BREAST OF FEMALE, ESTROGEN RECEPTOR POSITIVE (HCC): ICD-10-CM

## 2021-11-09 PROCEDURE — 0004A ADM SARSCOV2 30MCG/0.3ML BOOSTER: CPT | Performed by: INTERNAL MEDICINE

## 2021-11-09 PROCEDURE — 93000 ELECTROCARDIOGRAM COMPLETE: CPT | Performed by: INTERNAL MEDICINE

## 2021-11-09 PROCEDURE — 91300 HC SARSCOV02 VAC 30MCG/0.3ML IM: CPT | Performed by: INTERNAL MEDICINE

## 2021-11-09 PROCEDURE — 99214 OFFICE O/P EST MOD 30 MIN: CPT | Performed by: INTERNAL MEDICINE

## 2021-11-09 RX ORDER — LOSARTAN POTASSIUM 100 MG/1
100 TABLET ORAL DAILY
Qty: 90 TABLET | Refills: 2 | Status: SHIPPED | OUTPATIENT
Start: 2021-11-09 | End: 2022-02-09 | Stop reason: SDUPTHER

## 2021-11-09 NOTE — PROGRESS NOTES
Date of Office Visit: 21  Encounter Provider: Reginald Barker MD  Place of Service: [facility] CARDIOLOGY  Patient Name: Carlotta Pires  :1945  [id number]    Chief Complaint   Patient presents with   • Chest Pain   :     HPI: Carlotta Pires is a 76 y.o. female  a past cardiac history significant for Adriamycin induced cardiomyopathy.  Echocardiogram from 2019 revealed normal LV function with an EF of 56%.  She is here for follow-up her breathing is okay blood pressures have been high she recently saw her oncologist and they were little bit high she also is having intermittent periods of severe chest pain not related to activity sometimes it can last 5 to 6 minutes nothing seems to necessarily precipitated or make it go away she does not have other accompanying symptoms with it but when it happens it pretty severe.  She has a lot of stress on her plate she is got a mentally challenged child has been very sick    Past Medical History:   Diagnosis Date   • Allergic rhinitis    • Arthritis    • Cancer (HCC)     Left breast cancer   • CHF (congestive heart failure) ([facility])    • Cough    • COVID-19 2020   • Degenerative arthritis of thumb    • GERD without esophagitis    • H/O TIA (transient ischemic attack) and stroke    • History of bone density study    • Lumbar radiculopathy    • Osteoporosis    • Palpitations    • Peptic ulceration    • Personal history of malignant neoplasm of breast    • Personal history of malignant neoplasm of breast    • Sciatica    • Stroke (HCC)     CVA--History of storke   • Trigeminal neuralgia        Past Surgical History:   Procedure Laterality Date   • BREAST BIOPSY Left    • CATARACT EXTRACTION, BILATERAL     • COLONOSCOPY  2012    Adolph Martinez MD   • COLONOSCOPY N/A 2018    Procedure: COLONOSCOPY to cecum;  Surgeon: Adolph Martinez MD;  Location: [facility] ENDOSCOPY;  Service: Gastroenterology   • COLONOSCOPY N/A  1/27/2021    Procedure: COLONOSCOPY TO CECUM AND TERMINAL ILEUM;  Surgeon: Emelia Wilcox MD;  Location:  [facility]OSCOPY;  Service: Gastroenterology;  Laterality: N/A;  RECTAL BLEEDING  --DIVERTICULOSIS, HEMORRHOIDS, TORTUOUS COLON   • ENDOSCOPY N/A 11/13/2018    Procedure: ESOPHAGOGASTRODUODENOSCOPY with bx;  Surgeon: Adolph Martinez MD;  Location:  [facility];  Service: Gastroenterology   • ENDOSCOPY N/A 1/27/2021    Procedure: ESOPHAGOGASTRODUODENOSCOPY WITH COLD BX;  Surgeon: Emelia Wilcox MD;  Location:  [facility] ENDOSCOPY;  Service: Gastroenterology;  Laterality: N/A;  GERD  --GASTRITIS, HIATAL HERNIA   • EYE SURGERY     • HYSTERECTOMY      At age 31-Not due to cancer   • MASTECTOMY Left 06/30/2011    Dr. Kaitlyn Luna   • TUBAL ABDOMINAL LIGATION  1974   • UPPER GASTROINTESTINAL ENDOSCOPY  09/28/2012    Adolph Martinez MD       Social History     Socioeconomic History   • Marital status:      Spouse name: Jack   • Number of children: 2   • Years of education: High School   Tobacco Use   • Smoking status: Never Smoker   • Smokeless tobacco: Never Used   • Tobacco comment: CAFFEINE USE: 4-5 CUPS 1/2 & 1/2 COFFEE DAILY   Vaping Use   • Vaping Use: Never used   Substance and Sexual Activity   • Alcohol use: Not Currently     Comment: rare-7 or less drinks per week   • Drug use: No   • Sexual activity: Defer       Family History   Problem Relation Age of Onset   • Cancer Mother 68        head and neck   • Stroke Mother    • Heart disease Mother    • Gallbladder disease Mother    • Throat cancer Mother 68   • Breast cancer Sister 57   • Heart disease Sister    • Hypertension Sister    • Cancer Sister    • Gallbladder disease Sister    • Diabetes Sister    • Cancer Brother         head and neck   • Heart disease Brother    • Hypertension Brother    • Gallbladder disease Brother    • Lung cancer Brother 67   • Throat cancer Brother 67   • Hypertension Father    • Gallbladder disease Father    •  Emphysema Father    • Heart disease Father         Enlarged heart   • Cancer Maternal Aunt    • Cancer Maternal Uncle    • Colon cancer Maternal Uncle    • Heart attack Brother    • Alcohol abuse Brother    • Hypertension Sister    • Heart disease Sister    • Hypertension Sister    • Heart disease Sister    • Hypertension Sister    • No Known Problems Other        Review of Systems   Constitutional: Negative for decreased appetite, fever, malaise/fatigue and weight loss.   HENT: Negative for nosebleeds.    Eyes: Negative for double vision.   Cardiovascular: Negative for chest pain, claudication, cyanosis, dyspnea on exertion, irregular heartbeat, leg swelling, near-syncope, orthopnea, palpitations, paroxysmal nocturnal dyspnea and syncope.   Respiratory: Negative for cough, hemoptysis and shortness of breath.    Hematologic/Lymphatic: Negative for bleeding problem.   Skin: Negative for rash.   Musculoskeletal: Negative for falls and myalgias.   Gastrointestinal: Negative for hematochezia, jaundice, melena, nausea and vomiting.   Genitourinary: Negative for hematuria.   Neurological: Negative for dizziness and seizures.   Psychiatric/Behavioral: Negative for altered mental status and memory loss.       Allergies   Allergen Reactions   • Lisinopril Cough   • Contrast Dye Itching and Rash     IVP Dye   • Tartrazine Hives         Current Outpatient Medications:   •  amitriptyline (ELAVIL) 25 MG tablet, Take 1 tablet by mouth Every Night., Disp: 90 tablet, Rfl: 1  •  aspirin 81 MG EC tablet, Take 81 mg by mouth Daily., Disp: , Rfl:   •  atorvastatin (LIPITOR) 10 MG tablet, Take 1 tablet by mouth Daily., Disp: 90 tablet, Rfl: 4  •  clopidogrel (PLAVIX) 75 MG tablet, Take 1 tablet by mouth Daily., Disp: 90 tablet, Rfl: 4  •  diphenhydrAMINE (BENADRYL) 50 MG capsule, Take 1 hour prior to CT scan, Disp: 1 capsule, Rfl: 0  •  hydroCHLOROthiazide (HYDRODIURIL) 12.5 MG tablet, Take 1 tablet by mouth Daily., Disp: 90 tablet,  "Rfl: 2  •  levothyroxine (Euthyrox) 75 MCG tablet, Take 1 tablet by mouth Daily. Take one po qd except on Sunday take 2., Disp: 110 tablet, Rfl: 1  •  losartan (COZAAR) 50 MG tablet, Take 1 tablet by mouth Daily., Disp: 90 tablet, Rfl: 2  •  Melatonin 10 MG capsule, Take  by mouth., Disp: , Rfl:   •  metoprolol succinate XL (TOPROL-XL) 50 MG 24 hr tablet, Take 1 tablet by mouth 2 (Two) Times a Day., Disp: 180 tablet, Rfl: 2      Objective:     Vitals:    11/09/21 1215   Weight: 65.4 kg (144 lb 3.2 oz)   Height: 157.5 cm (62\")     Body mass index is 26.37 kg/m².    Constitutional:       Appearance: Well-developed.   Eyes:      General: No scleral icterus.  HENT:      Head: Normocephalic.   Neck:      Thyroid: No thyromegaly.      Vascular: No JVD.      Lymphadenopathy: No cervical adenopathy.   Pulmonary:      Effort: Pulmonary effort is normal.      Breath sounds: Normal breath sounds. No wheezing. No rales.   Cardiovascular:      Normal rate. Regular rhythm.      No gallop.   Edema:     Peripheral edema absent.   Abdominal:      Palpations: Abdomen is soft.      Tenderness: There is no abdominal tenderness.   Musculoskeletal: Normal range of motion. Skin:     General: Skin is warm and dry.      Findings: No rash.   Neurological:      Mental Status: Alert and oriented to person, place, and time.           ECG 12 Lead    Date/Time: 11/9/2021 12:26 PM  Performed by: Reginald Barker MD  Authorized by: Reginald Barker MD   Comparison: compared with previous ECG   Similar to previous ECG  Rhythm: sinus rhythm  Other findings: non-specific ST-T wave changes    Clinical impression: abnormal EKG             Assessment:       Diagnosis Plan   1. History of cardiomyopathy     2. Essential hypertension     3. Malignant neoplasm of lower-outer quadrant of left breast of female, estrogen receptor positive (HCC)            Plan:       I think her blood pressure is a little bit high when increase her losartan to 100 mg a day I " also think we can probably stop the aspirin from her regimen and just keep her on long-term clopidogrel thoroughly I think with this chest pain to me it certainly she is at risk for coronary disease it does not sound typical for angina but when asked we will get a stress test and we will reecho her to make sure there is not been a change there if all that testing is okay_have her come back and see us in a year    As always, it has been a pleasure to participate in your patient's care.      Sincerely,       Reginald Barker MD

## 2021-12-07 ENCOUNTER — HOSPITAL ENCOUNTER (OUTPATIENT)
Dept: CARDIOLOGY | Facility: HOSPITAL | Age: 76
Discharge: HOME | End: 2021-12-07

## 2021-12-07 VITALS — HEIGHT: 63 IN | BODY MASS INDEX: 23.92 KG/M2 | WEIGHT: 135 LBS

## 2021-12-07 DIAGNOSIS — Z17.0 MALIGNANT NEOPLASM OF LOWER-OUTER QUADRANT OF LEFT BREAST OF FEMALE, ESTROGEN RECEPTOR POSITIVE (HCC): ICD-10-CM

## 2021-12-07 DIAGNOSIS — I10 ESSENTIAL HYPERTENSION: ICD-10-CM

## 2021-12-07 DIAGNOSIS — R07.89 CHEST PAIN, ATYPICAL: ICD-10-CM

## 2021-12-07 DIAGNOSIS — C50.512 MALIGNANT NEOPLASM OF LOWER-OUTER QUADRANT OF LEFT BREAST OF FEMALE, ESTROGEN RECEPTOR POSITIVE (HCC): ICD-10-CM

## 2021-12-07 DIAGNOSIS — Z86.79 HISTORY OF CARDIOMYOPATHY: ICD-10-CM

## 2021-12-07 LAB
BH CV NUCLEAR PRIOR STUDY: 2
BH CV REST NUCLEAR ISOTOPE DOSE: 10.6 MCI
BH CV STRESS BP STAGE 1: NORMAL
BH CV STRESS BP STAGE 2: NORMAL
BH CV STRESS DURATION MIN STAGE 1: 3
BH CV STRESS DURATION MIN STAGE 2: 3
BH CV STRESS DURATION SEC STAGE 1: 0
BH CV STRESS DURATION SEC STAGE 2: 0
BH CV STRESS GRADE STAGE 1: 10
BH CV STRESS GRADE STAGE 2: 12
BH CV STRESS HR STAGE 1: 102
BH CV STRESS HR STAGE 2: 130
BH CV STRESS METS STAGE 1: 5
BH CV STRESS METS STAGE 2: 7.5
BH CV STRESS NUCLEAR ISOTOPE DOSE: 34.2 MCI
BH CV STRESS PROTOCOL 1: NORMAL
BH CV STRESS RECOVERY BP: NORMAL MMHG
BH CV STRESS RECOVERY HR: 82 BPM
BH CV STRESS SPEED STAGE 1: 1.7
BH CV STRESS SPEED STAGE 2: 2.5
BH CV STRESS STAGE 1: 1
BH CV STRESS STAGE 2: 2
LV EF NUC BP: 47 %
MAXIMAL PREDICTED HEART RATE: 146 BPM
PERCENT MAX PREDICTED HR: 89.04 %
STRESS BASELINE BP: NORMAL MMHG
STRESS BASELINE HR: 62 BPM
STRESS PERCENT HR: 105 %
STRESS POST ESTIMATED WORKLOAD: 7 METS
STRESS POST EXERCISE DUR MIN: 6 MIN
STRESS POST EXERCISE DUR SEC: 0 SEC
STRESS POST PEAK BP: NORMAL MMHG
STRESS POST PEAK HR: 130 BPM
STRESS TARGET HR: 124 BPM

## 2021-12-07 PROCEDURE — 0 TECHNETIUM TETROFOSMIN KIT: Performed by: INTERNAL MEDICINE

## 2021-12-07 PROCEDURE — 93306 TTE W/DOPPLER COMPLETE: CPT | Performed by: INTERNAL MEDICINE

## 2021-12-07 PROCEDURE — 93017 CV STRESS TEST TRACING ONLY: CPT

## 2021-12-07 PROCEDURE — 25010000002 PERFLUTREN (DEFINITY) 8.476 MG IN SODIUM CHLORIDE (PF) 0.9 % 10 ML INJECTION: Performed by: INTERNAL MEDICINE

## 2021-12-07 PROCEDURE — 93356 MYOCRD STRAIN IMG SPCKL TRCK: CPT | Performed by: INTERNAL MEDICINE

## 2021-12-07 PROCEDURE — 93306 TTE W/DOPPLER COMPLETE: CPT

## 2021-12-07 PROCEDURE — 78452 HT MUSCLE IMAGE SPECT MULT: CPT

## 2021-12-07 PROCEDURE — 93356 MYOCRD STRAIN IMG SPCKL TRCK: CPT

## 2021-12-07 PROCEDURE — 78452 HT MUSCLE IMAGE SPECT MULT: CPT | Performed by: INTERNAL MEDICINE

## 2021-12-07 PROCEDURE — 93018 CV STRESS TEST I&R ONLY: CPT | Performed by: INTERNAL MEDICINE

## 2021-12-07 PROCEDURE — 93016 CV STRESS TEST SUPVJ ONLY: CPT | Performed by: INTERNAL MEDICINE

## 2021-12-07 PROCEDURE — A9502 TC99M TETROFOSMIN: HCPCS | Performed by: INTERNAL MEDICINE

## 2021-12-07 RX ADMIN — TETROFOSMIN 1 DOSE: 1.38 INJECTION, POWDER, LYOPHILIZED, FOR SOLUTION INTRAVENOUS at 12:35

## 2021-12-07 RX ADMIN — PERFLUTREN 1.5 ML: 6.52 INJECTION, SUSPENSION INTRAVENOUS at 11:43

## 2021-12-07 RX ADMIN — TETROFOSMIN 1 DOSE: 1.38 INJECTION, POWDER, LYOPHILIZED, FOR SOLUTION INTRAVENOUS at 11:53

## 2021-12-08 LAB
ASCENDING AORTA: 3.3 CM
BH CV ECHO MEAS - ACS: 1.8 CM
BH CV ECHO MEAS - AO MAX PG: 3.5 MMHG
BH CV ECHO MEAS - AO MEAN PG: 2.2 MMHG
BH CV ECHO MEAS - AO ROOT AREA: 7.4 CM^2
BH CV ECHO MEAS - AO ROOT DIAM: 3.1 CM
BH CV ECHO MEAS - AO V2 MAX: 93.8 CM/SEC
BH CV ECHO MEAS - AO V2 MEAN: 71.3 CM/SEC
BH CV ECHO MEAS - AO V2 VTI: 24.1 CM
BH CV ECHO MEAS - ASC AORTA: 3.3 CM
BH CV ECHO MEAS - EDV(MOD-SP2): 93 ML
BH CV ECHO MEAS - EDV(MOD-SP4): 128 ML
BH CV ECHO MEAS - EDV(TEICH): 124 ML
BH CV ECHO MEAS - EF(CUBED): 65.8 %
BH CV ECHO MEAS - EF(MOD-BP): 59.6 %
BH CV ECHO MEAS - EF(MOD-SP2): 63.4 %
BH CV ECHO MEAS - EF(MOD-SP4): 57 %
BH CV ECHO MEAS - EF(TEICH): 57 %
BH CV ECHO MEAS - ESV(MOD-SP2): 34 ML
BH CV ECHO MEAS - ESV(MOD-SP4): 55 ML
BH CV ECHO MEAS - ESV(TEICH): 53.3 ML
BH CV ECHO MEAS - FS: 30.1 %
BH CV ECHO MEAS - IVS/LVPW: 1.1
BH CV ECHO MEAS - IVSD: 1.1 CM
BH CV ECHO MEAS - LAT PEAK E' VEL: 8.9 CM/SEC
BH CV ECHO MEAS - LV MASS(C)D: 212.1 GRAMS
BH CV ECHO MEAS - LVIDD: 5.1 CM
BH CV ECHO MEAS - LVIDS: 3.6 CM
BH CV ECHO MEAS - LVLD AP2: 7.1 CM
BH CV ECHO MEAS - LVLD AP4: 7.6 CM
BH CV ECHO MEAS - LVLS AP2: 5.1 CM
BH CV ECHO MEAS - LVLS AP4: 5.7 CM
BH CV ECHO MEAS - LVOT AREA (M): 3.1 CM^2
BH CV ECHO MEAS - LVOT AREA: 3.1 CM^2
BH CV ECHO MEAS - LVOT DIAM: 2 CM
BH CV ECHO MEAS - LVPWD: 1.1 CM
BH CV ECHO MEAS - MED PEAK E' VEL: 6.1 CM/SEC
BH CV ECHO MEAS - MR MAX PG: 22.3 MMHG
BH CV ECHO MEAS - MR MAX VEL: 236.3 CM/SEC
BH CV ECHO MEAS - MV A DUR: 0.12 SEC
BH CV ECHO MEAS - MV A MAX VEL: 76.7 CM/SEC
BH CV ECHO MEAS - MV DEC SLOPE: 215.2 CM/SEC^2
BH CV ECHO MEAS - MV DEC TIME: 0.16 SEC
BH CV ECHO MEAS - MV E MAX VEL: 50.6 CM/SEC
BH CV ECHO MEAS - MV E/A: 0.66
BH CV ECHO MEAS - MV MAX PG: 3.9 MMHG
BH CV ECHO MEAS - MV MEAN PG: 1.4 MMHG
BH CV ECHO MEAS - MV P1/2T MAX VEL: 66.9 CM/SEC
BH CV ECHO MEAS - MV P1/2T: 91 MSEC
BH CV ECHO MEAS - MV V2 MAX: 98.2 CM/SEC
BH CV ECHO MEAS - MV V2 MEAN: 55 CM/SEC
BH CV ECHO MEAS - MV V2 VTI: 25.7 CM
BH CV ECHO MEAS - MVA P1/2T LCG: 3.3 CM^2
BH CV ECHO MEAS - MVA(P1/2T): 2.4 CM^2
BH CV ECHO MEAS - PA ACC TIME: 0.17 SEC
BH CV ECHO MEAS - PA MAX PG (FULL): 1.8 MMHG
BH CV ECHO MEAS - PA MAX PG: 3.1 MMHG
BH CV ECHO MEAS - PA PR(ACCEL): 4.1 MMHG
BH CV ECHO MEAS - PA V2 MAX: 87.7 CM/SEC
BH CV ECHO MEAS - PI END-D VEL: 106.9 CM/SEC
BH CV ECHO MEAS - PULM A REVS DUR: 0.09 SEC
BH CV ECHO MEAS - PULM A REVS VEL: 30.4 CM/SEC
BH CV ECHO MEAS - PULM DIAS VEL: 49.3 CM/SEC
BH CV ECHO MEAS - PULM S/D: 1.3
BH CV ECHO MEAS - PULM SYS VEL: 65.1 CM/SEC
BH CV ECHO MEAS - PVA(V,A): 1.8 CM^2
BH CV ECHO MEAS - PVA(V,D): 1.8 CM^2
BH CV ECHO MEAS - RV MAX PG: 1.2 MMHG
BH CV ECHO MEAS - RV MEAN PG: 0.77 MMHG
BH CV ECHO MEAS - RV V1 MAX: 55.7 CM/SEC
BH CV ECHO MEAS - RV V1 MEAN: 42.2 CM/SEC
BH CV ECHO MEAS - RV V1 VTI: 13.6 CM
BH CV ECHO MEAS - RVOT AREA: 2.8 CM^2
BH CV ECHO MEAS - RVOT DIAM: 1.9 CM
BH CV ECHO MEAS - SV(AO): 179.5 ML
BH CV ECHO MEAS - SV(CUBED): 87.5 ML
BH CV ECHO MEAS - SV(MOD-SP2): 59 ML
BH CV ECHO MEAS - SV(MOD-SP4): 73 ML
BH CV ECHO MEAS - SV(RVOT): 38.4 ML
BH CV ECHO MEAS - SV(TEICH): 70.7 ML
BH CV ECHO MEAS - TAPSE (>1.6): 1.5 CM
BH CV ECHO MEAS - TR MAX VEL: 206.3 CM/SEC
BH CV ECHO MEASUREMENTS AVERAGE E/E' RATIO: 6.75
BH CV XLRA - RV BASE: 3.1 CM
BH CV XLRA - RV LENGTH: 6.4 CM
BH CV XLRA - RV MID: 2.5 CM
BH CV XLRA - TDI S': 11.6 CM/SEC
LEFT ATRIUM VOLUME INDEX: 49 ML/M2
MAXIMAL PREDICTED HEART RATE: 146 BPM
SINUS: 3.1 CM
STJ: 2.8 CM
STRESS TARGET HR: 124 BPM

## 2021-12-13 ENCOUNTER — TELEPHONE (OUTPATIENT)
Dept: CARDIOLOGY | Facility: CLINIC | Age: 76
End: 2021-12-13

## 2021-12-13 DIAGNOSIS — F51.01 PRIMARY INSOMNIA: ICD-10-CM

## 2021-12-13 DIAGNOSIS — E03.9 HYPOTHYROIDISM (ACQUIRED): ICD-10-CM

## 2021-12-13 RX ORDER — LEVOTHYROXINE SODIUM 0.07 MG/1
75 TABLET ORAL DAILY
Qty: 110 TABLET | Refills: 0 | Status: SHIPPED | OUTPATIENT
Start: 2021-12-13 | End: 2022-06-13 | Stop reason: SDUPTHER

## 2021-12-13 RX ORDER — AMITRIPTYLINE HYDROCHLORIDE 25 MG/1
25 TABLET, FILM COATED ORAL NIGHTLY
Qty: 90 TABLET | Refills: 0 | Status: SHIPPED | OUTPATIENT
Start: 2021-12-13 | End: 2022-06-13 | Stop reason: SDUPTHER

## 2021-12-13 NOTE — TELEPHONE ENCOUNTER
Rx Refill Note  Requested Prescriptions     Pending Prescriptions Disp Refills   • levothyroxine (Euthyrox) 75 MCG tablet 110 tablet 1     Sig: Take 1 tablet by mouth Daily. Take one po qd except on Sunday take 2.   • amitriptyline (ELAVIL) 25 MG tablet 90 tablet 1     Sig: Take 1 tablet by mouth Every Night.      Last office visit with prescribing clinician: 8/24/2021      Next office visit with prescribing clinician: Visit date not found            Hernandez Wallace MA  12/13/21, 14:21 EST

## 2021-12-13 NOTE — TELEPHONE ENCOUNTER
Caller: Carlotta Pires    Relationship: Self    Best call back number: [phone]    Requested Prescriptions:   Requested Prescriptions     Pending Prescriptions Disp Refills   • levothyroxine (Euthyrox) 75 MCG tablet 110 tablet 1     Sig: Take 1 tablet by mouth Daily. Take one po qd except on Sunday take 2.   • amitriptyline (ELAVIL) 25 MG tablet 90 tablet 1     Sig: Take 1 tablet by mouth Every Night.        Pharmacy where request should be sent:  [facility] [address] [phone] [facility] [phone] [facility]       Does the patient have less than a 3 day supply:  [] Yes  [x] No    Dakota Davis Rep   12/13/21 11:48 EST

## 2021-12-15 ENCOUNTER — TELEPHONE (OUTPATIENT)
Dept: GASTROENTEROLOGY | Facility: CLINIC | Age: 76
End: 2021-12-15

## 2021-12-15 NOTE — TELEPHONE ENCOUNTER
----- Message from Dakota Means sent at 12/15/2021  2:48 PM EST -----  Regarding: nexium  Contact: [phone]  Pt would like Nexium called in.     [facility] [place], [place] - [address] [place] [address] - [phone]  - [phone] [address], [facility] [address]   Phone:  [phone]  Fax:  [phone]

## 2021-12-15 NOTE — TELEPHONE ENCOUNTER
"See o/v note of 5/10/21: \" GERD seems much better on Dexilant 60 mg daily.  Will stay on the Dexilant for 3 months and then in 3 months if she still doing well we will consider taking her down to Dexilant 30 mg daily.  Continue GERD precautions.  Constipation is still an issue.  I really want her taking the daily Metamucil every day and I think she would benefit from increasing the Senokot to every 2 to 3 days instead of waiting till she is a week behind and then trying to take the Senokot.  She needs to increase her water and her exercise as tolerated.  Patient to call the office next week with an update.  Patient to follow-up with me in 3 months.  Patient is agreeable to the plan.\"    Call to pt.  [facility] insurance requires  Her to use [facility] mail order.  [facility] did not remember above instructions.  Has run out of dexilant 60 mg - so taking nexium 40 mg 1 tab po daily that had on hand.       has been under a lot of stress - daughter in hospice.   reflux well controlled on dexilant, but needs something - so requested nexium..     Message to BISMARK Casas.   "

## 2021-12-16 RX ORDER — DEXLANSOPRAZOLE 60 MG/1
60 CAPSULE, DELAYED RELEASE ORAL DAILY
Qty: 90 CAPSULE | Refills: 3 | Status: SHIPPED | OUTPATIENT
Start: 2021-12-16 | End: 2023-02-13 | Stop reason: SDUPTHER

## 2021-12-16 NOTE — TELEPHONE ENCOUNTER
I will refill the Dexilant 60 mg daily to her [facility] mail order.  She has to be careful which PPI she takes because she is on Plavix.  I gave her enough for 1 year.  Thanks

## 2022-02-09 RX ORDER — ATORVASTATIN CALCIUM 10 MG/1
10 TABLET, FILM COATED ORAL DAILY
Qty: 90 TABLET | Refills: 3 | Status: SHIPPED | OUTPATIENT
Start: 2022-02-09 | End: 2022-05-10 | Stop reason: SDUPTHER

## 2022-02-09 RX ORDER — HYDROCHLOROTHIAZIDE 12.5 MG/1
12.5 TABLET ORAL DAILY
Qty: 90 TABLET | Refills: 3 | Status: SHIPPED | OUTPATIENT
Start: 2022-02-09 | End: 2022-04-20

## 2022-02-09 RX ORDER — LOSARTAN POTASSIUM 100 MG/1
100 TABLET ORAL DAILY
Qty: 90 TABLET | Refills: 3 | Status: SHIPPED | OUTPATIENT
Start: 2022-02-09 | End: 2023-03-06

## 2022-02-09 RX ORDER — METOPROLOL SUCCINATE 50 MG/1
50 TABLET, EXTENDED RELEASE ORAL 2 TIMES DAILY
Qty: 180 TABLET | Refills: 3 | Status: SHIPPED | OUTPATIENT
Start: 2022-02-09 | End: 2023-03-27 | Stop reason: SDUPTHER

## 2022-02-10 ENCOUNTER — APPOINTMENT (OUTPATIENT)
Dept: WOMENS IMAGING | Facility: HOSPITAL | Age: 77
End: 2022-02-10

## 2022-02-10 PROCEDURE — 77067 SCR MAMMO BI INCL CAD: CPT | Performed by: RADIOLOGY

## 2022-02-10 PROCEDURE — 77063 BREAST TOMOSYNTHESIS BI: CPT | Performed by: RADIOLOGY

## 2022-04-20 ENCOUNTER — OFFICE VISIT (OUTPATIENT)
Dept: CARDIOLOGY | Facility: CLINIC | Age: 77
End: 2022-04-20

## 2022-04-20 VITALS
SYSTOLIC BLOOD PRESSURE: 142 MMHG | HEIGHT: 62 IN | BODY MASS INDEX: 25.54 KG/M2 | DIASTOLIC BLOOD PRESSURE: 76 MMHG | WEIGHT: 138.8 LBS | HEART RATE: 63 BPM

## 2022-04-20 DIAGNOSIS — Z86.79 HISTORY OF CARDIOMYOPATHY: Primary | ICD-10-CM

## 2022-04-20 DIAGNOSIS — I10 ESSENTIAL HYPERTENSION: ICD-10-CM

## 2022-04-20 DIAGNOSIS — C50.512 MALIGNANT NEOPLASM OF LOWER-OUTER QUADRANT OF LEFT BREAST OF FEMALE, ESTROGEN RECEPTOR POSITIVE: ICD-10-CM

## 2022-04-20 DIAGNOSIS — Z17.0 MALIGNANT NEOPLASM OF LOWER-OUTER QUADRANT OF LEFT BREAST OF FEMALE, ESTROGEN RECEPTOR POSITIVE: ICD-10-CM

## 2022-04-20 PROCEDURE — 93000 ELECTROCARDIOGRAM COMPLETE: CPT | Performed by: INTERNAL MEDICINE

## 2022-04-20 PROCEDURE — 99214 OFFICE O/P EST MOD 30 MIN: CPT | Performed by: INTERNAL MEDICINE

## 2022-04-20 RX ORDER — CHLORTHALIDONE 25 MG/1
25 TABLET ORAL DAILY
Qty: 90 TABLET | Refills: 3 | Status: SHIPPED | OUTPATIENT
Start: 2022-04-20

## 2022-04-20 NOTE — PROGRESS NOTES
Date of Office Visit: 22  Encounter Provider: Reginald Barker MD  Place of Service: [facility] CARDIOLOGY  Patient Name: Carlotta Pires  :1945  [id number]    Chief Complaint   Patient presents with   • Cardiomyopathy   :     HPI: Carlotta Pires is a 77 y.o. female  a past cardiac history significant for Adriamycin induced cardiomyopathy.    She was evaluated in December and had a normal echocardiogram with normal strain and a normal stress test.  I should asked that in the past when she had her cardiomyopathy they did a cath and that was reportedly normal.    She is doing well she is not having PND orthopnea edema occasionally she will have some palpitations that is been a longstanding thing no chest discomfort no syncope she is under a lot of stress and her blood pressures have been high in the evening she has a mentally challenged daughter that we thought was going to pass away but is actually doing pretty well    Past Medical History:   Diagnosis Date   • Allergic rhinitis    • Arthritis    • Cancer ([facility])     Left breast cancer   • CHF (congestive heart failure) ([facility])    • Cough    • COVID-19 2020   • Degenerative arthritis of thumb    • GERD without esophagitis    • H/O TIA (transient ischemic attack) and stroke    • History of bone density study    • Lumbar radiculopathy    • Osteoporosis    • Palpitations    • Peptic ulceration    • Personal history of malignant neoplasm of breast    • Personal history of malignant neoplasm of breast    • Sciatica    • Stroke ([facility])     CVA--History of storke   • Trigeminal neuralgia        Past Surgical History:   Procedure Laterality Date   • BREAST BIOPSY Left    • CATARACT EXTRACTION, BILATERAL     • COLONOSCOPY  2012    Adolph Martinez MD   • COLONOSCOPY N/A 2018    Procedure: COLONOSCOPY to cecum;  Surgeon: Adolph Martinez MD;  Location: [facility] ENDOSCOPY;  Service: Gastroenterology   • COLONOSCOPY N/A  1/27/2021    Procedure: COLONOSCOPY TO CECUM AND TERMINAL ILEUM;  Surgeon: Emelia Wilcox MD;  Location:  [facility];  Service: Gastroenterology;  Laterality: N/A;  RECTAL BLEEDING  --DIVERTICULOSIS, HEMORRHOIDS, TORTUOUS COLON   • ENDOSCOPY N/A 11/13/2018    Procedure: ESOPHAGOGASTRODUODENOSCOPY with bx;  Surgeon: Adolph Martinez MD;  Location:  [facility];  Service: Gastroenterology   • ENDOSCOPY N/A 1/27/2021    Procedure: ESOPHAGOGASTRODUODENOSCOPY WITH COLD BX;  Surgeon: Emelia Wilcox MD;  Location:  [facility];  Service: Gastroenterology;  Laterality: N/A;  GERD  --GASTRITIS, HIATAL HERNIA   • EYE SURGERY     • HYSTERECTOMY      At age 31-Not due to cancer   • MASTECTOMY Left 06/30/2011    Dr. Kaitlyn Luna   • TUBAL ABDOMINAL LIGATION  1974   • UPPER GASTROINTESTINAL ENDOSCOPY  09/28/2012    Adolph Martinez MD       Social History     Socioeconomic History   • Marital status:      Spouse name: Jack   • Number of children: 2   • Years of education: High School   Tobacco Use   • Smoking status: Never Smoker   • Smokeless tobacco: Never Used   • Tobacco comment: CAFFEINE USE: 4-5 CUPS 1/2 & 1/2 COFFEE DAILY   Vaping Use   • Vaping Use: Never used   Substance and Sexual Activity   • Alcohol use: Not Currently     Comment: rare-7 or less drinks per week   • Drug use: No   • Sexual activity: Defer       Family History   Problem Relation Age of Onset   • Cancer Mother 68        head and neck   • Stroke Mother    • Heart disease Mother    • Gallbladder disease Mother    • Throat cancer Mother 68   • Breast cancer Sister 57   • Heart disease Sister    • Hypertension Sister    • Cancer Sister    • Gallbladder disease Sister    • Diabetes Sister    • Cancer Brother         head and neck   • Heart disease Brother    • Hypertension Brother    • Gallbladder disease Brother    • Lung cancer Brother 67   • Throat cancer Brother 67   • Hypertension Father    • Gallbladder disease Father    •  Emphysema Father    • Heart disease Father         Enlarged heart   • Cancer Maternal Aunt    • Cancer Maternal Uncle    • Colon cancer Maternal Uncle    • Heart attack Brother    • Alcohol abuse Brother    • Hypertension Sister    • Heart disease Sister    • Hypertension Sister    • Heart disease Sister    • Hypertension Sister    • No Known Problems Other        Review of Systems   Constitutional: Negative for decreased appetite, fever, malaise/fatigue and weight loss.   HENT: Negative for nosebleeds.    Eyes: Negative for double vision.   Cardiovascular: Negative for chest pain, claudication, cyanosis, dyspnea on exertion, irregular heartbeat, leg swelling, near-syncope, orthopnea, palpitations, paroxysmal nocturnal dyspnea and syncope.   Respiratory: Negative for cough, hemoptysis and shortness of breath.    Hematologic/Lymphatic: Negative for bleeding problem.   Skin: Negative for rash.   Musculoskeletal: Negative for falls and myalgias.   Gastrointestinal: Negative for hematochezia, jaundice, melena, nausea and vomiting.   Genitourinary: Negative for hematuria.   Neurological: Negative for dizziness and seizures.   Psychiatric/Behavioral: Negative for altered mental status and memory loss.       Allergies   Allergen Reactions   • Lisinopril Cough   • Contrast Dye Itching and Rash     IVP Dye   • Tartrazine Hives         Current Outpatient Medications:   •  amitriptyline (ELAVIL) 25 MG tablet, Take 1 tablet by mouth Every Night., Disp: 90 tablet, Rfl: 0  •  atorvastatin (LIPITOR) 10 MG tablet, Take 1 tablet by mouth Daily., Disp: 90 tablet, Rfl: 3  •  clopidogrel (PLAVIX) 75 MG tablet, Take 1 tablet by mouth Daily., Disp: 90 tablet, Rfl: 4  •  dexlansoprazole (Dexilant) 60 MG capsule, Take 1 capsule by mouth Daily., Disp: 90 capsule, Rfl: 3  •  diphenhydrAMINE (BENADRYL) 50 MG capsule, Take 1 hour prior to CT scan, Disp: 1 capsule, Rfl: 0  •  hydroCHLOROthiazide (HYDRODIURIL) 12.5 MG tablet, Take 1 tablet by  "mouth Daily., Disp: 90 tablet, Rfl: 3  •  levothyroxine (Euthyrox) 75 MCG tablet, Take 1 tablet by mouth Daily. Take one po qd except on Sunday take 2., Disp: 110 tablet, Rfl: 0  •  losartan (COZAAR) 100 MG tablet, Take 1 tablet by mouth Daily., Disp: 90 tablet, Rfl: 3  •  Melatonin 10 MG capsule, Take  by mouth., Disp: , Rfl:   •  metoprolol succinate XL (TOPROL-XL) 50 MG 24 hr tablet, Take 1 tablet by mouth 2 (Two) Times a Day., Disp: 180 tablet, Rfl: 3  •  aspirin 81 MG EC tablet, Take 81 mg by mouth Daily., Disp: , Rfl:       Objective:     Vitals:    04/20/22 1038   BP: 142/76   Pulse: 63   Weight: 63 kg (138 lb 12.8 oz)   Height: 157.5 cm (62\")     Body mass index is 25.39 kg/m².    Constitutional:       Appearance: Well-developed.   Eyes:      General: No scleral icterus.  HENT:      Head: Normocephalic.   Neck:      Thyroid: No thyromegaly.      Vascular: No JVD.      Lymphadenopathy: No cervical adenopathy.   Pulmonary:      Effort: Pulmonary effort is normal.      Breath sounds: Normal breath sounds. No wheezing. No rales.   Cardiovascular:      Normal rate. Regular rhythm.      No gallop.   Edema:     Peripheral edema absent.   Abdominal:      Palpations: Abdomen is soft.      Tenderness: There is no abdominal tenderness.   Musculoskeletal: Normal range of motion. Skin:     General: Skin is warm and dry.      Findings: No rash.   Neurological:      Mental Status: Alert and oriented to person, place, and time.           ECG 12 Lead    Date/Time: 4/20/2022 11:11 AM  Performed by: Reginald Barker MD  Authorized by: Reginald Barker MD   Comparison: compared with previous ECG   Similar to previous ECG  Rhythm: sinus rhythm  Ectopy: unifocal PVCs  Other findings: non-specific ST-T wave changes    Clinical impression: abnormal EKG             Assessment:       Diagnosis Plan   1. History of cardiomyopathy     2. Essential hypertension     3. Malignant neoplasm of lower-outer quadrant of left breast of female, " estrogen receptor positive (HCC)            Plan:       I think she is doing well her LV was normal when we last looked at it she does not have coronary disease her blood pressure is too high I am going to switch her from hydrochlorothiazide over to chlorthalidone I will have her come back and see us in a year sooner if she has trouble    As always, it has been a pleasure to participate in your patient's care.      Sincerely,       Reginald Barker MD

## 2022-04-21 ENCOUNTER — OFFICE VISIT (OUTPATIENT)
Dept: ONCOLOGY | Facility: CLINIC | Age: 77
End: 2022-04-21

## 2022-04-21 ENCOUNTER — LAB (OUTPATIENT)
Dept: OTHER | Facility: HOSPITAL | Age: 77
End: 2022-04-21

## 2022-04-21 VITALS
DIASTOLIC BLOOD PRESSURE: 83 MMHG | OXYGEN SATURATION: 98 % | HEART RATE: 65 BPM | BODY MASS INDEX: 25.43 KG/M2 | TEMPERATURE: 97.5 F | RESPIRATION RATE: 17 BRPM | SYSTOLIC BLOOD PRESSURE: 151 MMHG | HEIGHT: 62 IN | WEIGHT: 138.2 LBS

## 2022-04-21 DIAGNOSIS — C50.512 MALIGNANT NEOPLASM OF LOWER-OUTER QUADRANT OF LEFT BREAST OF FEMALE, ESTROGEN RECEPTOR POSITIVE: Primary | ICD-10-CM

## 2022-04-21 DIAGNOSIS — Z17.0 MALIGNANT NEOPLASM OF LOWER-OUTER QUADRANT OF LEFT BREAST OF FEMALE, ESTROGEN RECEPTOR POSITIVE: ICD-10-CM

## 2022-04-21 DIAGNOSIS — Z17.0 MALIGNANT NEOPLASM OF LOWER-OUTER QUADRANT OF LEFT BREAST OF FEMALE, ESTROGEN RECEPTOR POSITIVE: Primary | ICD-10-CM

## 2022-04-21 DIAGNOSIS — C50.512 MALIGNANT NEOPLASM OF LOWER-OUTER QUADRANT OF LEFT BREAST OF FEMALE, ESTROGEN RECEPTOR POSITIVE: ICD-10-CM

## 2022-04-21 LAB
BASOPHILS # BLD AUTO: 0.04 10*3/MM3 (ref 0–0.2)
BASOPHILS NFR BLD AUTO: 0.6 % (ref 0–1.5)
DEPRECATED RDW RBC AUTO: 40.7 FL (ref 37–54)
EOSINOPHIL # BLD AUTO: 0.2 10*3/MM3 (ref 0–0.4)
EOSINOPHIL NFR BLD AUTO: 3.1 % (ref 0.3–6.2)
ERYTHROCYTE [DISTWIDTH] IN BLOOD BY AUTOMATED COUNT: 12.4 % (ref 12.3–15.4)
HCT VFR BLD AUTO: 35.8 % (ref 34–46.6)
HGB BLD-MCNC: 12.7 G/DL (ref 12–15.9)
IMM GRANULOCYTES # BLD AUTO: 0.04 10*3/MM3 (ref 0–0.05)
IMM GRANULOCYTES NFR BLD AUTO: 0.6 % (ref 0–0.5)
LYMPHOCYTES # BLD AUTO: 1.61 10*3/MM3 (ref 0.7–3.1)
LYMPHOCYTES NFR BLD AUTO: 25.3 % (ref 19.6–45.3)
MCH RBC QN AUTO: 31.6 PG (ref 26.6–33)
MCHC RBC AUTO-ENTMCNC: 35.5 G/DL (ref 31.5–35.7)
MCV RBC AUTO: 89.1 FL (ref 79–97)
MONOCYTES # BLD AUTO: 0.4 10*3/MM3 (ref 0.1–0.9)
MONOCYTES NFR BLD AUTO: 6.3 % (ref 5–12)
NEUTROPHILS NFR BLD AUTO: 4.07 10*3/MM3 (ref 1.7–7)
NEUTROPHILS NFR BLD AUTO: 64.1 % (ref 42.7–76)
NRBC BLD AUTO-RTO: 0 /100 WBC (ref 0–0.2)
PLATELET # BLD AUTO: 249 10*3/MM3 (ref 140–450)
PMV BLD AUTO: 9.6 FL (ref 6–12)
RBC # BLD AUTO: 4.02 10*6/MM3 (ref 3.77–5.28)
WBC NRBC COR # BLD: 6.36 10*3/MM3 (ref 3.4–10.8)

## 2022-04-21 PROCEDURE — 36415 COLL VENOUS BLD VENIPUNCTURE: CPT

## 2022-04-21 PROCEDURE — 99215 OFFICE O/P EST HI 40 MIN: CPT | Performed by: INTERNAL MEDICINE

## 2022-04-21 PROCEDURE — 85025 COMPLETE CBC W/AUTO DIFF WBC: CPT | Performed by: INTERNAL MEDICINE

## 2022-04-21 NOTE — PROGRESS NOTES
Subjective .     REASONS FOR FOLLOWUP:  Breast cancer    HISTORY OF PRESENT ILLNESS:  The patient is a 77 y.o. year old female  who is here for follow-up with the above-mentioned history.    Has been a little more depressed recently.  No significant SOA, nausea, pain    Past Medical History:   Diagnosis Date   • Allergic rhinitis    • Arthritis    • Cancer (HCC)     Left breast cancer   • CHF (congestive heart failure) (HCC)    • Cough    • COVID-19 11/2020   • Degenerative arthritis of thumb    • GERD without esophagitis    • H/O TIA (transient ischemic attack) and stroke 2005   • History of bone density study    • Lumbar radiculopathy    • Osteoporosis    • Palpitations    • Peptic ulceration    • Personal history of malignant neoplasm of breast    • Personal history of malignant neoplasm of breast    • Sciatica    • Stroke (HCC) 2005    CVA--History of storke   • Trigeminal neuralgia      Past Surgical History:   Procedure Laterality Date   • BREAST BIOPSY Left 2011   • CATARACT EXTRACTION, BILATERAL     • COLONOSCOPY  09/28/2012    Adolph Martinez MD   • COLONOSCOPY N/A 11/13/2018    Procedure: COLONOSCOPY to cecum;  Surgeon: Adolph Martinez MD;  Location: [facility] ENDOSCOPY;  Service: Gastroenterology   • COLONOSCOPY N/A 1/27/2021    Procedure: COLONOSCOPY TO CECUM AND TERMINAL ILEUM;  Surgeon: Emelia Wilcox MD;  Location: [facility] ENDOSCOPY;  Service: Gastroenterology;  Laterality: N/A;  RECTAL BLEEDING  --DIVERTICULOSIS, HEMORRHOIDS, TORTUOUS COLON   • ENDOSCOPY N/A 11/13/2018    Procedure: ESOPHAGOGASTRODUODENOSCOPY with bx;  Surgeon: Adolph Martinze MD;  Location: [facility] ENDOSCOPY;  Service: Gastroenterology   • ENDOSCOPY N/A 1/27/2021    Procedure: ESOPHAGOGASTRODUODENOSCOPY WITH COLD BX;  Surgeon: Emelia Wilcox MD;  Location: [facility] ENDOSCOPY;  Service: Gastroenterology;  Laterality: N/A;  GERD  --GASTRITIS, HIATAL HERNIA   • EYE SURGERY     • HYSTERECTOMY      At age 31-Not due to cancer   •  MASTECTOMY Left 06/30/2011    Dr. Kaitlyn Luna   • TUBAL ABDOMINAL LIGATION  1974   • UPPER GASTROINTESTINAL ENDOSCOPY  09/28/2012    Adolph Martinez MD       HEMATOLOGIC/ONCOLOGIC HISTORY:  (History from previous dates can be found in the separate document.)    MEDICATIONS    Current Outpatient Medications:   •  amitriptyline (ELAVIL) 25 MG tablet, Take 1 tablet by mouth Every Night., Disp: 90 tablet, Rfl: 0  •  aspirin 81 MG EC tablet, Take 81 mg by mouth Daily., Disp: , Rfl:   •  atorvastatin (LIPITOR) 10 MG tablet, Take 1 tablet by mouth Daily., Disp: 90 tablet, Rfl: 3  •  chlorthalidone (HYGROTON) 25 MG tablet, Take 1 tablet by mouth Daily., Disp: 90 tablet, Rfl: 3  •  clopidogrel (PLAVIX) 75 MG tablet, Take 1 tablet by mouth Daily., Disp: 90 tablet, Rfl: 4  •  dexlansoprazole (Dexilant) 60 MG capsule, Take 1 capsule by mouth Daily., Disp: 90 capsule, Rfl: 3  •  diphenhydrAMINE (BENADRYL) 50 MG capsule, Take 1 hour prior to CT scan, Disp: 1 capsule, Rfl: 0  •  levothyroxine (Euthyrox) 75 MCG tablet, Take 1 tablet by mouth Daily. Take one po qd except on Sunday take 2., Disp: 110 tablet, Rfl: 0  •  losartan (COZAAR) 100 MG tablet, Take 1 tablet by mouth Daily., Disp: 90 tablet, Rfl: 3  •  Melatonin 10 MG capsule, Take  by mouth., Disp: , Rfl:   •  metoprolol succinate XL (TOPROL-XL) 50 MG 24 hr tablet, Take 1 tablet by mouth 2 (Two) Times a Day., Disp: 180 tablet, Rfl: 3    ALLERGIES:     Allergies   Allergen Reactions   • Lisinopril Cough   • Contrast Dye Itching and Rash     IVP Dye   • Tartrazine Hives       SOCIAL HISTORY:       Social History     Socioeconomic History   • Marital status:      Spouse name: Jack   • Number of children: 2   • Years of education: High School   Tobacco Use   • Smoking status: Never Smoker   • Smokeless tobacco: Never Used   • Tobacco comment: CAFFEINE USE: 4-5 CUPS 1/2 & 1/2 COFFEE DAILY   Vaping Use   • Vaping Use: Never used   Substance and Sexual Activity   • Alcohol  "use: Not Currently     Comment: rare-7 or less drinks per week   • Drug use: No   • Sexual activity: Defer         FAMILY HISTORY:  Family History   Problem Relation Age of Onset   • Cancer Mother 68        head and neck   • Stroke Mother    • Heart disease Mother    • Gallbladder disease Mother    • Throat cancer Mother 68   • Breast cancer Sister 57   • Heart disease Sister    • Hypertension Sister    • Cancer Sister    • Gallbladder disease Sister    • Diabetes Sister    • Cancer Brother         head and neck   • Heart disease Brother    • Hypertension Brother    • Gallbladder disease Brother    • Lung cancer Brother 67   • Throat cancer Brother 67   • Hypertension Father    • Gallbladder disease Father    • Emphysema Father    • Heart disease Father         Enlarged heart   • Cancer Maternal Aunt    • Cancer Maternal Uncle    • Colon cancer Maternal Uncle    • Heart attack Brother    • Alcohol abuse Brother    • Hypertension Sister    • Heart disease Sister    • Hypertension Sister    • Heart disease Sister    • Hypertension Sister    • No Known Problems Other        REVIEW OF SYSTEMS:  Review of Systems   Constitutional: Negative for activity change.   HENT: Negative for nosebleeds and trouble swallowing.    Respiratory: Negative for shortness of breath and wheezing.    Cardiovascular: Negative for chest pain and palpitations.   Gastrointestinal: Negative for constipation, diarrhea and nausea.   Genitourinary: Negative for dysuria and hematuria.   Musculoskeletal: Negative for arthralgias and myalgias.   Skin: Negative for rash and wound.   Neurological: Negative for seizures and syncope.   Hematological: Negative for adenopathy. Does not bruise/bleed easily.   Psychiatric/Behavioral: Negative for confusion.           Objective    Vitals:    04/21/22 1408   BP: 151/83   Pulse: 65   Resp: 17   Temp: 97.5 °F (36.4 °C)   TempSrc: Temporal   SpO2: 98%   Weight: 62.7 kg (138 lb 3.2 oz)   Height: 157.5 cm (62.01\") "   PainSc: 0-No pain     Current Status 4/21/2022   ECOG score 0      PHYSICAL EXAM:        CONSTITUTIONAL:  Vital signs reviewed.  No distress, looks comfortable.  EYES:  Conjunctiva and lids unremarkable.  PERRLA  EARS,NOSE,MOUTH,THROAT:  Ears and nose appear unremarkable.  Lips, teeth, gums appear unremarkable.  RESPIRATORY:  Normal respiratory effort.  Lungs clear to auscultation bilaterally.  CARDIOVASCULAR:  Normal S1, S2.  No murmurs rubs or gallops.  No significant lower extremity edema.  GASTROINTESTINAL: Abdomen appears unremarkable.  Nontender.  No hepatomegaly.  No splenomegaly.  LYMPHATIC:  No cervical, supraclavicular, axillary lymphadenopathy.  SKIN:  Warm.  No rashes.  PSYCHIATRIC:  Normal judgment and insight.  Normal mood and affect.        RECENT LABS:        WBC   Date/Time Value Ref Range Status   04/21/2022 01:56 PM 6.36 3.40 - 10.80 10*3/mm3 Final     Hemoglobin   Date/Time Value Ref Range Status   04/21/2022 01:56 PM 12.7 12.0 - 15.9 g/dL Final     Platelets   Date/Time Value Ref Range Status   04/21/2022 01:56  140 - 450 10*3/mm3 Final       Assessment/Plan     ASSESSMENT:  Problem List Items Addressed This Visit        High    Malignant neoplasm of lower-outer quadrant of left female breast (HCC) - Primary    Relevant Orders    Ambulatory Referral to Multi-Disciplinary Clinic    CBC & Differential         *Stage IIIA, grade 3, 3.9 cm left breast cancer. Four out of 24 nodes positive. ER 2%, WV negative, HER2 negative. Completed FEC x3 followed by Taxotere x3, 11/28/2011. Completed radiation in February 2012.   Poor tolerance to anastrozole and letrozole.  completed 5 years hormonal therapy using Aromasin, 1/31/17.  (She was initially a patient at [facility]. She transferred here as her son-in-law, Jeremiah Dumont, was a patient here).   · Suspect she remains in remission.  However, with the bulging/swelling of skin around her mastectomy site on the left and the new onset right-sided chest pain,  check a CT of the chest to assess for recurrence.  · CT chest 8/10/2020: No evidence of recurrence.  · I discussed with Dr. Braxton-she stated we could do an ultrasound if we wanted to evaluate the superficial areas more.  Notably the bulging is on the left side and the right side has chest pain.  She states an ultrasound often shows fat necrosis better than a CT.  She suspects the CT would have picked up malignancy if it was present.  Patient did not want to pursue an ultrasound.  Symptoms resolved.  No signs of recurrence.  Remission remains.    *Possible lupus.  Was evaluated by Dr. Rivera for this  · 10/21/2021 visit: Fever of 100.5 or higher on average every 2 weeks or so for the past 2 months.  I told her sometimes this can occur with a rheumatologic disorder.  I advise she discuss with her PCP or Dr. Rivera about this.  (CBC unremarkable.  Exam unremarkable.  No clear signs of leukemia or lymphoma.  I told her we could do CTs looking for LAD which is not in areas that can be palpated.  However, I think it is unlikely this is the case.  She declines CTs at this time)  · 4/21/2022: Continues intermittent fever.  Unchanged.  Advised again to follow-up with Dr. Rivera.     *Chronic right low back/hip pain radiating anteriorly.  In May 2016, CAT scan and bone scan negative for cancer as the cause of the pain.  Defer further management of this to her PCP.  She did not complain of this today.    *In the past, she has complained of: Forgetfulness and transposition of numbers when she is writing numbers at times. This had been present since around April 2015. She states it is not worsening.   She did not complain of this today.    *History of stroke around 2005. Because of this I do not think she would be a good candidate for tamoxifen.     *Osteopenia, which has improved to normal bone density.  DEXA 5/17/16 normal bone density.  T score -0.6, compared to.   -1.1 2/20/14.  She is on calcium and vitamin D.  Defer further  management of this to her PCP now that she is off aromatase inhibitors.    *Right upper quadrant/right lower anterior rib pain.  Present since October 2016.    Due to RUQ abdominal pain/right lower anterior rib pain since October 2016, bone scan and CT abdomen with contrast 2/2/17: No evidence of recurrence.  (8 mm low-attenuation liver lesion seen on the 5/17/16 CT but less conspicuous on order CT.  This was felt to be due to older CT without contrast.  This was felt to likely be benign.  Plan no further scheduled follow-up of this-patient agrees.).  She did not complain of this pain today.    *Hypokalemia. Her PCP manages this.      *IV contrast allergy.  Previous rash.  Receives IV contrast pre-medicines with CT IV contrast.  (This has worked well in the past)    *Left arm lymphedema due to prior surgery for breast cancer.  She was seen by the lymphedema clinic but has had some logistical issues obtaining the sleeve they prescribed.  I encouraged her to continue to call the lymphedema clinic for assistance with this.    *Previously complained of vertigo and was referred to her ENT.    *Heartburn.  Had EGD and colonoscopy by Dr. Martinez November 2018.  No malignancy was found.      *Intermittent chest pain x2 years, SBP's sometimes in the 190s.  Her blood pressure machine has been reading irregular sometimes for her heart rate.  Coronary calcifications on last CT.  · Referred to her cardiologist, Dr. Barker  · 4/21/2022: She has seen Dr. Barker recently.  No further cardiac work-up planned at this time.    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  · Her other daughter, Osmin, is dealing with the death of her  which was around January 2019.    PLAN:   · M.D. CBC 6 months  · Last mammogram 1/20/2021, BI-RADS 1 (Right-sided mammograms)  · Off hormonal therapy    Send copy to Dr. Paxton Rivera, rheumatology    42 minutes.  Total time.  Same day.

## 2022-04-22 ENCOUNTER — TELEPHONE (OUTPATIENT)
Dept: ONCOLOGY | Facility: CLINIC | Age: 77
End: 2022-04-22

## 2022-04-22 NOTE — TELEPHONE ENCOUNTER
Clinical Case Management/[place]:  Telephone    OSW received referral from Carlotta Moore requesting for OSW to reach out to patient to provide emotional support. OSW reviewed patient's chart and called. Patient open to and prefers to meeting in person on 4/29/22 at 1:00 pm at [place].     Patient is on the schedule to meet with OSW.     AMANDA GarciaW, CSW  Oncology Social Worker   [place]/[facility]

## 2022-04-26 NOTE — TELEPHONE ENCOUNTER
Caller: Carlotta Pires    Relationship to patient: Self    Best call back number: [phone]    Patient is needing: TO CANCEL APPT ON 4-. PT WILL CALL BACK THE FIRST OF NEXT WEEK TO R/S.

## 2022-05-10 RX ORDER — CLOPIDOGREL BISULFATE 75 MG/1
75 TABLET ORAL DAILY
Qty: 90 TABLET | Refills: 3 | Status: SHIPPED | OUTPATIENT
Start: 2022-05-10 | End: 2022-05-10 | Stop reason: SDUPTHER

## 2022-05-10 RX ORDER — ATORVASTATIN CALCIUM 10 MG/1
10 TABLET, FILM COATED ORAL DAILY
Qty: 90 TABLET | Refills: 3 | Status: SHIPPED | OUTPATIENT
Start: 2022-05-10

## 2022-05-10 RX ORDER — CLOPIDOGREL BISULFATE 75 MG/1
75 TABLET ORAL DAILY
Qty: 30 TABLET | Refills: 0 | Status: SHIPPED | OUTPATIENT
Start: 2022-05-10

## 2022-06-13 DIAGNOSIS — E03.9 HYPOTHYROIDISM (ACQUIRED): ICD-10-CM

## 2022-06-13 DIAGNOSIS — F51.01 PRIMARY INSOMNIA: ICD-10-CM

## 2022-06-13 RX ORDER — AMITRIPTYLINE HYDROCHLORIDE 25 MG/1
25 TABLET, FILM COATED ORAL NIGHTLY
Qty: 90 TABLET | Refills: 0 | Status: SHIPPED | OUTPATIENT
Start: 2022-06-13 | End: 2022-06-23 | Stop reason: SDUPTHER

## 2022-06-13 RX ORDER — LEVOTHYROXINE SODIUM 0.07 MG/1
75 TABLET ORAL DAILY
Qty: 110 TABLET | Refills: 0 | Status: SHIPPED | OUTPATIENT
Start: 2022-06-13 | End: 2022-06-23 | Stop reason: SDUPTHER

## 2022-06-13 NOTE — TELEPHONE ENCOUNTER
Rx Refill Note  Requested Prescriptions     Pending Prescriptions Disp Refills   • levothyroxine (Euthyrox) 75 MCG tablet 110 tablet 0     Sig: Take 1 tablet by mouth Daily. Take one po qd except on Sunday take 2.   • amitriptyline (ELAVIL) 25 MG tablet 90 tablet 0     Sig: Take 1 tablet by mouth Every Night.      Last office visit with prescribing clinician: 8/24/2021      Next office visit with prescribing clinician: 6/23/2022            Hernandez Wallace MA  06/13/22, 16:11 EDT

## 2022-06-13 NOTE — TELEPHONE ENCOUNTER
CALL IF YOU NEED HER TO MAKE APPT      Caller: Pranay Carlotta CRISTEL    Relationship: Self    Best call back number: *[phone] (H)    Requested Prescriptions:   Requested Prescriptions     Pending Prescriptions Disp Refills   • levothyroxine (Euthyrox) 75 MCG tablet 110 tablet 0     Sig: Take 1 tablet by mouth Daily. Take one po qd except on Sunday take 2.   • amitriptyline (ELAVIL) 25 MG tablet 90 tablet 0     Sig: Take 1 tablet by mouth Every Night.        Pharmacy where request should be sent[facility] [address] - [phone] [facility] [phone] FX     Additional details provided by patienT    Does the patient have less than a 3 day supply:  [] Yes  [] No    Dakota Brush   06/13/22 12:21 EDT              Trying to insert IV-unsuccessful.   Able to draw labs     Arminda Rainey RN  03/15/22 1949

## 2022-06-23 ENCOUNTER — OFFICE VISIT (OUTPATIENT)
Dept: FAMILY MEDICINE CLINIC | Facility: CLINIC | Age: 77
End: 2022-06-23

## 2022-06-23 VITALS
BODY MASS INDEX: 25.91 KG/M2 | DIASTOLIC BLOOD PRESSURE: 60 MMHG | TEMPERATURE: 97.8 F | RESPIRATION RATE: 16 BRPM | HEIGHT: 62 IN | WEIGHT: 140.8 LBS | SYSTOLIC BLOOD PRESSURE: 142 MMHG

## 2022-06-23 DIAGNOSIS — E78.2 HYPERLIPIDEMIA, MIXED: ICD-10-CM

## 2022-06-23 DIAGNOSIS — F51.01 PRIMARY INSOMNIA: ICD-10-CM

## 2022-06-23 DIAGNOSIS — Z00.00 ENCOUNTER FOR MEDICARE ANNUAL WELLNESS EXAM: Primary | ICD-10-CM

## 2022-06-23 DIAGNOSIS — I10 ESSENTIAL HYPERTENSION: ICD-10-CM

## 2022-06-23 DIAGNOSIS — E03.9 HYPOTHYROIDISM (ACQUIRED): ICD-10-CM

## 2022-06-23 PROCEDURE — 1159F MED LIST DOCD IN RCRD: CPT | Performed by: FAMILY MEDICINE

## 2022-06-23 PROCEDURE — 1170F FXNL STATUS ASSESSED: CPT | Performed by: FAMILY MEDICINE

## 2022-06-23 PROCEDURE — G0439 PPPS, SUBSEQ VISIT: HCPCS | Performed by: FAMILY MEDICINE

## 2022-06-23 PROCEDURE — 99214 OFFICE O/P EST MOD 30 MIN: CPT | Performed by: FAMILY MEDICINE

## 2022-06-23 RX ORDER — LEVOTHYROXINE SODIUM 0.07 MG/1
75 TABLET ORAL DAILY
Qty: 110 TABLET | Refills: 0 | Status: SHIPPED | OUTPATIENT
Start: 2022-06-23 | End: 2022-11-29 | Stop reason: SDUPTHER

## 2022-06-23 RX ORDER — AMITRIPTYLINE HYDROCHLORIDE 25 MG/1
25 TABLET, FILM COATED ORAL NIGHTLY
Qty: 90 TABLET | Refills: 0 | Status: SHIPPED | OUTPATIENT
Start: 2022-06-23 | End: 2022-11-29 | Stop reason: SDUPTHER

## 2022-06-23 NOTE — PROGRESS NOTES
The ABCs of the Annual Wellness Visit  Subsequent Medicare Wellness Visit    Chief Complaint   Patient presents with   • Hypertension      Subjective    History of Present Illness:  Carlotta Pires is a 77 y.o. female who presents for a Subsequent Medicare Wellness Visit.  Pt is here for refills, labs and   HTN- no CP or HA;  She recently had med for this changed and overall doing better with her bp.    HLD- no myalgia and on med.    The following portions of the patient's history were reviewed and   updated as appropriate: allergies, current medications, past family history, past medical history, past social history, past surgical history and problem list.    Compared to one year ago, the patient feels her physical   health is the same.    Compared to one year ago, the patient feels her mental   health is the same.    Recent Hospitalizations:  She was not admitted to the hospital during the last year.       Current Medical Providers:  Patient Care Team:  Mirela Garcia MD as PCP - General (Family Medicine)  Code, Dennis DUVALL II, MD as Consulting Physician (Hematology and Oncology)  Reginald Barker MD as Consulting Physician (Cardiology)  Code, Dennis DUVALL II, MD as Consulting Physician (Hematology and Oncology)  Emelia Wilcox MD as Consulting Physician (Gastroenterology)    Outpatient Medications Prior to Visit   Medication Sig Dispense Refill   • aspirin 81 MG EC tablet Take 81 mg by mouth Daily.     • atorvastatin (LIPITOR) 10 MG tablet Take 1 tablet by mouth Daily. 90 tablet 3   • chlorthalidone (HYGROTON) 25 MG tablet Take 1 tablet by mouth Daily. 90 tablet 3   • clopidogrel (PLAVIX) 75 MG tablet Take 1 tablet by mouth Daily. 30 tablet 0   • dexlansoprazole (Dexilant) 60 MG capsule Take 1 capsule by mouth Daily. 90 capsule 3   • diphenhydrAMINE (BENADRYL) 50 MG capsule Take 1 hour prior to CT scan 1 capsule 0   • losartan (COZAAR) 100 MG tablet Take 1 tablet by mouth Daily. 90 tablet 3   • Melatonin 10 MG  capsule Take  by mouth.     • metoprolol succinate XL (TOPROL-XL) 50 MG 24 hr tablet Take 1 tablet by mouth 2 (Two) Times a Day. 180 tablet 3   • amitriptyline (ELAVIL) 25 MG tablet Take 1 tablet by mouth Every Night. 90 tablet 0   • levothyroxine (Euthyrox) 75 MCG tablet Take 1 tablet by mouth Daily. Take one po qd except on Sunday take 2. 110 tablet 0     No facility-administered medications prior to visit.       No opioid medication identified on active medication list. I have reviewed chart for other potential  high risk medication/s and harmful drug interactions in the elderly.          Aspirin is on active medication list. Aspirin use is indicated based on review of current medical condition/s. Pros and cons of this therapy have been discussed today. Benefits of this medication outweigh potential harm.  Patient has been encouraged to continue taking this medication.  .      Patient Active Problem List   Diagnosis   • Malignant neoplasm of lower-outer quadrant of left female breast (HCC)   • Osteopenia   • Encounter for screening for malignant neoplasm of colon   • Epigastric pain   • Essential hypertension   • History of cardiomyopathy   • Hyperlipidemia, mixed   • Acute seasonal allergic rhinitis due to pollen   • GERD without esophagitis   • Hypothyroidism (acquired)   • Primary insomnia   • CHF (congestive heart failure) (HCC)   • Encounter for Medicare annual wellness exam   • Arthritis   • Trigeminal neuralgia   • Osteoporosis   • Lumbar radiculopathy   • Degenerative arthritis of thumb   • Rectal bleeding   • Gastroesophageal reflux disease without esophagitis   • Weight loss, abnormal   • Decreased appetite   • History of breast cancer   • FH: colon cancer     Advance Care Planning  Advance Directive is not on file.  ACP discussion was held with the patient during this visit. Patient has an advance directive (not in EMR), copy requested.          Objective    Vitals:    06/23/22 1445   BP: 142/60   BP  "Location: Right arm   Patient Position: Sitting   Cuff Size: Adult   Resp: 16   Temp: 97.8 °F (36.6 °C)   TempSrc: Temporal   Weight: 63.9 kg (140 lb 12.8 oz)   Height: 157.5 cm (62.01\")     Estimated body mass index is 25.74 kg/m² as calculated from the following:    Height as of this encounter: 157.5 cm (62.01\").    Weight as of this encounter: 63.9 kg (140 lb 12.8 oz).    BMI is >= 25 and <30. (Overweight) The following options were offered after discussion;: exercise counseling/recommendations      Does the patient have evidence of cognitive impairment? No    Physical Exam  Vitals and nursing note reviewed.   Constitutional:       Appearance: Normal appearance. She is well-developed.   Cardiovascular:      Rate and Rhythm: Normal rate and regular rhythm.      Heart sounds: Normal heart sounds. No murmur heard.  Pulmonary:      Effort: Pulmonary effort is normal. No respiratory distress.      Breath sounds: Normal breath sounds. No stridor. No wheezing or rhonchi.   Neurological:      General: No focal deficit present.      Mental Status: She is alert and oriented to person, place, and time. She is not disoriented.   Psychiatric:         Mood and Affect: Mood normal.         Behavior: Behavior normal.                 HEALTH RISK ASSESSMENT    Smoking Status:  Social History     Tobacco Use   Smoking Status Never Smoker   Smokeless Tobacco Never Used   Tobacco Comment    CAFFEINE USE: 4-5 CUPS 1/2 & 1/2 COFFEE DAILY     Alcohol Consumption:  Social History     Substance and Sexual Activity   Alcohol Use Not Currently    Comment: rare-7 or less drinks per week     Fall Risk Screen:    STEADI Fall Risk Assessment was completed, and patient is at HIGH risk for falls. Assessment completed on:6/23/2022    Depression Screening:  PHQ-2/PHQ-9 Depression Screening 6/23/2022   Retired Total Score -   Little Interest or Pleasure in Doing Things 0-->not at all   Feeling Down, Depressed or Hopeless 0-->not at all   Trouble " Falling or Staying Asleep, or Sleeping Too Much -   Feeling Tired or Having Little Energy -   Poor Appetite or Overeating -   Feeling Bad about Yourself - or that You are a Failure or Have Let Yourself or Your Family Down -   Trouble Concentrating on Things, Such as Reading the Newspaper or Watching Television -   Moving or Speaking So Slowly that Other People Could Have Noticed? Or the Opposite - Being So Fidgety -   Thoughts that You Would be Better Off Dead or of Hurting Yourself in Some Way -   PHQ-9: Brief Depression Severity Measure Score 0   If You Checked Off Any Problems, How Difficult Have These Problems Made It For You to Do Your Work, Take Care of Things at Home, or Get Along with Other People? -       Health Habits and Functional and Cognitive Screening:  Functional & Cognitive Status 6/23/2022   Do you have difficulty preparing food and eating? No   Do you have difficulty bathing yourself, getting dressed or grooming yourself? No   Do you have difficulty using the toilet? No   Do you have difficulty moving around from place to place? No   Do you have trouble with steps or getting out of a bed or a chair? No   Current Diet Well Balanced Diet   Dental Exam Up to date   Eye Exam Up to date   Exercise (times per week) Other   Current Exercise Activities Include -   Do you need help using the phone?  No   Are you deaf or do you have serious difficulty hearing?  No   Do you need help with transportation? No   Do you need help shopping? No   Do you need help preparing meals?  No   Do you need help with housework?  No   Do you need help with laundry? No   Do you need help taking your medications? No   Do you need help managing money? No   Do you ever drive or ride in a car without wearing a seat belt? No   Have you felt unusual stress, anger or loneliness in the last month? Yes   Who do you live with? Spouse   If you need help, do you have trouble finding someone available to you? No   Have you been bothered  in the last four weeks by sexual problems? -   Do you have difficulty concentrating, remembering or making decisions? Yes       Age-appropriate Screening Schedule:  Refer to the list below for future screening recommendations based on patient's age, sex and/or medical conditions. Orders for these recommended tests are listed in the plan section. The patient has been provided with a written plan.    Health Maintenance   Topic Date Due   • ZOSTER VACCINE (1 of 2) Never done   • DXA SCAN  05/17/2018   • LIPID PANEL  08/24/2022   • INFLUENZA VACCINE  10/01/2022   • MAMMOGRAM  02/10/2023   • TDAP/TD VACCINES (3 - Tdap) 07/15/2031              Assessment & Plan   CMS Preventative Services Quick Reference  Risk Factors Identified During Encounter  Cardiovascular Disease  The above risks/problems have been discussed with the patient.  Follow up actions/plans if indicated are seen below in the Assessment/Plan Section.  Pertinent information has been shared with the patient in the After Visit Summary.    Diagnoses and all orders for this visit:    1. Encounter for Medicare annual wellness exam (Primary)    2. Essential hypertension  -     Comprehensive Metabolic Panel    3. Hyperlipidemia, mixed  -     Lipid Panel    4. Hypothyroidism (acquired)  -     TSH  -     levothyroxine (Euthyrox) 75 MCG tablet; Take 1 tablet by mouth Daily. Take one po qd except on Sunday take 2.  Dispense: 110 tablet; Refill: 0    5. Primary insomnia  -     amitriptyline (ELAVIL) 25 MG tablet; Take 1 tablet by mouth Every Night.  Dispense: 90 tablet; Refill: 0        Follow Up:   Return in about 6 months (around 12/23/2022).     An After Visit Summary and PPPS were made available to the patient.                 MWE done and patient to work on diet and exercise for Preventive Counseling.    Continue to work on diet and exercise.   Labs and refills today.        Given warning signs for stroke and MI.    Patient to monitor BP over next week and call me  with readings at that time and we can make adjustments accordingly if needed.

## 2022-06-24 LAB
ALBUMIN SERPL-MCNC: 4 G/DL (ref 3.7–4.7)
ALBUMIN/GLOB SERPL: 1.4 {RATIO} (ref 1.2–2.2)
ALP SERPL-CCNC: 85 IU/L (ref 44–121)
ALT SERPL-CCNC: 17 IU/L (ref 0–32)
AST SERPL-CCNC: 27 IU/L (ref 0–40)
BILIRUB SERPL-MCNC: 0.4 MG/DL (ref 0–1.2)
BUN SERPL-MCNC: 10 MG/DL (ref 8–27)
BUN/CREAT SERPL: 14 (ref 12–28)
CALCIUM SERPL-MCNC: 9.6 MG/DL (ref 8.7–10.3)
CHLORIDE SERPL-SCNC: 93 MMOL/L (ref 96–106)
CHOLEST SERPL-MCNC: 158 MG/DL (ref 100–199)
CO2 SERPL-SCNC: 27 MMOL/L (ref 20–29)
CREAT SERPL-MCNC: 0.69 MG/DL (ref 0.57–1)
EGFRCR SERPLBLD CKD-EPI 2021: 90 ML/MIN/1.73
GLOBULIN SER CALC-MCNC: 2.8 G/DL (ref 1.5–4.5)
GLUCOSE SERPL-MCNC: 86 MG/DL (ref 65–99)
HDLC SERPL-MCNC: 49 MG/DL
LDLC SERPL CALC-MCNC: 72 MG/DL (ref 0–99)
POTASSIUM SERPL-SCNC: 3.4 MMOL/L (ref 3.5–5.2)
PROT SERPL-MCNC: 6.8 G/DL (ref 6–8.5)
SODIUM SERPL-SCNC: 136 MMOL/L (ref 134–144)
TRIGL SERPL-MCNC: 226 MG/DL (ref 0–149)
TSH SERPL DL<=0.005 MIU/L-ACNC: 3.16 UIU/ML (ref 0.45–4.5)
VLDLC SERPL CALC-MCNC: 37 MG/DL (ref 5–40)

## 2022-07-05 ENCOUNTER — TELEPHONE (OUTPATIENT)
Dept: FAMILY MEDICINE CLINIC | Facility: CLINIC | Age: 77
End: 2022-07-05

## 2022-07-05 NOTE — TELEPHONE ENCOUNTER
Caller: Carlotta Pires    Relationship: Self    Best call back number: [phone]    Caller requesting test results: PATIENT    What test was performed: LABS     When was the test performed: 6-23-22    Where was the test performed: OFFICE    Additional notes:

## 2022-09-28 ENCOUNTER — OFFICE VISIT (OUTPATIENT)
Dept: GASTROENTEROLOGY | Facility: CLINIC | Age: 77
End: 2022-09-28

## 2022-09-28 VITALS
TEMPERATURE: 97.6 F | OXYGEN SATURATION: 97 % | WEIGHT: 139 LBS | HEIGHT: 62 IN | SYSTOLIC BLOOD PRESSURE: 112 MMHG | DIASTOLIC BLOOD PRESSURE: 68 MMHG | BODY MASS INDEX: 25.58 KG/M2 | HEART RATE: 59 BPM

## 2022-09-28 DIAGNOSIS — K59.00 CONSTIPATION, UNSPECIFIED CONSTIPATION TYPE: ICD-10-CM

## 2022-09-28 DIAGNOSIS — R10.12 LEFT UPPER QUADRANT ABDOMINAL PAIN: Primary | ICD-10-CM

## 2022-09-28 DIAGNOSIS — K21.9 GASTROESOPHAGEAL REFLUX DISEASE WITHOUT ESOPHAGITIS: ICD-10-CM

## 2022-09-28 DIAGNOSIS — K57.90 DIVERTICULOSIS: ICD-10-CM

## 2022-09-28 PROCEDURE — 99214 OFFICE O/P EST MOD 30 MIN: CPT | Performed by: NURSE PRACTITIONER

## 2022-09-28 NOTE — PROGRESS NOTES
Chief Complaint   Patient presents with   • Constipation       Carlotta Pires is a  77 y.o. female here for a follow up visit for constipation.    HPI  77-year-old female presents today for follow-up visit for constipation.  She is a patient of Dr. Wilcox.  She was last seen in the office by me on 5/10/2021.  She has a history of chronic constipation and admits lately she has just been pretty miserable.  She admits for the last 6 months she is really been struggling with her constipation.  She normally takes Metamucil daily and Senokot every couple days.  She tells me that she is not working anymore.  A couple weeks ago she reports that she went 10 days without a bowel movement and ended up taking for laxatives over-the-counter and did finally start going.  She tells me unfortunately though she is right back where she started.  Now she is gone 4 days without a bowel movement.  She tells me she is done MiraLAX in the past and it just did not work at all.  She has not tried any of the prescription medications.  She does have a history of GERD/gastritis/hiatal hernia and admits she does really well on Dexilant 60 mg daily.  She denies any breakthrough reflux at this time.  She denies any dysphagia, reflux, nausea and vomiting, rectal bleeding or melena.  She admits her appetite is okay and her weight is stable.  She tells me she has been under a lot of stress lately with the passing of her daughter.  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  Her last EGD and colonoscopy was on 2021.  She is on Plavix for a history of CHF/TIA.  She does have a history of breast cancer.  She has had diverticulitis in the past.  She tells me when her constipation has been really bad she has had left upper quadrant abdominal pain.  But she admits once her bowels start moving again the pain dissipates.  Past Medical History:   Diagnosis Date   • Allergic rhinitis     • Arthritis    • Cancer (HCC)     Left breast cancer   • CHF (congestive heart failure) (HCC)    • Cough    • COVID-19 11/2020   • Degenerative arthritis of thumb    • GERD without esophagitis    • H/O TIA (transient ischemic attack) and stroke 2005   • History of bone density study    • Lumbar radiculopathy    • Osteoporosis    • Palpitations    • Peptic ulceration    • Personal history of malignant neoplasm of breast    • Personal history of malignant neoplasm of breast    • Sciatica    • Stroke (HCC) 2005    CVA--History of storke   • Trigeminal neuralgia        Past Surgical History:   Procedure Laterality Date   • BREAST BIOPSY Left 2011   • CATARACT EXTRACTION, BILATERAL     • COLONOSCOPY  09/28/2012    Adolph Martinez MD   • COLONOSCOPY N/A 11/13/2018    Procedure: COLONOSCOPY to cecum;  Surgeon: Adolph Martinez MD;  Location:  [facility];  Service: Gastroenterology   • COLONOSCOPY N/A 1/27/2021    Procedure: COLONOSCOPY TO CECUM AND TERMINAL ILEUM;  Surgeon: Emelia Wilcox MD;  Location: [facility];  Service: Gastroenterology;  Laterality: N/A;  RECTAL BLEEDING  --DIVERTICULOSIS, HEMORRHOIDS, TORTUOUS COLON   • ENDOSCOPY N/A 11/13/2018    Procedure: ESOPHAGOGASTRODUODENOSCOPY with bx;  Surgeon: Adolph Martinez MD;  Location: [facility];  Service: Gastroenterology   • ENDOSCOPY N/A 1/27/2021    Procedure: ESOPHAGOGASTRODUODENOSCOPY WITH COLD BX;  Surgeon: Emelia Wilcox MD;  Location: [facility] ENDOSCOPY;  Service: Gastroenterology;  Laterality: N/A;  GERD  --GASTRITIS, HIATAL HERNIA   • EYE SURGERY     • HYSTERECTOMY      At age 31-Not due to cancer   • MASTECTOMY Left 06/30/2011    Dr. Kaitlyn Luna   • TUBAL ABDOMINAL LIGATION  1974   • UPPER GASTROINTESTINAL ENDOSCOPY  09/28/2012    Adolph Martinez MD       Scheduled Meds:    Continuous Infusions:No current facility-administered medications for this visit.      PRN Meds:.    Allergies   Allergen Reactions   • Lisinopril Cough   •  Contrast Dye Itching and Rash     IVP Dye   • Tartrazine Hives       Social History     Socioeconomic History   • Marital status:      Spouse name: Jack   • Number of children: 2   • Years of education: High School   Tobacco Use   • Smoking status: Never Smoker   • Smokeless tobacco: Never Used   • Tobacco comment: CAFFEINE USE: 4-5 CUPS 1/2 & 1/2 COFFEE DAILY   Vaping Use   • Vaping Use: Never used   Substance and Sexual Activity   • Alcohol use: Not Currently     Comment: rare-7 or less drinks per week   • Drug use: No   • Sexual activity: Defer       Family History   Problem Relation Age of Onset   • Cancer Mother 68        head and neck   • Stroke Mother    • Heart disease Mother    • Gallbladder disease Mother    • Throat cancer Mother 68   • Breast cancer Sister 57   • Heart disease Sister    • Hypertension Sister    • Cancer Sister    • Gallbladder disease Sister    • Diabetes Sister    • Cancer Brother         head and neck   • Heart disease Brother    • Hypertension Brother    • Gallbladder disease Brother    • Lung cancer Brother 67   • Throat cancer Brother 67   • Hypertension Father    • Gallbladder disease Father    • Emphysema Father    • Heart disease Father         Enlarged heart   • Cancer Maternal Aunt    • Cancer Maternal Uncle    • Colon cancer Maternal Uncle    • Heart attack Brother    • Alcohol abuse Brother    • Hypertension Sister    • Heart disease Sister    • Hypertension Sister    • Heart disease Sister    • Hypertension Sister    • No Known Problems Other        Review of Systems   Constitutional: Negative for appetite change, chills, diaphoresis, fatigue, fever and unexpected weight change.   HENT: Negative for nosebleeds, postnasal drip, sore throat, trouble swallowing and voice change.    Respiratory: Negative for cough, choking, chest tightness, shortness of breath, wheezing and stridor.    Cardiovascular: Negative for chest pain, palpitations and leg swelling.    Gastrointestinal: Positive for abdominal distention and constipation. Negative for abdominal pain, anal bleeding, blood in stool, diarrhea, nausea, rectal pain and vomiting.   Endocrine: Negative for polydipsia, polyphagia and polyuria.   Musculoskeletal: Negative for gait problem.   Skin: Negative for rash and wound.   Allergic/Immunologic: Negative for food allergies.   Neurological: Negative for dizziness, speech difficulty and light-headedness.   Psychiatric/Behavioral: Negative for confusion, self-injury, sleep disturbance and suicidal ideas.       Vitals:    09/28/22 1046   BP: 112/68   Pulse: 59   Temp: 97.6 °F (36.4 °C)   SpO2: 97%       Physical Exam  Constitutional:       General: She is not in acute distress.     Appearance: She is well-developed. She is not ill-appearing.   HENT:      Head: Normocephalic.   Eyes:      Pupils: Pupils are equal, round, and reactive to light.   Cardiovascular:      Rate and Rhythm: Normal rate and regular rhythm.      Heart sounds: Normal heart sounds.   Pulmonary:      Effort: Pulmonary effort is normal.      Breath sounds: Normal breath sounds.   Abdominal:      General: Bowel sounds are normal. There is distension.      Palpations: Abdomen is soft. There is no mass.      Tenderness: There is no abdominal tenderness. There is no guarding or rebound.      Hernia: No hernia is present.   Musculoskeletal:         General: Normal range of motion.   Skin:     General: Skin is warm and dry.   Neurological:      Mental Status: She is alert and oriented to person, place, and time.   Psychiatric:         Speech: Speech normal.         Behavior: Behavior normal.         Judgment: Judgment normal.         No radiology results for the last 7 days     Diagnoses and all orders for this visit:    1. Left upper quadrant abdominal pain (Primary)    2. Constipation, unspecified constipation type    3. Gastroesophageal reflux disease without esophagitis  Overview:  Added automatically  from request for surgery [id number]      4. Diverticulosis     GERD seems well controlled on Dexilant 60 mg daily.  Continue GERD precautions.  Constipation is clearly not well controlled.  At this time I would like her to stop Metamucil and Senokot and laxatives and start Linzess 145 mcg daily.  Samples given to her today.  Patient to call the office next week with an update.  Patient to follow-up with me in 2 weeks.  Patient is agreeable to the plan.

## 2022-10-10 ENCOUNTER — TELEPHONE (OUTPATIENT)
Dept: GASTROENTEROLOGY | Facility: CLINIC | Age: 77
End: 2022-10-10

## 2022-10-10 NOTE — TELEPHONE ENCOUNTER
Pt stated that her sample of Linzess has been making her have bowel movements about every 4 days. She wanted to know if this was what Kelsiecari Casas intended for her or if it needed to be increased. Stated that if a prescription needed to be called in [facility].

## 2022-10-11 NOTE — TELEPHONE ENCOUNTER
Called pt and advised of Kelsie Xiao's note. Pt verb understanding and would like to try the higher dose. Advised we will have 3 boxes of samples for her at the . Verb understanding.     Update sent to Kelsie XIAO.

## 2022-10-11 NOTE — TELEPHONE ENCOUNTER
That sounds good.  She can always try the higher dose for a week or 2 and see how she does.  And then decide which one she likes better.  Thanks

## 2022-10-11 NOTE — TELEPHONE ENCOUNTER
Called pt and that she has been taking linzess 145mcg daily. Pt reports that she is having a bm every 3-4 days. She reports when she has a bm she the first bm will be normal and then later she will pass a smaller amount of stool that is loose. Pt reports feeling better. Pt is asking if she needs to try the higher dose. ADvised will send message to Kelsie XIAO.

## 2022-10-19 ENCOUNTER — OFFICE VISIT (OUTPATIENT)
Dept: GASTROENTEROLOGY | Facility: CLINIC | Age: 77
End: 2022-10-19

## 2022-10-19 VITALS
DIASTOLIC BLOOD PRESSURE: 75 MMHG | TEMPERATURE: 96.9 F | SYSTOLIC BLOOD PRESSURE: 126 MMHG | HEIGHT: 62 IN | BODY MASS INDEX: 25.73 KG/M2 | WEIGHT: 139.8 LBS

## 2022-10-19 DIAGNOSIS — R10.12 LEFT UPPER QUADRANT ABDOMINAL PAIN: ICD-10-CM

## 2022-10-19 DIAGNOSIS — K59.00 CONSTIPATION, UNSPECIFIED CONSTIPATION TYPE: Primary | ICD-10-CM

## 2022-10-19 DIAGNOSIS — K21.9 GASTROESOPHAGEAL REFLUX DISEASE WITHOUT ESOPHAGITIS: ICD-10-CM

## 2022-10-19 PROCEDURE — 99214 OFFICE O/P EST MOD 30 MIN: CPT | Performed by: NURSE PRACTITIONER

## 2022-10-19 NOTE — PROGRESS NOTES
Chief Complaint   Patient presents with   • Constipation   • left upper quadrant pain       Carlotta Pires is a  77 y.o. female here for a follow up visit for constipation.    HPI  27-year-old female presents today for follow-up visit for constipation.  She is a patient of Dr. Wilcox.  She was last seen in the office by me on 9/28/2022.  She is really happy to be taking Linzess 290.  She feels like it is really working.  She tells me it is definitely better than the 145.  She still feels like though that her bowels could be better.  She still only has a bowel movement every 4 to 5 days.  Of course this is much better than every 6 to 8 days like it was on the Linzess 145.  She does have a history of GERD and admits she is doing well on Dexilant 60 mg daily.  She tells me she is very happy with it.  She denies any dysphagia, reflux, nausea and vomiting, rectal bleeding or melena.  She admits her appetite is okay and her weight is stable.  Her last EGD and colonoscopy was on 1/27/2021.  Does have a history of breast cancer.  She also has a history of CHF/stroke.  Past Medical History:   Diagnosis Date   • Allergic rhinitis    • Arthritis    • Cancer (HCC)     Left breast cancer   • CHF (congestive heart failure) (HCC)    • Cough    • COVID-19 11/2020   • Degenerative arthritis of thumb    • GERD without esophagitis    • H/O TIA (transient ischemic attack) and stroke 2005   • History of bone density study    • Lumbar radiculopathy    • Osteoporosis    • Palpitations    • Peptic ulceration    • Personal history of malignant neoplasm of breast    • Personal history of malignant neoplasm of breast    • Sciatica    • Stroke (HCC) 2005    CVA--History of storke   • Trigeminal neuralgia        Past Surgical History:   Procedure Laterality Date   • BREAST BIOPSY Left 2011   • CATARACT EXTRACTION, BILATERAL     • COLONOSCOPY  09/28/2012    Adolph Martinez MD   • COLONOSCOPY N/A 11/13/2018    Procedure: COLONOSCOPY to cecum;   Surgeon: Adolph Martinez MD;  Location: [facility] ENDOSCOPY;  Service: Gastroenterology   • COLONOSCOPY N/A 1/27/2021    Procedure: COLONOSCOPY TO CECUM AND TERMINAL ILEUM;  Surgeon: Emelia Wilcox MD;  Location: [facility] ENDOSCOPY;  Service: Gastroenterology;  Laterality: N/A;  RECTAL BLEEDING  --DIVERTICULOSIS, HEMORRHOIDS, TORTUOUS COLON   • ENDOSCOPY N/A 11/13/2018    Procedure: ESOPHAGOGASTRODUODENOSCOPY with bx;  Surgeon: Adolph Martinez MD;  Location: [facility] ENDOSCOPY;  Service: Gastroenterology   • ENDOSCOPY N/A 1/27/2021    Procedure: ESOPHAGOGASTRODUODENOSCOPY WITH COLD BX;  Surgeon: Emelia Wilcox MD;  Location: [facility] ENDOSCOPY;  Service: Gastroenterology;  Laterality: N/A;  GERD  --GASTRITIS, HIATAL HERNIA   • EYE SURGERY     • HYSTERECTOMY      At age 31-Not due to cancer   • MASTECTOMY Left 06/30/2011    Dr. Kaitlyn Luna   • TUBAL ABDOMINAL LIGATION  1974   • UPPER GASTROINTESTINAL ENDOSCOPY  09/28/2012    Adolph Martinez MD       Scheduled Meds:    Continuous Infusions:No current facility-administered medications for this visit.      PRN Meds:.    Allergies   Allergen Reactions   • Lisinopril Cough   • Contrast Dye Itching and Rash     IVP Dye   • Tartrazine Hives       Social History     Socioeconomic History   • Marital status:      Spouse name: Jack   • Number of children: 2   • Years of education: High School   Tobacco Use   • Smoking status: Never   • Smokeless tobacco: Never   • Tobacco comments:     CAFFEINE USE: 4-5 CUPS 1/2 & 1/2 COFFEE DAILY   Vaping Use   • Vaping Use: Never used   Substance and Sexual Activity   • Alcohol use: Not Currently     Comment: rare-7 or less drinks per week   • Drug use: No   • Sexual activity: Defer       Family History   Problem Relation Age of Onset   • Cancer Mother 68        head and neck   • Stroke Mother    • Heart disease Mother    • Gallbladder disease Mother    • Throat cancer Mother 68   • Breast cancer Sister 57   • Heart disease Sister     • Hypertension Sister    • Cancer Sister    • Gallbladder disease Sister    • Diabetes Sister    • Cancer Brother         head and neck   • Heart disease Brother    • Hypertension Brother    • Gallbladder disease Brother    • Lung cancer Brother 67   • Throat cancer Brother 67   • Hypertension Father    • Gallbladder disease Father    • Emphysema Father    • Heart disease Father         Enlarged heart   • Cancer Maternal Aunt    • Cancer Maternal Uncle    • Colon cancer Maternal Uncle    • Heart attack Brother    • Alcohol abuse Brother    • Hypertension Sister    • Heart disease Sister    • Hypertension Sister    • Heart disease Sister    • Hypertension Sister    • No Known Problems Other        Review of Systems   Constitutional: Negative for appetite change, chills, diaphoresis, fatigue, fever and unexpected weight change.   HENT: Negative for nosebleeds, postnasal drip, sore throat, trouble swallowing and voice change.    Respiratory: Negative for cough, choking, chest tightness, shortness of breath, wheezing and stridor.    Cardiovascular: Negative for chest pain, palpitations and leg swelling.   Gastrointestinal: Positive for abdominal distention and constipation. Negative for abdominal pain, anal bleeding, blood in stool, diarrhea, nausea, rectal pain and vomiting.   Endocrine: Negative for polydipsia, polyphagia and polyuria.   Musculoskeletal: Negative for gait problem.   Skin: Negative for rash and wound.   Allergic/Immunologic: Negative for food allergies.   Neurological: Negative for dizziness, speech difficulty and light-headedness.   Psychiatric/Behavioral: Negative for confusion, self-injury, sleep disturbance and suicidal ideas.       Vitals:    10/19/22 1258   BP: 126/75   Temp: 96.9 °F (36.1 °C)       Physical Exam  Constitutional:       General: She is not in acute distress.     Appearance: She is well-developed. She is not ill-appearing.   HENT:      Head: Normocephalic.   Eyes:      Pupils:  Pupils are equal, round, and reactive to light.   Cardiovascular:      Rate and Rhythm: Normal rate and regular rhythm.      Heart sounds: Normal heart sounds.   Pulmonary:      Effort: Pulmonary effort is normal.      Breath sounds: Normal breath sounds.   Abdominal:      General: Bowel sounds are normal. There is distension.      Palpations: Abdomen is soft. There is no mass.      Tenderness: There is no abdominal tenderness. There is no guarding or rebound.      Hernia: No hernia is present.   Musculoskeletal:         General: Normal range of motion.   Skin:     General: Skin is warm and dry.   Neurological:      Mental Status: She is alert and oriented to person, place, and time.   Psychiatric:         Speech: Speech normal.         Behavior: Behavior normal.         Judgment: Judgment normal.         No radiology results for the last 7 days     Diagnoses and all orders for this visit:    1. Constipation, unspecified constipation type (Primary)    2. Left upper quadrant abdominal pain    3. Gastroesophageal reflux disease without esophagitis  Overview:  Added automatically from request for surgery [id number]      Other orders  -     linaclotide (Linzess) 290 MCG capsule capsule; Take 1 capsule by mouth Every Morning Before Breakfast.  Dispense: 90 capsule; Refill: 3       GERD seems well controlled on Dexilant 60 mg daily.  Continue GERD precautions.  Constipation is better on the Linzess 290 daily.  However still only having a bowel movement every 4 to 5 days.  And on those days when she is not having a bowel movement she still having a lot of cramping with gas and bloating and anal leakage.  Would like her to add MiraLAX daily to it.  Lets see what that does.  Continue a high-fiber diet.  Patient to call the office next week with an update.  Patient to follow-up with me in 2 weeks.  Patient is agreeable to the plan.

## 2022-10-20 ENCOUNTER — OFFICE VISIT (OUTPATIENT)
Dept: ONCOLOGY | Facility: CLINIC | Age: 77
End: 2022-10-20

## 2022-10-20 ENCOUNTER — LAB (OUTPATIENT)
Dept: OTHER | Facility: HOSPITAL | Age: 77
End: 2022-10-20

## 2022-10-20 VITALS
HEART RATE: 61 BPM | WEIGHT: 139.8 LBS | TEMPERATURE: 97.1 F | BODY MASS INDEX: 25.73 KG/M2 | RESPIRATION RATE: 16 BRPM | OXYGEN SATURATION: 85 % | DIASTOLIC BLOOD PRESSURE: 77 MMHG | SYSTOLIC BLOOD PRESSURE: 130 MMHG | HEIGHT: 62 IN

## 2022-10-20 DIAGNOSIS — C50.512 MALIGNANT NEOPLASM OF LOWER-OUTER QUADRANT OF LEFT BREAST OF FEMALE, ESTROGEN RECEPTOR POSITIVE: Primary | ICD-10-CM

## 2022-10-20 DIAGNOSIS — Z17.0 MALIGNANT NEOPLASM OF LOWER-OUTER QUADRANT OF LEFT BREAST OF FEMALE, ESTROGEN RECEPTOR POSITIVE: ICD-10-CM

## 2022-10-20 DIAGNOSIS — C50.512 MALIGNANT NEOPLASM OF LOWER-OUTER QUADRANT OF LEFT BREAST OF FEMALE, ESTROGEN RECEPTOR POSITIVE: ICD-10-CM

## 2022-10-20 DIAGNOSIS — Z17.0 MALIGNANT NEOPLASM OF LOWER-OUTER QUADRANT OF LEFT BREAST OF FEMALE, ESTROGEN RECEPTOR POSITIVE: Primary | ICD-10-CM

## 2022-10-20 LAB
BASOPHILS # BLD AUTO: 0.03 10*3/MM3 (ref 0–0.2)
BASOPHILS NFR BLD AUTO: 0.4 % (ref 0–1.5)
DEPRECATED RDW RBC AUTO: 39.4 FL (ref 37–54)
EOSINOPHIL # BLD AUTO: 0.23 10*3/MM3 (ref 0–0.4)
EOSINOPHIL NFR BLD AUTO: 3.1 % (ref 0.3–6.2)
ERYTHROCYTE [DISTWIDTH] IN BLOOD BY AUTOMATED COUNT: 12.4 % (ref 12.3–15.4)
HCT VFR BLD AUTO: 36.1 % (ref 34–46.6)
HGB BLD-MCNC: 12.9 G/DL (ref 12–15.9)
IMM GRANULOCYTES # BLD AUTO: 0.04 10*3/MM3 (ref 0–0.05)
IMM GRANULOCYTES NFR BLD AUTO: 0.5 % (ref 0–0.5)
LYMPHOCYTES # BLD AUTO: 1.84 10*3/MM3 (ref 0.7–3.1)
LYMPHOCYTES NFR BLD AUTO: 24.5 % (ref 19.6–45.3)
MCH RBC QN AUTO: 31.2 PG (ref 26.6–33)
MCHC RBC AUTO-ENTMCNC: 35.7 G/DL (ref 31.5–35.7)
MCV RBC AUTO: 87.2 FL (ref 79–97)
MONOCYTES # BLD AUTO: 0.53 10*3/MM3 (ref 0.1–0.9)
MONOCYTES NFR BLD AUTO: 7.1 % (ref 5–12)
NEUTROPHILS NFR BLD AUTO: 4.84 10*3/MM3 (ref 1.7–7)
NEUTROPHILS NFR BLD AUTO: 64.4 % (ref 42.7–76)
NRBC BLD AUTO-RTO: 0 /100 WBC (ref 0–0.2)
PLATELET # BLD AUTO: 254 10*3/MM3 (ref 140–450)
PMV BLD AUTO: 9.6 FL (ref 6–12)
RBC # BLD AUTO: 4.14 10*6/MM3 (ref 3.77–5.28)
WBC NRBC COR # BLD: 7.51 10*3/MM3 (ref 3.4–10.8)

## 2022-10-20 PROCEDURE — 85025 COMPLETE CBC W/AUTO DIFF WBC: CPT | Performed by: INTERNAL MEDICINE

## 2022-10-20 PROCEDURE — 36415 COLL VENOUS BLD VENIPUNCTURE: CPT

## 2022-10-20 PROCEDURE — 99215 OFFICE O/P EST HI 40 MIN: CPT | Performed by: INTERNAL MEDICINE

## 2022-10-20 NOTE — PROGRESS NOTES
Subjective .     REASONS FOR FOLLOWUP:  Breast cancer    HISTORY OF PRESENT ILLNESS:  The patient is a 77 y.o. year old female  who is here for follow-up with the above-mentioned history.    Her disabled daughter passed away.  She still recovering from this.    No new SOA, problems eating.  Has had some discomfort on the right side of her back since falling after her daughter passed away but this is improving.  States she had an x-ray which was unremarkable.    Past Medical History:   Diagnosis Date   • Allergic rhinitis    • Arthritis    • Cancer (HCC)     Left breast cancer   • CHF (congestive heart failure) (HCC)    • Cough    • COVID-19 11/2020   • Degenerative arthritis of thumb    • GERD without esophagitis    • H/O TIA (transient ischemic attack) and stroke 2005   • History of bone density study    • Lumbar radiculopathy    • Osteoporosis    • Palpitations    • Peptic ulceration    • Personal history of malignant neoplasm of breast    • Personal history of malignant neoplasm of breast    • Sciatica    • Stroke (HCC) 2005    CVA--History of storke   • Trigeminal neuralgia      Past Surgical History:   Procedure Laterality Date   • BREAST BIOPSY Left 2011   • CATARACT EXTRACTION, BILATERAL     • COLONOSCOPY  09/28/2012    Adolph Martinez MD   • COLONOSCOPY N/A 11/13/2018    Procedure: COLONOSCOPY to cecum;  Surgeon: Adolph Martinez MD;  Location: [facility] ENDOSCOPY;  Service: Gastroenterology   • COLONOSCOPY N/A 1/27/2021    Procedure: COLONOSCOPY TO CECUM AND TERMINAL ILEUM;  Surgeon: Emelia Wilcox MD;  Location: [facility] ENDOSCOPY;  Service: Gastroenterology;  Laterality: N/A;  RECTAL BLEEDING  --DIVERTICULOSIS, HEMORRHOIDS, TORTUOUS COLON   • ENDOSCOPY N/A 11/13/2018    Procedure: ESOPHAGOGASTRODUODENOSCOPY with bx;  Surgeon: Adolph Martinez MD;  Location: [facility] ENDOSCOPY;  Service: Gastroenterology   • ENDOSCOPY N/A 1/27/2021    Procedure: ESOPHAGOGASTRODUODENOSCOPY WITH COLD BX;  Surgeon: Brenden  Emelia SALDANA MD;  Location: [facility] ENDOSCOPY;  Service: Gastroenterology;  Laterality: N/A;  GERD  --GASTRITIS, HIATAL HERNIA   • EYE SURGERY     • HYSTERECTOMY      At age 31-Not due to cancer   • MASTECTOMY Left 06/30/2011    Dr. Kaitlyn Luna   • TUBAL ABDOMINAL LIGATION  1974   • UPPER GASTROINTESTINAL ENDOSCOPY  09/28/2012    Adolph Martinez MD       HEMATOLOGIC/ONCOLOGIC HISTORY:  (History from previous dates can be found in the separate document.)    MEDICATIONS    Current Outpatient Medications:   •  amitriptyline (ELAVIL) 25 MG tablet, Take 1 tablet by mouth Every Night., Disp: 90 tablet, Rfl: 0  •  aspirin 81 MG EC tablet, Take 81 mg by mouth Daily., Disp: , Rfl:   •  atorvastatin (LIPITOR) 10 MG tablet, Take 1 tablet by mouth Daily., Disp: 90 tablet, Rfl: 3  •  Bisacodyl (LAXATIVE PO), Take  by mouth As Needed., Disp: , Rfl:   •  chlorthalidone (HYGROTON) 25 MG tablet, Take 1 tablet by mouth Daily., Disp: 90 tablet, Rfl: 3  •  clopidogrel (PLAVIX) 75 MG tablet, Take 1 tablet by mouth Daily., Disp: 30 tablet, Rfl: 0  •  dexlansoprazole (Dexilant) 60 MG capsule, Take 1 capsule by mouth Daily., Disp: 90 capsule, Rfl: 3  •  diphenhydrAMINE (BENADRYL) 50 MG capsule, Take 1 hour prior to CT scan, Disp: 1 capsule, Rfl: 0  •  levothyroxine (Euthyrox) 75 MCG tablet, Take 1 tablet by mouth Daily. Take one po qd except on Sunday take 2., Disp: 110 tablet, Rfl: 0  •  linaclotide (Linzess) 290 MCG capsule capsule, Take 1 capsule by mouth Every Morning Before Breakfast., Disp: 90 capsule, Rfl: 3  •  losartan (COZAAR) 100 MG tablet, Take 1 tablet by mouth Daily., Disp: 90 tablet, Rfl: 3  •  Melatonin 10 MG capsule, Take  by mouth., Disp: , Rfl:   •  metoprolol succinate XL (TOPROL-XL) 50 MG 24 hr tablet, Take 1 tablet by mouth 2 (Two) Times a Day., Disp: 180 tablet, Rfl: 3    ALLERGIES:     Allergies   Allergen Reactions   • Lisinopril Cough   • Contrast Dye Itching and Rash     IVP Dye   • Tartrazine Hives       SOCIAL  HISTORY:       Social History     Socioeconomic History   • Marital status:      Spouse name: Jack   • Number of children: 2   • Years of education: High School   Tobacco Use   • Smoking status: Never   • Smokeless tobacco: Never   • Tobacco comments:     CAFFEINE USE: 4-5 CUPS 1/2 & 1/2 COFFEE DAILY   Vaping Use   • Vaping Use: Never used   Substance and Sexual Activity   • Alcohol use: Not Currently     Comment: rare-7 or less drinks per week   • Drug use: No   • Sexual activity: Defer         FAMILY HISTORY:  Family History   Problem Relation Age of Onset   • Cancer Mother 68        head and neck   • Stroke Mother    • Heart disease Mother    • Gallbladder disease Mother    • Throat cancer Mother 68   • Breast cancer Sister 57   • Heart disease Sister    • Hypertension Sister    • Cancer Sister    • Gallbladder disease Sister    • Diabetes Sister    • Cancer Brother         head and neck   • Heart disease Brother    • Hypertension Brother    • Gallbladder disease Brother    • Lung cancer Brother 67   • Throat cancer Brother 67   • Hypertension Father    • Gallbladder disease Father    • Emphysema Father    • Heart disease Father         Enlarged heart   • Cancer Maternal Aunt    • Cancer Maternal Uncle    • Colon cancer Maternal Uncle    • Heart attack Brother    • Alcohol abuse Brother    • Hypertension Sister    • Heart disease Sister    • Hypertension Sister    • Heart disease Sister    • Hypertension Sister    • No Known Problems Other        REVIEW OF SYSTEMS:  Review of Systems   Constitutional: Negative for activity change.   HENT: Negative for nosebleeds and trouble swallowing.    Respiratory: Negative for shortness of breath and wheezing.    Cardiovascular: Negative for chest pain and palpitations.   Gastrointestinal: Negative for constipation, diarrhea and nausea.   Genitourinary: Negative for dysuria and hematuria.   Musculoskeletal: Negative for arthralgias and myalgias.   Skin: Negative for  "rash and wound.   Neurological: Negative for seizures and syncope.   Hematological: Negative for adenopathy. Does not bruise/bleed easily.   Psychiatric/Behavioral: Negative for confusion.           Objective    Vitals:    10/20/22 1357   BP: 130/77   Pulse: 61   Resp: 16   Temp: 97.1 °F (36.2 °C)   TempSrc: Temporal   SpO2: (!) 85%  Comment: patients hands are pretty cold, second reading was even lower (79) than this   Weight: 63.4 kg (139 lb 12.8 oz)   Height: 157.5 cm (62.01\")   PainSc: 0-No pain     Current Status 10/20/2022   ECOG score 0      PHYSICAL EXAM:        CONSTITUTIONAL:  Vital signs reviewed.  No distress, looks comfortable.  EYES:  Conjunctiva and lids unremarkable.  PERRLA  EARS,NOSE,MOUTH,THROAT:  Ears and nose appear unremarkable.  Lips, teeth, gums appear unremarkable.  RESPIRATORY:  Normal respiratory effort.  Lungs clear to auscultation bilaterally.  CARDIOVASCULAR:  Normal S1, S2.  No murmurs rubs or gallops.  No significant lower extremity edema.  GASTROINTESTINAL: Abdomen appears unremarkable.  Nontender.  No hepatomegaly.  No splenomegaly.  BREAST: no masses by palpation or inspection   LYMPHATIC:  No cervical, supraclavicular, axillary lymphadenopathy.  SKIN:  Warm.  No rashes.  PSYCHIATRIC:  Normal judgment and insight.  Normal mood and affect.         RECENT LABS:        WBC   Date/Time Value Ref Range Status   10/20/2022 01:35 PM 7.51 3.40 - 10.80 10*3/mm3 Final     Hemoglobin   Date/Time Value Ref Range Status   10/20/2022 01:35 PM 12.9 12.0 - 15.9 g/dL Final     Platelets   Date/Time Value Ref Range Status   10/20/2022 01:35  140 - 450 10*3/mm3 Final       Assessment/Plan     ASSESSMENT:  Problem List Items Addressed This Visit    None     *Stage IIIA, grade 3, 3.9 cm left breast cancer. Four out of 24 nodes positive. ER 2%, NV negative, HER2 negative. Completed FEC x3 followed by Taxotere x3, 11/28/2011. Completed radiation in February 2012.   · Poor tolerance to anastrozole " and letrozole.  completed 5 years hormonal therapy using Aromasin, 1/31/17.  · (She was initially a patient at [facility]. She transferred here as her son-in-law, Jeremiah Dumont, was a patient here).   · Suspect she remains in remission.  However, with the bulging/swelling of skin around her mastectomy site on the left and the new onset right-sided chest pain, check a CT of the chest to assess for recurrence.  · CT chest 8/10/2020: No evidence of recurrence.  · I discussed with Dr. Braxton-she stated we could do an ultrasound if we wanted to evaluate the superficial areas more.  Notably the bulging is on the left side and the right side has chest pain.  She states an ultrasound often shows fat necrosis better than a CT.  She suspects the CT would have picked up malignancy if it was present.  Patient did not want to pursue an ultrasound.  Symptoms resolved.  No evidence of recurrence.  Remission continues.    *Possible lupus.  Was evaluated by Dr. Rivera for this  · 10/21/2021 visit: Fever of 100.5 or higher on average every 2 weeks or so for the past 2 months.  I told her sometimes this can occur with a rheumatologic disorder.  I advise she discuss with her PCP or Dr. Rivera about this.  (CBC unremarkable.  Exam unremarkable.  No clear signs of leukemia or lymphoma.  I told her we could do CTs looking for LAD which is not in areas that can be palpated.  However, I think it is unlikely this is the case.  She declines CTs at this time)  · 4/21/2022: Continues intermittent fever.  Unchanged.  Advised again to follow-up with Dr. Rivera.  · 10/20/2022: No complaints of fever today     *Chronic right low back/hip pain radiating anteriorly.  In May 2016, CAT scan and bone scan negative for cancer as the cause of the pain.  Defer further management of this to her PCP.  She did not complain of this today.    *In the past, she has complained of: Forgetfulness and transposition of numbers when she is writing numbers at times. This had been  present since around April 2015. She states it is not worsening.   She did not complain of this today.    *History of stroke around 2005. Because of this I do not think she would be a good candidate for tamoxifen.     *Osteopenia, which has improved to normal bone density.  DEXA 5/17/16 normal bone density.  T score -0.6, compared to.   -1.1 2/20/14.  She is on calcium and vitamin D.  Defer further management of this to her PCP now that she is off aromatase inhibitors.    *Right upper quadrant/right lower anterior rib pain.  Present since October 2016.    Due to RUQ abdominal pain/right lower anterior rib pain since October 2016, bone scan and CT abdomen with contrast 2/2/17: No evidence of recurrence.  (8 mm low-attenuation liver lesion seen on the 5/17/16 CT but less conspicuous on order CT.  This was felt to be due to older CT without contrast.  This was felt to likely be benign.  Plan no further scheduled follow-up of this-patient agrees.).  She did not complain of this pain today.    *Hypokalemia. Her PCP manages this.      *IV contrast allergy.  Previous rash.  Receives IV contrast pre-medicines with CT IV contrast.  (This has worked well in the past)    *Left arm lymphedema due to prior surgery for breast cancer.  She was seen by the lymphedema clinic but has had some logistical issues obtaining the sleeve they prescribed.  I encouraged her to continue to call the lymphedema clinic for assistance with this.    *Previously complained of vertigo and was referred to her ENT.    *Heartburn.  Had EGD and colonoscopy by Dr. Martinez November 2018.  No malignancy was found.      *Intermittent chest pain x2 years, SBP's sometimes in the 190s.  Her blood pressure machine has been reading irregular sometimes for her heart rate.  Coronary calcifications on last CT.  · Referred to her cardiologist, Dr. Barker  · 4/21/2022: She has seen Dr. Barker recently.  No further cardiac work-up planned at this time.    *Social  issues  · Her disabled daughter passed away September 2022  · Her other daughter, Osmin, is dealing with the death of her  which was around January 2019.    PLAN:   · M.D. CBC 6 months  · Last mammogram 2/10/2022, BI-RADS 1 (Right-sided mammograms)  · Off hormonal therapy    Send copy to Dr. Paxton Rivera, rheumatology    41 minutes.  Total time.  Same day.      ADDENDUM   Arrange:  Left breast prosthesis and 3 surgical bras with 3 refills

## 2022-11-02 ENCOUNTER — OFFICE VISIT (OUTPATIENT)
Dept: GASTROENTEROLOGY | Facility: CLINIC | Age: 77
End: 2022-11-02

## 2022-11-02 VITALS
DIASTOLIC BLOOD PRESSURE: 70 MMHG | TEMPERATURE: 94.5 F | SYSTOLIC BLOOD PRESSURE: 121 MMHG | WEIGHT: 141.6 LBS | BODY MASS INDEX: 26.06 KG/M2 | HEART RATE: 67 BPM | HEIGHT: 62 IN

## 2022-11-02 DIAGNOSIS — L29.0 ANAL ITCHING: ICD-10-CM

## 2022-11-02 DIAGNOSIS — K21.9 GASTROESOPHAGEAL REFLUX DISEASE WITHOUT ESOPHAGITIS: ICD-10-CM

## 2022-11-02 DIAGNOSIS — K59.00 CONSTIPATION, UNSPECIFIED CONSTIPATION TYPE: Primary | ICD-10-CM

## 2022-11-02 PROCEDURE — 99214 OFFICE O/P EST MOD 30 MIN: CPT | Performed by: NURSE PRACTITIONER

## 2022-11-02 NOTE — PROGRESS NOTES
Chief Complaint   Patient presents with   • Constipation   • Abdominal Pain       Carlotta Pires is a  77 y.o. female here for a follow up visit for constipation.    HPI  77-year-old female presents today for follow-up visit for constipation.  She is a patient of Dr. Wilcox.  She was last seen in the office by me on 10/19/2022.  She underwent EGD and colonoscopy on 1/27/2021.  She has a history of constipation with worsening left upper quadrant abdominal pain.  She is happy to report she is doing so much better now on Linzess 290 daily.  She is no longer needing the MiraLAX with it.  She feels like stress makes things worse and she has really been working on stress management lately.  She still is having some rectal itching at night.  She has tried different over-the-counter creams and nothing really seems to work.  She was hoping I could help treat this today while she is here.  She does have a history of GERD and admits she does really well on Dexilant 60 mg daily.  She denies any breakthrough reflux at this time.  She does have a history of ulcers in the past.  She also has history of stroke.  She denies any dysphagia, nausea and vomiting, diarrhea, rectal bleeding or melena.  She admits her appetite is good and her weight is stable.  Past Medical History:   Diagnosis Date   • Allergic rhinitis    • Arthritis    • Cancer (HCC)     Left breast cancer   • CHF (congestive heart failure) (HCC)    • Cough    • COVID-19 11/2020   • Degenerative arthritis of thumb    • GERD without esophagitis    • H/O TIA (transient ischemic attack) and stroke 2005   • History of bone density study    • Lumbar radiculopathy    • Osteoporosis    • Palpitations    • Peptic ulceration    • Personal history of malignant neoplasm of breast    • Personal history of malignant neoplasm of breast    • Sciatica    • Stroke (HCC) 2005    CVA--History of storke   • Trigeminal neuralgia        Past Surgical History:   Procedure Laterality Date   •  BREAST BIOPSY Left 2011   • CATARACT EXTRACTION, BILATERAL     • COLONOSCOPY  09/28/2012    Adolph Martinez MD   • COLONOSCOPY N/A 11/13/2018    Procedure: COLONOSCOPY to cecum;  Surgeon: Adolph Martinez MD;  Location:  [facility];  Service: Gastroenterology   • COLONOSCOPY N/A 1/27/2021    Procedure: COLONOSCOPY TO CECUM AND TERMINAL ILEUM;  Surgeon: Emelia Wilcox MD;  Location:  [facility];  Service: Gastroenterology;  Laterality: N/A;  RECTAL BLEEDING  --DIVERTICULOSIS, HEMORRHOIDS, TORTUOUS COLON   • ENDOSCOPY N/A 11/13/2018    Procedure: ESOPHAGOGASTRODUODENOSCOPY with bx;  Surgeon: Adolph Martinez MD;  Location:  [facility];  Service: Gastroenterology   • ENDOSCOPY N/A 1/27/2021    Procedure: ESOPHAGOGASTRODUODENOSCOPY WITH COLD BX;  Surgeon: Emelia Wilcox MD;  Location: [facility] ENDOSCOPY;  Service: Gastroenterology;  Laterality: N/A;  GERD  --GASTRITIS, HIATAL HERNIA   • EYE SURGERY     • HYSTERECTOMY      At age 31-Not due to cancer   • MASTECTOMY Left 06/30/2011    Dr. Kaitlyn Luna   • TUBAL ABDOMINAL LIGATION  1974   • UPPER GASTROINTESTINAL ENDOSCOPY  09/28/2012    Adolph Martinez MD       Scheduled Meds:    Continuous Infusions:No current facility-administered medications for this visit.      PRN Meds:.    Allergies   Allergen Reactions   • Lisinopril Cough   • Contrast Dye Itching and Rash     IVP Dye   • Tartrazine Hives       Social History     Socioeconomic History   • Marital status:      Spouse name: Jack   • Number of children: 2   • Years of education: High School   Tobacco Use   • Smoking status: Never   • Smokeless tobacco: Never   • Tobacco comments:     CAFFEINE USE: 4-5 CUPS 1/2 & 1/2 COFFEE DAILY   Vaping Use   • Vaping Use: Never used   Substance and Sexual Activity   • Alcohol use: Not Currently     Comment: rare-7 or less drinks per week   • Drug use: No   • Sexual activity: Defer       Family History   Problem Relation Age of Onset   • Cancer Mother 68         head and neck   • Stroke Mother    • Heart disease Mother    • Gallbladder disease Mother    • Throat cancer Mother 68   • Breast cancer Sister 57   • Heart disease Sister    • Hypertension Sister    • Cancer Sister    • Gallbladder disease Sister    • Diabetes Sister    • Cancer Brother         head and neck   • Heart disease Brother    • Hypertension Brother    • Gallbladder disease Brother    • Lung cancer Brother 67   • Throat cancer Brother 67   • Hypertension Father    • Gallbladder disease Father    • Emphysema Father    • Heart disease Father         Enlarged heart   • Cancer Maternal Aunt    • Cancer Maternal Uncle    • Colon cancer Maternal Uncle    • Heart attack Brother    • Alcohol abuse Brother    • Hypertension Sister    • Heart disease Sister    • Hypertension Sister    • Heart disease Sister    • Hypertension Sister    • No Known Problems Other        Review of Systems   Constitutional: Negative for appetite change, chills, diaphoresis, fatigue, fever and unexpected weight change.   HENT: Negative for nosebleeds, postnasal drip, sore throat, trouble swallowing and voice change.    Respiratory: Negative for cough, choking, chest tightness, shortness of breath, wheezing and stridor.    Cardiovascular: Negative for chest pain, palpitations and leg swelling.   Gastrointestinal: Negative for abdominal distention, abdominal pain, anal bleeding, blood in stool, constipation, diarrhea, nausea, rectal pain and vomiting.   Endocrine: Negative for polydipsia, polyphagia and polyuria.   Musculoskeletal: Negative for gait problem.   Skin: Negative for rash and wound.   Allergic/Immunologic: Negative for food allergies.   Neurological: Negative for dizziness, speech difficulty and light-headedness.   Psychiatric/Behavioral: Negative for confusion, self-injury, sleep disturbance and suicidal ideas.       Vitals:    11/02/22 1401   BP: 121/70   Pulse: 67   Temp: 94.5 °F (34.7 °C)       Physical  Exam  Constitutional:       General: She is not in acute distress.     Appearance: She is well-developed. She is not ill-appearing.   HENT:      Head: Normocephalic.   Eyes:      Pupils: Pupils are equal, round, and reactive to light.   Cardiovascular:      Rate and Rhythm: Normal rate and regular rhythm.      Heart sounds: Normal heart sounds.   Pulmonary:      Effort: Pulmonary effort is normal.      Breath sounds: Normal breath sounds.   Abdominal:      General: Bowel sounds are normal. There is no distension.      Palpations: Abdomen is soft. There is no mass.      Tenderness: There is no abdominal tenderness. There is no guarding or rebound.      Hernia: No hernia is present.      Comments: Rectal exam did reveal some excoriated erythematous perianal tissue below the anal opening.  No rectal bleeding noted.  Otherwise normal exam.   Musculoskeletal:         General: Normal range of motion.   Skin:     General: Skin is warm and dry.   Neurological:      Mental Status: She is alert and oriented to person, place, and time.   Psychiatric:         Speech: Speech normal.         Behavior: Behavior normal.         Judgment: Judgment normal.         No radiology results for the last 7 days     Diagnoses and all orders for this visit:    1. Constipation, unspecified constipation type (Primary)    2. Gastroesophageal reflux disease without esophagitis  Overview:  Added automatically from request for surgery [id number]      3. Anal itching    GERD seems well controlled on Dexilant 60 mg daily.  Continue GERD precautions.  Constipation seems better on Linzess 290 daily.  She tells me the MiraLAX and Metamucil was just too much.  She feels like she is having more more solid stools now.  Sounds like her anal itching could be due to yeast.  Would like her to try an over-the-counter yeast cream twice a day for the next week and see how she does.  Patient to call the office next week with an update.  Patient to follow-up with me  in 3 months.  Patient is agreeable to the plan.

## 2022-11-29 DIAGNOSIS — F51.01 PRIMARY INSOMNIA: ICD-10-CM

## 2022-11-29 DIAGNOSIS — E03.9 HYPOTHYROIDISM (ACQUIRED): ICD-10-CM

## 2022-11-29 RX ORDER — LEVOTHYROXINE SODIUM 0.07 MG/1
75 TABLET ORAL DAILY
Qty: 110 TABLET | Refills: 0 | Status: SHIPPED | OUTPATIENT
Start: 2022-11-29

## 2022-11-29 RX ORDER — AMITRIPTYLINE HYDROCHLORIDE 25 MG/1
25 TABLET, FILM COATED ORAL NIGHTLY
Qty: 90 TABLET | Refills: 0 | Status: SHIPPED | OUTPATIENT
Start: 2022-11-29 | End: 2023-01-24 | Stop reason: SDUPTHER

## 2022-11-29 NOTE — TELEPHONE ENCOUNTER
Rx Refill Note  Requested Prescriptions     Pending Prescriptions Disp Refills   • levothyroxine (Euthyrox) 75 MCG tablet 110 tablet 0     Sig: Take 1 tablet by mouth Daily. Take one po qd except on Sunday take 2.   • amitriptyline (ELAVIL) 25 MG tablet 90 tablet 0     Sig: Take 1 tablet by mouth Every Night.      Last office visit with prescribing clinician: 6/23/2022   Last telemedicine visit with prescribing clinician: Visit date not found   Next office visit with prescribing clinician: Visit date not found  Last refill 06/23/2022

## 2022-11-29 NOTE — TELEPHONE ENCOUNTER
Caller: Carlotta Pires CRISTEL    Relationship: Self    Best call back number: [phone]   Requested Prescriptions:   Requested Prescriptions     Pending Prescriptions Disp Refills   • levothyroxine (Euthyrox) 75 MCG tablet 110 tablet 0     Sig: Take 1 tablet by mouth Daily. Take one po qd except on Sunday take 2.   • amitriptyline (ELAVIL) 25 MG tablet 90 tablet 0     Sig: Take 1 tablet by mouth Every Night.        Pharmacy where request should be sent:  [facility] [place] [address] [facility] - [phone] [facility] [phone]      Additional details provided by patient:   Does the patient have less than a 3 day supply:  [] Yes  [x] No    Would you like a call back once the refill request has been completed: [] Yes [x] No  ONLY IF REQUEST IS DENIED      If the office needs to give you a call back, can they leave a voicemail: [x] Yes [] No    Dakota Banda Rep   11/29/22 11:15 EST

## 2023-01-24 ENCOUNTER — OFFICE VISIT (OUTPATIENT)
Dept: FAMILY MEDICINE CLINIC | Facility: CLINIC | Age: 78
End: 2023-01-24
Payer: MEDICARE

## 2023-01-24 VITALS
TEMPERATURE: 97.4 F | SYSTOLIC BLOOD PRESSURE: 124 MMHG | HEART RATE: 86 BPM | HEIGHT: 62 IN | OXYGEN SATURATION: 95 % | BODY MASS INDEX: 26.35 KG/M2 | WEIGHT: 143.2 LBS | DIASTOLIC BLOOD PRESSURE: 68 MMHG

## 2023-01-24 DIAGNOSIS — Z12.31 SCREENING MAMMOGRAM, ENCOUNTER FOR: ICD-10-CM

## 2023-01-24 DIAGNOSIS — Z13.31 POSITIVE DEPRESSION SCREENING: ICD-10-CM

## 2023-01-24 DIAGNOSIS — C50.512 MALIGNANT NEOPLASM OF LOWER-OUTER QUADRANT OF LEFT BREAST OF FEMALE, ESTROGEN RECEPTOR POSITIVE: ICD-10-CM

## 2023-01-24 DIAGNOSIS — R53.83 FATIGUE, UNSPECIFIED TYPE: ICD-10-CM

## 2023-01-24 DIAGNOSIS — F41.9 ANXIETY: ICD-10-CM

## 2023-01-24 DIAGNOSIS — Z17.0 MALIGNANT NEOPLASM OF LOWER-OUTER QUADRANT OF LEFT BREAST OF FEMALE, ESTROGEN RECEPTOR POSITIVE: ICD-10-CM

## 2023-01-24 DIAGNOSIS — K59.00 CONSTIPATION, UNSPECIFIED CONSTIPATION TYPE: ICD-10-CM

## 2023-01-24 DIAGNOSIS — F51.01 PRIMARY INSOMNIA: ICD-10-CM

## 2023-01-24 DIAGNOSIS — K21.9 GERD WITHOUT ESOPHAGITIS: ICD-10-CM

## 2023-01-24 DIAGNOSIS — I10 ESSENTIAL HYPERTENSION: Primary | ICD-10-CM

## 2023-01-24 DIAGNOSIS — E03.9 HYPOTHYROIDISM (ACQUIRED): ICD-10-CM

## 2023-01-24 DIAGNOSIS — E78.2 HYPERLIPIDEMIA, MIXED: ICD-10-CM

## 2023-01-24 DIAGNOSIS — R50.9 FEVER, UNSPECIFIED FEVER CAUSE: ICD-10-CM

## 2023-01-24 DIAGNOSIS — J01.90 ACUTE NON-RECURRENT SINUSITIS, UNSPECIFIED LOCATION: ICD-10-CM

## 2023-01-24 DIAGNOSIS — G50.0 TRIGEMINAL NEURALGIA: ICD-10-CM

## 2023-01-24 PROCEDURE — 99215 OFFICE O/P EST HI 40 MIN: CPT | Performed by: NURSE PRACTITIONER

## 2023-01-24 PROCEDURE — 87428 SARSCOV & INF VIR A&B AG IA: CPT | Performed by: NURSE PRACTITIONER

## 2023-01-24 RX ORDER — AMITRIPTYLINE HYDROCHLORIDE 25 MG/1
25 TABLET, FILM COATED ORAL NIGHTLY
Qty: 90 TABLET | Refills: 2 | Status: SHIPPED | OUTPATIENT
Start: 2023-01-24

## 2023-01-24 RX ORDER — DIPHENHYDRAMINE HCL 25 MG
25 CAPSULE ORAL NIGHTLY PRN
COMMUNITY

## 2023-01-24 NOTE — PROGRESS NOTES
Subjective   Carlotta Pires is a 77 y.o. female.     Chief Complaint   Patient presents with   • Nasal Congestion     On and off discuss    • Establish Care       History of Present Illness   New patient, here to establish care; patient of Dr. Garcia, but daughter passed away in September and cannot bring self to go back to same office she was also going to; patient presents with c/o nasal congestion on and off; symptoms started 3 weeks ago with really runny nose and then had sores inside and outside nose; symptoms resolved after 1 week and then started again 1/18/23; has had stuffy nose and loss of taste/smell; feels exhausted; had fever up until 2-3 days ago; Tmax 101; no ear pain or sore throat; has had nonproductive cough with tickle in throat; has had popping in ears, but no pain; has had drainage in throat; some SOA with activity; has had some decrease in appetite; has tried Nyquil and helped sleep.    F/U HTN: takes Metoprolol twice daily and Losartan and Chlorthalidone daily; sees Dr. Barker, cardiology; has follow up in April; also takes Plavix daily for history of stroke 25 years ago; no residual deficit other than sometimes flips numbers wrong; also has history of medication induced cardiomyopathy; monitors BP on occasion; no headaches; some lightheadedness this morning; has had low potassium in past; no swelling, mild swelling in left arm since had left mastectomy.    F/U Hyperlipidemia: takes Atorvastatin daily; no myalgias; avoids red meat and fried foods; fasting today other than coffee with creamer.    F/U Hypothyroidism: takes Levothyroxine daily except 2 tabs on Sundays; takes on empty stomach; no missed doses.    F/U constipation: takes Linzess daily and helps; sees GI, has follow up in February.    F/U [place]: takes Dexilant daily and works well; previously taking Nexium; has had EGD in past; sees GI.    Sees Dr. Chanel, oncology for history of breast cancer; history of chemo and radiation; completed  hormonal therapy 1/31/17.    Takes Amitriptyline nightly for trigeminal neuralgia; also helps sleep; life change without having to care for daughter any more since she passed; has always been social, but does not feel like being social; see PHQ-9 and SAGE-7; no SI/HI; patient declines medication for mood at this time; pt feels like typical grief and working through; goes to take care of sister 1 day per week; saw counselor in past and declines at this time.    Saw rheumatology last year for multiple joint pains and possible lupus; saw Dr. Rivera and told no lupus.      The following portions of the patient's history were reviewed and updated as appropriate: allergies, current medications, past family history, past medical history, past social history, past surgical history and problem list.    Current Outpatient Medications on File Prior to Visit   Medication Sig   • aspirin 81 MG EC tablet Take 81 mg by mouth Daily.   • atorvastatin (LIPITOR) 10 MG tablet Take 1 tablet by mouth Daily.   • chlorthalidone (HYGROTON) 25 MG tablet Take 1 tablet by mouth Daily.   • clopidogrel (PLAVIX) 75 MG tablet Take 1 tablet by mouth Daily.   • dexlansoprazole (Dexilant) 60 MG capsule Take 1 capsule by mouth Daily.   • diphenhydrAMINE (BENADRYL) 25 mg capsule Take 25 mg by mouth At Night As Needed for Itching.   • levothyroxine (Euthyrox) 75 MCG tablet Take 1 tablet by mouth Daily. Take one po qd except on Sunday take 2.   • linaclotide (Linzess) 290 MCG capsule capsule Take 1 capsule by mouth Every Morning Before Breakfast.   • losartan (COZAAR) 100 MG tablet Take 1 tablet by mouth Daily.   • Melatonin 10 MG capsule Take 1 capsule by mouth At Night As Needed.   • metoprolol succinate XL (TOPROL-XL) 50 MG 24 hr tablet Take 1 tablet by mouth 2 (Two) Times a Day.     No current facility-administered medications on file prior to visit.        Past Medical History:   Diagnosis Date   • Allergic rhinitis    • Arthritis    • Cancer (HCC)      Left breast cancer   • CHF (congestive heart failure) (HCC)    • Cough    • COVID-19 11/2020   • Degenerative arthritis of thumb    • GERD without esophagitis    • H/O TIA (transient ischemic attack) and stroke 2005   • History of bone density study    • Lumbar radiculopathy    • Osteoporosis    • Palpitations    • Peptic ulceration    • Personal history of malignant neoplasm of breast    • Personal history of malignant neoplasm of breast    • Sciatica    • Stroke (HCC) 2005    CVA--History of storke   • Trigeminal neuralgia        Past Surgical History:   Procedure Laterality Date   • BREAST BIOPSY Left 2011   • CATARACT EXTRACTION, BILATERAL     • COLONOSCOPY  09/28/2012    Adolph Martinez MD   • COLONOSCOPY N/A 11/13/2018    Procedure: COLONOSCOPY to cecum;  Surgeon: Adolph Martinez MD;  Location:  [facility];  Service: Gastroenterology   • COLONOSCOPY N/A 1/27/2021    Procedure: COLONOSCOPY TO CECUM AND TERMINAL ILEUM;  Surgeon: Emelia Wilcox MD;  Location:  [facility];  Service: Gastroenterology;  Laterality: N/A;  RECTAL BLEEDING  --DIVERTICULOSIS, HEMORRHOIDS, TORTUOUS COLON   • ENDOSCOPY N/A 11/13/2018    Procedure: ESOPHAGOGASTRODUODENOSCOPY with bx;  Surgeon: Adolph Martinez MD;  Location: [facility];  Service: Gastroenterology   • ENDOSCOPY N/A 1/27/2021    Procedure: ESOPHAGOGASTRODUODENOSCOPY WITH COLD BX;  Surgeon: Emelia Wilcox MD;  Location: [facility];  Service: Gastroenterology;  Laterality: N/A;  GERD  --GASTRITIS, HIATAL HERNIA   • EYE SURGERY     • HYSTERECTOMY      At age 31-Not due to cancer   • MASTECTOMY Left 06/30/2011    Dr. Kaitlyn Luna   • TUBAL ABDOMINAL LIGATION  1974   • UPPER GASTROINTESTINAL ENDOSCOPY  09/28/2012    Adolph Martinez MD       Family History   Problem Relation Age of Onset   • Cancer Mother 68        head and neck   • Stroke Mother    • Heart disease Mother    • Gallbladder disease Mother    • Throat cancer Mother 68   • Breast  cancer Sister 57   • Heart disease Sister    • Hypertension Sister    • Cancer Sister    • Gallbladder disease Sister    • Diabetes Sister    • Cancer Brother         head and neck   • Heart disease Brother    • Hypertension Brother    • Gallbladder disease Brother    • Lung cancer Brother 67   • Throat cancer Brother 67   • Hypertension Father    • Gallbladder disease Father    • Emphysema Father    • Heart disease Father         Enlarged heart   • Cancer Maternal Aunt    • Cancer Maternal Uncle    • Colon cancer Maternal Uncle    • Heart attack Brother    • Alcohol abuse Brother    • Hypertension Sister    • Heart disease Sister    • Hypertension Sister    • Heart disease Sister    • Hypertension Sister    • No Known Problems Other        Social History     Socioeconomic History   • Marital status:      Spouse name: Jack   • Number of children: 2   • Years of education: High School   Tobacco Use   • Smoking status: Never   • Smokeless tobacco: Never   • Tobacco comments:     CAFFEINE USE: 4-5 CUPS 1/2 & 1/2 COFFEE DAILY   Vaping Use   • Vaping Use: Never used   Substance and Sexual Activity   • Alcohol use: Not Currently     Comment: rare-7 or less drinks per week   • Drug use: No   • Sexual activity: Defer       Review of Systems   Constitutional: Positive for fatigue. Negative for unexpected weight gain and unexpected weight loss (lost 35 pounds last year with stress, but weight recently going back up). Appetite change: see HPI. Chills: some. Fever: see HPI.   HENT: Positive for postnasal drip and rhinorrhea (some improvement 2 days ago). Negative for ear pain (see HPI) and trouble swallowing. Sinus pressure: sinuses feel full.    Eyes: Negative for blurred vision. Eye discharge: mild.   Respiratory: Negative for chest tightness. Cough: see HPI. Shortness of breath: see HPI.    Cardiovascular: Negative for chest pain and palpitations.   Gastrointestinal: Negative for abdominal pain (no change with  "constipation) and GERD. Blood in stool: some at times with bowel movement, no change. Constipation: see HPI.   Endocrine: Negative for polydipsia.   Genitourinary: Negative for dysuria, frequency and hematuria.   Musculoskeletal: Back pain: some in left lower back last night; resolved at 0300 after getting up and walked around for couple of hours.   Skin: Negative for rash.   Neurological: Negative for headache. Light-headedness: some today.   Hematological: Bruises/bleeds easily: mild with Plavix.   Psychiatric/Behavioral: Depressed mood: has grief; feels better to cry and release emotions.       PHQ-9 Depression Screening  Little interest or pleasure in doing things? 3-->nearly every day   Feeling down, depressed, or hopeless? 3-->nearly every day   Trouble falling or staying asleep, or sleeping too much? 3-->nearly every day   Feeling tired or having little energy? 3-->nearly every day   Poor appetite or overeating? 0-->not at all   Feeling bad about yourself - or that you are a failure or have let yourself or your family down? 0-->not at all   Trouble concentrating on things, such as reading the newspaper or watching television? 3-->nearly every day   Moving or speaking so slowly that other people could have noticed? Or the opposite - being so fidgety or restless that you have been moving around a lot more than usual? 1-->several days   Thoughts that you would be better off dead, or of hurting yourself in some way? 0-->not at all   PHQ-9 Total Score 16   If you checked off any problems, how difficult have these problems made it for you to do your work, take care of things at home, or get along with other people? somewhat difficult     SAGE-7 anxiety score: 14    Objective   Vitals:    01/24/23 1043   BP: 124/68   BP Location: Left arm   Patient Position: Sitting   Cuff Size: Adult   Pulse: 86   Temp: 97.4 °F (36.3 °C)   SpO2: 95%   Weight: 65 kg (143 lb 3.2 oz)   Height: 157.5 cm (62\")     Body mass index is " 26.19 kg/m².    Physical Exam  Vitals and nursing note reviewed.   Constitutional:       General: She is not in acute distress.     Appearance: She is well-developed and well-groomed. She is not diaphoretic.   HENT:      Head: Normocephalic.      Right Ear: External ear normal. Decreased hearing noted. Right ear middle ear effusion: mild, few fluid bubbles behind TM. Tympanic membrane is not erythematous.      Left Ear: External ear normal. Decreased hearing noted. Left ear middle ear effusion: TM dull. Tympanic membrane is not erythematous.      Nose: Nose normal.      Right Sinus: No maxillary sinus tenderness or frontal sinus tenderness.      Left Sinus: No maxillary sinus tenderness or frontal sinus tenderness.      Mouth/Throat:      Mouth: Mucous membranes are moist.      Pharynx: No oropharyngeal exudate or posterior oropharyngeal erythema.   Eyes:      Conjunctiva/sclera: Conjunctivae normal.   Neck:      Vascular: No carotid bruit.   Cardiovascular:      Rate and Rhythm: Normal rate and regular rhythm.      Pulses: Normal pulses.      Heart sounds: Normal heart sounds. No murmur heard.  Pulmonary:      Effort: Pulmonary effort is normal. No respiratory distress.      Breath sounds: Normal breath sounds.   Abdominal:      General: Bowel sounds are normal.      Palpations: Abdomen is soft. There is no hepatomegaly or splenomegaly.      Tenderness: There is no abdominal tenderness. There is no right CVA tenderness, left CVA tenderness or guarding.   Musculoskeletal:      Cervical back: Normal range of motion and neck supple. No bony tenderness.      Thoracic back: No bony tenderness.      Lumbar back: No bony tenderness.      Right lower leg: No edema.      Left lower leg: No edema.   Lymphadenopathy:      Cervical: No cervical adenopathy.   Skin:     General: Skin is warm and dry.      Findings: No rash.   Neurological:      Mental Status: She is alert and oriented to person, place, and time.      Gait: Gait  is intact.   Psychiatric:         Mood and Affect: Mood normal.         Behavior: Behavior normal.         Thought Content: Thought content normal.         Cognition and Memory: Cognition normal.         Judgment: Judgment normal.           Assessment    Problem List Items Addressed This Visit     Malignant neoplasm of lower-outer quadrant of left female breast (HCC)    Current Assessment & Plan     Follow up as schedule with oncology.         Essential hypertension - Primary    Current Assessment & Plan     Hypertension is stable.  Continue current medications.  Ambulatory blood pressure monitoring.  Blood pressure will be reassessed at the next regular appointment.  Continue Metoprolol twice daily and Losartan and Chlorthalidone daily.  Follow up as scheduled with cardiology.         Relevant Orders    CBC & Differential (Completed)    Comprehensive Metabolic Panel (Completed)    Lipid Panel With LDL / HDL Ratio (Completed)    Hyperlipidemia, mixed    Current Assessment & Plan     Continue Atorvastatin daily.  Continue to work on healthy diet.         Relevant Orders    Lipid Panel With LDL / HDL Ratio (Completed)    GERD without esophagitis    Current Assessment & Plan     Stable.  Continue Dexilant daily.         Relevant Orders    CBC & Differential (Completed)    Hypothyroidism (acquired)    Current Assessment & Plan     Continue Levothyroxine daily.         Relevant Orders    TSH Rfx On Abnormal To Free T4 (Completed)    Primary insomnia    Relevant Medications    amitriptyline (ELAVIL) 25 MG tablet    Trigeminal neuralgia    Current Assessment & Plan     Continue Amitriptyline nightly.         Fatigue    Relevant Orders    CBC & Differential (Completed)    Comprehensive Metabolic Panel (Completed)    TSH Rfx On Abnormal To Free T4 (Completed)    Vitamin B12 & Folate (Completed)    Acute non-recurrent sinusitis    Current Assessment & Plan     Make sure drinking adequate liquids.  Try over the counter  antihistamine, such as Claritin or Allegra.  Try over the counter nasal steroid, such as Flonase or Nasacort.         Constipation    Current Assessment & Plan     Continue Linzess daily.  Follow up as scheduled with GI.         Anxiety    Current Assessment & Plan     Increase exercise.         Positive depression screening    Current Assessment & Plan     Increase exercise as tolerated.        Other Visit Diagnoses     Screening mammogram, encounter for        Relevant Orders    Mammo Screening Digital Tomosynthesis Bilateral With CAD    Fever, unspecified fever cause        Relevant Orders    POCT SARS-CoV-2 Antigen EFFIE             Return in about 6 months (around 7/24/2023) for Medicare Wellness, Recheck.or sooner if symptoms persist or worsen.  Patient declines medication for mood or seeing counselor for grief; pt states able to work through and has good support.  Will send refill of Levothyroxine to mail order pending lab results.       I spent 47 minutes caring for Carlotta on this date of service. This time includes time spent by me in the following activities:reviewing tests, obtaining and/or reviewing a separately obtained history, performing a medically appropriate examination and/or evaluation , counseling and educating the patient/family/caregiver, ordering medications, tests, or procedures and documenting information in the medical record    COVID-19 Precautions - Patient was compliant in wearing a mask. When I saw the patient, I used appropriate personal protective equipment (PPE) including mask, gloves, and eye shield (standard procedure).  Hand hygiene was completed before and after seeing the patient.

## 2023-01-24 NOTE — PATIENT INSTRUCTIONS
Make sure drinking adequate liquids.  Try over the counter antihistamine, such as Claritin or Allegra.  Try over the counter nasal steroid, such as Flonase or Nasacort.  Continue to monitor your blood pressure periodically and record results.  Continue to work on healthy diet and exercise.  Follow up pending lab results.  Follow up in 6 months for Medicare Wellness, or sooner if symptoms persist or worsen.

## 2023-01-25 DIAGNOSIS — R79.89 ABNORMAL CBC: ICD-10-CM

## 2023-01-25 DIAGNOSIS — R94.4 DECREASED GFR: Primary | ICD-10-CM

## 2023-01-25 DIAGNOSIS — E53.8 VITAMIN B12 DEFICIENCY: ICD-10-CM

## 2023-01-25 PROBLEM — F41.9 ANXIETY: Status: ACTIVE | Noted: 2023-01-25

## 2023-01-25 PROBLEM — R53.83 FATIGUE: Status: ACTIVE | Noted: 2023-01-25

## 2023-01-25 PROBLEM — Z13.31 POSITIVE DEPRESSION SCREENING: Status: ACTIVE | Noted: 2023-01-25

## 2023-01-25 PROBLEM — J01.90 ACUTE NON-RECURRENT SINUSITIS: Status: ACTIVE | Noted: 2023-01-25

## 2023-01-25 PROBLEM — I42.0 DILATED CARDIOMYOPATHY: Status: ACTIVE | Noted: 2019-04-24

## 2023-01-25 PROBLEM — K59.00 CONSTIPATION: Status: ACTIVE | Noted: 2023-01-25

## 2023-01-25 PROBLEM — K21.9 GASTROESOPHAGEAL REFLUX DISEASE WITHOUT ESOPHAGITIS: Status: ACTIVE | Noted: 2019-11-22

## 2023-01-25 LAB
ALBUMIN SERPL-MCNC: 4.1 G/DL (ref 3.7–4.7)
ALBUMIN/GLOB SERPL: 1.6 {RATIO} (ref 1.2–2.2)
ALP SERPL-CCNC: 77 IU/L (ref 44–121)
ALT SERPL-CCNC: 16 IU/L (ref 0–32)
AST SERPL-CCNC: 30 IU/L (ref 0–40)
BASOPHILS # BLD AUTO: 0 X10E3/UL (ref 0–0.2)
BASOPHILS NFR BLD AUTO: 0 %
BILIRUB SERPL-MCNC: 0.5 MG/DL (ref 0–1.2)
BUN SERPL-MCNC: 28 MG/DL (ref 8–27)
BUN/CREAT SERPL: 13 (ref 12–28)
CALCIUM SERPL-MCNC: 9.4 MG/DL (ref 8.7–10.3)
CHLORIDE SERPL-SCNC: 93 MMOL/L (ref 96–106)
CHOLEST SERPL-MCNC: 138 MG/DL (ref 100–199)
CO2 SERPL-SCNC: 27 MMOL/L (ref 20–29)
CREAT SERPL-MCNC: 2.15 MG/DL (ref 0.57–1)
EGFRCR SERPLBLD CKD-EPI 2021: 23 ML/MIN/1.73
EOSINOPHIL # BLD AUTO: 0.1 X10E3/UL (ref 0–0.4)
EOSINOPHIL NFR BLD AUTO: 1 %
ERYTHROCYTE [DISTWIDTH] IN BLOOD BY AUTOMATED COUNT: 12.6 % (ref 11.7–15.4)
FOLATE SERPL-MCNC: >20 NG/ML
GLOBULIN SER CALC-MCNC: 2.5 G/DL (ref 1.5–4.5)
GLUCOSE SERPL-MCNC: 91 MG/DL (ref 70–99)
HCT VFR BLD AUTO: 37 % (ref 34–46.6)
HDLC SERPL-MCNC: 37 MG/DL
HGB BLD-MCNC: 13.1 G/DL (ref 11.1–15.9)
IMM GRANULOCYTES # BLD AUTO: 0 X10E3/UL (ref 0–0.1)
IMM GRANULOCYTES NFR BLD AUTO: 0 %
LDLC SERPL CALC-MCNC: 78 MG/DL (ref 0–99)
LDLC/HDLC SERPL: 2.1 RATIO (ref 0–3.2)
LYMPHOCYTES # BLD AUTO: 0.8 X10E3/UL (ref 0.7–3.1)
LYMPHOCYTES NFR BLD AUTO: 8 %
MCH RBC QN AUTO: 32.3 PG (ref 26.6–33)
MCHC RBC AUTO-ENTMCNC: 35.4 G/DL (ref 31.5–35.7)
MCV RBC AUTO: 91 FL (ref 79–97)
MONOCYTES # BLD AUTO: 0.5 X10E3/UL (ref 0.1–0.9)
MONOCYTES NFR BLD AUTO: 5 %
NEUTROPHILS # BLD AUTO: 8.7 X10E3/UL (ref 1.4–7)
NEUTROPHILS NFR BLD AUTO: 86 %
PLATELET # BLD AUTO: 250 X10E3/UL (ref 150–450)
POTASSIUM SERPL-SCNC: 3.5 MMOL/L (ref 3.5–5.2)
PROT SERPL-MCNC: 6.6 G/DL (ref 6–8.5)
RBC # BLD AUTO: 4.06 X10E6/UL (ref 3.77–5.28)
SODIUM SERPL-SCNC: 137 MMOL/L (ref 134–144)
T4 FREE SERPL-MCNC: 1.47 NG/DL (ref 0.82–1.77)
TRIGL SERPL-MCNC: 129 MG/DL (ref 0–149)
TSH SERPL DL<=0.005 MIU/L-ACNC: 4.77 UIU/ML (ref 0.45–4.5)
VIT B12 SERPL-MCNC: 377 PG/ML (ref 232–1245)
VLDLC SERPL CALC-MCNC: 23 MG/DL (ref 5–40)
WBC # BLD AUTO: 10.1 X10E3/UL (ref 3.4–10.8)

## 2023-01-25 RX ORDER — LANOLIN ALCOHOL/MO/W.PET/CERES
1000 CREAM (GRAM) TOPICAL DAILY
Start: 2023-01-25

## 2023-01-26 ENCOUNTER — PATIENT ROUNDING (BHMG ONLY) (OUTPATIENT)
Dept: FAMILY MEDICINE CLINIC | Facility: CLINIC | Age: 78
End: 2023-01-26
Payer: MEDICARE

## 2023-01-26 LAB
EXPIRATION DATE: NORMAL
FLUAV AG UPPER RESP QL IA.RAPID: NOT DETECTED
FLUBV AG UPPER RESP QL IA.RAPID: NOT DETECTED
INTERNAL CONTROL: NORMAL
Lab: NORMAL
SARS-COV-2 AG UPPER RESP QL IA.RAPID: NOT DETECTED

## 2023-01-26 NOTE — ASSESSMENT & PLAN NOTE
Hypertension is stable.  Continue current medications.  Ambulatory blood pressure monitoring.  Blood pressure will be reassessed at the next regular appointment.  Continue Metoprolol twice daily and Losartan and Chlorthalidone daily.  Follow up as scheduled with cardiology.

## 2023-01-26 NOTE — ASSESSMENT & PLAN NOTE
Make sure drinking adequate liquids.  Try over the counter antihistamine, such as Claritin or Allegra.  Try over the counter nasal steroid, such as Flonase or Nasacort.

## 2023-01-26 NOTE — PROGRESS NOTES
• A My-Chart message has been sent to the patient for PATIENT ROUNDING with [facility]

## 2023-01-30 DIAGNOSIS — R94.4 DECREASED GFR: Primary | ICD-10-CM

## 2023-01-30 DIAGNOSIS — D64.9 ANEMIA, UNSPECIFIED TYPE: ICD-10-CM

## 2023-02-02 ENCOUNTER — OFFICE VISIT (OUTPATIENT)
Dept: GASTROENTEROLOGY | Facility: CLINIC | Age: 78
End: 2023-02-02
Payer: MEDICARE

## 2023-02-02 VITALS
WEIGHT: 142.2 LBS | HEIGHT: 62 IN | TEMPERATURE: 96.7 F | BODY MASS INDEX: 26.17 KG/M2 | SYSTOLIC BLOOD PRESSURE: 133 MMHG | DIASTOLIC BLOOD PRESSURE: 72 MMHG | HEART RATE: 65 BPM

## 2023-02-02 DIAGNOSIS — K59.00 CONSTIPATION, UNSPECIFIED CONSTIPATION TYPE: ICD-10-CM

## 2023-02-02 DIAGNOSIS — Z80.0 FH: COLON CANCER: ICD-10-CM

## 2023-02-02 DIAGNOSIS — K21.9 GASTROESOPHAGEAL REFLUX DISEASE WITHOUT ESOPHAGITIS: Primary | ICD-10-CM

## 2023-02-02 PROCEDURE — 99214 OFFICE O/P EST MOD 30 MIN: CPT | Performed by: NURSE PRACTITIONER

## 2023-02-02 NOTE — PROGRESS NOTES
Chief Complaint   Patient presents with   • Constipation   • Heartburn       Carlotta Pires is a  78 y.o. female here for a follow up visit for GERD.    HPI  78-year-old female presents today for follow-up visit for GERD.  She is a patient of Dr. Wilcox.  She was last seen in the office by me on 11/2/2022.  She has a history of GERD and that she does really well on Dexilant 60 mg once a day.  She denies any breakthrough reflux at this time.  Her last EGD and colonoscopy was on 1/27/2021.  She also has a history of constipation and admits as long she takes her Linzess 290 every day or every other day she does really well.  Sometimes she feels like the Linzess 290 is a little bit much and can cause some diarrhea.  She denies any dysphagia, reflux, abdominal pain, nausea and vomiting, diarrhea, constipation, active bleeding or melena.  She notes appetite is good and her weight is stable.  She does have a history of breast cancer and CHF.  She has had ulcers in the past.  She has history of stroke.Yes She does have a GI family history of her mother with throat cancer and a brother with throat cancer and a maternal uncle with colon cancer.  Past Medical History:   Diagnosis Date   • Allergic rhinitis    • Arthritis    • Cancer (HCC)     Left breast cancer   • CHF (congestive heart failure) (HCC)    • Cough    • COVID-19 11/2020   • Degenerative arthritis of thumb    • GERD without esophagitis    • H/O TIA (transient ischemic attack) and stroke 2005   • History of bone density study    • Lumbar radiculopathy    • Osteoporosis    • Palpitations    • Peptic ulceration    • Personal history of malignant neoplasm of breast    • Sciatica    • Stroke (HCC) 2005    CVA--History of storke   • Trigeminal neuralgia        Past Surgical History:   Procedure Laterality Date   • BREAST BIOPSY Left 2011   • CATARACT EXTRACTION, BILATERAL     • COLONOSCOPY  09/28/2012    Adolph Martinez MD   • COLONOSCOPY N/A 11/13/2018    Procedure:  COLONOSCOPY to cecum;  Surgeon: Adolph Martinez MD;  Location: [facility] ENDOSCOPY;  Service: Gastroenterology   • COLONOSCOPY N/A 1/27/2021    Procedure: COLONOSCOPY TO CECUM AND TERMINAL ILEUM;  Surgeon: Emelia Wilcox MD;  Location: [facility] ENDOSCOPY;  Service: Gastroenterology;  Laterality: N/A;  RECTAL BLEEDING  --DIVERTICULOSIS, HEMORRHOIDS, TORTUOUS COLON   • ENDOSCOPY N/A 11/13/2018    Procedure: ESOPHAGOGASTRODUODENOSCOPY with bx;  Surgeon: Adolph Martinez MD;  Location: [facility] ENDOSCOPY;  Service: Gastroenterology   • ENDOSCOPY N/A 1/27/2021    Procedure: ESOPHAGOGASTRODUODENOSCOPY WITH COLD BX;  Surgeon: Emelia Wilcox MD;  Location: [facility] ENDOSCOPY;  Service: Gastroenterology;  Laterality: N/A;  GERD  --GASTRITIS, HIATAL HERNIA   • EYE SURGERY     • HYSTERECTOMY      At age 31-Not due to cancer   • MASTECTOMY Left 06/30/2011    Dr. Kaitlyn Luna   • TUBAL ABDOMINAL LIGATION  1974   • UPPER GASTROINTESTINAL ENDOSCOPY  09/28/2012    Adolph Martinez MD       Scheduled Meds:    Continuous Infusions:No current facility-administered medications for this visit.      PRN Meds:.    Allergies   Allergen Reactions   • Lisinopril Cough   • Contrast Dye (Echo Or Unknown Ct/Mr) Itching and Rash     IVP Dye   • Tartrazine Hives       Social History     Socioeconomic History   • Marital status:      Spouse name: Jack   • Number of children: 2   • Years of education: High School   Tobacco Use   • Smoking status: Never   • Smokeless tobacco: Never   • Tobacco comments:     CAFFEINE USE: 4-5 CUPS 1/2 & 1/2 COFFEE DAILY   Vaping Use   • Vaping Use: Never used   Substance and Sexual Activity   • Alcohol use: Not Currently     Comment: rare-7 or less drinks per week   • Drug use: No   • Sexual activity: Defer       Family History   Problem Relation Age of Onset   • Cancer Mother 68        head and neck   • Stroke Mother    • Heart disease Mother    • Gallbladder disease Mother    • Throat cancer Mother 68   •  Breast cancer Sister 57   • Heart disease Sister    • Hypertension Sister    • Cancer Sister    • Gallbladder disease Sister    • Diabetes Sister    • Cancer Brother         head and neck   • Heart disease Brother    • Hypertension Brother    • Gallbladder disease Brother    • Lung cancer Brother 67   • Throat cancer Brother 67   • Hypertension Father    • Gallbladder disease Father    • Emphysema Father    • Heart disease Father         Enlarged heart   • Cancer Maternal Aunt    • Cancer Maternal Uncle    • Colon cancer Maternal Uncle    • Heart attack Brother    • Alcohol abuse Brother    • Hypertension Sister    • Heart disease Sister    • Hypertension Sister    • Heart disease Sister    • Hypertension Sister    • No Known Problems Other        Review of Systems   Constitutional: Negative for appetite change, chills, diaphoresis, fatigue, fever and unexpected weight change.   HENT: Negative for nosebleeds, postnasal drip, sore throat, trouble swallowing and voice change.    Respiratory: Negative for cough, choking, chest tightness, shortness of breath, wheezing and stridor.    Cardiovascular: Negative for chest pain, palpitations and leg swelling.   Gastrointestinal: Negative for abdominal distention, abdominal pain, anal bleeding, blood in stool, constipation, diarrhea, nausea, rectal pain and vomiting.   Endocrine: Negative for polydipsia, polyphagia and polyuria.   Musculoskeletal: Negative for gait problem.   Skin: Negative for rash and wound.   Allergic/Immunologic: Negative for food allergies.   Neurological: Negative for dizziness, speech difficulty and light-headedness.   Psychiatric/Behavioral: Negative for confusion, self-injury, sleep disturbance and suicidal ideas.       Vitals:    02/02/23 1057   BP: 133/72   Pulse: 65   Temp: 96.7 °F (35.9 °C)       Physical Exam  Constitutional:       General: She is not in acute distress.     Appearance: She is well-developed. She is not ill-appearing.   HENT:       Head: Normocephalic.   Eyes:      Pupils: Pupils are equal, round, and reactive to light.   Cardiovascular:      Rate and Rhythm: Normal rate and regular rhythm.      Heart sounds: Normal heart sounds.   Pulmonary:      Effort: Pulmonary effort is normal.      Breath sounds: Normal breath sounds.   Abdominal:      General: Bowel sounds are normal. There is no distension.      Palpations: Abdomen is soft. There is no mass.      Tenderness: There is no abdominal tenderness. There is no guarding or rebound.      Hernia: No hernia is present.   Musculoskeletal:         General: Normal range of motion.   Skin:     General: Skin is warm and dry.   Neurological:      Mental Status: She is alert and oriented to person, place, and time.   Psychiatric:         Speech: Speech normal.         Behavior: Behavior normal.         Judgment: Judgment normal.         No radiology results for the last 7 days     Diagnoses and all orders for this visit:    1. Gastroesophageal reflux disease without esophagitis (Primary)  Overview:  Added automatically from request for surgery [id number]    Formatting of this note might be different from the original.  Added automatically from request for surgery [id number]      2. Constipation, unspecified constipation type    3. FH: colon cancer  Overview:  Added automatically from request for surgery [id number]    GERD seems well controlled on Dexilant 60 mg daily.  Since she is doing so well lets decrease the dose down to 30 mg daily and see how she does.  Continue GERD precautions.  Bowels moving well on Linzess 290 daily.  Since she is doing really well on the Linzess 290 and sometimes she feels like it is a little bit too much lets decrease her down to 145 and see how she does.  Samples given to her today.  Overall I think she is doing really well.  Patient to call the office with any issues.  Patient to follow-up with me as planned.  Patient is agreeable to the plan.

## 2023-02-13 ENCOUNTER — TELEPHONE (OUTPATIENT)
Dept: GASTROENTEROLOGY | Facility: CLINIC | Age: 78
End: 2023-02-13
Payer: MEDICARE

## 2023-02-13 RX ORDER — DEXLANSOPRAZOLE 60 MG/1
60 CAPSULE, DELAYED RELEASE ORAL DAILY
Qty: 90 CAPSULE | Refills: 3 | Status: SHIPPED | OUTPATIENT
Start: 2023-02-13 | End: 2023-03-07

## 2023-02-13 NOTE — TELEPHONE ENCOUNTER
Caller: Carlotta Pires    Relationship to patient: Self    Best call back number: [phone]    Patient is needing: PATIENT STATES THAT THE MEDICATION SHE WAS GIVEN IS WORKING.LINZESS 145  IS THE MEDICATION.     PATIENT WOULD LIKE FOR ALAN TO EITHER GIVE HER A CALL OR REFILL THE MEDICATION DEPENDING ON WHAT SHE BELIEVES IS BEST

## 2023-02-13 NOTE — TELEPHONE ENCOUNTER
Caller: Pranay Carlotta CRISTEL    Relationship: Self    Best call back number: [phone]    Requested Prescriptions:   Requested Prescriptions     Pending Prescriptions Disp Refills   • dexlansoprazole (Dexilant) 60 MG capsule 90 capsule 3     Sig: Take 1 capsule by mouth Daily.        Pharmacy where request should be sent:  [facility]     Additional details provided by patient: PATIENT STATED TO PLEASE SEND MEDICATION THROUGH [facility]    Does the patient have less than a 3 day supply:  [x] Yes  [] No    Would you like a call back once the refill request has been completed: [x] Yes [] No    If the office needs to give you a call back, can they leave a voicemail: [x] Yes [] No    Dakota Marie Rep   02/13/23 09:05 EST

## 2023-02-20 ENCOUNTER — APPOINTMENT (OUTPATIENT)
Dept: WOMENS IMAGING | Facility: HOSPITAL | Age: 78
End: 2023-02-20
Payer: MEDICARE

## 2023-02-20 PROCEDURE — 77067 SCR MAMMO BI INCL CAD: CPT | Performed by: RADIOLOGY

## 2023-02-20 PROCEDURE — 77063 BREAST TOMOSYNTHESIS BI: CPT | Performed by: RADIOLOGY

## 2023-02-24 DIAGNOSIS — Z12.31 SCREENING MAMMOGRAM, ENCOUNTER FOR: ICD-10-CM

## 2023-03-06 RX ORDER — LOSARTAN POTASSIUM 100 MG/1
TABLET ORAL
Qty: 90 TABLET | Refills: 3 | Status: SHIPPED | OUTPATIENT
Start: 2023-03-06

## 2023-03-07 RX ORDER — DEXLANSOPRAZOLE 60 MG/1
CAPSULE, DELAYED RELEASE ORAL
Qty: 90 CAPSULE | Refills: 3 | Status: SHIPPED | OUTPATIENT
Start: 2023-03-07

## 2023-03-27 RX ORDER — METOPROLOL SUCCINATE 50 MG/1
50 TABLET, EXTENDED RELEASE ORAL 2 TIMES DAILY
Qty: 180 TABLET | Refills: 3 | Status: SHIPPED | OUTPATIENT
Start: 2023-03-27

## 2023-03-31 ENCOUNTER — TELEPHONE (OUTPATIENT)
Dept: ONCOLOGY | Facility: HOSPITAL | Age: 78
End: 2023-03-31
Payer: MEDICARE

## 2023-04-10 RX ORDER — METOPROLOL SUCCINATE 50 MG/1
50 TABLET, EXTENDED RELEASE ORAL 2 TIMES DAILY
Qty: 180 TABLET | Refills: 3 | Status: SHIPPED | OUTPATIENT
Start: 2023-04-10

## 2023-04-12 ENCOUNTER — HOSPITAL ENCOUNTER (OUTPATIENT)
Dept: GENERAL RADIOLOGY | Facility: HOSPITAL | Age: 78
Discharge: HOME | End: 2023-04-12
Admitting: NURSE PRACTITIONER
Payer: MEDICARE

## 2023-04-12 ENCOUNTER — OFFICE VISIT (OUTPATIENT)
Dept: FAMILY MEDICINE CLINIC | Facility: CLINIC | Age: 78
End: 2023-04-12
Payer: MEDICARE

## 2023-04-12 VITALS
WEIGHT: 144 LBS | DIASTOLIC BLOOD PRESSURE: 77 MMHG | SYSTOLIC BLOOD PRESSURE: 132 MMHG | HEART RATE: 65 BPM | HEIGHT: 62 IN | OXYGEN SATURATION: 97 % | BODY MASS INDEX: 26.5 KG/M2 | TEMPERATURE: 97.1 F

## 2023-04-12 DIAGNOSIS — Z85.3 HISTORY OF BREAST CANCER: ICD-10-CM

## 2023-04-12 DIAGNOSIS — K59.00 CONSTIPATION, UNSPECIFIED CONSTIPATION TYPE: ICD-10-CM

## 2023-04-12 DIAGNOSIS — J30.1 ACUTE SEASONAL ALLERGIC RHINITIS DUE TO POLLEN: ICD-10-CM

## 2023-04-12 DIAGNOSIS — I42.0 DILATED CARDIOMYOPATHY: ICD-10-CM

## 2023-04-12 DIAGNOSIS — E78.2 HYPERLIPIDEMIA, MIXED: ICD-10-CM

## 2023-04-12 DIAGNOSIS — R05.9 COUGH, UNSPECIFIED TYPE: ICD-10-CM

## 2023-04-12 DIAGNOSIS — G50.0 TRIGEMINAL NEURALGIA: ICD-10-CM

## 2023-04-12 DIAGNOSIS — E03.9 HYPOTHYROIDISM (ACQUIRED): ICD-10-CM

## 2023-04-12 DIAGNOSIS — K21.9 GASTROESOPHAGEAL REFLUX DISEASE WITHOUT ESOPHAGITIS: ICD-10-CM

## 2023-04-12 DIAGNOSIS — I10 ESSENTIAL HYPERTENSION: Primary | ICD-10-CM

## 2023-04-12 DIAGNOSIS — E53.8 VITAMIN B12 DEFICIENCY: ICD-10-CM

## 2023-04-12 DIAGNOSIS — F51.01 PRIMARY INSOMNIA: ICD-10-CM

## 2023-04-12 PROCEDURE — 71046 X-RAY EXAM CHEST 2 VIEWS: CPT

## 2023-04-12 RX ORDER — AMITRIPTYLINE HYDROCHLORIDE 25 MG/1
25 TABLET, FILM COATED ORAL NIGHTLY
Qty: 90 TABLET | Refills: 2 | Status: SHIPPED | OUTPATIENT
Start: 2023-04-12

## 2023-04-12 NOTE — PROGRESS NOTES
Subjective   Carlotta Pires is a 78 y.o. female.     Chief Complaint   Patient presents with   • Hypertension   • Cough       History of Present Illness   Patient presents for follow up HTN: takes Metoprolol twice daily and Losartan and Chlorthalidone daily; sees Dr. Barker, cardiology; has follow up later this month; also takes Plavix daily for history of stroke 25 years ago; no residual deficit other than sometimes flips numbers wrong; monitors BP on occasion, BP typically runs 130s-140s/80s; had a lot of stress recently and would be a little high at times, but would come back down; has not checked BP in last couple of months; no headaches; no orthostasis for most part; no swelling other than some lymphedema in left arm since mastectomy.    For last 2 months, has had dry cough on occasion and will have coughing spells; has seen ENT for similar problems in past and was related to diuretic; some mild reflux symptoms; has improved since has been on Dexilant daily; will start feeling bad around 6 p.m. and will have aching in joints; has had 2 episodes of temp 100 in evenings; also c/o toes numb on bilateral feet; no change in chronic back pain; will have some radiation of pain down left leg at times; no current back pain; no weakness in legs; some weakness in general; no bladder or bowel dysfunction; has learned to limit activity and helps; takes Aleve rarely and helps; does not take very often due to stomach; some SOA with coughing spells; some dyspnea if carrying baskets up and down steps.    F/U Hyperlipidemia: takes Atorvastatin daily; no myalgias; watches intake of saturated fats; some exercise, has been walking; last labs good.     F/U Hypothyroidism: takes Levothyroxine daily except 2 tabs on Sundays; takes on empty stomach; no missed doses.     F/U constipation: takes Linzess daily and helps; cannot take daily or is too much; will take every other day; sees GI.      F/U [place]: takes Dexilant daily and helps; has  had EGD in past; sees GI.     Sees Dr. Chanel, oncology for history of breast cancer; history of chemo and radiation; completed hormonal therapy 1/31/17; has follow up tomorrow.     Takes Amitriptyline nightly for trigeminal neuralgia; also helps sleep; life change without having to care for daughter any more since she passed; patient declines medication for mood at this time; pt feels like typical grief and working through; goes to take care of sister 1 day per week; has good days and bad days; stays busy; no SI/HI.    F/U Vitamin B12 deficiency: did not start Vitamin B12 daily.      The following portions of the patient's history were reviewed and updated as appropriate: allergies, current medications, past family history, past medical history, past social history, past surgical history and problem list.    Current Outpatient Medications on File Prior to Visit   Medication Sig   • atorvastatin (LIPITOR) 10 MG tablet Take 1 tablet by mouth Daily.   • chlorthalidone (HYGROTON) 25 MG tablet Take 1 tablet by mouth Daily.   • clopidogrel (PLAVIX) 75 MG tablet Take 1 tablet by mouth Daily.   • Dexilant 60 MG capsule TAKE ONE CAPSULE BY MOUTH EVERY DAY   • levothyroxine (Euthyrox) 75 MCG tablet Take 1 tablet by mouth Daily. Take one po qd except on Sunday take 2.   • linaclotide (LINZESS) 145 MCG capsule capsule Take 1 capsule by mouth Every Morning Before Breakfast.   • losartan (COZAAR) 100 MG tablet TAKE ONE TABLET BY MOUTH EVERY DAY   • Melatonin 10 MG capsule Take 1 capsule by mouth At Night As Needed.   • metoprolol succinate XL (TOPROL-XL) 50 MG 24 hr tablet Take 1 tablet by mouth 2 (Two) Times a Day.   • diphenhydrAMINE (BENADRYL) 25 mg capsule Take 1 capsule by mouth At Night As Needed for Itching.   • vitamin B-12 (CYANOCOBALAMIN) 1000 MCG tablet Take 1 tablet by mouth Daily.     No current facility-administered medications on file prior to visit.        Past Medical History:   Diagnosis Date   • Allergic  rhinitis    • Arthritis    • Cancer     Left breast cancer   • CHF (congestive heart failure)    • Cough    • COVID-19 11/2020   • Degenerative arthritis of thumb    • GERD without esophagitis    • H/O TIA (transient ischemic attack) and stroke 2005   • History of bone density study    • Lumbar radiculopathy    • Osteoporosis    • Palpitations    • Peptic ulceration    • Personal history of malignant neoplasm of breast    • Sciatica    • Stroke 2005    CVA--History of storke   • Trigeminal neuralgia        Past Surgical History:   Procedure Laterality Date   • BREAST BIOPSY Left 2011   • CATARACT EXTRACTION, BILATERAL     • COLONOSCOPY  09/28/2012    Adolph Martinez MD   • COLONOSCOPY N/A 11/13/2018    Procedure: COLONOSCOPY to cecum;  Surgeon: Adolph Martinez MD;  [facility];  Service: Gastroenterology   • COLONOSCOPY N/A 1/27/2021    Procedure: COLONOSCOPY TO CECUM AND TERMINAL ILEUM;  Surgeon: Emelia Wilcox MD;  [facility];  Service: Gastroenterology;  Laterality: N/A;  RECTAL BLEEDING  --DIVERTICULOSIS, HEMORRHOIDS, TORTUOUS COLON   • ENDOSCOPY N/A 11/13/2018    Procedure: ESOPHAGOGASTRODUODENOSCOPY with bx;  Surgeon: Adolph Martinez MD;  [facility];  Service: Gastroenterology   • ENDOSCOPY N/A 1/27/2021    Procedure: ESOPHAGOGASTRODUODENOSCOPY WITH COLD BX;  Surgeon: Emelia Wilcox MD;  Location: [facility] ENDOSCOPY;  Service: Gastroenterology;  Laterality: N/A;  GERD  --GASTRITIS, HIATAL HERNIA   • EYE SURGERY     • HYSTERECTOMY      At age 31-Not due to cancer   • MASTECTOMY Left 06/30/2011    Dr. Kaitlyn Luna   • TUBAL ABDOMINAL LIGATION  1974   • UPPER GASTROINTESTINAL ENDOSCOPY  09/28/2012    Adolph Martinez MD       Family History   Problem Relation Age of Onset   • Cancer Mother 68        head and neck   • Stroke Mother    • Heart disease Mother    • Gallbladder disease Mother    • Throat cancer Mother 68   • Breast cancer Sister 57   • Heart disease  Sister    • Hypertension Sister    • Cancer Sister    • Gallbladder disease Sister    • Diabetes Sister    • Cancer Brother         head and neck   • Heart disease Brother    • Hypertension Brother    • Gallbladder disease Brother    • Lung cancer Brother 67   • Throat cancer Brother 67   • Hypertension Father    • Gallbladder disease Father    • Emphysema Father    • Heart disease Father         Enlarged heart   • Cancer Maternal Aunt    • Cancer Maternal Uncle    • Colon cancer Maternal Uncle    • Heart attack Brother    • Alcohol abuse Brother    • Hypertension Sister    • Heart disease Sister    • Hypertension Sister    • Heart disease Sister    • Hypertension Sister    • No Known Problems Other        Social History     Socioeconomic History   • Marital status:      Spouse name: Jack   • Number of children: 2   • Years of education: High School   Tobacco Use   • Smoking status: Never   • Smokeless tobacco: Never   • Tobacco comments:     CAFFEINE USE: 4-5 CUPS 1/2 & 1/2 COFFEE DAILY   Vaping Use   • Vaping Use: Never used   Substance and Sexual Activity   • Alcohol use: Not Currently     Comment: rare-7 or less drinks per week   • Drug use: No   • Sexual activity: Defer       Review of Systems   Constitutional: Positive for fatigue. Negative for chills, unexpected weight gain and unexpected weight loss. Appetite change: some decrease in appetite, but forces self to eat because knows she should. Fever: see HPI.   HENT: Negative for ear pain, postnasal drip, sinus pressure, sore throat and trouble swallowing. Rhinorrhea: some stuffy nose at times.    Eyes: Negative for blurred vision and discharge.   Respiratory: Positive for cough. Negative for chest tightness. Shortness of breath: see HPI.    Cardiovascular: Negative for chest pain. Palpitations: some at times, no change.   Gastrointestinal: Negative for abdominal pain (rare with constipation), blood in stool and GERD (none recently, avoiding overeating  "helps).   Endocrine: Positive for cold intolerance and polydipsia. Negative for heat intolerance.   Genitourinary: Negative for dysuria and frequency.   Musculoskeletal: Back pain: see HPI.   Skin: Negative for rash.   Neurological: Negative for syncope. Weakness: some in general; see HPI.   Hematological: Bruises/bleeds easily: some with Plavix.       Objective   Vitals:    04/12/23 1106   BP: 132/77   BP Location: Left arm   Patient Position: Sitting   Cuff Size: Adult   Pulse: 65   Temp: 97.1 °F (36.2 °C)   TempSrc: Temporal   SpO2: 97%   Weight: 65.3 kg (144 lb)   Height: 157.5 cm (62\")     Body mass index is 26.34 kg/m².    Physical Exam  Vitals and nursing note reviewed.   Constitutional:       General: She is not in acute distress.     Appearance: She is well-developed and well-groomed. She is not diaphoretic.   HENT:      Head: Normocephalic.      Right Ear: External ear normal. No decreased hearing noted. Right ear middle ear effusion: mild. Tympanic membrane is not erythematous.      Left Ear: External ear normal. No decreased hearing noted. Impacted cerumen: cerumen partially blocking TM. Tympanic membrane is not erythematous.      Nose: Nose normal.      Right Sinus: No maxillary sinus tenderness or frontal sinus tenderness.      Left Sinus: No maxillary sinus tenderness or frontal sinus tenderness.      Mouth/Throat:      Mouth: Mucous membranes are moist.      Pharynx: No oropharyngeal exudate (drainage noted in posterior pharynx). Posterior oropharyngeal erythema: mild in posterior pharynx.   Eyes:      Conjunctiva/sclera: Conjunctivae normal.   Neck:      Vascular: No carotid bruit.   Cardiovascular:      Rate and Rhythm: Normal rate and regular rhythm.      Pulses: Normal pulses.      Heart sounds: Normal heart sounds. No murmur heard.  Pulmonary:      Effort: Pulmonary effort is normal. No respiratory distress.      Breath sounds: Normal breath sounds. No rales.   Abdominal:      General: Bowel " sounds are normal.      Palpations: Abdomen is soft. There is no hepatomegaly or splenomegaly.      Tenderness: There is no abdominal tenderness. There is no guarding.   Musculoskeletal:      Cervical back: Normal range of motion and neck supple. No bony tenderness.      Thoracic back: No bony tenderness.      Lumbar back: No bony tenderness. Negative right straight leg raise test. Abnormal left straight leg raise test: discomfort in left lower back with SLR at 75 degrees.      Right hip: Normal range of motion.      Left hip: Normal range of motion.      Right lower leg: No edema.      Left lower leg: No edema.   Lymphadenopathy:      Cervical: No cervical adenopathy.   Skin:     General: Skin is warm and dry.      Findings: No rash.   Neurological:      Mental Status: She is alert and oriented to person, place, and time.      Gait: Gait is intact.      Deep Tendon Reflexes:      Reflex Scores:       Patellar reflexes are 2+ on the right side and 2+ on the left side.  Psychiatric:         Mood and Affect: Mood normal.         Behavior: Behavior normal.         Thought Content: Thought content normal.         Cognition and Memory: Cognition normal.         Judgment: Judgment normal.         Lab Results   Component Value Date    CHLPL 138 01/24/2023    TRIG 129 01/24/2023    HDL 37 (L) 01/24/2023    VLDL 23 01/24/2023    LDL 78 01/24/2023 1/27/23 BMP WNL except creat 1.08, eGFR 53.7; CBC WNL except Hgb 11.9  2/10/23 CBC WNL except lymph 18.9    Assessment    Problem List Items Addressed This Visit     Essential hypertension - Primary    Current Assessment & Plan     Hypertension is stable.  Continue current medications.  Ambulatory blood pressure monitoring.  Blood pressure will be reassessed in 3 months.  Continue Metoprolol twice daily and Losartan and Chlorthalidone daily.         Relevant Orders    CBC & Differential (Completed)    Comprehensive Metabolic Panel (Completed)    Hyperlipidemia, mixed    Current  Assessment & Plan     Continue Atorvastatin daily.  Continue to work on healthy diet and exercise.         Relevant Orders    Comprehensive Metabolic Panel (Completed)    Acute seasonal allergic rhinitis due to pollen    Current Assessment & Plan     Start daily antihistamine, such as Claritin or Allegra instead of Benadryl.         Hypothyroidism (acquired)    Current Assessment & Plan     Continue Levothyroxine daily.         Relevant Orders    TSH Rfx On Abnormal To Free T4 (Completed)    Primary insomnia    Current Assessment & Plan     Stable.  Continue Amitriptyline nightly.         Relevant Medications    amitriptyline (ELAVIL) 25 MG tablet    Trigeminal neuralgia    Current Assessment & Plan     Stable.  Continue Amitriptyline nightly.         Gastroesophageal reflux disease without esophagitis    Overview     Added automatically from request for surgery [id number]    Formatting of this note might be different from the original.  Added automatically from request for surgery [id number]         Current Assessment & Plan     Stable.  Continue Dexilant daily.         History of breast cancer    Overview     Added automatically from request for surgery [id number]         Current Assessment & Plan     Follow up as scheduled with oncology.         Dilated cardiomyopathy    Current Assessment & Plan     Continue current medications.  Follow up as scheduled with cardiology.         Constipation    Current Assessment & Plan     Stable.  Continue Linzess daily.         Vitamin B12 deficiency    Current Assessment & Plan     Start Vitamin B12 1000 mcg daily.         Relevant Orders    Vitamin B12 & Folate (Completed)    Cough    Current Assessment & Plan     Get chest [facility] at [facility].  Start daily antihistamine, such as Claritin or Allegra instead of Benadryl.         Relevant Orders    CBC & Differential (Completed)    XR Chest PA & Lateral (Completed)        Return in about 3 months (around  7/12/2023) for Medicare Wellness, Recheck.or sooner if symptoms persist or worsen.  Will consider imaging of lower back if numbness in toes persists after starting Vitamin B12; will recheck labs today.

## 2023-04-12 NOTE — PATIENT INSTRUCTIONS
Get chest x-ray at [facility] [place].  Start daily antihistamine, such as Claritin or Allegra instead of Benadryl.  Start Vitamin B12 1000 mcg daily.  Continue to monitor your blood pressure periodically and record results.  Continue to work on healthy diet and exercise.  Follow up pending lab/x-ray results.  Follow up in 3 months for Medicare Wellness, or sooner if symptoms persist or worsen.

## 2023-04-13 ENCOUNTER — LAB (OUTPATIENT)
Dept: OTHER | Facility: HOSPITAL | Age: 78
End: 2023-04-13
Payer: MEDICARE

## 2023-04-13 ENCOUNTER — OFFICE VISIT (OUTPATIENT)
Dept: ONCOLOGY | Facility: CLINIC | Age: 78
End: 2023-04-13
Payer: MEDICARE

## 2023-04-13 VITALS
OXYGEN SATURATION: 95 % | RESPIRATION RATE: 18 BRPM | SYSTOLIC BLOOD PRESSURE: 127 MMHG | TEMPERATURE: 96.6 F | DIASTOLIC BLOOD PRESSURE: 71 MMHG | HEART RATE: 88 BPM | WEIGHT: 145.6 LBS | BODY MASS INDEX: 26.79 KG/M2 | HEIGHT: 62 IN

## 2023-04-13 DIAGNOSIS — C50.512 MALIGNANT NEOPLASM OF LOWER-OUTER QUADRANT OF LEFT BREAST OF FEMALE, ESTROGEN RECEPTOR POSITIVE: Primary | ICD-10-CM

## 2023-04-13 DIAGNOSIS — Z17.0 MALIGNANT NEOPLASM OF LOWER-OUTER QUADRANT OF LEFT BREAST OF FEMALE, ESTROGEN RECEPTOR POSITIVE: ICD-10-CM

## 2023-04-13 DIAGNOSIS — C50.512 MALIGNANT NEOPLASM OF LOWER-OUTER QUADRANT OF LEFT BREAST OF FEMALE, ESTROGEN RECEPTOR POSITIVE: ICD-10-CM

## 2023-04-13 DIAGNOSIS — Z17.0 MALIGNANT NEOPLASM OF LOWER-OUTER QUADRANT OF LEFT BREAST OF FEMALE, ESTROGEN RECEPTOR POSITIVE: Primary | ICD-10-CM

## 2023-04-13 LAB
ALBUMIN SERPL-MCNC: 4.4 G/DL (ref 3.7–4.7)
ALBUMIN/GLOB SERPL: 1.8 {RATIO} (ref 1.2–2.2)
ALP SERPL-CCNC: 91 IU/L (ref 44–121)
ALT SERPL-CCNC: 15 IU/L (ref 0–32)
AST SERPL-CCNC: 25 IU/L (ref 0–40)
BASOPHILS # BLD AUTO: 0 X10E3/UL (ref 0–0.2)
BASOPHILS # BLD AUTO: 0.02 10*3/MM3 (ref 0–0.2)
BASOPHILS NFR BLD AUTO: 0 %
BASOPHILS NFR BLD AUTO: 0.4 % (ref 0–1.5)
BILIRUB SERPL-MCNC: 0.5 MG/DL (ref 0–1.2)
BUN SERPL-MCNC: 8 MG/DL (ref 8–27)
BUN/CREAT SERPL: 12 (ref 12–28)
CALCIUM SERPL-MCNC: 9.8 MG/DL (ref 8.7–10.3)
CHLORIDE SERPL-SCNC: 95 MMOL/L (ref 96–106)
CO2 SERPL-SCNC: 28 MMOL/L (ref 20–29)
CREAT SERPL-MCNC: 0.65 MG/DL (ref 0.57–1)
DEPRECATED RDW RBC AUTO: 39.3 FL (ref 37–54)
EGFRCR SERPLBLD CKD-EPI 2021: 91 ML/MIN/1.73
EOSINOPHIL # BLD AUTO: 0.04 10*3/MM3 (ref 0–0.4)
EOSINOPHIL # BLD AUTO: 0.1 X10E3/UL (ref 0–0.4)
EOSINOPHIL NFR BLD AUTO: 0.7 % (ref 0.3–6.2)
EOSINOPHIL NFR BLD AUTO: 1 %
ERYTHROCYTE [DISTWIDTH] IN BLOOD BY AUTOMATED COUNT: 12.2 % (ref 11.7–15.4)
ERYTHROCYTE [DISTWIDTH] IN BLOOD BY AUTOMATED COUNT: 12.2 % (ref 12.3–15.4)
FOLATE SERPL-MCNC: >20 NG/ML
GLOBULIN SER CALC-MCNC: 2.5 G/DL (ref 1.5–4.5)
GLUCOSE SERPL-MCNC: 95 MG/DL (ref 70–99)
HCT VFR BLD AUTO: 37.4 % (ref 34–46.6)
HCT VFR BLD AUTO: 39.6 % (ref 34–46.6)
HGB BLD-MCNC: 13.3 G/DL (ref 12–15.9)
HGB BLD-MCNC: 13.7 G/DL (ref 11.1–15.9)
IMM GRANULOCYTES # BLD AUTO: 0 X10E3/UL (ref 0–0.1)
IMM GRANULOCYTES # BLD AUTO: 0.04 10*3/MM3 (ref 0–0.05)
IMM GRANULOCYTES NFR BLD AUTO: 0 %
IMM GRANULOCYTES NFR BLD AUTO: 0.7 % (ref 0–0.5)
LYMPHOCYTES # BLD AUTO: 0.9 X10E3/UL (ref 0.7–3.1)
LYMPHOCYTES # BLD AUTO: 1.17 10*3/MM3 (ref 0.7–3.1)
LYMPHOCYTES NFR BLD AUTO: 17 %
LYMPHOCYTES NFR BLD AUTO: 20.5 % (ref 19.6–45.3)
MCH RBC QN AUTO: 31.4 PG (ref 26.6–33)
MCH RBC QN AUTO: 32.1 PG (ref 26.6–33)
MCHC RBC AUTO-ENTMCNC: 34.6 G/DL (ref 31.5–35.7)
MCHC RBC AUTO-ENTMCNC: 35.6 G/DL (ref 31.5–35.7)
MCV RBC AUTO: 88.2 FL (ref 79–97)
MCV RBC AUTO: 93 FL (ref 79–97)
MONOCYTES # BLD AUTO: 0.4 X10E3/UL (ref 0.1–0.9)
MONOCYTES # BLD AUTO: 0.56 10*3/MM3 (ref 0.1–0.9)
MONOCYTES NFR BLD AUTO: 7 %
MONOCYTES NFR BLD AUTO: 9.8 % (ref 5–12)
NEUTROPHILS # BLD AUTO: 3.9 X10E3/UL (ref 1.4–7)
NEUTROPHILS NFR BLD AUTO: 3.87 10*3/MM3 (ref 1.7–7)
NEUTROPHILS NFR BLD AUTO: 67.9 % (ref 42.7–76)
NEUTROPHILS NFR BLD AUTO: 75 %
NRBC BLD AUTO-RTO: 0 /100 WBC (ref 0–0.2)
PLATELET # BLD AUTO: 198 10*3/MM3 (ref 140–450)
PLATELET # BLD AUTO: 233 X10E3/UL (ref 150–450)
PMV BLD AUTO: 9.6 FL (ref 6–12)
POTASSIUM SERPL-SCNC: 3.2 MMOL/L (ref 3.5–5.2)
PROT SERPL-MCNC: 6.9 G/DL (ref 6–8.5)
RBC # BLD AUTO: 4.24 10*6/MM3 (ref 3.77–5.28)
RBC # BLD AUTO: 4.27 X10E6/UL (ref 3.77–5.28)
SODIUM SERPL-SCNC: 138 MMOL/L (ref 134–144)
TSH SERPL DL<=0.005 MIU/L-ACNC: 2.11 UIU/ML (ref 0.45–4.5)
VIT B12 SERPL-MCNC: 386 PG/ML (ref 232–1245)
WBC # BLD AUTO: 5.2 X10E3/UL (ref 3.4–10.8)
WBC NRBC COR # BLD: 5.7 10*3/MM3 (ref 3.4–10.8)

## 2023-04-13 PROCEDURE — 85025 COMPLETE CBC W/AUTO DIFF WBC: CPT | Performed by: INTERNAL MEDICINE

## 2023-04-13 PROCEDURE — 36415 COLL VENOUS BLD VENIPUNCTURE: CPT

## 2023-04-13 PROCEDURE — 99214 OFFICE O/P EST MOD 30 MIN: CPT | Performed by: INTERNAL MEDICINE

## 2023-04-13 PROCEDURE — 3074F SYST BP LT 130 MM HG: CPT | Performed by: INTERNAL MEDICINE

## 2023-04-13 PROCEDURE — 1126F AMNT PAIN NOTED NONE PRSNT: CPT | Performed by: INTERNAL MEDICINE

## 2023-04-13 PROCEDURE — 3078F DIAST BP <80 MM HG: CPT | Performed by: INTERNAL MEDICINE

## 2023-04-13 NOTE — PROGRESS NOTES
Subjective .     REASONS FOR FOLLOWUP:  Breast cancer    HISTORY OF PRESENT ILLNESS:  The patient is a 78 y.o. year old female  who is here for follow-up with the above-mentioned history.    Feeling fine.  Eating well.  No nausea.  No SOA.  Had an elevation in her creatinine in January but this has since normalized.    Past Medical History:   Diagnosis Date   • Allergic rhinitis    • Arthritis    • Cancer     Left breast cancer   • CHF (congestive heart failure)    • Cough    • COVID-19 11/2020   • Degenerative arthritis of thumb    • GERD without esophagitis    • H/O TIA (transient ischemic attack) and stroke 2005   • History of bone density study    • Lumbar radiculopathy    • Osteoporosis    • Palpitations    • Peptic ulceration    • Personal history of malignant neoplasm of breast    • Sciatica    • Stroke 2005    CVA--History of storke   • Trigeminal neuralgia      Past Surgical History:   Procedure Laterality Date   • BREAST BIOPSY Left 2011   • CATARACT EXTRACTION, BILATERAL     • COLONOSCOPY  09/28/2012    Adolph Martinez MD   • COLONOSCOPY N/A 11/13/2018    Procedure: COLONOSCOPY to cecum;  Surgeon: Adolph Martinez MD;  Location: [facility] ENDOSCOPY;  Service: Gastroenterology   • COLONOSCOPY N/A 1/27/2021    Procedure: COLONOSCOPY TO CECUM AND TERMINAL ILEUM;  Surgeon: Emelia Wilcox MD;  Location: [facility] ENDOSCOPY;  Service: Gastroenterology;  Laterality: N/A;  RECTAL BLEEDING  --DIVERTICULOSIS, HEMORRHOIDS, TORTUOUS COLON   • ENDOSCOPY N/A 11/13/2018    Procedure: ESOPHAGOGASTRODUODENOSCOPY with bx;  Surgeon: Adolph Martinez MD;  Location: [facility] ENDOSCOPY;  Service: Gastroenterology   • ENDOSCOPY N/A 1/27/2021    Procedure: ESOPHAGOGASTRODUODENOSCOPY WITH COLD BX;  Surgeon: Emelia Wilcox MD;  Location: [facility] ENDOSCOPY;  Service: Gastroenterology;  Laterality: N/A;  GERD  --GASTRITIS, HIATAL HERNIA   • EYE SURGERY     • HYSTERECTOMY      At age 31-Not due to cancer   • MASTECTOMY Left 06/30/2011     Dr. Kaitlyn Luna   • TUBAL ABDOMINAL LIGATION  1974   • UPPER GASTROINTESTINAL ENDOSCOPY  09/28/2012    Adolph Martinez MD       HEMATOLOGIC/ONCOLOGIC HISTORY:  (History from previous dates can be found in the separate document.)    MEDICATIONS    Current Outpatient Medications:   •  amitriptyline (ELAVIL) 25 MG tablet, Take 1 tablet by mouth Every Night., Disp: 90 tablet, Rfl: 2  •  atorvastatin (LIPITOR) 10 MG tablet, Take 1 tablet by mouth Daily., Disp: 90 tablet, Rfl: 3  •  chlorthalidone (HYGROTON) 25 MG tablet, Take 1 tablet by mouth Daily., Disp: 90 tablet, Rfl: 3  •  clopidogrel (PLAVIX) 75 MG tablet, Take 1 tablet by mouth Daily., Disp: 30 tablet, Rfl: 0  •  Dexilant 60 MG capsule, TAKE ONE CAPSULE BY MOUTH EVERY DAY, Disp: 90 capsule, Rfl: 3  •  diphenhydrAMINE (BENADRYL) 25 mg capsule, Take 1 capsule by mouth At Night As Needed for Itching., Disp: , Rfl:   •  levothyroxine (Euthyrox) 75 MCG tablet, Take 1 tablet by mouth Daily. Take one po qd except on Sunday take 2., Disp: 110 tablet, Rfl: 0  •  linaclotide (LINZESS) 145 MCG capsule capsule, Take 1 capsule by mouth Every Morning Before Breakfast., Disp: 30 capsule, Rfl: 11  •  losartan (COZAAR) 100 MG tablet, TAKE ONE TABLET BY MOUTH EVERY DAY, Disp: 90 tablet, Rfl: 3  •  Melatonin 10 MG capsule, Take 1 capsule by mouth At Night As Needed., Disp: , Rfl:   •  metoprolol succinate XL (TOPROL-XL) 50 MG 24 hr tablet, Take 1 tablet by mouth 2 (Two) Times a Day., Disp: 180 tablet, Rfl: 3  •  vitamin B-12 (CYANOCOBALAMIN) 1000 MCG tablet, Take 1 tablet by mouth Daily., Disp: , Rfl:     ALLERGIES:     Allergies   Allergen Reactions   • Lisinopril Cough   • Contrast Dye (Echo Or Unknown Ct/Mr) Itching and Rash     IVP Dye   • Tartrazine Hives       SOCIAL HISTORY:       Social History     Socioeconomic History   • Marital status:      Spouse name: Jack   • Number of children: 2   • Years of education: High School   Tobacco Use   • Smoking status:  Never   • Smokeless tobacco: Never   • Tobacco comments:     CAFFEINE USE: 4-5 CUPS 1/2 & 1/2 COFFEE DAILY   Vaping Use   • Vaping Use: Never used   Substance and Sexual Activity   • Alcohol use: Not Currently     Comment: rare-7 or less drinks per week   • Drug use: No   • Sexual activity: Defer         FAMILY HISTORY:  Family History   Problem Relation Age of Onset   • Cancer Mother 68        head and neck   • Stroke Mother    • Heart disease Mother    • Gallbladder disease Mother    • Throat cancer Mother 68   • Breast cancer Sister 57   • Heart disease Sister    • Hypertension Sister    • Cancer Sister    • Gallbladder disease Sister    • Diabetes Sister    • Cancer Brother         head and neck   • Heart disease Brother    • Hypertension Brother    • Gallbladder disease Brother    • Lung cancer Brother 67   • Throat cancer Brother 67   • Hypertension Father    • Gallbladder disease Father    • Emphysema Father    • Heart disease Father         Enlarged heart   • Cancer Maternal Aunt    • Cancer Maternal Uncle    • Colon cancer Maternal Uncle    • Heart attack Brother    • Alcohol abuse Brother    • Hypertension Sister    • Heart disease Sister    • Hypertension Sister    • Heart disease Sister    • Hypertension Sister    • No Known Problems Other        REVIEW OF SYSTEMS:  Review of Systems   Constitutional: Negative for activity change.   HENT: Negative for nosebleeds and trouble swallowing.    Respiratory: Negative for shortness of breath and wheezing.    Cardiovascular: Negative for chest pain and palpitations.   Gastrointestinal: Negative for constipation, diarrhea and nausea.   Genitourinary: Negative for dysuria and hematuria.   Musculoskeletal: Negative for arthralgias and myalgias.   Skin: Negative for rash and wound.   Neurological: Negative for seizures and syncope.   Hematological: Negative for adenopathy. Does not bruise/bleed easily.   Psychiatric/Behavioral: Negative for confusion.        "    Objective    Vitals:    04/13/23 1303   BP: 127/71   Pulse: 88   Resp: 18   Temp: 96.6 °F (35.9 °C)   TempSrc: Temporal   SpO2: 95%   Weight: 66 kg (145 lb 9.6 oz)   Height: 157.5 cm (62.01\")   PainSc: 0-No pain         4/13/2023     1:02 PM   Current Status   ECOG score 0      PHYSICAL EXAM:        CONSTITUTIONAL:  Vital signs reviewed.  No distress, looks comfortable.  EYES:  Conjunctiva and lids unremarkable.  PERRLA  EARS,NOSE,MOUTH,THROAT:  Ears and nose appear unremarkable.  Lips, teeth, gums appear unremarkable.  RESPIRATORY:  Normal respiratory effort.  Lungs clear to auscultation bilaterally.  CARDIOVASCULAR:  Normal S1, S2.  No murmurs rubs or gallops.  No significant lower extremity edema.  GASTROINTESTINAL: Abdomen appears unremarkable.  Nontender.  No hepatomegaly.  No splenomegaly.  LYMPHATIC:  No cervical, supraclavicular, axillary lymphadenopathy.  SKIN:  Warm.  No rashes.  PSYCHIATRIC:  Normal judgment and insight.  Normal mood and affect.     RECENT LABS:        WBC   Date/Time Value Ref Range Status   04/13/2023 12:14 PM 5.70 3.40 - 10.80 10*3/mm3 Final   04/12/2023 12:04 PM 5.2 3.4 - 10.8 x10E3/uL Final     Hemoglobin   Date/Time Value Ref Range Status   04/13/2023 12:14 PM 13.3 12.0 - 15.9 g/dL Final     Platelets   Date/Time Value Ref Range Status   04/13/2023 12:14  140 - 450 10*3/mm3 Final       Assessment/Plan     ASSESSMENT:  Problem List Items Addressed This Visit    None     *Stage IIIA, grade 3, 3.9 cm left breast cancer. Four out of 24 nodes positive. ER 2%, AR negative, HER2 negative. Completed FEC x3 followed by Taxotere x3, 11/28/2011. Completed radiation in February 2012.   · Poor tolerance to anastrozole and letrozole.  completed 5 years hormonal therapy using Aromasin, 1/31/17.  · (She was initially a patient at [facility]. She transferred here as her son-in-law, Jeremiah Dumont, was a patient here).   · Suspect she remains in remission.  However, with the bulging/swelling of skin " around her mastectomy site on the left and the new onset right-sided chest pain, check a CT of the chest to assess for recurrence.  · CT chest 8/10/2020: No evidence of recurrence.  · I discussed with Dr. Braxton-she stated we could do an ultrasound if we wanted to evaluate the superficial areas more.  Notably the bulging is on the left side and the right side has chest pain.  She states an ultrasound often shows fat necrosis better than a CT.  She suspects the CT would have picked up malignancy if it was present.  Patient did not want to pursue an ultrasound.  Symptoms resolved.  No signs of recurrence.  Remission remains.    *Possible lupus.  Was evaluated by Dr. Rivera for this  · 10/21/2021 visit: Fever of 100.5 or higher on average every 2 weeks or so for the past 2 months.  I told her sometimes this can occur with a rheumatologic disorder.  I advise she discuss with her PCP or Dr. Rivera about this.  (CBC unremarkable.  Exam unremarkable.  No clear signs of leukemia or lymphoma.  I told her we could do CTs looking for LAD which is not in areas that can be palpated.  However, I think it is unlikely this is the case.  She declines CTs at this time)  · 4/21/2022: Continues intermittent fever.  Unchanged.  Advised again to follow-up with Dr. Rivera.  · 10/20/2022: No complaints of fever today  · 4/ 13/23: Has had recent intermittent fevers up to 100.2.  PCP aware.  (January 2023 had significantly higher fevers.  No specific infection identified).     *Chronic right low back/hip pain radiating anteriorly.  In May 2016, CAT scan and bone scan negative for cancer as the cause of the pain.  Defer further management of this to her PCP.  She did not complain of this today.    *In the past, she has complained of: Forgetfulness and transposition of numbers when she is writing numbers at times. This had been present since around April 2015. She states it is not worsening.   She did not complain of this today.    *History of  stroke around 2005. Because of this I do not think she would be a good candidate for tamoxifen.     *Osteopenia, which has improved to normal bone density.  DEXA 5/17/16 normal bone density.  T score -0.6, compared to.   -1.1 2/20/14.  She is on calcium and vitamin D.  Defer further management of this to her PCP now that she is off aromatase inhibitors.    *Right upper quadrant/right lower anterior rib pain.  Present since October 2016.    Due to RUQ abdominal pain/right lower anterior rib pain since October 2016, bone scan and CT abdomen with contrast 2/2/17: No evidence of recurrence.  (8 mm low-attenuation liver lesion seen on the 5/17/16 CT but less conspicuous on order CT.  This was felt to be due to older CT without contrast.  This was felt to likely be benign.  Plan no further scheduled follow-up of this-patient agrees.).  She did not complain of this pain today.    *Hypokalemia. Her PCP manages this.      *IV contrast allergy.  Previous rash.  Receives IV contrast pre-medicines with CT IV contrast.  (This has worked well in the past)    *Left arm lymphedema due to prior surgery for breast cancer.  She was seen by the lymphedema clinic but has had some logistical issues obtaining the sleeve they prescribed.  I encouraged her to continue to call the lymphedema clinic for assistance with this.    *Previously complained of vertigo and was referred to her ENT.    *Heartburn.  Had EGD and colonoscopy by Dr. Martinez November 2018.  No malignancy was found.      *Intermittent chest pain x2 years, SBP's sometimes in the 190s.  Her blood pressure machine has been reading irregular sometimes for her heart rate.  Coronary calcifications on last CT.  · Referred to her cardiologist, Dr. Barker  · 4/21/2022: She has seen Dr. Barker recently.  No further cardiac work-up planned at this time.    *Social issues  · Her disabled daughter passed away September 2022  · Her other daughter, Osmin, is dealing with the death of her   which was around January 2019.    PLAN:   · M.D. CBC 6 months  · Last mammogram 2/10/2022, BI-RADS 1 (Right-sided mammograms)  · Off hormonal therapy    Send copy to Dr. Paxton Rivera, rheumatology    31 minutes.  Total time.  Same day.    Arrange:  Left breast prosthesis and 3 surgical bras with 3 refills

## 2023-04-14 DIAGNOSIS — E87.6 HYPOKALEMIA: ICD-10-CM

## 2023-04-14 DIAGNOSIS — R05.9 COUGH, UNSPECIFIED TYPE: ICD-10-CM

## 2023-04-14 DIAGNOSIS — R50.9 FEVER, UNSPECIFIED FEVER CAUSE: Primary | ICD-10-CM

## 2023-04-14 RX ORDER — POTASSIUM CHLORIDE 750 MG/1
10 TABLET, EXTENDED RELEASE ORAL DAILY
Qty: 30 TABLET | Refills: 2 | Status: SHIPPED | OUTPATIENT
Start: 2023-04-14

## 2023-04-14 RX ORDER — AMOXICILLIN AND CLAVULANATE POTASSIUM 875; 125 MG/1; MG/1
1 TABLET, FILM COATED ORAL 2 TIMES DAILY
Qty: 14 TABLET | Refills: 0 | Status: SHIPPED | OUTPATIENT
Start: 2023-04-14 | End: 2023-04-21

## 2023-04-15 PROBLEM — K21.9 GERD WITHOUT ESOPHAGITIS: Status: RESOLVED | Noted: 2019-11-22 | Resolved: 2023-04-15

## 2023-04-15 PROBLEM — E53.8 VITAMIN B12 DEFICIENCY: Status: ACTIVE | Noted: 2023-04-15

## 2023-04-15 PROBLEM — R05.9 COUGH: Status: ACTIVE | Noted: 2023-04-15

## 2023-04-15 NOTE — ASSESSMENT & PLAN NOTE
Hypertension is stable.  Continue current medications.  Ambulatory blood pressure monitoring.  Blood pressure will be reassessed in 3 months.  Continue Metoprolol twice daily and Losartan and Chlorthalidone daily.

## 2023-04-15 NOTE — ASSESSMENT & PLAN NOTE
Get chest x-ray at [facility].  Start daily antihistamine, such as Claritin or Allegra instead of Benadryl.

## 2023-04-24 ENCOUNTER — HOSPITAL ENCOUNTER (INPATIENT)
Facility: HOSPITAL | Age: 78
LOS: 2 days | Discharge: HOME | DRG: 372 | End: 2023-04-28
Attending: EMERGENCY MEDICINE | Admitting: INTERNAL MEDICINE
Payer: MEDICARE

## 2023-04-24 ENCOUNTER — APPOINTMENT (OUTPATIENT)
Dept: GENERAL RADIOLOGY | Facility: HOSPITAL | Age: 78
DRG: 372 | End: 2023-04-24
Payer: MEDICARE

## 2023-04-24 ENCOUNTER — TELEPHONE (OUTPATIENT)
Dept: FAMILY MEDICINE CLINIC | Facility: CLINIC | Age: 78
End: 2023-04-24
Payer: MEDICARE

## 2023-04-24 ENCOUNTER — APPOINTMENT (OUTPATIENT)
Dept: CT IMAGING | Facility: HOSPITAL | Age: 78
DRG: 372 | End: 2023-04-24
Payer: MEDICARE

## 2023-04-24 DIAGNOSIS — E87.6 HYPOKALEMIA: ICD-10-CM

## 2023-04-24 DIAGNOSIS — Z85.3 HISTORY OF BREAST CANCER: ICD-10-CM

## 2023-04-24 DIAGNOSIS — R53.1 GENERALIZED WEAKNESS: ICD-10-CM

## 2023-04-24 DIAGNOSIS — D72.829 LEUKOCYTOSIS, UNSPECIFIED TYPE: ICD-10-CM

## 2023-04-24 DIAGNOSIS — R50.9 FEVER IN ADULT: Primary | ICD-10-CM

## 2023-04-24 DIAGNOSIS — M25.50 ARTHRALGIA, UNSPECIFIED JOINT: ICD-10-CM

## 2023-04-24 DIAGNOSIS — R00.0 SINUS TACHYCARDIA: ICD-10-CM

## 2023-04-24 DIAGNOSIS — A41.9 SEPSIS WITHOUT ACUTE ORGAN DYSFUNCTION, DUE TO UNSPECIFIED ORGANISM: ICD-10-CM

## 2023-04-24 DIAGNOSIS — R50.9 RECURRENT FEVER OF UNKNOWN CAUSE: Primary | ICD-10-CM

## 2023-04-24 LAB
ALBUMIN SERPL-MCNC: 4.2 G/DL (ref 3.5–5.2)
ALBUMIN/GLOB SERPL: 1.3 G/DL
ALP SERPL-CCNC: 88 U/L (ref 39–117)
ALT SERPL W P-5'-P-CCNC: 14 U/L (ref 1–33)
ANION GAP SERPL CALCULATED.3IONS-SCNC: 15 MMOL/L (ref 5–15)
AST SERPL-CCNC: 19 U/L (ref 1–32)
B PARAPERT DNA SPEC QL NAA+PROBE: NOT DETECTED
B PERT DNA SPEC QL NAA+PROBE: NOT DETECTED
BACTERIA UR QL AUTO: ABNORMAL /HPF
BASOPHILS # BLD AUTO: 0.03 10*3/MM3 (ref 0–0.2)
BASOPHILS NFR BLD AUTO: 0.2 % (ref 0–1.5)
BILIRUB SERPL-MCNC: 1.2 MG/DL (ref 0–1.2)
BILIRUB UR QL STRIP: NEGATIVE
BUN SERPL-MCNC: 12 MG/DL (ref 8–23)
BUN/CREAT SERPL: 12.5 (ref 7–25)
C DIFF GDH + TOXINS A+B STL QL IA.RAPID: NEGATIVE
C DIFF TOX GENS STL QL NAA+PROBE: POSITIVE
C PNEUM DNA NPH QL NAA+NON-PROBE: NOT DETECTED
CALCIUM SPEC-SCNC: 9.3 MG/DL (ref 8.6–10.5)
CHLORIDE SERPL-SCNC: 91 MMOL/L (ref 98–107)
CK SERPL-CCNC: 81 U/L (ref 20–180)
CLARITY UR: CLEAR
CO2 SERPL-SCNC: 27 MMOL/L (ref 22–29)
COLOR UR: YELLOW
CREAT SERPL-MCNC: 0.96 MG/DL (ref 0.57–1)
CRP SERPL-MCNC: 2.04 MG/DL (ref 0–0.5)
D-LACTATE SERPL-SCNC: 1.1 MMOL/L (ref 0.5–2)
D-LACTATE SERPL-SCNC: 2.1 MMOL/L (ref 0.5–2)
DEPRECATED RDW RBC AUTO: 40.8 FL (ref 37–54)
EGFRCR SERPLBLD CKD-EPI 2021: 61.4 ML/MIN/1.73
EOSINOPHIL # BLD AUTO: 0.07 10*3/MM3 (ref 0–0.4)
EOSINOPHIL NFR BLD AUTO: 0.4 % (ref 0.3–6.2)
ERYTHROCYTE [DISTWIDTH] IN BLOOD BY AUTOMATED COUNT: 12 % (ref 12.3–15.4)
ERYTHROCYTE [SEDIMENTATION RATE] IN BLOOD: 18 MM/HR (ref 0–30)
FLUAV SUBTYP SPEC NAA+PROBE: NOT DETECTED
FLUBV RNA ISLT QL NAA+PROBE: NOT DETECTED
GEN 5 2HR TROPONIN T REFLEX: 16 NG/L
GLOBULIN UR ELPH-MCNC: 3.2 GM/DL
GLUCOSE SERPL-MCNC: 122 MG/DL (ref 65–99)
GLUCOSE UR STRIP-MCNC: NEGATIVE MG/DL
GRAN CASTS URNS QL MICRO: ABNORMAL /LPF
HADV DNA SPEC NAA+PROBE: NOT DETECTED
HCOV 229E RNA SPEC QL NAA+PROBE: NOT DETECTED
HCOV HKU1 RNA SPEC QL NAA+PROBE: NOT DETECTED
HCOV NL63 RNA SPEC QL NAA+PROBE: NOT DETECTED
HCOV OC43 RNA SPEC QL NAA+PROBE: NOT DETECTED
HCT VFR BLD AUTO: 40.5 % (ref 34–46.6)
HGB BLD-MCNC: 13.8 G/DL (ref 12–15.9)
HGB UR QL STRIP.AUTO: ABNORMAL
HMPV RNA NPH QL NAA+NON-PROBE: NOT DETECTED
HPIV1 RNA ISLT QL NAA+PROBE: NOT DETECTED
HPIV2 RNA SPEC QL NAA+PROBE: NOT DETECTED
HPIV3 RNA NPH QL NAA+PROBE: NOT DETECTED
HPIV4 P GENE NPH QL NAA+PROBE: NOT DETECTED
HYALINE CASTS UR QL AUTO: ABNORMAL /LPF
IMM GRANULOCYTES # BLD AUTO: 0.1 10*3/MM3 (ref 0–0.05)
IMM GRANULOCYTES NFR BLD AUTO: 0.6 % (ref 0–0.5)
KETONES UR QL STRIP: ABNORMAL
LEUKOCYTE ESTERASE UR QL STRIP.AUTO: ABNORMAL
LYMPHOCYTES # BLD AUTO: 1.06 10*3/MM3 (ref 0.7–3.1)
LYMPHOCYTES NFR BLD AUTO: 6.2 % (ref 19.6–45.3)
M PNEUMO IGG SER IA-ACNC: NOT DETECTED
MAGNESIUM SERPL-MCNC: 1.6 MG/DL (ref 1.6–2.4)
MCH RBC QN AUTO: 31.4 PG (ref 26.6–33)
MCHC RBC AUTO-ENTMCNC: 34.1 G/DL (ref 31.5–35.7)
MCV RBC AUTO: 92.3 FL (ref 79–97)
MONOCYTES # BLD AUTO: 1.4 10*3/MM3 (ref 0.1–0.9)
MONOCYTES NFR BLD AUTO: 8.2 % (ref 5–12)
NEUTROPHILS NFR BLD AUTO: 14.45 10*3/MM3 (ref 1.7–7)
NEUTROPHILS NFR BLD AUTO: 84.4 % (ref 42.7–76)
NITRITE UR QL STRIP: NEGATIVE
NRBC BLD AUTO-RTO: 0 /100 WBC (ref 0–0.2)
PH UR STRIP.AUTO: 6 [PH] (ref 5–8)
PLATELET # BLD AUTO: 256 10*3/MM3 (ref 140–450)
PMV BLD AUTO: 9 FL (ref 6–12)
POTASSIUM SERPL-SCNC: 2.8 MMOL/L (ref 3.5–5.2)
POTASSIUM SERPL-SCNC: 3.2 MMOL/L (ref 3.5–5.2)
PROCALCITONIN SERPL-MCNC: 0.07 NG/ML (ref 0–0.25)
PROT SERPL-MCNC: 7.4 G/DL (ref 6–8.5)
PROT UR QL STRIP: ABNORMAL
QT INTERVAL: 292 MS
RBC # BLD AUTO: 4.39 10*6/MM3 (ref 3.77–5.28)
RBC # UR STRIP: ABNORMAL /HPF
REF LAB TEST METHOD: ABNORMAL
RHINOVIRUS RNA SPEC NAA+PROBE: NOT DETECTED
RSV RNA NPH QL NAA+NON-PROBE: NOT DETECTED
SARS-COV-2 RNA NPH QL NAA+NON-PROBE: NOT DETECTED
SODIUM SERPL-SCNC: 133 MMOL/L (ref 136–145)
SP GR UR STRIP: 1.02 (ref 1–1.03)
SQUAMOUS #/AREA URNS HPF: ABNORMAL /HPF
TROPONIN T DELTA: 0 NG/L
TROPONIN T SERPL HS-MCNC: 16 NG/L
UROBILINOGEN UR QL STRIP: ABNORMAL
WBC # UR STRIP: ABNORMAL /HPF
WBC NRBC COR # BLD: 17.11 10*3/MM3 (ref 3.4–10.8)

## 2023-04-24 PROCEDURE — 93010 ELECTROCARDIOGRAM REPORT: CPT | Performed by: STUDENT IN AN ORGANIZED HEALTH CARE EDUCATION/TRAINING PROGRAM

## 2023-04-24 PROCEDURE — 83605 ASSAY OF LACTIC ACID: CPT | Performed by: EMERGENCY MEDICINE

## 2023-04-24 PROCEDURE — 86140 C-REACTIVE PROTEIN: CPT | Performed by: EMERGENCY MEDICINE

## 2023-04-24 PROCEDURE — G0378 HOSPITAL OBSERVATION PER HR: HCPCS

## 2023-04-24 PROCEDURE — 85025 COMPLETE CBC W/AUTO DIFF WBC: CPT | Performed by: EMERGENCY MEDICINE

## 2023-04-24 PROCEDURE — 84132 ASSAY OF SERUM POTASSIUM: CPT | Performed by: INTERNAL MEDICINE

## 2023-04-24 PROCEDURE — 73030 X-RAY EXAM OF SHOULDER: CPT

## 2023-04-24 PROCEDURE — 87040 BLOOD CULTURE FOR BACTERIA: CPT | Performed by: EMERGENCY MEDICINE

## 2023-04-24 PROCEDURE — 71045 X-RAY EXAM CHEST 1 VIEW: CPT

## 2023-04-24 PROCEDURE — 82550 ASSAY OF CK (CPK): CPT | Performed by: EMERGENCY MEDICINE

## 2023-04-24 PROCEDURE — 63710000001 ONDANSETRON PER 8 MG: Performed by: INTERNAL MEDICINE

## 2023-04-24 PROCEDURE — 84145 PROCALCITONIN (PCT): CPT | Performed by: EMERGENCY MEDICINE

## 2023-04-24 PROCEDURE — 70450 CT HEAD/BRAIN W/O DYE: CPT

## 2023-04-24 PROCEDURE — 83735 ASSAY OF MAGNESIUM: CPT | Performed by: EMERGENCY MEDICINE

## 2023-04-24 PROCEDURE — P9612 CATHETERIZE FOR URINE SPEC: HCPCS

## 2023-04-24 PROCEDURE — 25010000002 SODIUM CHLORIDE 0.9 % WITH KCL 20 MEQ 20-0.9 MEQ/L-% SOLUTION: Performed by: INTERNAL MEDICINE

## 2023-04-24 PROCEDURE — 99285 EMERGENCY DEPT VISIT HI MDM: CPT

## 2023-04-24 PROCEDURE — 36415 COLL VENOUS BLD VENIPUNCTURE: CPT | Performed by: EMERGENCY MEDICINE

## 2023-04-24 PROCEDURE — 93005 ELECTROCARDIOGRAM TRACING: CPT | Performed by: EMERGENCY MEDICINE

## 2023-04-24 PROCEDURE — 85652 RBC SED RATE AUTOMATED: CPT | Performed by: EMERGENCY MEDICINE

## 2023-04-24 PROCEDURE — 87449 NOS EACH ORGANISM AG IA: CPT | Performed by: EMERGENCY MEDICINE

## 2023-04-24 PROCEDURE — 84484 ASSAY OF TROPONIN QUANT: CPT | Performed by: EMERGENCY MEDICINE

## 2023-04-24 PROCEDURE — 80053 COMPREHEN METABOLIC PANEL: CPT | Performed by: EMERGENCY MEDICINE

## 2023-04-24 PROCEDURE — 87493 C DIFF AMPLIFIED PROBE: CPT | Performed by: EMERGENCY MEDICINE

## 2023-04-24 PROCEDURE — 81001 URINALYSIS AUTO W/SCOPE: CPT | Performed by: EMERGENCY MEDICINE

## 2023-04-24 PROCEDURE — 0202U NFCT DS 22 TRGT SARS-COV-2: CPT | Performed by: EMERGENCY MEDICINE

## 2023-04-24 RX ORDER — POTASSIUM CHLORIDE 7.45 MG/ML
10 INJECTION INTRAVENOUS
Status: DISCONTINUED | OUTPATIENT
Start: 2023-04-24 | End: 2023-04-24 | Stop reason: SDUPTHER

## 2023-04-24 RX ORDER — POTASSIUM CHLORIDE 750 MG/1
40 TABLET, FILM COATED, EXTENDED RELEASE ORAL AS NEEDED
Status: DISCONTINUED | OUTPATIENT
Start: 2023-04-24 | End: 2023-04-28 | Stop reason: HOSPADM

## 2023-04-24 RX ORDER — ONDANSETRON 4 MG/1
4 TABLET, FILM COATED ORAL EVERY 6 HOURS PRN
Status: DISCONTINUED | OUTPATIENT
Start: 2023-04-24 | End: 2023-04-28 | Stop reason: HOSPADM

## 2023-04-24 RX ORDER — POTASSIUM CHLORIDE 1.5 G/1.77G
40 POWDER, FOR SOLUTION ORAL AS NEEDED
Status: DISCONTINUED | OUTPATIENT
Start: 2023-04-24 | End: 2023-04-28 | Stop reason: HOSPADM

## 2023-04-24 RX ORDER — LOSARTAN POTASSIUM 100 MG/1
100 TABLET ORAL DAILY
Status: DISCONTINUED | OUTPATIENT
Start: 2023-04-25 | End: 2023-04-25

## 2023-04-24 RX ORDER — LEVOTHYROXINE SODIUM 0.07 MG/1
75 TABLET ORAL
Status: DISCONTINUED | OUTPATIENT
Start: 2023-04-25 | End: 2023-04-28 | Stop reason: HOSPADM

## 2023-04-24 RX ORDER — POTASSIUM CHLORIDE 1.5 G/1.77G
40 POWDER, FOR SOLUTION ORAL AS NEEDED
Status: DISCONTINUED | OUTPATIENT
Start: 2023-04-24 | End: 2023-04-24 | Stop reason: SDUPTHER

## 2023-04-24 RX ORDER — DIPHENHYDRAMINE HCL 25 MG
25 CAPSULE ORAL NIGHTLY PRN
Status: DISCONTINUED | OUTPATIENT
Start: 2023-04-24 | End: 2023-04-28 | Stop reason: HOSPADM

## 2023-04-24 RX ORDER — POTASSIUM CHLORIDE 7.45 MG/ML
10 INJECTION INTRAVENOUS
Status: DISCONTINUED | OUTPATIENT
Start: 2023-04-24 | End: 2023-04-28 | Stop reason: HOSPADM

## 2023-04-24 RX ORDER — POTASSIUM CHLORIDE 750 MG/1
40 TABLET, FILM COATED, EXTENDED RELEASE ORAL AS NEEDED
Status: DISCONTINUED | OUTPATIENT
Start: 2023-04-24 | End: 2023-04-24 | Stop reason: SDUPTHER

## 2023-04-24 RX ORDER — CLOPIDOGREL BISULFATE 75 MG/1
75 TABLET ORAL DAILY
Status: DISCONTINUED | OUTPATIENT
Start: 2023-04-25 | End: 2023-04-28 | Stop reason: HOSPADM

## 2023-04-24 RX ORDER — PANTOPRAZOLE SODIUM 40 MG/1
40 TABLET, DELAYED RELEASE ORAL
Status: DISCONTINUED | OUTPATIENT
Start: 2023-04-25 | End: 2023-04-28 | Stop reason: HOSPADM

## 2023-04-24 RX ORDER — SODIUM CHLORIDE 0.9 % (FLUSH) 0.9 %
10 SYRINGE (ML) INJECTION AS NEEDED
Status: DISCONTINUED | OUTPATIENT
Start: 2023-04-24 | End: 2023-04-28 | Stop reason: HOSPADM

## 2023-04-24 RX ORDER — POTASSIUM CHLORIDE 750 MG/1
20 TABLET, FILM COATED, EXTENDED RELEASE ORAL 2 TIMES DAILY WITH MEALS
Status: DISCONTINUED | OUTPATIENT
Start: 2023-04-24 | End: 2023-04-28 | Stop reason: HOSPADM

## 2023-04-24 RX ORDER — ATORVASTATIN CALCIUM 20 MG/1
10 TABLET, FILM COATED ORAL DAILY
Status: DISCONTINUED | OUTPATIENT
Start: 2023-04-25 | End: 2023-04-28 | Stop reason: HOSPADM

## 2023-04-24 RX ORDER — AMITRIPTYLINE HYDROCHLORIDE 25 MG/1
25 TABLET, FILM COATED ORAL NIGHTLY
Status: DISCONTINUED | OUTPATIENT
Start: 2023-04-24 | End: 2023-04-28 | Stop reason: HOSPADM

## 2023-04-24 RX ORDER — UREA 10 %
3 LOTION (ML) TOPICAL NIGHTLY PRN
Status: DISCONTINUED | OUTPATIENT
Start: 2023-04-24 | End: 2023-04-28 | Stop reason: HOSPADM

## 2023-04-24 RX ORDER — CHOLECALCIFEROL (VITAMIN D3) 125 MCG
1000 CAPSULE ORAL DAILY
Status: DISCONTINUED | OUTPATIENT
Start: 2023-04-25 | End: 2023-04-28 | Stop reason: HOSPADM

## 2023-04-24 RX ORDER — ACETAMINOPHEN 325 MG/1
650 TABLET ORAL EVERY 4 HOURS PRN
Status: DISCONTINUED | OUTPATIENT
Start: 2023-04-24 | End: 2023-04-28 | Stop reason: HOSPADM

## 2023-04-24 RX ORDER — SODIUM CHLORIDE 9 MG/ML
125 INJECTION, SOLUTION INTRAVENOUS CONTINUOUS
Status: DISCONTINUED | OUTPATIENT
Start: 2023-04-24 | End: 2023-04-24

## 2023-04-24 RX ORDER — POTASSIUM CHLORIDE 750 MG/1
40 TABLET, FILM COATED, EXTENDED RELEASE ORAL ONCE
Status: COMPLETED | OUTPATIENT
Start: 2023-04-24 | End: 2023-04-24

## 2023-04-24 RX ORDER — ONDANSETRON 2 MG/ML
4 INJECTION INTRAMUSCULAR; INTRAVENOUS EVERY 6 HOURS PRN
Status: DISCONTINUED | OUTPATIENT
Start: 2023-04-24 | End: 2023-04-28 | Stop reason: HOSPADM

## 2023-04-24 RX ORDER — METOPROLOL SUCCINATE 50 MG/1
50 TABLET, EXTENDED RELEASE ORAL 2 TIMES DAILY
Status: DISCONTINUED | OUTPATIENT
Start: 2023-04-24 | End: 2023-04-28 | Stop reason: HOSPADM

## 2023-04-24 RX ORDER — SODIUM CHLORIDE AND POTASSIUM CHLORIDE 150; 900 MG/100ML; MG/100ML
125 INJECTION, SOLUTION INTRAVENOUS CONTINUOUS
Status: DISCONTINUED | OUTPATIENT
Start: 2023-04-24 | End: 2023-04-27

## 2023-04-24 RX ORDER — ACETAMINOPHEN 500 MG
1000 TABLET ORAL ONCE
Status: COMPLETED | OUTPATIENT
Start: 2023-04-24 | End: 2023-04-24

## 2023-04-24 RX ADMIN — POTASSIUM CHLORIDE 40 MEQ: 750 TABLET, EXTENDED RELEASE ORAL at 13:34

## 2023-04-24 RX ADMIN — POTASSIUM CHLORIDE 20 MEQ: 750 TABLET, EXTENDED RELEASE ORAL at 21:10

## 2023-04-24 RX ADMIN — AMITRIPTYLINE HYDROCHLORIDE 25 MG: 25 TABLET, FILM COATED ORAL at 23:54

## 2023-04-24 RX ADMIN — ACETAMINOPHEN 1000 MG: 500 TABLET ORAL at 13:34

## 2023-04-24 RX ADMIN — SODIUM CHLORIDE 500 ML: 9 INJECTION, SOLUTION INTRAVENOUS at 14:33

## 2023-04-24 RX ADMIN — SODIUM CHLORIDE 500 ML: 9 INJECTION, SOLUTION INTRAVENOUS at 13:34

## 2023-04-24 RX ADMIN — POTASSIUM CHLORIDE AND SODIUM CHLORIDE 100 ML/HR: 900; 150 INJECTION, SOLUTION INTRAVENOUS at 21:14

## 2023-04-24 RX ADMIN — ONDANSETRON HYDROCHLORIDE 4 MG: 4 TABLET, FILM COATED ORAL at 23:54

## 2023-04-24 RX ADMIN — ACETAMINOPHEN 650 MG: 325 TABLET, FILM COATED ORAL at 23:54

## 2023-04-24 RX ADMIN — SODIUM CHLORIDE 125 ML/HR: 9 INJECTION, SOLUTION INTRAVENOUS at 14:12

## 2023-04-24 NOTE — ED NOTES
"Nursing report ED to floor  Carlotta Pires  78 y.o.  female    HPI :   Chief Complaint   Patient presents with    Fever    Weakness - Generalized       Admitting doctor:   Amanda Cortes MD    Admitting diagnosis:   The primary encounter diagnosis was Fever in adult. Diagnoses of Leukocytosis, unspecified type, Hypokalemia, Sepsis without acute organ dysfunction, due to unspecified organism, Generalized weakness, History of breast cancer, and Sinus tachycardia were also pertinent to this visit.    Code status:   Current Code Status       Date Active Code Status Order ID Comments User Context       Not on file            Allergies:   Lisinopril, Contrast dye (echo or unknown ct/mr), and Tartrazine    Isolation:   Contact Spore    Intake and Output  No intake or output data in the 24 hours ending 04/24/23 1448    Weight:       04/24/23  1102   Weight: 65.8 kg (145 lb)       Most recent vitals:   Vitals:    04/24/23 1102 04/24/23 1110 04/24/23 1345 04/24/23 1400   BP:  103/67 144/70 123/60   Pulse:   (!) 121 114   Resp:       Temp:       SpO2:   97% 96%   Weight: 65.8 kg (145 lb)      Height: 157.5 cm (62\")          Active LDAs/IV Access:   Lines, Drains & Airways       Active LDAs       Name Placement date Placement time Site Days    Peripheral IV 04/24/23 1151 Anterior;Proximal;Right Forearm 04/24/23  1151  Forearm  less than 1                    Labs (abnormal labs have a star):   Labs Reviewed   COMPREHENSIVE METABOLIC PANEL - Abnormal; Notable for the following components:       Result Value    Glucose 122 (*)     Sodium 133 (*)     Potassium 2.8 (*)     Chloride 91 (*)     All other components within normal limits    Narrative:     GFR Normal >60  Chronic Kidney Disease <60  Kidney Failure <15    The GFR formula is only valid for adults with stable renal function between ages 18 and 70.   URINALYSIS W/ CULTURE IF INDICATED - Abnormal; Notable for the following components:    Ketones, UA 15 mg/dL (1+) (*)  "    Blood, UA Large (3+) (*)     Protein,  mg/dL (2+) (*)     Leuk Esterase, UA Trace (*)     All other components within normal limits    Narrative:     In absence of clinical symptoms, the presence of pyuria, bacteria, and/or nitrites on the urinalysis result does not correlate with infection.   LACTIC ACID, PLASMA - Abnormal; Notable for the following components:    Lactate 2.1 (*)     All other components within normal limits   TROPONIN - Abnormal; Notable for the following components:    HS Troponin T 16 (*)     All other components within normal limits    Narrative:     High Sensitive Troponin T Reference Range:  <10.0 ng/L- Negative Female for AMI  <15.0 ng/L- Negative Male for AMI  >=10 - Abnormal Female indicating possible myocardial injury.  >=15 - Abnormal Male indicating possible myocardial injury.   Clinicians would have to utilize clinical acumen, EKG, Troponin, and serial changes to determine if it is an Acute Myocardial Infarction or myocardial injury due to an underlying chronic condition.        CBC WITH AUTO DIFFERENTIAL - Abnormal; Notable for the following components:    WBC 17.11 (*)     RDW 12.0 (*)     Neutrophil % 84.4 (*)     Lymphocyte % 6.2 (*)     Immature Grans % 0.6 (*)     Neutrophils, Absolute 14.45 (*)     Monocytes, Absolute 1.40 (*)     Immature Grans, Absolute 0.10 (*)     All other components within normal limits   C-REACTIVE PROTEIN - Abnormal; Notable for the following components:    C-Reactive Protein 2.04 (*)     All other components within normal limits   HIGH SENSITIVITIY TROPONIN T 2HR - Abnormal; Notable for the following components:    HS Troponin T 16 (*)     All other components within normal limits    Narrative:     High Sensitive Troponin T Reference Range:  <10.0 ng/L- Negative Female for AMI  <15.0 ng/L- Negative Male for AMI  >=10 - Abnormal Female indicating possible myocardial injury.  >=15 - Abnormal Male indicating possible myocardial injury.  "  Clinicians would have to utilize clinical acumen, EKG, Troponin, and serial changes to determine if it is an Acute Myocardial Infarction or myocardial injury due to an underlying chronic condition.        URINALYSIS, MICROSCOPIC ONLY - Abnormal; Notable for the following components:    RBC, UA 3-5 (*)     All other components within normal limits   RESPIRATORY PANEL PCR W/ COVID-19 (SARS-COV-2) MOLINA/THOMAS/JAYLA/PAD/COR/MAD/[facility]ANCO IN-HOUSE, NP SWAB IN UT/VTP, 3-4 HR TAT - Normal    Narrative:     In the setting of a positive respiratory panel with a viral infection PLUS a negative procalcitonin without other underlying concern for bacterial infection, consider observing off antibiotics or discontinuation of antibiotics and continue supportive care. If the respiratory panel is positive for atypical bacterial infection (Bordetella pertussis, Chlamydophila pneumoniae, or Mycoplasma pneumoniae), consider antibiotic de-escalation to target atypical bacterial infection.   PROCALCITONIN - Normal    Narrative:     As a Marker for Sepsis (Non-Neonates):    1. <0.5 ng/mL represents a low risk of severe sepsis and/or septic shock.  2. >2 ng/mL represents a high risk of severe sepsis and/or septic shock.    As a Marker for Lower Respiratory Tract Infections that require antibiotic therapy:    PCT on Admission    Antibiotic Therapy       6-12 Hrs later    >0.5                Strongly Recommended  >0.25 - <0.5        Recommended   0.1 - 0.25          Discouraged              Remeasure/reassess PCT  <0.1                Strongly Discouraged     Remeasure/reassess PCT    As 28 day mortality risk marker: \"Change in Procalcitonin Result\" (>80% or <=80%) if Day 0 (or Day 1) and Day 4 values are available. Refer to http://www.grabHalos-pct-calculator.com    Change in PCT <=80%  A decrease of PCT levels below or equal to 80% defines a positive change in PCT test result representing a higher risk for 28-day all-cause mortality of patients " diagnosed with severe sepsis for septic shock.    Change in PCT >80%  A decrease of PCT levels of more than 80% defines a negative change in PCT result representing a lower risk for 28-day all-cause mortality of patients diagnosed with severe sepsis or septic shock.      CK - Normal   SEDIMENTATION RATE - Normal   MAGNESIUM - Normal   BLOOD CULTURE   BLOOD CULTURE   GASTROINTESTINAL PANEL, PCR   CLOSTRIDIOIDES DIFFICILE TOXIN    Narrative:     The following orders were created for panel order Clostridioides difficile Toxin - Stool, Per Rectum.  Procedure                               Abnormality         Status                     ---------                               -----------         ------                     Clostridioides difficile...[[id number]]                                                   Please view results for these tests on the individual orders.   CLOSTRIDIOIDES DIFFICILE TOXIN, PCR   LACTIC ACID, REFLEX   CBC AND DIFFERENTIAL    Narrative:     The following orders were created for panel order CBC & Differential.  Procedure                               Abnormality         Status                     ---------                               -----------         ------                     CBC Auto Differential[[id number]]        Abnormal            Final result                 Please view results for these tests on the individual orders.       EKG:   ECG 12 Lead Other; weakness   Final Result   HEART RATE= 128  bpm   RR Interval= 469  ms   VT Interval= 171  ms   P Horizontal Axis= -10  deg   P Front Axis= -15  deg   QRSD Interval= 97  ms   QT Interval= 292  ms   QRS Axis= -18  deg   T Wave Axis= 171  deg   - ABNORMAL ECG -   Sinus tachycardia   Paired ventricular premature complexes   Abnormal R-wave progression, early transition   LVH with secondary repolarization abnormality   When compared with ECG of 13-Mar-2014 23:16:51,   Significant rate increase   PVCs are new   Electronically Signed By: Antonio  Wild ([facility]) 24-Apr-2023 12:39:14   Date and Time of Study: 2023-04-24 11:40:09          Meds given in ED:   Medications   sodium chloride 0.9 % flush 10 mL (has no administration in time range)   sodium chloride 0.9 % bolus 500 mL (0 mL Intravenous Stopped 4/24/23 1412)     Followed by   sodium chloride 0.9 % infusion (125 mL/hr Intravenous New Bag 4/24/23 1412)   sodium chloride 0.9 % bolus 500 mL (500 mL Intravenous New Bag 4/24/23 1433)   acetaminophen (TYLENOL) tablet 1,000 mg (1,000 mg Oral Given 4/24/23 1334)   potassium chloride (K-DUR,KLOR-CON) ER tablet 40 mEq (40 mEq Oral Given 4/24/23 1334)       Imaging results:  XR Shoulder 2+ View Right    Result Date: 4/24/2023   As described.  This report was finalized on 4/24/2023 12:28 PM by Dr. Yair Liang M.D.      CT Head Without Contrast    Result Date: 4/24/2023  1. No acute intracranial abnormality is identified. 2. There is mild bilateral ethmoid sinus mucosal thickening and a small amount of fluid and mucosal thickening in the posterior right maxillary sinus. The remainder of the head CT is within normal limits. Specifically, no acute skull fracture or intracranial hemorrhage is identified.  Radiation dose reduction techniques were utilized, including automated exposure control and exposure modulation based on body size.       XR Chest 1 View    Result Date: 4/24/2023  No evidence for acute pulmonary process. Follow-up as clinical indications persist.  This report was finalized on 4/24/2023 12:27 PM by Dr. Yair Liang M.D.       Ambulatory status:   - independent    Social issues:   Social History     Socioeconomic History    Marital status:      Spouse name: Jack    Number of children: 2    Years of education: High School   Tobacco Use    Smoking status: Never    Smokeless tobacco: Never    Tobacco comments:     CAFFEINE USE: 4-5 CUPS 1/2 & 1/2 COFFEE DAILY   Vaping Use    Vaping Use: Never used   Substance and Sexual Activity     Alcohol use: Not Currently     Comment: rare-7 or less drinks per week    Drug use: No    Sexual activity: Defer       NIH Stroke Scale:         Dane Huber RN  04/24/23 14:48 EDT

## 2023-04-24 NOTE — TELEPHONE ENCOUNTER
At the time of this call, ordered arthritis panel for aching in joints when has fever; pt has seen rheumatology in past for positive GEOVANNI, but had negative work up; also referred to infectious disease for further evaluation of ongoing fevers as we had previously discussed; patient is noted to currently be in the ER at this time.

## 2023-04-24 NOTE — TELEPHONE ENCOUNTER
Hi, my mom asked me to contact you. She finished her antibiotic Friday. She had horrible diarrhea the entire week. On Friday night she started with the fever again. Last night she had 103 temp and sounds horrible. She is very achy and no energy. I would have taken her to er but know they are not going to be able to help until we know what is actually wrong.  Our dynamic is she never really involves me but when she asks me to medically get involved I know she is bad. The fact she is asking me to contact you  tells me how bad she is.  I am going to call first thing Monday morning and leave a message with the office also so hopefully you will get one of these messages early. Thanks,  I know she is in good hands with you and you will help us figure it out.      Patients daughter wants to know if she can have blood work done and a referral put in for INFECTIOUS DISEASE  As soon as possible. Please advise

## 2023-04-24 NOTE — ED NOTES
"Patient to ER via car from home for \"spells where a fever comes and goes for 2 years\"  Patient was seen by PCP and put on antibiotics for abnormal labs but the fever is not going away this time and reports last night she just couldn't stand up last night so she fell down and crawled to bed patient denies hitting head hit R shoulder and face  "

## 2023-04-24 NOTE — ED PROVIDER NOTES
EMERGENCY DEPARTMENT ENCOUNTER    Room Number:  21/21  Date of encounter:  4/24/2023  PCP: Gudelia Bazzi APRN  Historian: Patient and family    Patient was placed in face mask during triage process. Patient was wearing facemask when I entered the room and throughout our encounter. I wore full protective equipment throughout this patient encounter including a face mask, eye protection, and gloves. Hand hygiene was performed before donning protective equipment and again following doffing of PPE after leaving the room.    HPI:  Chief Complaint: Increasing generalized weakness  A complete HPI/ROS/PMH/PSH/SH/FH are unobtainable due to: N/A   Context: Carlotta Pires is a 78 y.o. female who presents to the ED c/o increasing generalized weakness over the last several months with associated waxing waning fevers and now diarrhea status post Augmentin treatment.  Patient was so weak last night that she had a fall and then could barely walk today thus presents to the ED for further evaluation and treatment.  Patient reports she has had intermittent fevers for nearly 2 years with no prior source identified.  She was started on Augmentin 2 weeks ago without significant change in symptoms.  Patient began having fever 2 and half days ago that has remained persistent with a Tmax of 103 last night.  No antipyretics today.  No nausea vomiting or diarrhea nor dysuria.  The patient has developed nonbloody diarrhea since starting the Augmentin with no belly pain reported.  Patient occasionally has a cough but has no complaint of dyspnea at this time.  She did have a fall secondary to weakness last night was was able to crawl back into bed.  She denies ongoing headache or neck pain.  She did injure her right shoulder.  Patient does note that with her fever she will have diffuse myalgias/arthralgias that would generally improve after the fever is gone.  Fever generally last 12 to 18 hours and then resolves spontaneously though this 1 has  persisted for longer than that.      MEDICAL HISTORY REVIEW  EMR reviewed:    Oncology office note 4/13/2023- Code reviewed:  Patient followed for stage IIIa grade 3 left-sided breast cancer.  Also noted possible lupus.  There is a note made the patient has had recurrent intermittent fevers up to 100.2 since January of this year with no specific infection identified.    PAST MEDICAL HISTORY  Active Ambulatory Problems     Diagnosis Date Noted   • Malignant neoplasm of lower-outer quadrant of left female breast 05/09/2016   • Osteopenia 05/10/2016   • Encounter for screening for malignant neoplasm of colon 11/28/2017   • Epigastric pain 10/18/2018   • Essential hypertension 04/24/2019   • History of cardiomyopathy 04/24/2019   • Hyperlipidemia, mixed 11/22/2019   • Acute seasonal allergic rhinitis due to pollen 11/22/2019   • Hypothyroidism (acquired) 11/22/2019   • Primary insomnia 11/22/2019   • CHF (congestive heart failure) 11/22/2019   • Encounter for Medicare annual wellness exam 11/22/2019   • Arthritis 11/25/2019   • Trigeminal neuralgia    • Osteoporosis    • Lumbar radiculopathy    • Degenerative arthritis of thumb    • Rectal bleeding 01/05/2021   • Gastroesophageal reflux disease without esophagitis 11/22/2019   • Weight loss, abnormal 01/05/2021   • Decreased appetite 01/05/2021   • History of breast cancer 01/05/2021   • FH: colon cancer 01/05/2021   • Dilated cardiomyopathy 04/24/2019   • Fatigue 01/25/2023   • Acute non-recurrent sinusitis 01/25/2023   • Constipation 01/25/2023   • Anxiety 01/25/2023   • Positive depression screening 01/25/2023   • Vitamin B12 deficiency 04/15/2023   • Cough 04/15/2023     Resolved Ambulatory Problems     Diagnosis Date Noted   • GERD without esophagitis 11/22/2019     Past Medical History:   Diagnosis Date   • Allergic rhinitis    • Cancer    • COVID-19 11/2020   • H/O TIA (transient ischemic attack) and stroke 2005   • History of bone density study    •  Palpitations    • Peptic ulceration    • Personal history of malignant neoplasm of breast    • Sciatica    • Stroke 2005         PAST SURGICAL HISTORY  Past Surgical History:   Procedure Laterality Date   • BREAST BIOPSY Left 2011   • CATARACT EXTRACTION, BILATERAL     • COLONOSCOPY  09/28/2012    Adolph Martinez MD   • COLONOSCOPY N/A 11/13/2018    Procedure: COLONOSCOPY to cecum;  Surgeon: Adolph Martinez MD;  Location: [facility] ENDOSCOPY;  Service: Gastroenterology   • COLONOSCOPY N/A 1/27/2021    Procedure: COLONOSCOPY TO CECUM AND TERMINAL ILEUM;  Surgeon: Emelia Wilcox MD;  Location[facility] END[facility]COPY;  Service: Gastroenterology;  Laterality: N/A;  RECTAL BLEEDING  --DIVERTICULOSIS, HEMORRHOIDS, TORTUOUS COLON   • ENDOSCOPY N/A 11/13/2018    Procedure: ESOPHAGOGASTRODUODENOSCOPY with bx;  Surgeon: Adolph Martinez MD;  Location: [facility] ENDOSCOPY;  Service: Gastroenterology   • ENDOSCOPY N/A 1/27/2021    Procedure: ESOPHAGOGASTRODUODENOSCOPY WITH COLD BX;  Surgeon: Emelia Wilcox MD;  Location: [facility] ENDOSCOPY;  Service: Gastroenterology;  Laterality: N/A;  GERD  --GASTRITIS, HIATAL HERNIA   • EYE SURGERY     • HYSTERECTOMY      At age 31-Not due to cancer   • MASTECTOMY Left 06/30/2011    Dr. Kaitlyn Luna   • TUBAL ABDOMINAL LIGATION  1974   • UPPER GASTROINTESTINAL ENDOSCOPY  09/28/2012    Adolph Martinez MD         FAMILY HISTORY  Family History   Problem Relation Age of Onset   • Cancer Mother 68        head and neck   • Stroke Mother    • Heart disease Mother    • Gallbladder disease Mother    • Throat cancer Mother 68   • Breast cancer Sister 57   • Heart disease Sister    • Hypertension Sister    • Cancer Sister    • Gallbladder disease Sister    • Diabetes Sister    • Cancer Brother         head and neck   • Heart disease Brother    • Hypertension Brother    • Gallbladder disease Brother    • Lung cancer Brother 67   • Throat cancer Brother 67   • Hypertension Father    • Gallbladder disease  Father    • Emphysema Father    • Heart disease Father         Enlarged heart   • Cancer Maternal Aunt    • Cancer Maternal Uncle    • Colon cancer Maternal Uncle    • Heart attack Brother    • Alcohol abuse Brother    • Hypertension Sister    • Heart disease Sister    • Hypertension Sister    • Heart disease Sister    • Hypertension Sister    • No Known Problems Other          SOCIAL HISTORY  Social History     Socioeconomic History   • Marital status:      Spouse name: Jack   • Number of children: 2   • Years of education: High School   Tobacco Use   • Smoking status: Never   • Smokeless tobacco: Never   • Tobacco comments:     CAFFEINE USE: 4-5 CUPS 1/2 & 1/2 COFFEE DAILY   Vaping Use   • Vaping Use: Never used   Substance and Sexual Activity   • Alcohol use: Not Currently     Comment: rare-7 or less drinks per week   • Drug use: No   • Sexual activity: Defer         ALLERGIES  Lisinopril, Contrast dye (echo or unknown ct/mr), and Tartrazine        REVIEW OF SYSTEMS  Review of Systems     All systems reviewed and negative except for those discussed in HPI.       PHYSICAL EXAM    I have reviewed the triage vital signs and nursing notes.    ED Triage Vitals   Temp Heart Rate Resp BP SpO2   04/24/23 1100 04/24/23 1100 04/24/23 1100 04/24/23 1110 04/24/23 1100   98.4 °F (36.9 °C) 63 18 103/67 98 %      Temp src Heart Rate Source Patient Position BP Location FiO2 (%)   -- -- -- -- --              Physical Exam    Physical Exam   Constitutional: No distress.  Not overtly toxic  HENT:  Head: Normocephalic and atraumatic.   Oropharynx: Mucous membranes are moist.  Mildly dry.  No obvious acute dental infection appreciated.  There is mild diffuse dental decay of the mandibular teeth  Eyes: No scleral icterus. No conjunctival pallor.  Neck: Painless range of motion noted. Neck supple.  No meningismus or rigidity  Cardiovascular: Pink warm and well-perfused throughout.  Pulmonary/Chest: No respiratory distress. There  are no wheezes, no rhonchi, and no rales.   Abdominal: Soft. There is no tenderness. There is no rebound and no guarding.   Musculoskeletal: Moves all extremities equally. There is no pedal edema or calf tenderness.   Neurological: Alert.  Baseline strength and sensation noted.   Skin: Skin is pink, warm, and dry. No pallor.   Psychiatric: Mood and affect normal.   Nursing note and vitals reviewed.    LAB RESULTS  Recent Results (from the past 24 hour(s))   Respiratory Panel PCR w/COVID-19(SARS-CoV-2) MOLINA/THOMAS/JAYLA/PAD/COR/MAD/FRANCO In-House, NP Swab in UTM/VTM, 3-4 HR TAT - Swab, Nasopharynx    Collection Time: 04/24/23 11:35 AM    Specimen: Nasopharynx; Swab   Result Value Ref Range    ADENOVIRUS, PCR Not Detected Not Detected    Coronavirus 229E Not Detected Not Detected    Coronavirus HKU1 Not Detected Not Detected    Coronavirus NL63 Not Detected Not Detected    Coronavirus OC43 Not Detected Not Detected    COVID19 Not Detected Not Detected - Ref. Range    Human Metapneumovirus Not Detected Not Detected    Human Rhinovirus/Enterovirus Not Detected Not Detected    Influenza A PCR Not Detected Not Detected    Influenza B PCR Not Detected Not Detected    Parainfluenza Virus 1 Not Detected Not Detected    Parainfluenza Virus 2 Not Detected Not Detected    Parainfluenza Virus 3 Not Detected Not Detected    Parainfluenza Virus 4 Not Detected Not Detected    RSV, PCR Not Detected Not Detected    Bordetella pertussis pcr Not Detected Not Detected    Bordetella parapertussis PCR Not Detected Not Detected    Chlamydophila pneumoniae PCR Not Detected Not Detected    Mycoplasma pneumo by PCR Not Detected Not Detected   ECG 12 Lead Other; weakness    Collection Time: 04/24/23 11:40 AM   Result Value Ref Range    QT Interval 292 ms   Comprehensive Metabolic Panel    Collection Time: 04/24/23 11:51 AM    Specimen: Blood   Result Value Ref Range    Glucose 122 (H) 65 - 99 mg/dL    BUN 12 8 - 23 mg/dL    Creatinine 0.96 0.57 - 1.00  mg/dL    Sodium 133 (L) 136 - 145 mmol/L    Potassium 2.8 (L) 3.5 - 5.2 mmol/L    Chloride 91 (L) 98 - 107 mmol/L    CO2 27.0 22.0 - 29.0 mmol/L    Calcium 9.3 8.6 - 10.5 mg/dL    Total Protein 7.4 6.0 - 8.5 g/dL    Albumin 4.2 3.5 - 5.2 g/dL    ALT (SGPT) 14 1 - 33 U/L    AST (SGOT) 19 1 - 32 U/L    Alkaline Phosphatase 88 39 - 117 U/L    Total Bilirubin 1.2 0.0 - 1.2 mg/dL    Globulin 3.2 gm/dL    A/G Ratio 1.3 g/dL    BUN/Creatinine Ratio 12.5 7.0 - 25.0    Anion Gap 15.0 5.0 - 15.0 mmol/L    eGFR 61.4 >60.0 mL/min/1.73   Lactic Acid, Plasma    Collection Time: 04/24/23 11:51 AM    Specimen: Blood   Result Value Ref Range    Lactate 2.1 (C) 0.5 - 2.0 mmol/L   Procalcitonin    Collection Time: 04/24/23 11:51 AM    Specimen: Blood   Result Value Ref Range    Procalcitonin 0.07 0.00 - 0.25 ng/mL   High Sensitivity Troponin T    Collection Time: 04/24/23 11:51 AM    Specimen: Blood   Result Value Ref Range    HS Troponin T 16 (H) <10 ng/L   CBC Auto Differential    Collection Time: 04/24/23 11:51 AM    Specimen: Blood   Result Value Ref Range    WBC 17.11 (H) 3.40 - 10.80 10*3/mm3    RBC 4.39 3.77 - 5.28 10*6/mm3    Hemoglobin 13.8 12.0 - 15.9 g/dL    Hematocrit 40.5 34.0 - 46.6 %    MCV 92.3 79.0 - 97.0 fL    MCH 31.4 26.6 - 33.0 pg    MCHC 34.1 31.5 - 35.7 g/dL    RDW 12.0 (L) 12.3 - 15.4 %    RDW-SD 40.8 37.0 - 54.0 fl    MPV 9.0 6.0 - 12.0 fL    Platelets 256 140 - 450 10*3/mm3    Neutrophil % 84.4 (H) 42.7 - 76.0 %    Lymphocyte % 6.2 (L) 19.6 - 45.3 %    Monocyte % 8.2 5.0 - 12.0 %    Eosinophil % 0.4 0.3 - 6.2 %    Basophil % 0.2 0.0 - 1.5 %    Immature Grans % 0.6 (H) 0.0 - 0.5 %    Neutrophils, Absolute 14.45 (H) 1.70 - 7.00 10*3/mm3    Lymphocytes, Absolute 1.06 0.70 - 3.10 10*3/mm3    Monocytes, Absolute 1.40 (H) 0.10 - 0.90 10*3/mm3    Eosinophils, Absolute 0.07 0.00 - 0.40 10*3/mm3    Basophils, Absolute 0.03 0.00 - 0.20 10*3/mm3    Immature Grans, Absolute 0.10 (H) 0.00 - 0.05 10*3/mm3    nRBC 0.0 0.0 -  0.2 /100 WBC   CK    Collection Time: 04/24/23 11:51 AM    Specimen: Blood   Result Value Ref Range    Creatine Kinase 81 20 - 180 U/L   Sedimentation Rate    Collection Time: 04/24/23 11:51 AM    Specimen: Blood   Result Value Ref Range    Sed Rate 18 0 - 30 mm/hr   C-reactive Protein    Collection Time: 04/24/23 11:51 AM    Specimen: Blood   Result Value Ref Range    C-Reactive Protein 2.04 (H) 0.00 - 0.50 mg/dL   Magnesium    Collection Time: 04/24/23 11:51 AM    Specimen: Blood   Result Value Ref Range    Magnesium 1.6 1.6 - 2.4 mg/dL   Urinalysis With Culture If Indicated - Urine, Catheter    Collection Time: 04/24/23  1:38 PM    Specimen: Urine, Catheter   Result Value Ref Range    Color, UA Yellow Yellow, Straw    Appearance, UA Clear Clear    pH, UA 6.0 5.0 - 8.0    Specific Gravity, UA 1.019 1.005 - 1.030    Glucose, UA Negative Negative    Ketones, UA 15 mg/dL (1+) (A) Negative    Bilirubin, UA Negative Negative    Blood, UA Large (3+) (A) Negative    Protein,  mg/dL (2+) (A) Negative    Leuk Esterase, UA Trace (A) Negative    Nitrite, UA Negative Negative    Urobilinogen, UA 1.0 E.U./dL 0.2 - 1.0 E.U./dL   Urinalysis, Microscopic Only - Urine, Catheter    Collection Time: 04/24/23  1:38 PM    Specimen: Urine, Catheter   Result Value Ref Range    RBC, UA 3-5 (A) None Seen, 0-2 /HPF    WBC, UA 0-2 None Seen, 0-2 /HPF    Bacteria, UA None Seen None Seen /HPF    Squamous Epithelial Cells, UA 0-2 None Seen, 0-2 /HPF    Hyaline Casts, UA 0-2 None Seen /LPF    Granular Casts, UA 0-2 None Seen /LPF    Methodology Manual Light Microscopy    High Sensitivity Troponin T 2Hr    Collection Time: 04/24/23  1:52 PM    Specimen: Blood   Result Value Ref Range    HS Troponin T 16 (H) <10 ng/L    Troponin T Delta 0 >=-4 - <+4 ng/L       Ordered the above labs and independently reviewed the results.        RADIOLOGY  XR Shoulder 2+ View Right    Result Date: 4/24/2023  XR SHOULDER 2+ VW RIGHT-  INDICATIONS: Trauma   TECHNIQUE: 3 views of the right shoulder  COMPARISON: None available  FINDINGS:  Mild hypertrophic degenerative change is seen at the acromioclavicular joint. Small degenerative spurring of the humeral head. No acute fracture, erosion, or dislocation is identified. Follow-up/further evaluation can be obtained as indications persist.       As described.  This report was finalized on 4/24/2023 12:28 PM by Dr. Yair Liang M.D.      CT Head Without Contrast    Result Date: 4/24/2023  EMERGENCY NONCONTRAST HEAD CT ON 04/24/2023  CLINICAL HISTORY: Patient fell and hit right side of head.  TECHNIQUE: Spiral CT images were obtained from the base of the skull to the vertex without intravenous contrast. The images were reformatted and submitted in 3 mm thick axial, sagittal and coronal CT sections with brain algorithm and 2 mm thick axial CT sections with high-resolution bone algorithm.  This is correlated to a prior head CT from [facility] on 10/14/2021.  FINDINGS: The brain parenchyma is normal in attenuation. The ventricles are normal in size. I see no focal mass effect. There is no midline shift. No extraaxial fluid collections are identified. There is no evidence of acute intracranial hemorrhage. No acute skull fracture is identified. The calvarium and skull base are normal in appearance. There is mild bilateral ethmoid sinus mucosal thickening with a small amount of fluid and mucosal thickening in the posterior right maxillary sinus. The remainder of the paranasal sinuses and the mastoid air cells and the middle ear cavities are clear.      1. No acute intracranial abnormality is identified. 2. There is mild bilateral ethmoid sinus mucosal thickening and a small amount of fluid and mucosal thickening in the posterior right maxillary sinus. The remainder of the head CT is within normal limits. Specifically, no acute skull fracture or intracranial hemorrhage is identified.  Radiation dose reduction  techniques were utilized, including automated exposure control and exposure modulation based on body size.       XR Chest 1 View    Result Date: 4/24/2023  XR CHEST 1 VW-  HISTORY: Female who is 78 years-old,  fever, cancer  TECHNIQUE: Frontal view of the chest  COMPARISON: 04/12/2023  FINDINGS: Heart, mediastinum and pulmonary vasculature are unremarkable. No focal pulmonary consolidation, pleural effusion, or pneumothorax. No acute osseous process.      No evidence for acute pulmonary process. Follow-up as clinical indications persist.  This report was finalized on 4/24/2023 12:27 PM by Dr. Yair Liang M.D.        I ordered the above noted radiological studies. Reviewed by me and discussed with radiologist.  See dictation for official radiology interpretation.      PROCEDURES    Procedures        MEDICATIONS GIVEN IN ER    Medications   sodium chloride 0.9 % flush 10 mL (has no administration in time range)   sodium chloride 0.9 % bolus 500 mL (0 mL Intravenous Stopped 4/24/23 1412)     Followed by   sodium chloride 0.9 % infusion (125 mL/hr Intravenous New Bag 4/24/23 1412)   sodium chloride 0.9 % bolus 500 mL (500 mL Intravenous New Bag 4/24/23 1433)   acetaminophen (TYLENOL) tablet 1,000 mg (1,000 mg Oral Given 4/24/23 1334)   potassium chloride (K-DUR,KLOR-CON) ER tablet 40 mEq (40 mEq Oral Given 4/24/23 1334)         PROGRESS, DATA ANALYSIS, CONSULTS, AND MEDICAL DECISION MAKING    My differential diagnosis includes but is not limited to generalized weakness, electrolyte abnormality, CVA, TIA, Bell's palsy, acute MI, GI bleed, urinary tract infection, systemic infections including sepsis, alcohol abuse, drug abuse including prescription and street drug.    My differential diagnosis for fever includes but is not limited:  To viral infections including COVID-19, bacterial infections, fungal infections, fever of unknown origin, auto regulatory dysfunction, hyperthermia, heat exhaustion, heat stroke,  malignant neuroleptic syndrome and others.      All labs have been independently reviewed by me.  All radiology studies have been reviewed by me and discussed with radiologist dictating the report.   EKG's independently viewed and interpreted by me.  Discussion below represents my analysis of pertinent findings related to patient's condition, differential diagnosis, treatment plan and final disposition.      ED Course as of 04/24/23 1447   Mon Apr 24, 2023   1139 We will provide Tylenol for both antipyretic as well as pain utilities.  Initiate IV fluids and broad evaluation to assess for acute life threats a possible source of infection/fever.  Additionally, we will evaluate for acute traumatic injuries.  Patient agreeable. [RS]   1146 EKG           EKG time: 1140  Rhythm/Rate: Sinus with ventricular bigeminy; 120s  P waves and MN: MARK within normal limit  QRS, axis: Narrow complex  ST and T waves: ST/T wave repolarization abnormalities without clear evidence of STEMI    Interpreted Contemporaneously by me, independently viewed  Comparison: 3/13/2014   [RS]   1208 RADIOLOGY      Study: Single view portable chest  Findings: No pneumothorax or focal infiltrate appreciated.  I independently viewed and interpreted these images contemporaneously with treatment.    [RS]   1208 RADIOLOGY      Study: Noncontrast CT head  Findings: No large volume intracranial hemorrhage appreciated  I independently viewed and interpreted these images contemporaneously with treatment.    [RS]   1300 Procalcitonin: 0.07 [RS]   1301 WBC(!): 17.11 [RS]   1301 Hemoglobin: 13.8 [RS]   1301 Platelets: 256 [RS]   1301 Creatine Kinase: 81 [RS]   1301 Lactate(!!): 2.1  IV fluids infusing.  Source not yet clearly identified and the patient has been on nearly 2 weeks of Augmentin.  Holding on further antibiotics at this time. [RS]   1301 HS Troponin T(!): 16 [RS]   1301 C-Reactive Protein(!): 2.04 [RS]   1301 COVID19: Not Detected [RS]   1301 Influenza  A PCR: Not Detected [RS]   1301 Influenza B PCR: Not Detected [RS]   1301 CONSULT        Provider: Dr. Higginbotham-neuroradiologist    Discussion: Discussed patient history and ED presentation.  No acute intracranial traumatic process identified    Agreeable c treatment and planned disposition.         [RS]   1302 Potassium(!): 2.8  Oral potassium replacement ordered. [RS]   1430 Troponin T Delta: 0 [RS]   1430 WBC, UA: 0-2 [RS]   1430 Bacteria, UA: None Seen [RS]   1431 Heart rate improved with initial fluids, additional bolus ordered.  Plan admission.  Patient agreeable with [RS]   1446 CONSULT        Provider: Dr. [facility]    Discussion: Reviewed patient history, ED presentation and evaluation as well as therapies initiated in the ED.  We discussed holding off on further antibiotics at this time and awaiting culture results.  She is agreeable to accept the patient for observation admission for further evaluation and treatment.    Agreeable c treatment and planned disposition.         [RS]   1446 Patient and family updated with findings and plan.  All agreeable. [RS]      ED Course User Index  [RS] Patric Su MD       AS OF 14:47 EDT VITALS:    BP - 123/60  HR - 114  TEMP - 98.4 °F (36.9 °C)  O2 SATS - 96%        DIAGNOSIS  Final diagnoses:   Fever in adult   Leukocytosis, unspecified type   Hypokalemia   Sepsis without acute organ dysfunction, due to unspecified organism   Generalized weakness   History of breast cancer   Sinus tachycardia         DISPOSITION  ADMISSION    Discussed treatment plan and reason for admission with pt/family and admitting physician.  Pt/family voiced understanding of the plan for admission for further testing/treatment as needed.          Patric Su MD  04/24/23 0287

## 2023-04-25 ENCOUNTER — APPOINTMENT (OUTPATIENT)
Dept: CT IMAGING | Facility: HOSPITAL | Age: 78
DRG: 372 | End: 2023-04-25
Payer: MEDICARE

## 2023-04-25 ENCOUNTER — APPOINTMENT (OUTPATIENT)
Dept: CARDIOLOGY | Facility: HOSPITAL | Age: 78
DRG: 372 | End: 2023-04-25
Payer: MEDICARE

## 2023-04-25 LAB
ADV 40+41 DNA STL QL NAA+NON-PROBE: NOT DETECTED
ANION GAP SERPL CALCULATED.3IONS-SCNC: 10.2 MMOL/L (ref 5–15)
AORTIC ARCH: 2.6 CM
ASCENDING AORTA: 3.3 CM
ASTRO TYP 1-8 RNA STL QL NAA+NON-PROBE: NOT DETECTED
BH CV ECHO LEFT VENTRICLE GLOBAL LONGITUDINAL STRAIN: -18.4 %
BH CV ECHO MEAS - ACS: 2.12 CM
BH CV ECHO MEAS - AO MAX PG: 9.1 MMHG
BH CV ECHO MEAS - AO MEAN PG: 5.1 MMHG
BH CV ECHO MEAS - AO ROOT DIAM: 2.9 CM
BH CV ECHO MEAS - AO V2 MAX: 150.8 CM/SEC
BH CV ECHO MEAS - AO V2 VTI: 24 CM
BH CV ECHO MEAS - AVA(I,D): 3 CM2
BH CV ECHO MEAS - EDV(CUBED): 101.5 ML
BH CV ECHO MEAS - EDV(MOD-SP2): 110 ML
BH CV ECHO MEAS - EDV(MOD-SP4): 121 ML
BH CV ECHO MEAS - EF(MOD-BP): 61.9 %
BH CV ECHO MEAS - EF(MOD-SP2): 61.8 %
BH CV ECHO MEAS - EF(MOD-SP4): 63.6 %
BH CV ECHO MEAS - EF_3D-VOL: 63 %
BH CV ECHO MEAS - ESV(CUBED): 36.4 ML
BH CV ECHO MEAS - ESV(MOD-SP2): 42 ML
BH CV ECHO MEAS - ESV(MOD-SP4): 44 ML
BH CV ECHO MEAS - FS: 28.9 %
BH CV ECHO MEAS - IVS/LVPW: 1.15 CM
BH CV ECHO MEAS - IVSD: 1.23 CM
BH CV ECHO MEAS - LAT PEAK E' VEL: 7.3 CM/SEC
BH CV ECHO MEAS - LV DIASTOLIC VOL/BSA (35-75): 74.9 CM2
BH CV ECHO MEAS - LV MASS(C)D: 197.6 GRAMS
BH CV ECHO MEAS - LV MAX PG: 2.7 MMHG
BH CV ECHO MEAS - LV MEAN PG: 1.4 MMHG
BH CV ECHO MEAS - LV SYSTOLIC VOL/BSA (12-30): 27.2 CM2
BH CV ECHO MEAS - LV V1 MAX: 81.9 CM/SEC
BH CV ECHO MEAS - LV V1 VTI: 16.4 CM
BH CV ECHO MEAS - LVIDD: 4.7 CM
BH CV ECHO MEAS - LVIDS: 3.3 CM
BH CV ECHO MEAS - LVOT AREA: 4.4 CM2
BH CV ECHO MEAS - LVOT DIAM: 2.36 CM
BH CV ECHO MEAS - LVPWD: 1.07 CM
BH CV ECHO MEAS - MED PEAK E' VEL: 6 CM/SEC
BH CV ECHO MEAS - MV A DUR: 0.12 SEC
BH CV ECHO MEAS - MV A MAX VEL: 97.4 CM/SEC
BH CV ECHO MEAS - MV DEC SLOPE: 460 CM/SEC2
BH CV ECHO MEAS - MV DEC TIME: 0.13 MSEC
BH CV ECHO MEAS - MV E MAX VEL: 60.9 CM/SEC
BH CV ECHO MEAS - MV E/A: 0.63
BH CV ECHO MEAS - MV MAX PG: 4.1 MMHG
BH CV ECHO MEAS - MV MEAN PG: 2.25 MMHG
BH CV ECHO MEAS - MV P1/2T: 48 MSEC
BH CV ECHO MEAS - MV V2 VTI: 24.3 CM
BH CV ECHO MEAS - MVA(P1/2T): 4.6 CM2
BH CV ECHO MEAS - MVA(VTI): 2.9 CM2
BH CV ECHO MEAS - PA ACC TIME: 0.11 SEC
BH CV ECHO MEAS - PA PR(ACCEL): 31.5 MMHG
BH CV ECHO MEAS - PA V2 MAX: 103.2 CM/SEC
BH CV ECHO MEAS - PULM A REVS DUR: 0.11 SEC
BH CV ECHO MEAS - PULM A REVS VEL: 72.7 CM/SEC
BH CV ECHO MEAS - PULM DIAS VEL: 67.1 CM/SEC
BH CV ECHO MEAS - PULM S/D: 1.33
BH CV ECHO MEAS - PULM SYS VEL: 89.4 CM/SEC
BH CV ECHO MEAS - QP/QS: 0.81
BH CV ECHO MEAS - RAP SYSTOLE: 3 MMHG
BH CV ECHO MEAS - RV MAX PG: 3.6 MMHG
BH CV ECHO MEAS - RV V1 MAX: 94.7 CM/SEC
BH CV ECHO MEAS - RV V1 VTI: 15 CM
BH CV ECHO MEAS - RVOT DIAM: 2.22 CM
BH CV ECHO MEAS - SI(MOD-SP2): 42.1 ML/M2
BH CV ECHO MEAS - SI(MOD-SP4): 47.6 ML/M2
BH CV ECHO MEAS - SUP REN AO DIAM: 2.2 CM
BH CV ECHO MEAS - SV(LVOT): 71.4 ML
BH CV ECHO MEAS - SV(MOD-SP2): 68 ML
BH CV ECHO MEAS - SV(MOD-SP4): 77 ML
BH CV ECHO MEAS - SV(RVOT): 57.7 ML
BH CV ECHO MEAS - TAPSE (>1.6): 1.67 CM
BH CV ECHO MEASUREMENTS AVERAGE E/E' RATIO: 9.16
BH CV XLRA - RV BASE: 2.8 CM
BH CV XLRA - RV LENGTH: 4.7 CM
BH CV XLRA - RV MID: 2.32 CM
BH CV XLRA - TDI S': 15.3 CM/SEC
BUN SERPL-MCNC: 10 MG/DL (ref 8–23)
BUN/CREAT SERPL: 15.4 (ref 7–25)
C CAYETANENSIS DNA STL QL NAA+NON-PROBE: NOT DETECTED
C COLI+JEJ+UPSA DNA STL QL NAA+NON-PROBE: NOT DETECTED
CALCIUM SPEC-SCNC: 8.3 MG/DL (ref 8.6–10.5)
CHLORIDE SERPL-SCNC: 100 MMOL/L (ref 98–107)
CO2 SERPL-SCNC: 22.8 MMOL/L (ref 22–29)
CREAT SERPL-MCNC: 0.65 MG/DL (ref 0.57–1)
CRYPTOSP DNA STL QL NAA+NON-PROBE: NOT DETECTED
DEPRECATED RDW RBC AUTO: 40.5 FL (ref 37–54)
E HISTOLYT DNA STL QL NAA+NON-PROBE: NOT DETECTED
EAEC PAA PLAS AGGR+AATA ST NAA+NON-PRB: NOT DETECTED
EC STX1+STX2 GENES STL QL NAA+NON-PROBE: NOT DETECTED
EGFRCR SERPLBLD CKD-EPI 2021: 91.4 ML/MIN/1.73
EPEC EAE GENE STL QL NAA+NON-PROBE: NOT DETECTED
ERYTHROCYTE [DISTWIDTH] IN BLOOD BY AUTOMATED COUNT: 12.1 % (ref 12.3–15.4)
ETEC LTA+ST1A+ST1B TOX ST NAA+NON-PROBE: NOT DETECTED
G LAMBLIA DNA STL QL NAA+NON-PROBE: NOT DETECTED
GLUCOSE SERPL-MCNC: 103 MG/DL (ref 65–99)
HCT VFR BLD AUTO: 29.8 % (ref 34–46.6)
HGB BLD-MCNC: 10.5 G/DL (ref 12–15.9)
LEFT ATRIUM VOLUME INDEX: 25.5 ML/M2
MAGNESIUM SERPL-MCNC: 1.5 MG/DL (ref 1.6–2.4)
MAXIMAL PREDICTED HEART RATE: 144 BPM
MCH RBC QN AUTO: 32.1 PG (ref 26.6–33)
MCHC RBC AUTO-ENTMCNC: 35.2 G/DL (ref 31.5–35.7)
MCV RBC AUTO: 91.1 FL (ref 79–97)
NOROVIRUS GI+II RNA STL QL NAA+NON-PROBE: NOT DETECTED
P SHIGELLOIDES DNA STL QL NAA+NON-PROBE: NOT DETECTED
PLATELET # BLD AUTO: 177 10*3/MM3 (ref 140–450)
PMV BLD AUTO: 9 FL (ref 6–12)
POTASSIUM SERPL-SCNC: 3 MMOL/L (ref 3.5–5.2)
POTASSIUM SERPL-SCNC: 3.9 MMOL/L (ref 3.5–5.2)
RBC # BLD AUTO: 3.27 10*6/MM3 (ref 3.77–5.28)
RVA RNA STL QL NAA+NON-PROBE: NOT DETECTED
S ENT+BONG DNA STL QL NAA+NON-PROBE: NOT DETECTED
SAPO I+II+IV+V RNA STL QL NAA+NON-PROBE: NOT DETECTED
SHIGELLA SP+EIEC IPAH ST NAA+NON-PROBE: NOT DETECTED
SINUS: 3.2 CM
SODIUM SERPL-SCNC: 133 MMOL/L (ref 136–145)
STJ: 2.47 CM
STRESS TARGET HR: 122 BPM
V CHOL+PARA+VUL DNA STL QL NAA+NON-PROBE: NOT DETECTED
V CHOLERAE DNA STL QL NAA+NON-PROBE: NOT DETECTED
WBC NRBC COR # BLD: 13.13 10*3/MM3 (ref 3.4–10.8)
Y ENTEROCOL DNA STL QL NAA+NON-PROBE: NOT DETECTED

## 2023-04-25 PROCEDURE — 84132 ASSAY OF SERUM POTASSIUM: CPT | Performed by: INTERNAL MEDICINE

## 2023-04-25 PROCEDURE — 63710000001 ONDANSETRON PER 8 MG: Performed by: INTERNAL MEDICINE

## 2023-04-25 PROCEDURE — 93356 MYOCRD STRAIN IMG SPCKL TRCK: CPT

## 2023-04-25 PROCEDURE — G0378 HOSPITAL OBSERVATION PER HR: HCPCS

## 2023-04-25 PROCEDURE — 74176 CT ABD & PELVIS W/O CONTRAST: CPT

## 2023-04-25 PROCEDURE — 99223 1ST HOSP IP/OBS HIGH 75: CPT | Performed by: INTERNAL MEDICINE

## 2023-04-25 PROCEDURE — 83735 ASSAY OF MAGNESIUM: CPT | Performed by: INTERNAL MEDICINE

## 2023-04-25 PROCEDURE — 25010000002 SODIUM CHLORIDE 0.9 % WITH KCL 20 MEQ 20-0.9 MEQ/L-% SOLUTION: Performed by: INTERNAL MEDICINE

## 2023-04-25 PROCEDURE — 80048 BASIC METABOLIC PNL TOTAL CA: CPT | Performed by: INTERNAL MEDICINE

## 2023-04-25 PROCEDURE — 93306 TTE W/DOPPLER COMPLETE: CPT | Performed by: INTERNAL MEDICINE

## 2023-04-25 PROCEDURE — 85027 COMPLETE CBC AUTOMATED: CPT | Performed by: INTERNAL MEDICINE

## 2023-04-25 PROCEDURE — 87507 IADNA-DNA/RNA PROBE TQ 12-25: CPT | Performed by: EMERGENCY MEDICINE

## 2023-04-25 PROCEDURE — 71250 CT THORAX DX C-: CPT

## 2023-04-25 PROCEDURE — 93356 MYOCRD STRAIN IMG SPCKL TRCK: CPT | Performed by: INTERNAL MEDICINE

## 2023-04-25 PROCEDURE — 25010000002 MAGNESIUM SULFATE 2 GM/50ML SOLUTION: Performed by: INTERNAL MEDICINE

## 2023-04-25 PROCEDURE — 93306 TTE W/DOPPLER COMPLETE: CPT

## 2023-04-25 PROCEDURE — 25510000001 PERFLUTREN (DEFINITY) 8.476 MG IN SODIUM CHLORIDE (PF) 0.9 % 10 ML INJECTION: Performed by: INTERNAL MEDICINE

## 2023-04-25 RX ORDER — MAGNESIUM SULFATE HEPTAHYDRATE 40 MG/ML
2 INJECTION, SOLUTION INTRAVENOUS AS NEEDED
Status: DISCONTINUED | OUTPATIENT
Start: 2023-04-25 | End: 2023-04-28

## 2023-04-25 RX ORDER — VANCOMYCIN HYDROCHLORIDE 125 MG/1
125 CAPSULE ORAL EVERY 6 HOURS SCHEDULED
Status: DISCONTINUED | OUTPATIENT
Start: 2023-04-25 | End: 2023-04-25

## 2023-04-25 RX ORDER — MAGNESIUM SULFATE HEPTAHYDRATE 40 MG/ML
4 INJECTION, SOLUTION INTRAVENOUS AS NEEDED
Status: DISCONTINUED | OUTPATIENT
Start: 2023-04-25 | End: 2023-04-28

## 2023-04-25 RX ADMIN — MAGNESIUM SULFATE HEPTAHYDRATE 2 G: 40 INJECTION, SOLUTION INTRAVENOUS at 20:08

## 2023-04-25 RX ADMIN — Medication 1000 MCG: at 10:15

## 2023-04-25 RX ADMIN — POTASSIUM CHLORIDE 20 MEQ: 750 TABLET, EXTENDED RELEASE ORAL at 18:08

## 2023-04-25 RX ADMIN — LEVOTHYROXINE SODIUM 75 MCG: 0.07 TABLET ORAL at 05:52

## 2023-04-25 RX ADMIN — POTASSIUM CHLORIDE 40 MEQ: 750 TABLET, EXTENDED RELEASE ORAL at 13:21

## 2023-04-25 RX ADMIN — POTASSIUM CHLORIDE 40 MEQ: 750 TABLET, EXTENDED RELEASE ORAL at 18:08

## 2023-04-25 RX ADMIN — PERFLUTREN 2 ML: 6.52 INJECTION, SUSPENSION INTRAVENOUS at 15:25

## 2023-04-25 RX ADMIN — CLOPIDOGREL BISULFATE 75 MG: 75 TABLET, FILM COATED ORAL at 10:15

## 2023-04-25 RX ADMIN — POTASSIUM CHLORIDE AND SODIUM CHLORIDE 125 ML/HR: 900; 150 INJECTION, SOLUTION INTRAVENOUS at 18:20

## 2023-04-25 RX ADMIN — ACETAMINOPHEN 650 MG: 325 TABLET, FILM COATED ORAL at 05:52

## 2023-04-25 RX ADMIN — ONDANSETRON HYDROCHLORIDE 4 MG: 4 TABLET, FILM COATED ORAL at 22:37

## 2023-04-25 RX ADMIN — ACETAMINOPHEN 650 MG: 325 TABLET, FILM COATED ORAL at 22:37

## 2023-04-25 RX ADMIN — MAGNESIUM SULFATE HEPTAHYDRATE 2 G: 40 INJECTION, SOLUTION INTRAVENOUS at 22:40

## 2023-04-25 RX ADMIN — POTASSIUM CHLORIDE 20 MEQ: 750 TABLET, EXTENDED RELEASE ORAL at 10:15

## 2023-04-25 RX ADMIN — ATORVASTATIN CALCIUM 10 MG: 20 TABLET, FILM COATED ORAL at 10:15

## 2023-04-25 RX ADMIN — POTASSIUM CHLORIDE AND SODIUM CHLORIDE 100 ML/HR: 900; 150 INJECTION, SOLUTION INTRAVENOUS at 10:27

## 2023-04-25 RX ADMIN — PANTOPRAZOLE SODIUM 40 MG: 40 TABLET, DELAYED RELEASE ORAL at 18:08

## 2023-04-25 RX ADMIN — AMITRIPTYLINE HYDROCHLORIDE 25 MG: 25 TABLET, FILM COATED ORAL at 20:17

## 2023-04-25 RX ADMIN — PANTOPRAZOLE SODIUM 40 MG: 40 TABLET, DELAYED RELEASE ORAL at 10:15

## 2023-04-25 NOTE — H&P
HISTORY AND PHYSICAL   [facility]        Date of Admission: 2023  Patient Identification:  Name: Carlotta Pires  Age: 78 y.o.  Sex: female  :  1945  MRN: [id number]                     Primary Care Physician: Gudelia Bazzi APRN    Chief Complaint:  78 year old female who presented to the emergency room with weakness and intermittent fevers which have been present for some time; she was recently started on augmentin and has had diarrhea but did not feel any better; she has had several falls recently; she has been febrile to 103; she denies nausea or vomiting    History of Present Illness:   As above    Past Medical History:  Past Medical History:   Diagnosis Date   • Allergic rhinitis    • Arthritis    • Cancer     Left breast cancer   • CHF (congestive heart failure)    • Cough    • COVID-19 2020   • Degenerative arthritis of thumb    • GERD without esophagitis    • H/O TIA (transient ischemic attack) and stroke    • History of bone density study    • Lumbar radiculopathy    • Osteoporosis    • Palpitations    • Peptic ulceration    • Personal history of malignant neoplasm of breast    • Sciatica    • Stroke     CVA--History of storke   • Trigeminal neuralgia      Past Surgical History:  Past Surgical History:   Procedure Laterality Date   • BREAST BIOPSY Left    • CATARACT EXTRACTION, BILATERAL     • COLONOSCOPY  2012    Adolph Martinez MD   • COLONOSCOPY N/A 2018    Procedure: COLONOSCOPY to cecum;  Surgeon: Adolph Martinez MD;  Location: [facility] ENDOSCOPY;  Service: Gastroenterology   • COLONOSCOPY N/A 2021    Procedure: COLONOSCOPY TO CECUM AND TERMINAL ILEUM;  Surgeon: Emelia Wilcox MD;  Location: [facility] ENDOSCOPY;  Service: Gastroenterology;  Laterality: N/A;  RECTAL BLEEDING  --DIVERTICULOSIS, HEMORRHOIDS, TORTUOUS COLON   • ENDOSCOPY N/A 2018    Procedure: ESOPHAGOGASTRODUODENOSCOPY with bx;  Surgeon: Adolph Martinez MD;  Location: [facility]  ENDOSCOPY;  Service: Gastroenterology   • ENDOSCOPY N/A 1/27/2021    Procedure: ESOPHAGOGASTRODUODENOSCOPY WITH COLD BX;  Surgeon: Emelia Wilcox MD;  Location: [facility] ENDOSCOPY;  Service: Gastroenterology;  Laterality: N/A;  GERD  --GASTRITIS, HIATAL HERNIA   • EYE SURGERY     • HYSTERECTOMY      At age 31-Not due to cancer   • MASTECTOMY Left 06/30/2011    Dr. Kaitlyn Luna   • TUBAL ABDOMINAL LIGATION  1974   • UPPER GASTROINTESTINAL ENDOSCOPY  09/28/2012    Adolph Martinez MD      Home Meds:  Medications Prior to Admission   Medication Sig Dispense Refill Last Dose   • amitriptyline (ELAVIL) 25 MG tablet Take 1 tablet by mouth Every Night. 90 tablet 2    • atorvastatin (LIPITOR) 10 MG tablet Take 1 tablet by mouth Daily. 90 tablet 3    • chlorthalidone (HYGROTON) 25 MG tablet Take 1 tablet by mouth Daily. (Patient taking differently: Take 3 tablets by mouth Daily.) 90 tablet 3    • clopidogrel (PLAVIX) 75 MG tablet Take 1 tablet by mouth Daily. 30 tablet 0    • Dexilant 60 MG capsule TAKE ONE CAPSULE BY MOUTH EVERY DAY 90 capsule 3    • diphenhydrAMINE (BENADRYL) 25 mg capsule Take 1 capsule by mouth At Night As Needed for Itching.      • levothyroxine (Euthyrox) 75 MCG tablet Take 1 tablet by mouth Daily. Take one po qd except on Sunday take 2. 110 tablet 0    • linaclotide (LINZESS) 145 MCG capsule capsule Take 1 capsule by mouth Every Morning Before Breakfast. 30 capsule 11    • losartan (COZAAR) 100 MG tablet TAKE ONE TABLET BY MOUTH EVERY DAY 90 tablet 3    • Melatonin 10 MG capsule Take 1 capsule by mouth At Night As Needed.      • metoprolol succinate XL (TOPROL-XL) 50 MG 24 hr tablet Take 1 tablet by mouth 2 (Two) Times a Day. 180 tablet 3    • potassium chloride (K-DUR,KLOR-CON) 10 MEQ CR tablet Take 1 tablet by mouth Daily. 30 tablet 2    • vitamin B-12 (CYANOCOBALAMIN) 1000 MCG tablet Take 1 tablet by mouth Daily.          Allergies:  Allergies   Allergen Reactions   • Lisinopril Cough   • Contrast  Dye (Echo Or Unknown Ct/Mr) Itching and Rash     IVP Dye   • Tartrazine Hives     Immunizations:  Immunization History   Administered Date(s) Administered   • COVID-19 (PFIZER) BIVALENT BOOSTER 12+YRS 10/05/2022   • COVID-19 (PFIZER) PURPLE CAP 02/21/2021, 02/21/2021, 03/14/2021, 11/09/2021   • Covid-19 (Pfizer) Gray Cap 04/14/2022   • Fluad Quad 65+ 10/14/2020   • Fluzone High Dose =>65 Years (Vaxcare ONLY) 09/17/2014, 10/16/2018, 10/10/2019   • Fluzone High-Dose 65+yrs 10/05/2022   • Pneumococcal Conjugate 13-Valent (PCV13) 09/12/2016   • Pneumococcal Polysaccharide (PPSV23) 09/04/2018   • TD Preservative Free 06/06/2016, 07/15/2021     Social History:   Social History     Social History Narrative   • Not on file     Social History     Socioeconomic History   • Marital status:      Spouse name: Jack   • Number of children: 2   • Years of education: High School   Tobacco Use   • Smoking status: Never   • Smokeless tobacco: Never   • Tobacco comments:     CAFFEINE USE: 4-5 CUPS 1/2 & 1/2 COFFEE DAILY   Vaping Use   • Vaping Use: Never used   Substance and Sexual Activity   • Alcohol use: Not Currently   • Drug use: No   • Sexual activity: Defer       Family History:  Family History   Problem Relation Age of Onset   • Cancer Mother 68        head and neck   • Stroke Mother    • Heart disease Mother    • Gallbladder disease Mother    • Throat cancer Mother 68   • Breast cancer Sister 57   • Heart disease Sister    • Hypertension Sister    • Cancer Sister    • Gallbladder disease Sister    • Diabetes Sister    • Cancer Brother         head and neck   • Heart disease Brother    • Hypertension Brother    • Gallbladder disease Brother    • Lung cancer Brother 67   • Throat cancer Brother 67   • Hypertension Father    • Gallbladder disease Father    • Emphysema Father    • Heart disease Father         Enlarged heart   • Cancer Maternal Aunt    • Cancer Maternal Uncle    • Colon cancer Maternal Uncle    • Heart  "attack Brother    • Alcohol abuse Brother    • Hypertension Sister    • Heart disease Sister    • Hypertension Sister    • Heart disease Sister    • Hypertension Sister    • No Known Problems Other         Review of Systems  See history of present illness and past medical history.  Patient denies headache, dizziness, syncope,   trauma, change in vision, change in hearing, change in taste, changes in weight, changes in appetite, focal weakness, numbness, or paresthesia.  Patient denies chest pain, palpitations, dyspnea, orthopnea, PND, cough, sinus pressure, rhinorrhea, epistaxis, hemoptysis, nausea, vomiting,hematemesis, diarrhea, constipation or hematchezia.  Denies cold or heat intolerance, polydipsia, polyuria, polyphagia. Denies hematuria, pyuria, dysuria, hesitancy, frequency or urgency. Denies consumption of raw and under cooked meats foods or change in water source.  Denies fever, chills, sweats, night sweats.  Denies missing any routine medications. Remainder of ROS is negative.    Objective:  T Max 24 hrs: Temp (24hrs), Av.2 °F (36.8 °C), Min:98 °F (36.7 °C), Max:98.4 °F (36.9 °C)    Vitals Ranges:   Temp:  [98 °F (36.7 °C)-98.4 °F (36.9 °C)] 98 °F (36.7 °C)  Heart Rate:  [] 112  Resp:  [16-18] 16  BP: (102-144)/(60-79) 129/68      Exam:  /68 (BP Location: Right arm, Patient Position: Lying)   Pulse 112   Temp 98 °F (36.7 °C) (Oral)   Resp 16   Ht 157.5 cm (62\")   Wt 63.8 kg (140 lb 9 oz)   SpO2 96%   BMI 25.71 kg/m²     General Appearance:    Alert, cooperative, no distress, appears stated age   Head:    Normocephalic, without obvious abnormality, atraumatic   Eyes:    PERRL, conjunctivae/corneas clear, EOM's intact, both eyes   Ears:    Normal external ear canals, both ears   Nose:   Nares normal, septum midline, mucosa normal, no drainage    or sinus tenderness   Throat:   Lips, mucosa, and tongue normal   Neck:   Supple, symmetrical, trachea midline, no adenopathy;     thyroid:  " no enlargement/tenderness/nodules; no carotid    bruit or JVD   Back:     Symmetric, no curvature, ROM normal, no CVA tenderness   Lungs:     Decreased breath sounds bilaterally, respirations unlabored   Chest Wall:    No tenderness or deformity    Heart:    Regular rate and rhythm, S1 and S2 normal, no murmur, rub   or gallop   Abdomen:     Soft, nontender, bowel sounds active all four quadrants,     no masses, no hepatomegaly, no splenomegaly   Extremities:   Extremities normal, atraumatic, no cyanosis or edema   Pulses:   2+ and symmetric all extremities   Skin:   Skin color, texture, turgor normal, no rashes or lesions               .    Data Review:  Labs in chart were reviewed.  WBC   Date Value Ref Range Status   04/24/2023 17.11 (H) 3.40 - 10.80 10*3/mm3 Final     Hemoglobin   Date Value Ref Range Status   04/24/2023 13.8 12.0 - 15.9 g/dL Final     Hematocrit   Date Value Ref Range Status   04/24/2023 40.5 34.0 - 46.6 % Final     Platelets   Date Value Ref Range Status   04/24/2023 256 140 - 450 10*3/mm3 Final     Sodium   Date Value Ref Range Status   04/24/2023 133 (L) 136 - 145 mmol/L Final     Potassium   Date Value Ref Range Status   04/24/2023 2.8 (L) 3.5 - 5.2 mmol/L Final     Comment:     Slight hemolysis detected by analyzer. Results may be affected.     Chloride   Date Value Ref Range Status   04/24/2023 91 (L) 98 - 107 mmol/L Final     CO2   Date Value Ref Range Status   04/24/2023 27.0 22.0 - 29.0 mmol/L Final     BUN   Date Value Ref Range Status   04/24/2023 12 8 - 23 mg/dL Final     Creatinine   Date Value Ref Range Status   04/24/2023 0.96 0.57 - 1.00 mg/dL Final     Glucose   Date Value Ref Range Status   04/24/2023 122 (H) 65 - 99 mg/dL Final     Calcium   Date Value Ref Range Status   04/24/2023 9.3 8.6 - 10.5 mg/dL Final     Magnesium   Date Value Ref Range Status   04/24/2023 1.6 1.6 - 2.4 mg/dL Final     AST (SGOT)   Date Value Ref Range Status   04/24/2023 19 1 - 32 U/L Final     ALT  (SGPT)   Date Value Ref Range Status   04/24/2023 14 1 - 33 U/L Final     Alkaline Phosphatase   Date Value Ref Range Status   04/24/2023 88 39 - 117 U/L Final                Imaging Results (All)     Procedure Component Value Units Date/Time    CT Head Without Contrast [[id number]] Collected: 04/24/23 1245     Updated: 04/24/23 1819    Narrative:      EMERGENCY NONCONTRAST HEAD CT ON 04/24/2023     CLINICAL HISTORY: Patient fell and hit right side of head.     TECHNIQUE: Spiral CT images were obtained from the base of the skull to  the vertex without intravenous contrast. The images were reformatted and  submitted in 3 mm thick axial, sagittal and coronal CT sections with  brain algorithm and 2 mm thick axial CT sections with high-resolution  bone algorithm.     This is correlated to a prior head CT from [facility] on  10/14/2021.     FINDINGS: The brain parenchyma is normal in attenuation. The ventricles  are normal in size. I see no focal mass effect. There is no midline  shift. No extraaxial fluid collections are identified. There is no  evidence of acute intracranial hemorrhage. No acute skull fracture is  identified. The calvarium and skull base are normal in appearance. There  is mild bilateral ethmoid sinus mucosal thickening with a small amount  of fluid and mucosal thickening in the posterior right maxillary sinus.  The remainder of the paranasal sinuses and the mastoid air cells and the  middle ear cavities are clear.        Impression:      1. No acute intracranial abnormality is identified.  2. There is mild bilateral ethmoid sinus mucosal thickening and a small  amount of fluid and mucosal thickening in the posterior right maxillary  sinus. The remainder of the head CT is within normal limits.  Specifically, no acute skull fracture or intracranial hemorrhage is  identified.     Radiation dose reduction techniques were utilized, including automated  exposure control and exposure modulation  based on body size.     This report was finalized on 4/24/2023 6:16 PM by Dr. Tacos Higginbotham M.D.       XR Shoulder 2+ View Right [[id number]] Collected: 04/24/23 1227     Updated: 04/24/23 1231    Narrative:      XR SHOULDER 2+ VW RIGHT-     INDICATIONS: Trauma     TECHNIQUE: 3 views of the right shoulder     COMPARISON: None available     FINDINGS:     Mild hypertrophic degenerative change is seen at the acromioclavicular  joint. Small degenerative spurring of the humeral head. No acute  fracture, erosion, or dislocation is identified. Follow-up/further  evaluation can be obtained as indications persist.       Impression:         As described.     This report was finalized on 4/24/2023 12:28 PM by Dr. Yair Liang M.D.       XR Chest 1 View [[id number]] Collected: 04/24/23 1226     Updated: 04/24/23 1230    Narrative:      XR CHEST 1 VW-     HISTORY: Female who is 78 years-old,  fever, cancer     TECHNIQUE: Frontal view of the chest     COMPARISON: 04/12/2023     FINDINGS: Heart, mediastinum and pulmonary vasculature are unremarkable.  No focal pulmonary consolidation, pleural effusion, or pneumothorax. No  acute osseous process.       Impression:      No evidence for acute pulmonary process. Follow-up as  clinical indications persist.     This report was finalized on 4/24/2023 12:27 PM by Dr. Yair Liang M.D.               Assessment:  Active Hospital Problems    Diagnosis  POA   • **Fever in adult [R50.9]  Yes      Resolved Hospital Problems   No resolved problems to display.   weakness  Falls  Hyperglycemia  Hypertension  chf  Breast cancer  Hypokalemia  hypothyroidism    Plan:  Will await culture results  Replace potassium  Ask id to see her  Pt.ot to see  Hold off on antibiotics for now  Check echo  D.w patient and family as well as ED provider    Amanda Cortes MD  4/24/2023  20:37 EDT

## 2023-04-25 NOTE — CONSULTS
Nutrition Services    Patient Name:  Carlotta Pires  YOB: 1945  MRN: [id number]  Admit Date:  4/24/2023    Assessment Date:  04/25/23    Comment: Nutrition consult per nurse admission screen    79 y/o female admitted d/t fever, diarrhea, +cdiff. Pt reports poor PO intake for the past 4 days d/t diarrhea. States nothing sounds good besides cold foods d/t nausea. Pt had a few bites of crackers and orange juice this morning. Offered ice cream/popsicles available on pt's floor. Pt agreed to try Boost Breeze to supplement her PO intake. She feels like her diarrhea has gotten worse today. Reports unintentional weight loss, but after reviewing weight history weight appears stable x1.5 years.  Encouraged PO intake as tolerated.     Recommendations  1. Encourage intake of meals/snacks and fluids as tolerated  2. Add Boost Breeze with meals to trial    RD will continue to follow clinical course, nutritional needs.     CLINICAL NUTRITION ASSESSMENT      Reason for Assessment Nurse Admission Screen     Diagnosis/Problem   Fever, diarrhea   Medical/Surgical History Past Medical History:   Diagnosis Date   • Allergic rhinitis    • Arthritis    • Cancer     Left breast cancer   • CHF (congestive heart failure)    • Cough    • COVID-19 11/2020   • Degenerative arthritis of thumb    • GERD without esophagitis    • H/O TIA (transient ischemic attack) and stroke 2005   • History of bone density study    • Lumbar radiculopathy    • Osteoporosis    • Palpitations    • Peptic ulceration    • Personal history of malignant neoplasm of breast    • Sciatica    • Stroke 2005    CVA--History of storke   • Trigeminal neuralgia        Past Surgical History:   Procedure Laterality Date   • BREAST BIOPSY Left 2011   • CATARACT EXTRACTION, BILATERAL     • COLONOSCOPY  09/28/2012    Adolph Martinez MD   • COLONOSCOPY N/A 11/13/2018    Procedure: COLONOSCOPY to cecum;  Surgeon: Adolph Martinez MD;  Location: [facility] ENDOSCOPY;  Service:  "Gastroenterology   • COLONOSCOPY N/A 1/27/2021    Procedure: COLONOSCOPY TO CECUM AND TERMINAL ILEUM;  Surgeon: Emelia Wilcox MD;  Location:  [facility] ENDOSCOPY;  Service: Gastroenterology;  Laterality: N/A;  RECTAL BLEEDING  --DIVERTICULOSIS, HEMORRHOIDS, TORTUOUS COLON   • ENDOSCOPY N/A 11/13/2018    Procedure: ESOPHAGOGASTRODUODENOSCOPY with bx;  Surgeon: Adolph Martinez MD;  Location:  [facility] ENDOSCOPY;  Service: Gastroenterology   • ENDOSCOPY N/A 1/27/2021    Procedure: ESOPHAGOGASTRODUODENOSCOPY WITH COLD BX;  Surgeon: Emelia Wilcox MD;  Location:  [facility] ENDOSCOPY;  Service: Gastroenterology;  Laterality: N/A;  GERD  --GASTRITIS, HIATAL HERNIA   • EYE SURGERY     • HYSTERECTOMY      At age 31-Not due to cancer   • MASTECTOMY Left 06/30/2011    Dr. Kaitlyn Luna   • TUBAL ABDOMINAL LIGATION  1974   • UPPER GASTROINTESTINAL ENDOSCOPY  09/28/2012    Adolph Martinez MD        Encounter Information        Nutrition History:  Only wanting cold foods at this time d/t nausea   Food Preferences:    Supplements:    Factors Affecting Intake: altered GI function, decreased appetite, diarrhea     Anthropometrics        Current Height  Current Weight  BMI kg/m2 Height: 157.5 cm (62\")  Weight: 63.5 kg (140 lb) (04/25/23 0628)  Body mass index is 25.61 kg/m².   Adjusted BMI (if applicable)        Admission Weight 140#       Ideal Body Weight (IBW) 110#   Adjusted IBW (if applicable)        Usual Body Weight (UBW)    Weight Change/Trend Stable       Weight History Wt Readings from Last 30 Encounters:   04/25/23 0628 63.5 kg (140 lb)   04/24/23 1751 63.8 kg (140 lb 9 oz)   04/24/23 1102 65.8 kg (145 lb)   04/13/23 1303 66 kg (145 lb 9.6 oz)   04/12/23 1106 65.3 kg (144 lb)   02/02/23 1057 64.5 kg (142 lb 3.2 oz)   01/24/23 1043 65 kg (143 lb 3.2 oz)   11/02/22 1401 64.2 kg (141 lb 9.6 oz)   10/20/22 1357 63.4 kg (139 lb 12.8 oz)   10/19/22 1258 63.4 kg (139 lb 12.8 oz)   09/28/22 1046 63 kg (139 lb)   06/23/22 1445 63.9 kg " (140 lb 12.8 oz)   04/21/22 1408 62.7 kg (138 lb 3.2 oz)   04/20/22 1038 63 kg (138 lb 12.8 oz)   12/07/21 1152 61.2 kg (135 lb)   11/09/21 1215 65.4 kg (144 lb 3.2 oz)   10/21/21 1334 64 kg (141 lb)   10/14/21 1723 64.4 kg (142 lb)   08/24/21 0841 66.3 kg (146 lb 3.2 oz)   05/10/21 1051 67 kg (147 lb 12.8 oz)   04/06/21 1059 66.7 kg (147 lb)   03/22/21 1103 66.8 kg (147 lb 3.2 oz)   02/23/21 1500 66.9 kg (147 lb 8 oz)   01/27/21 0940 67.1 kg (148 lb)   01/05/21 1101 68.9 kg (152 lb)   12/03/20 1350 69.4 kg (153 lb)   11/14/20 1311 68 kg (150 lb)   08/14/20 1059 71.7 kg (158 lb)   08/03/20 1419 71.7 kg (158 lb)   04/06/20 1227 68.5 kg (151 lb)   01/15/20 1149 70.9 kg (156 lb 6.4 oz)   11/25/19 1301 71.7 kg (158 lb)           --  Tests/Procedures        Tests/Procedures CT scan, X-Ray     Labs       Pertinent Labs    Results from last 7 days   Lab Units 04/25/23  0713 04/24/23  1933 04/24/23  1151   SODIUM mmol/L 133*  --  133*   POTASSIUM mmol/L 3.0* 3.2* 2.8*   CHLORIDE mmol/L 100  --  91*   CO2 mmol/L 22.8  --  27.0   BUN mg/dL 10  --  12   CREATININE mg/dL 0.65  --  0.96   CALCIUM mg/dL 8.3*  --  9.3   BILIRUBIN mg/dL  --   --  1.2   ALK PHOS U/L  --   --  88   ALT (SGPT) U/L  --   --  14   AST (SGOT) U/L  --   --  19   GLUCOSE mg/dL 103*  --  122*     Results from last 7 days   Lab Units 04/25/23  0713 04/24/23  1151   MAGNESIUM mg/dL 1.5* 1.6   HEMOGLOBIN g/dL 10.5* 13.8   HEMATOCRIT % 29.8* 40.5   WBC 10*3/mm3 13.13* 17.11*   ALBUMIN g/dL  --  4.2     Results from last 7 days   Lab Units 04/25/23  0713 04/24/23  1151   PLATELETS 10*3/mm3 177 256     COVID19   Date Value Ref Range Status   04/24/2023 Not Detected Not Detected - Ref. Range Final     No results found for: HGBA1C       Medications           Scheduled Medications amitriptyline, 25 mg, Oral, Nightly  atorvastatin, 10 mg, Oral, Daily  clopidogrel, 75 mg, Oral, Daily  levothyroxine, 75 mcg, Oral, Q AM  metoprolol succinate XL, 50 mg, Oral,  BID  pantoprazole, 40 mg, Oral, BID AC  potassium chloride, 20 mEq, Oral, BID With Meals  vitamin B-12, 1,000 mcg, Oral, Daily       Infusions Pharmacy Consult,   sodium chloride 0.9 % with KCl 20 mEq, 125 mL/hr, Last Rate: 125 mL/hr (04/25/23 1216)       PRN Medications •  acetaminophen  •  diphenhydrAMINE  •  magnesium sulfate **OR** magnesium sulfate **OR** magnesium sulfate  •  melatonin  •  ondansetron **OR** ondansetron  •  Pharmacy Consult  •  potassium chloride **OR** potassium chloride **OR** potassium chloride  •  [COMPLETED] Insert Peripheral IV **AND** sodium chloride     Physical Findings          Physical Appearance alert, oriented, room air   Oral/Mouth Cavity dentures   Edema  no edema   Gastrointestinal diarrhea, normoactive, last bowel movement:4/25   Skin  skin intact   Tubes/Drains none   NFPE No clinical signs of muscle wasting or fat loss   --  Current Nutrition Orders & Evaluation of Intake       Oral Nutrition     Food Allergies NKFA   Current PO Diet Diet: Cardiac Diets; Healthy Heart (2-3 Na+); Texture: Regular Texture (IDDSI 7); Fluid Consistency: Thin (IDDSI 0)   Supplement n/a   PO Evaluation     % PO Intake Cracker and orange juice this morning    # of Days Evaluated    --  PES STATEMENT / NUTRITION DIAGNOSIS      Nutrition Dx Problem  Problem: Altered GI Function  Etiology: Medical Diagnosis  Signs/Symptoms: Report of Minimal PO Intake and Report/Observation    Comment:    --  NUTRITION INTERVENTION / PLAN OF CARE      Intervention Goal(s) Maintain nutrition status, Disease management/therapy, Establish PO intake and Maintain weight         RD Intervention/Action Interview for preferences, Supplement provided, Encourage intake and Follow Tx Progress         Prescription/Orders:       PO Diet       Supplements Boost Breeze TID       Snacks       Enteral Nutrition       Parenteral Nutrition    New Prescription Ordered? Yes   --      Monitor/Evaluation Per protocol, PO intake, Supplement  intake, Weight, GI status   Discharge Plan/Needs No discharge needs identified at this time, Pending clinical course   Education Will instruct as appropriate, Education not appropriate at this time   --    RD to follow per protocol.      Electronically signed by:  Shari Medina RD  04/25/23 12:50 EDT

## 2023-04-25 NOTE — CONSULTS
Referring Provider: Amanda Cortes MD      Subjective   History of present illness: This is a very nice 78-year-old we are asked evaluate for fever.  Per review of past medical records, she has a history of positive GEVOANNI 1: 640 in 2019 with normal inflammatory markers at that time.  She saw Dr. Rivera with rheumatology at at that time but has not been on any immunotherapy.  In any event, for the past 2 years she has had recurrent fevers whereby she will have temperature shaking chills and myalgias that lasted about 3 days and then will go away spontaneously.  This tends to occur every 6 to 8 weeks but has become more progressive.  She denies really any other associated symptoms.  She started having another episode on 4/19 and was prescribed Augmentin as an outpatient.  She also has what sounds like IBS with diarrhea and constipation and has been having more diarrhea lately up to 3-4 bowel movements per day.  She denies any odd epidemiologic exposures: Only animal contact is a dog.  No TB risk factors such as family history, exposures time spent in detention or homeless shelters, international travel.    Past Medical History:   Diagnosis Date   • Allergic rhinitis    • Arthritis    • Cancer     Left breast cancer   • CHF (congestive heart failure)    • Cough    • COVID-19 11/2020   • Degenerative arthritis of thumb    • GERD without esophagitis    • H/O TIA (transient ischemic attack) and stroke 2005   • History of bone density study    • Lumbar radiculopathy    • Osteoporosis    • Palpitations    • Peptic ulceration    • Personal history of malignant neoplasm of breast    • Sciatica    • Stroke 2005    CVA--History of storke   • Trigeminal neuralgia        Past Surgical History:   Procedure Laterality Date   • BREAST BIOPSY Left 2011   • CATARACT EXTRACTION, BILATERAL     • COLONOSCOPY  09/28/2012    Adolph Martinez MD   • COLONOSCOPY N/A 11/13/2018    Procedure: COLONOSCOPY to cecum;  Surgeon: Adolph Martinez,  MD;  Location: [facility] ENDOSCOPY;  Service: Gastroenterology   • COLONOSCOPY N/A 1/27/2021    Procedure: COLONOSCOPY TO CECUM AND TERMINAL ILEUM;  Surgeon: Emelia Wilcox MD;  Location[facility] END[facility];  Service: Gastroenterology;  Laterality: N/A;  RECTAL BLEEDING  --DIVERTICULOSIS, HEMORRHOIDS, TORTUOUS COLON   • ENDOSCOPY N/A 11/13/2018    Procedure: ESOPHAGOGASTRODUODENOSCOPY with bx;  Surgeon: Adolph Martinez MD;  [facility];  Service: Gastroenterology   • ENDOSCOPY N/A 1/27/2021    Procedure: ESOPHAGOGASTRODUODENOSCOPY WITH COLD BX;  Surgeon: Emelia Wilcox MD;  Location: [facility] ENDOSCOPY;  Service: Gastroenterology;  Laterality: N/A;  GERD  --GASTRITIS, HIATAL HERNIA   • EYE SURGERY     • HYSTERECTOMY      At age 31-Not due to cancer   • MASTECTOMY Left 06/30/2011    Dr. Kaitlyn Luna   • TUBAL ABDOMINAL LIGATION  1974   • UPPER GASTROINTESTINAL ENDOSCOPY  09/28/2012    Adolph Martinez MD           Allergies   Allergen Reactions   • Lisinopril Cough   • Contrast Dye (Echo Or Unknown Ct/Mr) Itching and Rash     IVP Dye   • Tartrazine Hives       Physical Exam:   Vital Signs   Temp:  [98 °F (36.7 °C)-100.8 °F (38.2 °C)] 98.3 °F (36.8 °C)  Heart Rate:  [] 97  Resp:  [16-20] 20  BP: ()/(50-79) 94/54    GENERAL: Awake and alert, in no acute distress.   HEENT: Oropharynx is clear. Hearing is grossly normal.   HEART: Regular rate and regular rhythm. No peripheral edema.   LUNGS: Clear to auscultation anteriorly with normal respiratory effort.   GI: Soft, nontender, nondistended. No appreciable organomegaly.   SKIN: Warm and dry without cutaneous eruptions   PSYCHIATRIC: Appropriate mood, affect, insight, and judgment.     Results Review:  White count 17 down to 13.1, hemoglobin 10.5, platelets 177, creatinine 0.65  Liver function test were normal  Urinalysis 0-2 white blood cells, 3-5 red blood cells  Chest x-ray independently interpreted: No acute pneumonia  Blood cultures  pending  Respiratory pathogen panel negative  C. difficile PCR positive, antigen negative    A/p  1.  Fever of unknown origin  2.  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  No response to antibiotics this episode.  She has a history in 2019 of positive GEOVANNI 1:640 (homogenous) and saw a rheumatology and I strongly suspect that these are autoimmune fevers.  We will check a CT of the chest abdomen pelvis to look for any deep nidus of infection or evidence of malignancy (remote history of breast cancer in 2012).    I am going to hold antibiotics.  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    Procalcitonin was negative      Thank you for this consult.  We will continue to follow along and tailor antibiotics as the patient's clinical course evolves.      Kip Ling MD  04/25/23  08:28 EDT

## 2023-04-25 NOTE — NURSING NOTE
C. Difficile Toxins by PC Positive Abnormal      Final lab positive for C.difficile Toxins by PC. C-diff like diarrhea x3. Fluid intake  encouraged, pt denies ABD pain, N/V. Increased thirst reported, no headache, inability to concentrate or dry mucus membrane noted, lightheadedness and general weakness reported. Vitals are stable. Daughter at bedside.

## 2023-04-25 NOTE — PROGRESS NOTES
" LOS: 0 days     Name: Carlotta Pires  Age: 78 y.o.  Sex: female  :  1945  MRN: [id number]         Primary Care Physician: Gudelia Bazzi APRN    Subjective   Subjective  Denies fever today.  Feels somewhat better.  Still having diarrhea.  Denies abdominal pain.    Objective   Vital Signs  Temp:  [98 °F (36.7 °C)-100.8 °F (38.2 °C)] 98.2 °F (36.8 °C)  Heart Rate:  [] 92  Resp:  [16-20] 19  BP: ()/(46-79) 98/46  Body mass index is 25.61 kg/m².    Objective:  General Appearance:  Comfortable and in no acute distress.    Vital signs: (most recent): Blood pressure 98/46, pulse 92, temperature 98.2 °F (36.8 °C), temperature source Oral, resp. rate 19, height 157.5 cm (62\"), weight 63.5 kg (140 lb), SpO2 97 %.    Lungs:  Normal effort and normal respiratory rate.    Heart: Normal rate.  Regular rhythm.    Abdomen: Abdomen is soft.  Bowel sounds are normal.   There is no abdominal tenderness.     Extremities: There is no dependent edema or local swelling.    Neurological: Patient is alert and oriented to person, place and time.    Skin:  Warm and dry.              Results Review:       I reviewed the patient's new clinical results.    Results from last 7 days   Lab Units 23  0713 23  1151   WBC 10*3/mm3 13.13* 17.11*   HEMOGLOBIN g/dL 10.5* 13.8   PLATELETS 10*3/mm3 177 256     Results from last 7 days   Lab Units 23  0713 23  1933 23  1151   SODIUM mmol/L 133*  --  133*   POTASSIUM mmol/L 3.0* 3.2* 2.8*   CHLORIDE mmol/L 100  --  91*   CO2 mmol/L 22.8  --  27.0   BUN mg/dL 10  --  12   CREATININE mg/dL 0.65  --  0.96   CALCIUM mg/dL 8.3*  --  9.3   GLUCOSE mg/dL 103*  --  122*                 Scheduled Meds:   amitriptyline, 25 mg, Oral, Nightly  atorvastatin, 10 mg, Oral, Daily  clopidogrel, 75 mg, Oral, Daily  levothyroxine, 75 mcg, Oral, Q AM  metoprolol succinate XL, 50 mg, Oral, BID  pantoprazole, 40 mg, Oral, BID AC  potassium chloride, 20 mEq, Oral, BID With " Meals  vitamin B-12, 1,000 mcg, Oral, Daily      PRN Meds:   •  acetaminophen  •  diphenhydrAMINE  •  magnesium sulfate **OR** magnesium sulfate **OR** magnesium sulfate  •  melatonin  •  ondansetron **OR** ondansetron  •  Pharmacy Consult  •  potassium chloride **OR** potassium chloride **OR** potassium chloride  •  [COMPLETED] Insert Peripheral IV **AND** sodium chloride  Continuous Infusions:  Pharmacy Consult,   sodium chloride 0.9 % with KCl 20 mEq, 100 mL/hr, Last Rate: 100 mL/hr (04/25/23 1027)        Assessment & Plan   Active Hospital Problems    Diagnosis  POA   • **Fever in adult [R50.9]  Yes   • Hypothyroidism (acquired) [E03.9]  Yes   • Essential hypertension [I10]  Yes   • Malignant neoplasm of lower-outer quadrant of left female breast [C50.512]  Yes      Resolved Hospital Problems   No resolved problems to display.       Assessment & Plan    -Fever has subsided for now.  CT chest, abdomen, and pelvis have been ordered.  Await culture results.  Monitoring off of antibiotics.  Appreciate input from infectious disease  -GI PCR panel pending for further work-up of her diarrhea.  Plans noted to start oral vancomycin if she has any colitis on CT  -Blood pressure is soft.  Stop losartan.  Add holding parameters to metoprolol.  Increased rate of IV fluids  -Replace potassium and magnesium today        No data recorded     Tacos Bullock MD  [facility]  04/25/23  11:19 EDT

## 2023-04-26 PROBLEM — I50.32 CHRONIC DIASTOLIC CHF (CONGESTIVE HEART FAILURE): Status: ACTIVE | Noted: 2023-04-26

## 2023-04-26 PROBLEM — A04.72 C. DIFFICILE COLITIS: Status: ACTIVE | Noted: 2023-04-26

## 2023-04-26 LAB
ANION GAP SERPL CALCULATED.3IONS-SCNC: 8.3 MMOL/L (ref 5–15)
BUN SERPL-MCNC: 5 MG/DL (ref 8–23)
BUN/CREAT SERPL: 8.1 (ref 7–25)
CALCIUM SPEC-SCNC: 8 MG/DL (ref 8.6–10.5)
CHLORIDE SERPL-SCNC: 102 MMOL/L (ref 98–107)
CO2 SERPL-SCNC: 22.7 MMOL/L (ref 22–29)
CREAT SERPL-MCNC: 0.62 MG/DL (ref 0.57–1)
DEPRECATED RDW RBC AUTO: 39.7 FL (ref 37–54)
EGFRCR SERPLBLD CKD-EPI 2021: 92.4 ML/MIN/1.73
ERYTHROCYTE [DISTWIDTH] IN BLOOD BY AUTOMATED COUNT: 11.9 % (ref 12.3–15.4)
GLUCOSE SERPL-MCNC: 92 MG/DL (ref 65–99)
HCT VFR BLD AUTO: 29.9 % (ref 34–46.6)
HGB BLD-MCNC: 10.3 G/DL (ref 12–15.9)
MCH RBC QN AUTO: 31.4 PG (ref 26.6–33)
MCHC RBC AUTO-ENTMCNC: 34.4 G/DL (ref 31.5–35.7)
MCV RBC AUTO: 91.2 FL (ref 79–97)
PLATELET # BLD AUTO: 188 10*3/MM3 (ref 140–450)
PMV BLD AUTO: 9.5 FL (ref 6–12)
POTASSIUM SERPL-SCNC: 3.7 MMOL/L (ref 3.5–5.2)
RBC # BLD AUTO: 3.28 10*6/MM3 (ref 3.77–5.28)
SODIUM SERPL-SCNC: 133 MMOL/L (ref 136–145)
WBC NRBC COR # BLD: 11.01 10*3/MM3 (ref 3.4–10.8)

## 2023-04-26 PROCEDURE — 85027 COMPLETE CBC AUTOMATED: CPT | Performed by: INTERNAL MEDICINE

## 2023-04-26 PROCEDURE — 80048 BASIC METABOLIC PNL TOTAL CA: CPT | Performed by: INTERNAL MEDICINE

## 2023-04-26 PROCEDURE — 25010000002 MAGNESIUM SULFATE 2 GM/50ML SOLUTION: Performed by: INTERNAL MEDICINE

## 2023-04-26 PROCEDURE — 99232 SBSQ HOSP IP/OBS MODERATE 35: CPT | Performed by: INTERNAL MEDICINE

## 2023-04-26 PROCEDURE — 25010000002 SODIUM CHLORIDE 0.9 % WITH KCL 20 MEQ 20-0.9 MEQ/L-% SOLUTION: Performed by: INTERNAL MEDICINE

## 2023-04-26 RX ORDER — VANCOMYCIN HYDROCHLORIDE 125 MG/1
125 CAPSULE ORAL EVERY 6 HOURS SCHEDULED
Status: DISCONTINUED | OUTPATIENT
Start: 2023-04-26 | End: 2023-04-28 | Stop reason: HOSPADM

## 2023-04-26 RX ADMIN — PANTOPRAZOLE SODIUM 40 MG: 40 TABLET, DELAYED RELEASE ORAL at 17:53

## 2023-04-26 RX ADMIN — ACETAMINOPHEN 650 MG: 325 TABLET, FILM COATED ORAL at 20:39

## 2023-04-26 RX ADMIN — CLOPIDOGREL BISULFATE 75 MG: 75 TABLET, FILM COATED ORAL at 08:26

## 2023-04-26 RX ADMIN — POTASSIUM CHLORIDE AND SODIUM CHLORIDE 125 ML/HR: 900; 150 INJECTION, SOLUTION INTRAVENOUS at 21:48

## 2023-04-26 RX ADMIN — AMITRIPTYLINE HYDROCHLORIDE 25 MG: 25 TABLET, FILM COATED ORAL at 20:38

## 2023-04-26 RX ADMIN — Medication 1000 MCG: at 08:25

## 2023-04-26 RX ADMIN — ACETAMINOPHEN 650 MG: 325 TABLET, FILM COATED ORAL at 06:56

## 2023-04-26 RX ADMIN — VANCOMYCIN HYDROCHLORIDE 125 MG: 125 CAPSULE ORAL at 20:37

## 2023-04-26 RX ADMIN — VANCOMYCIN HYDROCHLORIDE 125 MG: 125 CAPSULE ORAL at 23:41

## 2023-04-26 RX ADMIN — POTASSIUM CHLORIDE 20 MEQ: 750 TABLET, EXTENDED RELEASE ORAL at 08:25

## 2023-04-26 RX ADMIN — LEVOTHYROXINE SODIUM 75 MCG: 0.07 TABLET ORAL at 06:56

## 2023-04-26 RX ADMIN — VANCOMYCIN HYDROCHLORIDE 125 MG: 125 CAPSULE ORAL at 14:07

## 2023-04-26 RX ADMIN — PANTOPRAZOLE SODIUM 40 MG: 40 TABLET, DELAYED RELEASE ORAL at 08:26

## 2023-04-26 RX ADMIN — POTASSIUM CHLORIDE AND SODIUM CHLORIDE 125 ML/HR: 900; 150 INJECTION, SOLUTION INTRAVENOUS at 23:41

## 2023-04-26 RX ADMIN — MAGNESIUM SULFATE HEPTAHYDRATE 2 G: 40 INJECTION, SOLUTION INTRAVENOUS at 02:45

## 2023-04-26 RX ADMIN — POTASSIUM CHLORIDE AND SODIUM CHLORIDE 125 ML/HR: 900; 150 INJECTION, SOLUTION INTRAVENOUS at 08:27

## 2023-04-26 RX ADMIN — POTASSIUM CHLORIDE 20 MEQ: 750 TABLET, EXTENDED RELEASE ORAL at 17:53

## 2023-04-26 RX ADMIN — POTASSIUM CHLORIDE AND SODIUM CHLORIDE 125 ML/HR: 900; 150 INJECTION, SOLUTION INTRAVENOUS at 17:53

## 2023-04-26 RX ADMIN — ATORVASTATIN CALCIUM 10 MG: 20 TABLET, FILM COATED ORAL at 08:26

## 2023-04-26 NOTE — CASE MANAGEMENT/SOCIAL WORK
Discharge Planning Assessment  [facility]     Patient Name: Carlotta Pires  MRN: [id number]  Today's Date: 4/26/2023    Admit Date: 4/24/2023    Plan: Home with    Discharge Needs Assessment     Row Name 04/26/23 1558       Living Environment    People in Home spouse    Current Living Arrangements home    Potentially Unsafe Housing Conditions none    Primary Care Provided by self    Family Caregiver if Needed spouse;child(jayy), adult    Quality of Family Relationships involved;helpful    Able to Return to Prior Arrangements yes       Resource/Environmental Concerns    Resource/Environmental Concerns none       Transition Planning    Patient/Family Anticipates Transition to home with family    Patient/Family Anticipated Services at Transition none    Transportation Anticipated family or friend will provide       Discharge Needs Assessment    Readmission Within the Last 30 Days no previous admission in last 30 days    Equipment Currently Used at Home grab bar;shower chair    Concerns to be Addressed adjustment to diagnosis/illness    Anticipated Changes Related to Illness none    Equipment Needed After Discharge none               Discharge Plan     Row Name 04/26/23 1559       Plan    Plan Home with     Patient/Family in Agreement with Plan yes    Plan Comments Spoke to patient at bedside, face sheet and pharmacy information verified. Pt daughter Susie present at bedside, pt lives with her  Jack in 1 level home with ramp to enter, she is IADL’s. Pt has GB, s/c if needed. No HH or SNF in the past. She plans home with Susie to transport. No anticipated needs CCP will follow - Emelia LIZ              Continued Care and Services - Admitted Since 4/24/2023    Coordination has not been started for this encounter.       Expected Discharge Date and Time     Expected Discharge Date Expected Discharge Time    Apr 27, 2023          Demographic Summary     Row Name 04/26/23 1557       General  Information    Admission Type inpatient               Functional Status     Row Name 04/26/23 1558       Functional Status    Usual Activity Tolerance excellent    Current Activity Tolerance good       Assessment of Health Literacy    Health Literacy Good       Functional Status, IADL    Medications independent    Meal Preparation independent    Housekeeping independent    Laundry independent    Shopping independent       Mental Status    General Appearance WDL WDL       Mental Status Summary    Recent Changes in Mental Status/Cognitive Functioning no changes               Psychosocial    No documentation.                Abuse/Neglect    No documentation.                Legal     Row Name 04/26/23 1558       Financial/Legal    Who Manages Finances if Patient Unable  or dtr               Substance Abuse    No documentation.                Patient Forms    No documentation.                   Emelia Garrett RN

## 2023-04-26 NOTE — PROGRESS NOTES
ID NOTE    CC: f/u fever and C diff    Subj: History from pt and her family member who adds helpful details. Watery BMs yesterday and overnight. She has abdominal cramping. CT A/P showed colitis.    Meds:    Current Facility-Administered Medications:   •  acetaminophen (TYLENOL) tablet 650 mg, 650 mg, Oral, Q4H PRN, Amanda Cortes MD, 650 mg at 04/26/23 0656  •  amitriptyline (ELAVIL) tablet 25 mg, 25 mg, Oral, Nightly, Amanda Cortes MD, 25 mg at 04/25/23 2017  •  atorvastatin (LIPITOR) tablet 10 mg, 10 mg, Oral, Daily, Amanda Cortes MD, 10 mg at 04/26/23 0826  •  clopidogrel (PLAVIX) tablet 75 mg, 75 mg, Oral, Daily, Amanda Cortes MD, 75 mg at 04/26/23 0826  •  diphenhydrAMINE (BENADRYL) capsule 25 mg, 25 mg, Oral, Nightly PRN, Amanda Cortes MD  •  levothyroxine (SYNTHROID, LEVOTHROID) tablet 75 mcg, 75 mcg, Oral, Q AM, Amanda Cortes MD, 75 mcg at 04/26/23 0656  •  Magnesium Sulfate - Total Dose 10 grams - Magnesium 1 or Less, 2 g, Intravenous, PRN **OR** Magnesium Sulfate - Total Dose 6 grams - Magnesium 1.1 - 1.5, 2 g, Intravenous, PRN, Stopped at 04/26/23 0827 **OR** Magnesium Sulfate - Total Dose 4 grams - Magnesium 1.6 - 1.9, 4 g, Intravenous, PRN, Tacos Bullock MD  •  melatonin tablet 3 mg, 3 mg, Oral, Nightly PRN, Amanda Cortes MD  •  metoprolol succinate XL (TOPROL-XL) 24 hr tablet 50 mg, 50 mg, Oral, BID, Tacos Bullock MD  •  ondansetron (ZOFRAN) tablet 4 mg, 4 mg, Oral, Q6H PRN, 4 mg at 04/25/23 2237 **OR** ondansetron (ZOFRAN) injection 4 mg, 4 mg, Intravenous, Q6H PRN, Amanda Cortes MD  •  pantoprazole (PROTONIX) EC tablet 40 mg, 40 mg, Oral, BID AC, Amanda Cortes MD, 40 mg at 04/26/23 0826  •  potassium chloride (K-DUR,KLOR-CON) ER tablet 20 mEq, 20 mEq, Oral, BID With Meals, Amanda Cortes MD, 20 mEq at 04/26/23 0825  •  potassium chloride (K-DUR,KLOR-CON) ER tablet 40 mEq, 40 mEq, Oral,  PRN, 40 mEq at 04/25/23 1808 **OR** potassium chloride (KLOR-CON) packet 40 mEq, 40 mEq, Oral, PRN **OR** potassium chloride 10 mEq in 100 mL IVPB, 10 mEq, Intravenous, Q1H PRN, Amanda Cortes MD  •  [COMPLETED] Insert Peripheral IV, , , Once **AND** sodium chloride 0.9 % flush 10 mL, 10 mL, Intravenous, PRN, Patric Su MD  •  sodium chloride 0.9 % with KCl 20 mEq/L infusion, 125 mL/hr, Intravenous, Continuous, Tacos Bullock MD, Last Rate: 125 mL/hr at 04/26/23 0827, 125 mL/hr at 04/26/23 0827  •  vitamin B-12 (CYANOCOBALAMIN) tablet 1,000 mcg, 1,000 mcg, Oral, Daily, Amanda Cortes MD, 1,000 mcg at 04/26/23 0825    Physical Exam:   Vital Signs   Temp:  [97.8 °F (36.6 °C)-98.3 °F (36.8 °C)] 97.8 °F (36.6 °C)  Heart Rate:  [] 90  Resp:  [18-19] 18  BP: ()/(44-53) 106/53    GENERAL: Awake and alert, very nice  ENT: no scleral icterus  HEART: NR  LUNGS: no wheezing; normal respiratory effort.   GI: Soft, mild TTP  PSYCHIATRIC: calm and pleasant    Results Review:  CBC, BMP, C diff testing, and blood cultures reviewed today  Lab Results   Component Value Date    WBC 11.01 (H) 04/26/2023    HGB 10.3 (L) 04/26/2023    HCT 29.9 (L) 04/26/2023    MCV 91.2 04/26/2023     04/26/2023     Lab Results   Component Value Date    GLUCOSE 92 04/26/2023    CALCIUM 8.0 (L) 04/26/2023     (L) 04/26/2023    K 3.7 04/26/2023    CO2 22.7 04/26/2023     04/26/2023    BUN 5 (L) 04/26/2023    CREATININE 0.62 04/26/2023    EGFRRESULT 91 04/12/2023    EGFR 92.4 04/26/2023    BCR 8.1 04/26/2023    ANIONGAP 8.3 04/26/2023     Micro:   4/24 BCx: NGTD  4/24 RPP: negative  4/24 C. difficile PCR positive, antigen negative  4/25 GI PCR: negative    New radiology:  CT CAP shows colitis    A/P:  1.  Fever of unknown origin  2.  Positive GEOVANNI  3.  C. difficile colitis    Fevers are likely autoimmune in nature as outlined in yesterday's note.     I think the C diff is a new and different  problem. Given colitis seen on imaging, C diff PCR positive, and watery BMs following a course of antibiotics, I think we should treat this as C diff infection despite the negative antigen. Start vancomycin 125 mg PO q6h x 10 days.     ID will follow.     Earle Sanford MD  04/26/23

## 2023-04-26 NOTE — PLAN OF CARE
Goal Outcome Evaluation:  Plan of Care Reviewed With: patient        Progress: no change  Outcome Evaluation: Patient A & O. Makes needs known. Started on oral vanc atb. Daughter at bedside. No c/o pain. No s/s of distress noted.

## 2023-04-26 NOTE — PROGRESS NOTES
" LOS: 0 days     Name: Carlotta Pires  Age: 78 y.o.  Sex: female  :  1945  MRN: [id number]         Primary Care Physician: Gudelia Bazzi APRN    Subjective   Subjective  Resting in bed, accompanied by family.  Patient still complaining of weakness but feels better.  She is to start oral vancomycin at this morning for findings of colitis on CT scan with positive C. difficile toxin.    Objective   Vital Signs  Temp:  [97.8 °F (36.6 °C)-98.2 °F (36.8 °C)] 97.9 °F (36.6 °C)  Heart Rate:  [] 80  Resp:  [18] 18  BP: ()/(44-56) 133/56  Body mass index is 25.48 kg/m².    Objective:  General Appearance:  Comfortable and in no acute distress.    Vital signs: (most recent): Blood pressure 133/56, pulse 80, temperature 97.9 °F (36.6 °C), temperature source Oral, resp. rate 18, height 158 cm (62.21\"), weight 63.6 kg (140 lb 3.4 oz), SpO2 98 %.    Lungs:  Normal effort and normal respiratory rate.    Heart: Normal rate.  Regular rhythm.    Abdomen: Abdomen is soft.  Bowel sounds are normal.   There is no abdominal tenderness.     Extremities: There is no dependent edema or local swelling.    Neurological: Patient is alert and oriented to person, place and time.    Skin:  Warm and dry.              Results Review:       I reviewed the patient's new clinical results.    Results from last 7 days   Lab Units 23  0615 23  0713 23  1151   WBC 10*3/mm3 11.01* 13.13* 17.11*   HEMOGLOBIN g/dL 10.3* 10.5* 13.8   PLATELETS 10*3/mm3 188 177 256     Results from last 7 days   Lab Units 23  0615 23  2201 23  0713 23  1933 23  1151   SODIUM mmol/L 133*  --  133*  --  133*   POTASSIUM mmol/L 3.7 3.9 3.0* 3.2* 2.8*   CHLORIDE mmol/L 102  --  100  --  91*   CO2 mmol/L 22.7  --  22.8  --  27.0   BUN mg/dL 5*  --  10  --  12   CREATININE mg/dL 0.62  --  0.65  --  0.96   CALCIUM mg/dL 8.0*  --  8.3*  --  9.3   GLUCOSE mg/dL 92  --  103*  --  122*                 Scheduled Meds: "   amitriptyline, 25 mg, Oral, Nightly  atorvastatin, 10 mg, Oral, Daily  clopidogrel, 75 mg, Oral, Daily  levothyroxine, 75 mcg, Oral, Q AM  metoprolol succinate XL, 50 mg, Oral, BID  pantoprazole, 40 mg, Oral, BID AC  potassium chloride, 20 mEq, Oral, BID With Meals  vancomycin, 125 mg, Oral, Q6H  vitamin B-12, 1,000 mcg, Oral, Daily      PRN Meds:   •  acetaminophen  •  diphenhydrAMINE  •  magnesium sulfate **OR** magnesium sulfate **OR** magnesium sulfate  •  melatonin  •  ondansetron **OR** ondansetron  •  potassium chloride **OR** potassium chloride **OR** potassium chloride  •  [COMPLETED] Insert Peripheral IV **AND** sodium chloride  Continuous Infusions:  sodium chloride 0.9 % with KCl 20 mEq, 125 mL/hr, Last Rate: 125 mL/hr (04/26/23 1408)        Assessment & Plan   Active Hospital Problems    Diagnosis  POA   • **Fever in adult [R50.9]  Yes   • C. difficile colitis [A04.72]  Unknown   • Chronic diastolic CHF (congestive heart failure) [I50.32]  Unknown   • Hypothyroidism (acquired) [E03.9]  Yes   • Essential hypertension [I10]  Yes   • Malignant neoplasm of lower-outer quadrant of left female breast [C50.512]  Yes      Resolved Hospital Problems   No resolved problems to display.       Assessment & Plan    C. difficile colitis  -ID following, p.o. vancomycin started, CT abdomen reviewed with evidence of colitis, afebrile x24 hours    Recurrent fevers  -Patient with intermittent low-grade fevers as an outpatient for several years, recent fevers may be secondary to C. difficile however patient's daughter has made an appointment for her to follow-up with a rheumatologist in about 2 weeks as an outpatient for additional evaluation    Benign essential hypertension  -Losartan discontinued, metoprolol with hold parameters, she has not received any doses    Chronic diastolic CHF  -On fluids, monitor volume status closely    Hypothyroidism  -Levothyroxine    No data recorded     Guera García MD  [facility]  04/26/23  11:19 EDT

## 2023-04-26 NOTE — DISCHARGE PLACEMENT REQUEST
"Faina Coats (78 y.o. Female)     Date of Birth   1945    Social Security Number       Address   [facility] [address]    Home Phone   [phone]    MRN   [id number]       Jew   [employer]ehov[employer]'s witness    Marital Status                               Admission Date   4/24/23    Admission Type   Emergency    Admitting Provider   Amanda Cortes MD    Attending Provider   Guera García MD    Department, Room/Bed   [address], [address]       Discharge Date       Discharge Disposition       Discharge Destination                               Attending Provider: Guera García MD    Allergies: Lisinopril, Contrast Dye (Echo Or Unknown Ct/mr), Tartrazine    Isolation: Spore   Infection: C.difficile (04/24/23)   Code Status: CPR    Ht: 158 cm (62.21\")   Wt: 63.6 kg (140 lb 3.4 oz)    Admission Cmt: None   Principal Problem: Fever in adult [R50.9]                 Active Insurance as of 4/24/2023     Primary Coverage     Payor Plan Insurance Group Employer/Plan Group    MEDICARE MEDICARE A & B      Payor Plan Address Payor Plan Phone Number Payor Plan Fax Number Effective Dates    [address] [phone]  1/1/2012 - None Entered    [facility] [id number]       Subscriber Name Subscriber Birth Date Member ID       FAINA COATS P 1945 [id number]           Secondary Coverage     Payor Plan Insurance Group Employer/Plan Group           Payor Plan Address Payor Plan Phone Number Payor Plan Fax Number Effective Dates    [address] [address] [phone]  9/29/2005 - None Entered    [facility] [id number]       Subscriber Name Subscriber Birth Date Member ID       FAINA COATS P 1945 [id number]                 Emergency Contacts      (Rel.) Home Phone Work Phone Mobile Phone    PranayJack (Spouse) [phone] -- --    YECENIA JULIEN (Daughter) -- -- [phone]              "

## 2023-04-27 LAB
ANION GAP SERPL CALCULATED.3IONS-SCNC: 6.4 MMOL/L (ref 5–15)
BASOPHILS # BLD AUTO: 0.03 10*3/MM3 (ref 0–0.2)
BASOPHILS NFR BLD AUTO: 0.4 % (ref 0–1.5)
BUN SERPL-MCNC: 3 MG/DL (ref 8–23)
BUN/CREAT SERPL: 5.4 (ref 7–25)
CALCIUM SPEC-SCNC: 8.3 MG/DL (ref 8.6–10.5)
CHLORIDE SERPL-SCNC: 106 MMOL/L (ref 98–107)
CO2 SERPL-SCNC: 23.6 MMOL/L (ref 22–29)
CREAT SERPL-MCNC: 0.56 MG/DL (ref 0.57–1)
DEPRECATED RDW RBC AUTO: 38.4 FL (ref 37–54)
EGFRCR SERPLBLD CKD-EPI 2021: 94.7 ML/MIN/1.73
EOSINOPHIL # BLD AUTO: 0.25 10*3/MM3 (ref 0–0.4)
EOSINOPHIL NFR BLD AUTO: 3.6 % (ref 0.3–6.2)
ERYTHROCYTE [DISTWIDTH] IN BLOOD BY AUTOMATED COUNT: 11.6 % (ref 12.3–15.4)
GLUCOSE SERPL-MCNC: 84 MG/DL (ref 65–99)
HCT VFR BLD AUTO: 29.1 % (ref 34–46.6)
HGB BLD-MCNC: 10.2 G/DL (ref 12–15.9)
IMM GRANULOCYTES # BLD AUTO: 0.05 10*3/MM3 (ref 0–0.05)
IMM GRANULOCYTES NFR BLD AUTO: 0.7 % (ref 0–0.5)
LYMPHOCYTES # BLD AUTO: 1.29 10*3/MM3 (ref 0.7–3.1)
LYMPHOCYTES NFR BLD AUTO: 18.4 % (ref 19.6–45.3)
MAGNESIUM SERPL-MCNC: 1.7 MG/DL (ref 1.6–2.4)
MCH RBC QN AUTO: 31.7 PG (ref 26.6–33)
MCHC RBC AUTO-ENTMCNC: 35.1 G/DL (ref 31.5–35.7)
MCV RBC AUTO: 90.4 FL (ref 79–97)
MONOCYTES # BLD AUTO: 0.58 10*3/MM3 (ref 0.1–0.9)
MONOCYTES NFR BLD AUTO: 8.3 % (ref 5–12)
NEUTROPHILS NFR BLD AUTO: 4.82 10*3/MM3 (ref 1.7–7)
NEUTROPHILS NFR BLD AUTO: 68.6 % (ref 42.7–76)
NRBC BLD AUTO-RTO: 0 /100 WBC (ref 0–0.2)
PHOSPHATE SERPL-MCNC: 1.7 MG/DL (ref 2.5–4.5)
PLATELET # BLD AUTO: 216 10*3/MM3 (ref 140–450)
PMV BLD AUTO: 9.4 FL (ref 6–12)
POTASSIUM SERPL-SCNC: 4.1 MMOL/L (ref 3.5–5.2)
RBC # BLD AUTO: 3.22 10*6/MM3 (ref 3.77–5.28)
SODIUM SERPL-SCNC: 136 MMOL/L (ref 136–145)
WBC NRBC COR # BLD: 7.02 10*3/MM3 (ref 3.4–10.8)

## 2023-04-27 PROCEDURE — 85025 COMPLETE CBC W/AUTO DIFF WBC: CPT | Performed by: STUDENT IN AN ORGANIZED HEALTH CARE EDUCATION/TRAINING PROGRAM

## 2023-04-27 PROCEDURE — 99232 SBSQ HOSP IP/OBS MODERATE 35: CPT | Performed by: INTERNAL MEDICINE

## 2023-04-27 PROCEDURE — 80048 BASIC METABOLIC PNL TOTAL CA: CPT | Performed by: STUDENT IN AN ORGANIZED HEALTH CARE EDUCATION/TRAINING PROGRAM

## 2023-04-27 PROCEDURE — 83735 ASSAY OF MAGNESIUM: CPT | Performed by: STUDENT IN AN ORGANIZED HEALTH CARE EDUCATION/TRAINING PROGRAM

## 2023-04-27 PROCEDURE — 25010000002 SODIUM CHLORIDE 0.9 % WITH KCL 20 MEQ 20-0.9 MEQ/L-% SOLUTION: Performed by: INTERNAL MEDICINE

## 2023-04-27 PROCEDURE — 84100 ASSAY OF PHOSPHORUS: CPT | Performed by: STUDENT IN AN ORGANIZED HEALTH CARE EDUCATION/TRAINING PROGRAM

## 2023-04-27 PROCEDURE — 99222 1ST HOSP IP/OBS MODERATE 55: CPT | Performed by: INTERNAL MEDICINE

## 2023-04-27 RX ORDER — LOSARTAN POTASSIUM 50 MG/1
50 TABLET ORAL DAILY
Status: DISCONTINUED | OUTPATIENT
Start: 2023-04-27 | End: 2023-04-28 | Stop reason: HOSPADM

## 2023-04-27 RX ADMIN — VANCOMYCIN HYDROCHLORIDE 125 MG: 125 CAPSULE ORAL at 17:39

## 2023-04-27 RX ADMIN — METOPROLOL SUCCINATE 50 MG: 50 TABLET, FILM COATED, EXTENDED RELEASE ORAL at 08:41

## 2023-04-27 RX ADMIN — LOSARTAN POTASSIUM 50 MG: 50 TABLET, FILM COATED ORAL at 13:23

## 2023-04-27 RX ADMIN — AMITRIPTYLINE HYDROCHLORIDE 25 MG: 25 TABLET, FILM COATED ORAL at 21:26

## 2023-04-27 RX ADMIN — VANCOMYCIN HYDROCHLORIDE 125 MG: 125 CAPSULE ORAL at 11:51

## 2023-04-27 RX ADMIN — CLOPIDOGREL BISULFATE 75 MG: 75 TABLET, FILM COATED ORAL at 08:41

## 2023-04-27 RX ADMIN — ATORVASTATIN CALCIUM 10 MG: 20 TABLET, FILM COATED ORAL at 08:41

## 2023-04-27 RX ADMIN — PANTOPRAZOLE SODIUM 40 MG: 40 TABLET, DELAYED RELEASE ORAL at 06:08

## 2023-04-27 RX ADMIN — POTASSIUM CHLORIDE AND SODIUM CHLORIDE 125 ML/HR: 900; 150 INJECTION, SOLUTION INTRAVENOUS at 08:40

## 2023-04-27 RX ADMIN — VANCOMYCIN HYDROCHLORIDE 125 MG: 125 CAPSULE ORAL at 23:43

## 2023-04-27 RX ADMIN — SODIUM PHOSPHATE, MONOBASIC, MONOHYDRATE AND SODIUM PHOSPHATE, DIBASIC, ANHYDROUS 30 MMOL: 276; 142 INJECTION, SOLUTION INTRAVENOUS at 11:51

## 2023-04-27 RX ADMIN — POTASSIUM CHLORIDE 20 MEQ: 750 TABLET, EXTENDED RELEASE ORAL at 17:39

## 2023-04-27 RX ADMIN — Medication 1000 MCG: at 08:41

## 2023-04-27 RX ADMIN — LEVOTHYROXINE SODIUM 75 MCG: 0.07 TABLET ORAL at 06:09

## 2023-04-27 RX ADMIN — PANTOPRAZOLE SODIUM 40 MG: 40 TABLET, DELAYED RELEASE ORAL at 17:39

## 2023-04-27 RX ADMIN — VANCOMYCIN HYDROCHLORIDE 125 MG: 125 CAPSULE ORAL at 06:08

## 2023-04-27 RX ADMIN — METOPROLOL SUCCINATE 50 MG: 50 TABLET, FILM COATED, EXTENDED RELEASE ORAL at 21:26

## 2023-04-27 RX ADMIN — POTASSIUM CHLORIDE 20 MEQ: 750 TABLET, EXTENDED RELEASE ORAL at 08:41

## 2023-04-27 NOTE — PROGRESS NOTES
" LOS: 1 day     Name: Carlotta Pires  Age: 78 y.o.  Sex: female  :  1945  MRN: [id number]         Primary Care Physician: Gudelia Bazzi APRN    Subjective   Subjective  Resting in bed, accompanied by family.  Patient reports less frequent bowel movements, daughter has been encouraging her mother to eat.  She is hopeful for discharge soon.  She has noticed some blood spots on the toilet paper after she wipes.  She has also noticed some blood occasionally mixed in with the mucus/stool with her bowel movements.    Objective   Vital Signs  Temp:  [98.4 °F (36.9 °C)-98.6 °F (37 °C)] 98.6 °F (37 °C)  Heart Rate:  [] 92  Resp:  [16-18] 18  BP: (130-143)/(61-67) 143/64  Body mass index is 27.13 kg/m².    Objective:  General Appearance:  Comfortable and in no acute distress.    Vital signs: (most recent): Blood pressure 143/64, pulse 92, temperature 98.6 °F (37 °C), temperature source Oral, resp. rate 18, height 158 cm (62.21\"), weight 67.7 kg (149 lb 4.8 oz), SpO2 97 %.    Lungs:  Normal effort and normal respiratory rate.    Heart: Normal rate.  Regular rhythm.    Abdomen: Abdomen is soft.  Bowel sounds are normal.   There is no abdominal tenderness.     Extremities: There is no dependent edema or local swelling.    Neurological: Patient is alert and oriented to person, place and time.    Skin:  Warm and dry.              Results Review:       I reviewed the patient's new clinical results.    Results from last 7 days   Lab Units 23  0728 23  0615 23  0713 23  1151   WBC 10*3/mm3 7.02 11.01* 13.13* 17.11*   HEMOGLOBIN g/dL 10.2* 10.3* 10.5* 13.8   PLATELETS 10*3/mm3 216 188 177 256     Results from last 7 days   Lab Units 23  0728 23  0615 23  2201 23  0713 23  1933 23  1151   SODIUM mmol/L 136 133*  --  133*  --  133*   POTASSIUM mmol/L 4.1 3.7 3.9 3.0* 3.2* 2.8*   CHLORIDE mmol/L 106 102  --  100  --  91*   CO2 mmol/L 23.6 22.7  --  22.8  --  27.0 "   BUN mg/dL 3* 5*  --  10  --  12   CREATININE mg/dL 0.56* 0.62  --  0.65  --  0.96   CALCIUM mg/dL 8.3* 8.0*  --  8.3*  --  9.3   GLUCOSE mg/dL 84 92  --  103*  --  122*                 Scheduled Meds:   amitriptyline, 25 mg, Oral, Nightly  atorvastatin, 10 mg, Oral, Daily  clopidogrel, 75 mg, Oral, Daily  levothyroxine, 75 mcg, Oral, Q AM  losartan, 50 mg, Oral, Daily  metoprolol succinate XL, 50 mg, Oral, BID  pantoprazole, 40 mg, Oral, BID AC  potassium chloride, 20 mEq, Oral, BID With Meals  sodium phosphate IVPB, 30 mmol, Intravenous, Once  vancomycin, 125 mg, Oral, Q6H  vitamin B-12, 1,000 mcg, Oral, Daily      PRN Meds:   •  acetaminophen  •  diphenhydrAMINE  •  magnesium sulfate **OR** magnesium sulfate **OR** magnesium sulfate  •  melatonin  •  ondansetron **OR** ondansetron  •  potassium chloride **OR** potassium chloride **OR** potassium chloride  •  [COMPLETED] Insert Peripheral IV **AND** sodium chloride  Continuous Infusions:       Assessment & Plan   Active Hospital Problems    Diagnosis  POA   • **Fever in adult [R50.9]  Yes   • C. difficile colitis [A04.72]  Unknown   • Chronic diastolic CHF (congestive heart failure) [I50.32]  Unknown   • Hypothyroidism (acquired) [E03.9]  Yes   • Essential hypertension [I10]  Yes   • Malignant neoplasm of lower-outer quadrant of left female breast [C50.512]  Yes      Resolved Hospital Problems   No resolved problems to display.       Assessment & Plan    C. difficile colitis  -ID following, p.o. vancomycin started, CT abdomen reviewed with evidence of colitis, afebrile x24 hours  - blood mixed in with mucus stool c/w colitis, blood on toilet paper with wiping- pt with internal hemorrhoids on prior c-scope    Recurrent fevers  -Patient with intermittent low-grade fevers as an outpatient for several years, recent fevers may be secondary to C. difficile however patient's daughter has made an appointment for her to follow-up with a rheumatologist in about 2 weeks as  an outpatient for additional evaluation    Benign essential hypertension  -cardiology consulted  - continue metoprolol, losartan resumed at half dose    Chronic diastolic CHF  -Fluids discontinued, patient weight up, requesting to see cardiology  - appreciate Dr. Barker seeing pt    Hypothyroidism  -Levothyroxine    Hypophosphatemia  - replace    No data recorded     Guera García MD  [facility]  04/27/23  11:19 EDT

## 2023-04-27 NOTE — PLAN OF CARE
Goal Outcome Evaluation:  Plan of Care Reviewed With: patient        Progress: no change  Outcome Evaluation: Patient continues with oral vanc. Makes needs known. Family at bedside. Phosphorus replaced as ordered. No c/o pain. No s/s of distress noted.

## 2023-04-27 NOTE — PROGRESS NOTES
ID NOTE    CC: f/u fever and C diff    Subj: History from pt and her family member who adds helpful details. BM frequency is better. More time between food intake and BM in the last 24 hours. No fever.     Meds:    Current Facility-Administered Medications:   •  acetaminophen (TYLENOL) tablet 650 mg, 650 mg, Oral, Q4H PRN, Amanda Cortes MD, 650 mg at 04/26/23 2039  •  amitriptyline (ELAVIL) tablet 25 mg, 25 mg, Oral, Nightly, Amanda Cortes MD, 25 mg at 04/26/23 2038  •  atorvastatin (LIPITOR) tablet 10 mg, 10 mg, Oral, Daily, Amanda Cortes MD, 10 mg at 04/27/23 0841  •  clopidogrel (PLAVIX) tablet 75 mg, 75 mg, Oral, Daily, Amanda Cortes MD, 75 mg at 04/27/23 0841  •  diphenhydrAMINE (BENADRYL) capsule 25 mg, 25 mg, Oral, Nightly PRN, Amanda Cortes MD  •  levothyroxine (SYNTHROID, LEVOTHROID) tablet 75 mcg, 75 mcg, Oral, Q AM, Amanda Cortes MD, 75 mcg at 04/27/23 0609  •  Magnesium Sulfate - Total Dose 10 grams - Magnesium 1 or Less, 2 g, Intravenous, PRN **OR** Magnesium Sulfate - Total Dose 6 grams - Magnesium 1.1 - 1.5, 2 g, Intravenous, PRN, Stopped at 04/26/23 0827 **OR** Magnesium Sulfate - Total Dose 4 grams - Magnesium 1.6 - 1.9, 4 g, Intravenous, PRN, Tacos Bullock MD  •  melatonin tablet 3 mg, 3 mg, Oral, Nightly PRN, Amanda Cortes MD  •  metoprolol succinate XL (TOPROL-XL) 24 hr tablet 50 mg, 50 mg, Oral, BID, Tacos Bullock MD, 50 mg at 04/27/23 0841  •  ondansetron (ZOFRAN) tablet 4 mg, 4 mg, Oral, Q6H PRN, 4 mg at 04/25/23 2237 **OR** ondansetron (ZOFRAN) injection 4 mg, 4 mg, Intravenous, Q6H PRN, Amanda Cortes MD  •  pantoprazole (PROTONIX) EC tablet 40 mg, 40 mg, Oral, BID AC, Amanda Cortes MD, 40 mg at 04/27/23 0608  •  potassium chloride (K-DUR,KLOR-CON) ER tablet 20 mEq, 20 mEq, Oral, BID With Meals, Amanda Cortes MD, 20 mEq at 04/27/23 0841  •  potassium chloride (K-DUR,KLOR-CON) ER  tablet 40 mEq, 40 mEq, Oral, PRN, 40 mEq at 04/25/23 1808 **OR** potassium chloride (KLOR-CON) packet 40 mEq, 40 mEq, Oral, PRN **OR** potassium chloride 10 mEq in 100 mL IVPB, 10 mEq, Intravenous, Q1H PRN, Amanda Cortes MD  •  [COMPLETED] Insert Peripheral IV, , , Once **AND** sodium chloride 0.9 % flush 10 mL, 10 mL, Intravenous, PRN, Patric Su MD  •  vancomycin (VANCOCIN) capsule 125 mg, 125 mg, Oral, Q6H, Earle Sanford MD, 125 mg at 04/27/23 0608  •  vitamin B-12 (CYANOCOBALAMIN) tablet 1,000 mcg, 1,000 mcg, Oral, Daily, Amanda Cortes MD, 1,000 mcg at 04/27/23 0841    Physical Exam:   Vital Signs   Temp:  [97.9 °F (36.6 °C)-98.6 °F (37 °C)] 98.6 °F (37 °C)  Heart Rate:  [] 92  Resp:  [16-18] 18  BP: (130-143)/(56-67) 143/64    GENERAL: Awake and alert, very nice, eating breakfast  ENT: no scleral icterus  HEART: NR  LUNGS: no wheezing; normal respiratory effort on RA  GI: Soft, mild TTP  PSYCHIATRIC: calm and pleasant    Results Review:  CBC, BMP, and blood cultures reviewed today  Lab Results   Component Value Date    WBC 7.02 04/27/2023    HGB 10.2 (L) 04/27/2023    HCT 29.1 (L) 04/27/2023    MCV 90.4 04/27/2023     04/27/2023     Lab Results   Component Value Date    GLUCOSE 84 04/27/2023    CALCIUM 8.3 (L) 04/27/2023     04/27/2023    K 4.1 04/27/2023    CO2 23.6 04/27/2023     04/27/2023    BUN 3 (L) 04/27/2023    CREATININE 0.56 (L) 04/27/2023    EGFRRESULT 91 04/12/2023    EGFR 94.7 04/27/2023    BCR 5.4 (L) 04/27/2023    ANIONGAP 6.4 04/27/2023     Micro:   4/24 BCx: negative to date  4/24 RPP: negative  4/24 C. difficile PCR positive, antigen negative  4/25 GI PCR: negative    Prior radiology:  CT CAP shows colitis    A/P:  1.  Fever of unknown origin - resolved  2.  Positive GEOVANNI  3.  C. difficile colitis    Fevers are likely autoimmune in nature as outlined in the initial ID consult note. It would be unusual for C diff to fester over the  course of two years.    I think the C diff is a new and different problem. Given colitis seen on imaging, C diff PCR positive, and watery BMs following a course of antibiotics, I recommend that we treat this as C diff infection despite the negative antigen. Continue vancomycin 125 mg PO q6h x 10 days.     Thank you for allowing me to be involved in the care of this patient. Infectious diseases will sign off at this time with antibiotics plan in place, but please call me at [phone] if any further ID questions or new ID concerns.      Earle Sanford MD  04/27/23

## 2023-04-27 NOTE — PLAN OF CARE
Goal Outcome Evaluation:   Patient alert follows commands family at bedside. Iv fluids infusing. Oral vanc given. Prn tylenol given. No nausea noted. No acute distress noted. Patient refusing metoprolol and norvasc wants cardiology consult to talk about heart medications. Will continue to monitor

## 2023-04-27 NOTE — CONSULTS
Date of Hospital Visit: 23  Encounter Provider: Reginald Barker MD  Place of Service: [facility] CARDIOLOGY  Patient Name: Carlotta Pires  :1945  [id number]  Referral Provider: Amanda Cortes, *    Chief complaint: Blood pressure management    History of Present Illness: 78-year-old recently has had some joint pain had a fever got placed on an antibiotic and then now has developed diarrhea and C. difficile colitis.  Her diarrhea is getting better and her abdominal pain is improving however her blood pressure was low on admission and she has been hypokalemic.  Otherwise she is doing well from a cardiac standpoint      Past Medical History:   Diagnosis Date   • Allergic rhinitis    • Arthritis    • Cancer     Left breast cancer   • CHF (congestive heart failure)    • Cough    • COVID-19 2020   • Degenerative arthritis of thumb    • GERD without esophagitis    • H/O TIA (transient ischemic attack) and stroke    • History of bone density study    • Lumbar radiculopathy    • Osteoporosis    • Palpitations    • Peptic ulceration    • Personal history of malignant neoplasm of breast    • Sciatica    • Stroke     CVA--History of storke   • Trigeminal neuralgia        Past Surgical History:   Procedure Laterality Date   • BREAST BIOPSY Left    • CATARACT EXTRACTION, BILATERAL     • COLONOSCOPY  2012    Adolph Martinez MD   • COLONOSCOPY N/A 2018    Procedure: COLONOSCOPY to cecum;  Surgeon: Adolph Martinez MD;  Location: [facility] ENDOSCOPY;  Service: Gastroenterology   • COLONOSCOPY N/A 2021    Procedure: COLONOSCOPY TO CECUM AND TERMINAL ILEUM;  Surgeon: Emelia Wilcox MD;  Location: [facility] ENDOSCOPY;  Service: Gastroenterology;  Laterality: N/A;  RECTAL BLEEDING  --DIVERTICULOSIS, HEMORRHOIDS, TORTUOUS COLON   • ENDOSCOPY N/A 2018    Procedure: ESOPHAGOGASTRODUODENOSCOPY with bx;  Surgeon: Adolph Martinez MD;  Location: [facility]  ENDOSCOPY;  Service: Gastroenterology   • ENDOSCOPY N/A 1/27/2021    Procedure: ESOPHAGOGASTRODUODENOSCOPY WITH COLD BX;  Surgeon: Emelia Wilcox MD;  Location: [facility] ENDOSCOPY;  Service: Gastroenterology;  Laterality: N/A;  GERD  --GASTRITIS, HIATAL HERNIA   • EYE SURGERY     • HYSTERECTOMY      At age 31-Not due to cancer   • MASTECTOMY Left 06/30/2011    Dr. Kaitlyn Luna   • TUBAL ABDOMINAL LIGATION  1974   • UPPER GASTROINTESTINAL ENDOSCOPY  09/28/2012    Adolph Martinez MD       Medications Prior to Admission   Medication Sig Dispense Refill Last Dose   • amitriptyline (ELAVIL) 25 MG tablet Take 1 tablet by mouth Every Night. 90 tablet 2    • atorvastatin (LIPITOR) 10 MG tablet Take 1 tablet by mouth Daily. 90 tablet 3    • chlorthalidone (HYGROTON) 25 MG tablet Take 1 tablet by mouth Daily. (Patient taking differently: Take 3 tablets by mouth Daily.) 90 tablet 3    • clopidogrel (PLAVIX) 75 MG tablet Take 1 tablet by mouth Daily. 30 tablet 0    • Dexilant 60 MG capsule TAKE ONE CAPSULE BY MOUTH EVERY DAY 90 capsule 3    • diphenhydrAMINE (BENADRYL) 25 mg capsule Take 1 capsule by mouth At Night As Needed for Itching.      • levothyroxine (Euthyrox) 75 MCG tablet Take 1 tablet by mouth Daily. Take one po qd except on Sunday take 2. 110 tablet 0    • linaclotide (LINZESS) 145 MCG capsule capsule Take 1 capsule by mouth Every Morning Before Breakfast. 30 capsule 11    • losartan (COZAAR) 100 MG tablet TAKE ONE TABLET BY MOUTH EVERY DAY 90 tablet 3    • Melatonin 10 MG capsule Take 1 capsule by mouth At Night As Needed.      • metoprolol succinate XL (TOPROL-XL) 50 MG 24 hr tablet Take 1 tablet by mouth 2 (Two) Times a Day. 180 tablet 3    • potassium chloride (K-DUR,KLOR-CON) 10 MEQ CR tablet Take 1 tablet by mouth Daily. 30 tablet 2    • vitamin B-12 (CYANOCOBALAMIN) 1000 MCG tablet Take 1 tablet by mouth Daily.          Current Meds  Scheduled Meds:amitriptyline, 25 mg, Oral, Nightly  atorvastatin, 10 mg,  Oral, Daily  clopidogrel, 75 mg, Oral, Daily  levothyroxine, 75 mcg, Oral, Q AM  losartan, 50 mg, Oral, Daily  metoprolol succinate XL, 50 mg, Oral, BID  pantoprazole, 40 mg, Oral, BID AC  potassium chloride, 20 mEq, Oral, BID With Meals  sodium phosphate IVPB, 30 mmol, Intravenous, Once  vancomycin, 125 mg, Oral, Q6H  vitamin B-12, 1,000 mcg, Oral, Daily      Continuous Infusions:   PRN Meds:.•  acetaminophen  •  diphenhydrAMINE  •  magnesium sulfate **OR** magnesium sulfate **OR** magnesium sulfate  •  melatonin  •  ondansetron **OR** ondansetron  •  potassium chloride **OR** potassium chloride **OR** potassium chloride  •  [COMPLETED] Insert Peripheral IV **AND** sodium chloride    Allergies as of 04/24/2023 - Reviewed 04/24/2023   Allergen Reaction Noted   • Lisinopril Cough 03/25/2016   • Contrast dye (echo or unknown ct/mr) Itching and Rash 03/25/2016   • Tartrazine Hives 10/05/2012       Social History     Socioeconomic History   • Marital status:      Spouse name: Jack   • Number of children: 2   • Years of education: High School   Tobacco Use   • Smoking status: Never   • Smokeless tobacco: Never   • Tobacco comments:     CAFFEINE USE: 4-5 CUPS 1/2 & 1/2 COFFEE DAILY   Vaping Use   • Vaping Use: Never used   Substance and Sexual Activity   • Alcohol use: Not Currently   • Drug use: No   • Sexual activity: Defer       Family History   Problem Relation Age of Onset   • Cancer Mother 68        head and neck   • Stroke Mother    • Heart disease Mother    • Gallbladder disease Mother    • Throat cancer Mother 68   • Breast cancer Sister 57   • Heart disease Sister    • Hypertension Sister    • Cancer Sister    • Gallbladder disease Sister    • Diabetes Sister    • Cancer Brother         head and neck   • Heart disease Brother    • Hypertension Brother    • Gallbladder disease Brother    • Lung cancer Brother 67   • Throat cancer Brother 67   • Hypertension Father    • Gallbladder disease Father    •  "Emphysema Father    • Heart disease Father         Enlarged heart   • Cancer Maternal Aunt    • Cancer Maternal Uncle    • Colon cancer Maternal Uncle    • Heart attack Brother    • Alcohol abuse Brother    • Hypertension Sister    • Heart disease Sister    • Hypertension Sister    • Heart disease Sister    • Hypertension Sister    • No Known Problems Other        REVIEW OF SYSTEMS:   ROS was performed and is negative except as outlined in HPI     REVIEW OF SYSTEMS:   CONSTITUTIONAL: No weight loss, fever, chills, weakness or fatigue.   HEENT: Eyes: No visual loss, blurred vision, double vision or yellow sclerae. Ears, Nose, Throat: No hearing loss, sneezing, congestion, runny nose or sore throat.   SKIN: No rash or itching.     RESPIRATORY: No shortness of breath, hemoptysis, cough or sputum.   GASTROINTESTINAL: No anorexia, nausea, vomiting or diarrhea. No abdominal pain, bright red blood per rectum or melena.  GENITOURINARY: No burning on urination, hematuria or increased frequency.  NEUROLOGICAL: No headache, dizziness, syncope, paralysis, ataxia, numbness or tingling in the extremities. No change in bowel or bladder control.   MUSCULOSKELETAL: No muscle, back pain, joint pain or stiffness.   HEMATOLOGIC: No anemia, bleeding or bruising.   LYMPHATICS: No enlarged nodes. No history of splenectomy.   PSYCHIATRIC: No history of depression, anxiety, hallucinations.   ENDOCRINOLOGIC: No reports of sweating, cold or heat intolerance. No polyuria or polydipsia.       Objective:   Temp:  [97.9 °F (36.6 °C)-98.6 °F (37 °C)] 98.6 °F (37 °C)  Heart Rate:  [] 92  Resp:  [16-18] 18  BP: (130-143)/(56-67) 143/64  Body mass index is 27.13 kg/m².  Flowsheet Rows    Flowsheet Row First Filed Value   Admission Height 157.5 cm (62\") Documented at 04/24/2023 1102   Admission Weight 65.8 kg (145 lb) Documented at 04/24/2023 1102        Vitals:    04/27/23 0821   BP: 143/64   Pulse: 92   Resp: 18   Temp: 98.6 °F (37 °C)   SpO2: " 97%       Head:    Normocephalic, without obvious abnormality, atraumatic   Eyes:            Lids and lashes normal, conjunctivae and sclerae normal, no   icterus, no pallor   Ears:    Ears appear intact with no abnormalities noted   Throat:   No oral lesions, dentition good   Neck:   No adenopathy, supple, trachea midline, no thyromegaly, no   carotid bruit, no JVD   Lungs:     Breath sounds are equal and clear to auscultation    Heart:    Normal S1 and S2, RRR, No M/G/R   Abdomen:     Normal bowel sounds, no masses, no organomegaly, soft        non-tender, non-distended, no guarding   Extremities:   Moves all extremities well, no edema, no cyanosis, no redness   Pulses:   Pulses palpable and equal bilaterally.    Skin:  Psychiatric:   No bleeding, bruising or rash    Awake, alert and oriented x 3, normal mood and affect             I personally viewed and interpreted the patient's EKG/Telemetry data    Assessment:  Active Hospital Problems    Diagnosis  POA   • **Fever in adult [R50.9]  Yes   • C. difficile colitis [A04.72]  Unknown   • Chronic diastolic CHF (congestive heart failure) [I50.32]  Unknown   • Hypothyroidism (acquired) [E03.9]  Yes   • Essential hypertension [I10]  Yes   • Malignant neoplasm of lower-outer quadrant of left female breast [C50.512]  Yes      Resolved Hospital Problems   No resolved problems to display.       Plan: She is a little worried about her hypokalemia obviously diarrhea plus the thiazide diuretic is going to make that a real probability however there recently was a trial in the [employer] [employer] that showed equivalency of blood pressure control with HCTZ and chlorthalidone but less hypokalemia with HCTZ so when she improves will start HCTZ on her as her blood pressure likely will go up I am going to start her back on half of her dose of losartan I do not see any evidence of heart failure.    Reginald Barker MD  04/27/23  11:07 EDT.

## 2023-04-28 ENCOUNTER — TELEPHONE (OUTPATIENT)
Dept: CARDIOLOGY | Facility: CLINIC | Age: 78
End: 2023-04-28
Payer: MEDICARE

## 2023-04-28 ENCOUNTER — READMISSION MANAGEMENT (OUTPATIENT)
Dept: CALL CENTER | Facility: HOSPITAL | Age: 78
End: 2023-04-28
Payer: MEDICARE

## 2023-04-28 VITALS
BODY MASS INDEX: 26.92 KG/M2 | HEART RATE: 97 BPM | HEIGHT: 62 IN | TEMPERATURE: 97.2 F | OXYGEN SATURATION: 97 % | RESPIRATION RATE: 18 BRPM | DIASTOLIC BLOOD PRESSURE: 67 MMHG | SYSTOLIC BLOOD PRESSURE: 138 MMHG | WEIGHT: 146.3 LBS

## 2023-04-28 LAB
ANION GAP SERPL CALCULATED.3IONS-SCNC: 9.2 MMOL/L (ref 5–15)
BASOPHILS # BLD AUTO: 0.04 10*3/MM3 (ref 0–0.2)
BASOPHILS NFR BLD AUTO: 0.7 % (ref 0–1.5)
BUN SERPL-MCNC: 3 MG/DL (ref 8–23)
BUN/CREAT SERPL: 5 (ref 7–25)
CALCIUM SPEC-SCNC: 8.7 MG/DL (ref 8.6–10.5)
CHLORIDE SERPL-SCNC: 103 MMOL/L (ref 98–107)
CO2 SERPL-SCNC: 23.8 MMOL/L (ref 22–29)
CREAT SERPL-MCNC: 0.6 MG/DL (ref 0.57–1)
DEPRECATED RDW RBC AUTO: 41 FL (ref 37–54)
EGFRCR SERPLBLD CKD-EPI 2021: 93.2 ML/MIN/1.73
EOSINOPHIL # BLD AUTO: 0.25 10*3/MM3 (ref 0–0.4)
EOSINOPHIL NFR BLD AUTO: 4.3 % (ref 0.3–6.2)
ERYTHROCYTE [DISTWIDTH] IN BLOOD BY AUTOMATED COUNT: 12.1 % (ref 12.3–15.4)
GLUCOSE SERPL-MCNC: 77 MG/DL (ref 65–99)
HCT VFR BLD AUTO: 31.6 % (ref 34–46.6)
HGB BLD-MCNC: 10.6 G/DL (ref 12–15.9)
IMM GRANULOCYTES # BLD AUTO: 0.04 10*3/MM3 (ref 0–0.05)
IMM GRANULOCYTES NFR BLD AUTO: 0.7 % (ref 0–0.5)
LYMPHOCYTES # BLD AUTO: 1.23 10*3/MM3 (ref 0.7–3.1)
LYMPHOCYTES NFR BLD AUTO: 20.9 % (ref 19.6–45.3)
MAGNESIUM SERPL-MCNC: 1.5 MG/DL (ref 1.6–2.4)
MCH RBC QN AUTO: 31.3 PG (ref 26.6–33)
MCHC RBC AUTO-ENTMCNC: 33.5 G/DL (ref 31.5–35.7)
MCV RBC AUTO: 93.2 FL (ref 79–97)
MONOCYTES # BLD AUTO: 0.48 10*3/MM3 (ref 0.1–0.9)
MONOCYTES NFR BLD AUTO: 8.2 % (ref 5–12)
NEUTROPHILS NFR BLD AUTO: 3.84 10*3/MM3 (ref 1.7–7)
NEUTROPHILS NFR BLD AUTO: 65.2 % (ref 42.7–76)
NRBC BLD AUTO-RTO: 0 /100 WBC (ref 0–0.2)
PHOSPHATE SERPL-MCNC: 3.1 MG/DL (ref 2.5–4.5)
PLATELET # BLD AUTO: 248 10*3/MM3 (ref 140–450)
PMV BLD AUTO: 9.1 FL (ref 6–12)
POTASSIUM SERPL-SCNC: 4 MMOL/L (ref 3.5–5.2)
RBC # BLD AUTO: 3.39 10*6/MM3 (ref 3.77–5.28)
SODIUM SERPL-SCNC: 136 MMOL/L (ref 136–145)
WBC NRBC COR # BLD: 5.88 10*3/MM3 (ref 3.4–10.8)

## 2023-04-28 PROCEDURE — 99232 SBSQ HOSP IP/OBS MODERATE 35: CPT | Performed by: INTERNAL MEDICINE

## 2023-04-28 PROCEDURE — 83735 ASSAY OF MAGNESIUM: CPT | Performed by: STUDENT IN AN ORGANIZED HEALTH CARE EDUCATION/TRAINING PROGRAM

## 2023-04-28 PROCEDURE — 85025 COMPLETE CBC W/AUTO DIFF WBC: CPT | Performed by: STUDENT IN AN ORGANIZED HEALTH CARE EDUCATION/TRAINING PROGRAM

## 2023-04-28 PROCEDURE — 84100 ASSAY OF PHOSPHORUS: CPT | Performed by: STUDENT IN AN ORGANIZED HEALTH CARE EDUCATION/TRAINING PROGRAM

## 2023-04-28 PROCEDURE — 0 MAGNESIUM SULFATE 4 GM/100ML SOLUTION: Performed by: STUDENT IN AN ORGANIZED HEALTH CARE EDUCATION/TRAINING PROGRAM

## 2023-04-28 PROCEDURE — 80048 BASIC METABOLIC PNL TOTAL CA: CPT | Performed by: STUDENT IN AN ORGANIZED HEALTH CARE EDUCATION/TRAINING PROGRAM

## 2023-04-28 RX ORDER — MAGNESIUM SULFATE HEPTAHYDRATE 40 MG/ML
4 INJECTION, SOLUTION INTRAVENOUS ONCE
Status: COMPLETED | OUTPATIENT
Start: 2023-04-28 | End: 2023-04-28

## 2023-04-28 RX ORDER — VANCOMYCIN HYDROCHLORIDE 125 MG/1
125 CAPSULE ORAL EVERY 6 HOURS SCHEDULED
Qty: 32 CAPSULE | Refills: 0 | Status: SHIPPED | OUTPATIENT
Start: 2023-04-28 | End: 2023-05-06

## 2023-04-28 RX ORDER — LOSARTAN POTASSIUM 100 MG/1
50 TABLET ORAL DAILY
Start: 2023-04-28 | End: 2023-05-05 | Stop reason: SDUPTHER

## 2023-04-28 RX ORDER — POTASSIUM CHLORIDE 750 MG/1
20 TABLET, EXTENDED RELEASE ORAL DAILY
Start: 2023-04-28 | End: 2023-05-08

## 2023-04-28 RX ADMIN — METOPROLOL SUCCINATE 50 MG: 50 TABLET, FILM COATED, EXTENDED RELEASE ORAL at 10:00

## 2023-04-28 RX ADMIN — MAGNESIUM SULFATE HEPTAHYDRATE 4 G: 40 INJECTION, SOLUTION INTRAVENOUS at 12:23

## 2023-04-28 RX ADMIN — Medication 1000 MCG: at 10:00

## 2023-04-28 RX ADMIN — LEVOTHYROXINE SODIUM 75 MCG: 0.07 TABLET ORAL at 06:25

## 2023-04-28 RX ADMIN — PANTOPRAZOLE SODIUM 40 MG: 40 TABLET, DELAYED RELEASE ORAL at 06:25

## 2023-04-28 RX ADMIN — VANCOMYCIN HYDROCHLORIDE 125 MG: 125 CAPSULE ORAL at 06:25

## 2023-04-28 RX ADMIN — ATORVASTATIN CALCIUM 10 MG: 20 TABLET, FILM COATED ORAL at 10:00

## 2023-04-28 RX ADMIN — CLOPIDOGREL BISULFATE 75 MG: 75 TABLET, FILM COATED ORAL at 10:00

## 2023-04-28 RX ADMIN — LOSARTAN POTASSIUM 50 MG: 50 TABLET, FILM COATED ORAL at 10:00

## 2023-04-28 RX ADMIN — VANCOMYCIN HYDROCHLORIDE 125 MG: 125 CAPSULE ORAL at 12:23

## 2023-04-28 RX ADMIN — POTASSIUM CHLORIDE 20 MEQ: 750 TABLET, EXTENDED RELEASE ORAL at 09:59

## 2023-04-28 NOTE — DISCHARGE SUMMARY
Patient Name: Carlotta Pires  : 1945  MRN: [id number]    Date of Admission: 2023  Date of Discharge:  2023  Primary Care Physician: Gudelia Bazzi APRN      Chief Complaint:   Fever and Weakness - Generalized      Discharge Diagnoses     Active Hospital Problems    Diagnosis  POA   • **Fever in adult [R50.9]  Yes   • C. difficile colitis [A04.72]  Unknown   • Chronic diastolic CHF (congestive heart failure) [I50.32]  Unknown   • Hypothyroidism (acquired) [E03.9]  Yes   • Essential hypertension [I10]  Yes   • Malignant neoplasm of lower-outer quadrant of left female breast [C50.512]  Yes      Resolved Hospital Problems   No resolved problems to display.        Hospital Course     Ms. Pires is a 78 y.o. female with a history of chronic diastolic CHF, HTN, breast cancer, recurrent fever with positive GEOVANNI who presented to [facility] initially complaining of fever and diarrhea.  Please see the admitting history and physical for further details.  She was found to have C. difficile colitis and was admitted to the hospital for further evaluation and treatment.  ID consulted on admission for additional management recommendations.  Stool testing revealed her C. difficile PCR was positive however her antigen was negative.  A CT of her abdomen/pelvis revealed regional colitis in the distal descending, sigmoid and rectosigmoid colon so infectious disease is recommending treating as though the patient has an active C. difficile infection with a 10-day course of vancomycin.  She has been initiated on vancomycin and has had improvement in her symptoms.  The patient also requested a cardiology consult while she was admitted as she was worried about her weight gain and her blood pressure medications being held on admission.  Her cardiologist Dr. Barker evaluated the patient and has recommended she continue to hold her diuretic at discharge, resume losartan at half dose 50 mg daily.  He is going to  "have her follow-up with his nurse practitioner in 1 week and himself in 1 month.  Given the patient's history of recurrent fevers it was also recommended to her that she follow with a rheumatologist, this appointment has already been scheduled by the patient's daughter.  The patient also had electrolyte abnormalities likely related to her profuse diarrhea, this was repleted with IV and oral supplementation.  She has been instructed to follow-up with her PCP in about a week for labs.  This was discussed with her daughter and the patient on the day of discharge.    Day of Discharge     Subjective:  Resting in bed, daughter at bedside, she feels better. Is able to get up and ambulate to the bathroom independently while I'm there.       Physical Exam:  Temp:  [97.2 °F (36.2 °C)-98.7 °F (37.1 °C)] 97.2 °F (36.2 °C)  Heart Rate:  [76-97] 97  Resp:  [18] 18  BP: (125-145)/(60-67) 138/67  Body mass index is 26.58 kg/m².  Physical Exam  General Appearance:  Comfortable and in no acute distress.    Vital signs: (most recent): Blood pressure 143/64, pulse 92, temperature 98.6 °F (37 °C), temperature source Oral, resp. rate 18, height 158 cm (62.21\"), weight 67.7 kg (149 lb 4.8 oz), SpO2 97 %.    Lungs:  Normal effort and normal respiratory rate.    Heart: Normal rate.  Regular rhythm.    Abdomen: Abdomen is soft.  Bowel sounds are normal.   There is no abdominal tenderness.     Extremities: There is no dependent edema or local swelling.    Neurological: Patient is alert and oriented to person, place and time.    Skin:  Warm and dry.  Consultants     Consult Orders (all) (From admission, onward)     Start     Ordered    04/27/23 0845  Inpatient Cardiology Consult  Once        Specialty:  Cardiology  Provider:  Reginald Barker MD    04/27/23 0845    04/24/23 2038  Inpatient Infectious Diseases Consult  Once        Specialty:  Infectious Diseases  Provider:  Sharlene Anderson MD    04/24/23 2037 04/24/23 1853  Inpatient Nutrition " Consult  Once        Provider:  (Not yet assigned)    04/24/23 1853    04/24/23 1432  [facility] (on-call MD unless specified) Details  Once        Specialty:  Hospitalist  Provider:  (Not yet assigned)    04/24/23 1431              Procedures     Imaging Results (All)     Procedure Component Value Units Date/Time    CT Chest Without Contrast Diagnostic [[id number]] Collected: 04/25/23 1750     Updated: 04/25/23 1754    Narrative:      EXAMINATION: CT OF THE CHEST WITHOUT CONTRAST AND CT OF THE ABDOMEN AND  PELVIS WITHOUT CONTRAST     HISTORY: 78-year-old female with a history of sepsis     TECHNIQUE: Contiguous axial images were obtained through the chest,  abdomen and pelvis without IV contrast. Oral contrast was not  administered.     COMPARISON: CT of the chest without contrast, 08/10/2020 and CT of the  abdomen without and with contrast, 11/08/2018     FINDINGS OF THE CHEST: There is stable subpleural scarring seen within  the left upper lobe. Otherwise, the lungs are clear. There is no  evidence for mediastinal adenopathy. Calcified mediastinal nodes are  noted. The heart has a normal noncontrasted appearance. Mild  atheromatous calcification is seen within the aortic arch and descending  thoracic aorta. No evidence for axillary adenopathy is appreciated.  Evidence of prior left mastectomy is noted. No suspicious lytic or  sclerotic osseous lesions are visualized.     FINDINGS OF THE ABDOMEN AND PELVIS: There has been interval resolution  of a previously noted 5.1 cm cyst in the right lobe of the liver. There  has been interval decrease in size of a cyst in the left lobe of the  liver which measures 1.6 cm compared to 0.4 cm previously. A 1.2 cm cyst  in the left lobe of the liver is noted. There is a mild heterogenous  appearance seen throughout the liver. A calcified granuloma in the left  lobe the liver is noted. The spleen has a stable appearance with stable  capsular calcification. The gallbladder appears normal.  The kidneys,  pancreas and adrenal glands have a normal appearance. There is minimal  stranding seen surrounding the descending and sigmoid colon with an  edematous appearance of the wall of the descending, sigmoid and  rectosigmoid colon. There is mild thickening of the wall of the rectum  up to 5 mm in thickness. Mild atheromatous calcification of the  abdominal aorta and bilateral common iliac arteries is noted. L2-3 and  L5-S1 abutting endplate osteophytic change and loss of intervertebral  disc height is noted.       Impression:      1. There are findings consistent with regional colitis involving the  distal descending, sigmoid and rectosigmoid portions of the colon.  2. There is L2-3 and L5-S1 moderate degenerative disc disease and  abutting endplate osteophytic change.  3. There is stable subpleural scarring within the left upper lobe,  possibly associated with prior radiation therapy. No evidence for an  active or acute abnormality of the chest is appreciated.  4. There has been interval resolution of some hepatic cysts since the  prior examination. Other hepatic cyst remain and are minimally changed  as described. A slightly heterogenous appearance of the liver is noted  and the appearance is nonspecific but may be seen in the setting of  hepatitis or may be within normal limits. Correlation with liver  specific laboratory values is recommended.     Radiation dose reduction techniques were utilized, including automated  exposure control and exposure modulation based on body size.     This report was finalized on 4/25/2023 5:50 PM by Dr. Wolf Santiago M.D.       CT Abdomen Pelvis Without Contrast [[id number]] Collected: 04/25/23 1750     Updated: 04/25/23 1754    Narrative:      EXAMINATION: CT OF THE CHEST WITHOUT CONTRAST AND CT OF THE ABDOMEN AND  PELVIS WITHOUT CONTRAST     HISTORY: 78-year-old female with a history of sepsis     TECHNIQUE: Contiguous axial images were obtained through the  chest,  abdomen and pelvis without IV contrast. Oral contrast was not  administered.     COMPARISON: CT of the chest without contrast, 08/10/2020 and CT of the  abdomen without and with contrast, 11/08/2018     FINDINGS OF THE CHEST: There is stable subpleural scarring seen within  the left upper lobe. Otherwise, the lungs are clear. There is no  evidence for mediastinal adenopathy. Calcified mediastinal nodes are  noted. The heart has a normal noncontrasted appearance. Mild  atheromatous calcification is seen within the aortic arch and descending  thoracic aorta. No evidence for axillary adenopathy is appreciated.  Evidence of prior left mastectomy is noted. No suspicious lytic or  sclerotic osseous lesions are visualized.     FINDINGS OF THE ABDOMEN AND PELVIS: There has been interval resolution  of a previously noted 5.1 cm cyst in the right lobe of the liver. There  has been interval decrease in size of a cyst in the left lobe of the  liver which measures 1.6 cm compared to 0.4 cm previously. A 1.2 cm cyst  in the left lobe of the liver is noted. There is a mild heterogenous  appearance seen throughout the liver. A calcified granuloma in the left  lobe the liver is noted. The spleen has a stable appearance with stable  capsular calcification. The gallbladder appears normal. The kidneys,  pancreas and adrenal glands have a normal appearance. There is minimal  stranding seen surrounding the descending and sigmoid colon with an  edematous appearance of the wall of the descending, sigmoid and  rectosigmoid colon. There is mild thickening of the wall of the rectum  up to 5 mm in thickness. Mild atheromatous calcification of the  abdominal aorta and bilateral common iliac arteries is noted. L2-3 and  L5-S1 abutting endplate osteophytic change and loss of intervertebral  disc height is noted.       Impression:      1. There are findings consistent with regional colitis involving the  distal descending, sigmoid and  rectosigmoid portions of the colon.  2. There is L2-3 and L5-S1 moderate degenerative disc disease and  abutting endplate osteophytic change.  3. There is stable subpleural scarring within the left upper lobe,  possibly associated with prior radiation therapy. No evidence for an  active or acute abnormality of the chest is appreciated.  4. There has been interval resolution of some hepatic cysts since the  prior examination. Other hepatic cyst remain and are minimally changed  as described. A slightly heterogenous appearance of the liver is noted  and the appearance is nonspecific but may be seen in the setting of  hepatitis or may be within normal limits. Correlation with liver  specific laboratory values is recommended.     Radiation dose reduction techniques were utilized, including automated  exposure control and exposure modulation based on body size.     This report was finalized on 4/25/2023 5:50 PM by Dr. Wolf Santiago M.D.       CT Head Without Contrast [[id number]] Collected: 04/24/23 1245     Updated: 04/24/23 1819    Narrative:      EMERGENCY NONCONTRAST HEAD CT ON 04/24/2023     CLINICAL HISTORY: Patient fell and hit right side of head.     TECHNIQUE: Spiral CT images were obtained from the base of the skull to  the vertex without intravenous contrast. The images were reformatted and  submitted in 3 mm thick axial, sagittal and coronal CT sections with  brain algorithm and 2 mm thick axial CT sections with high-resolution  bone algorithm.     This is correlated to a prior head CT from [facility] on  10/14/2021.     FINDINGS: The brain parenchyma is normal in attenuation. The ventricles  are normal in size. I see no focal mass effect. There is no midline  shift. No extraaxial fluid collections are identified. There is no  evidence of acute intracranial hemorrhage. No acute skull fracture is  identified. The calvarium and skull base are normal in appearance. There  is mild bilateral  ethmoid sinus mucosal thickening with a small amount  of fluid and mucosal thickening in the posterior right maxillary sinus.  The remainder of the paranasal sinuses and the mastoid air cells and the  middle ear cavities are clear.        Impression:      1. No acute intracranial abnormality is identified.  2. There is mild bilateral ethmoid sinus mucosal thickening and a small  amount of fluid and mucosal thickening in the posterior right maxillary  sinus. The remainder of the head CT is within normal limits.  Specifically, no acute skull fracture or intracranial hemorrhage is  identified.     Radiation dose reduction techniques were utilized, including automated  exposure control and exposure modulation based on body size.     This report was finalized on 4/24/2023 6:16 PM by Dr. Tacos Higginbotham M.D.       XR Shoulder 2+ View Right [[id number]] Collected: 04/24/23 1227     Updated: 04/24/23 1231    Narrative:      XR SHOULDER 2+ VW RIGHT-     INDICATIONS: Trauma     TECHNIQUE: 3 views of the right shoulder     COMPARISON: None available     FINDINGS:     Mild hypertrophic degenerative change is seen at the acromioclavicular  joint. Small degenerative spurring of the humeral head. No acute  fracture, erosion, or dislocation is identified. Follow-up/further  evaluation can be obtained as indications persist.       Impression:         As described.     This report was finalized on 4/24/2023 12:28 PM by Dr. Yair Liang M.D.       XR Chest 1 View [[id number]] Collected: 04/24/23 1226     Updated: 04/24/23 1230    Narrative:      XR CHEST 1 VW-     HISTORY: Female who is 78 years-old,  fever, cancer     TECHNIQUE: Frontal view of the chest     COMPARISON: 04/12/2023     FINDINGS: Heart, mediastinum and pulmonary vasculature are unremarkable.  No focal pulmonary consolidation, pleural effusion, or pneumothorax. No  acute osseous process.       Impression:      No evidence for acute pulmonary process. Follow-up  as  clinical indications persist.     This report was finalized on 4/24/2023 12:27 PM by Dr. Yair Liang M.D.             Pertinent Labs     Results from last 7 days   Lab Units 04/28/23  0737 04/27/23  0728 04/26/23  0615 04/25/23  0713   WBC 10*3/mm3 5.88 7.02 11.01* 13.13*   HEMOGLOBIN g/dL 10.6* 10.2* 10.3* 10.5*   PLATELETS 10*3/mm3 248 216 188 177     Results from last 7 days   Lab Units 04/28/23  0737 04/27/23  0728 04/26/23  0615 04/25/23  2201 04/25/23  0713   SODIUM mmol/L 136 136 133*  --  133*   POTASSIUM mmol/L 4.0 4.1 3.7 3.9 3.0*   CHLORIDE mmol/L 103 106 102  --  100   CO2 mmol/L 23.8 23.6 22.7  --  22.8   BUN mg/dL 3* 3* 5*  --  10   CREATININE mg/dL 0.60 0.56* 0.62  --  0.65   GLUCOSE mg/dL 77 84 92  --  103*   Estimated Creatinine Clearance: 71.7 mL/min (by C-G formula based on SCr of 0.6 mg/dL).  Results from last 7 days   Lab Units 04/24/23  1151   ALBUMIN g/dL 4.2   BILIRUBIN mg/dL 1.2   ALK PHOS U/L 88   AST (SGOT) U/L 19   ALT (SGPT) U/L 14     Results from last 7 days   Lab Units 04/28/23  0737 04/27/23  0728 04/26/23  0615 04/25/23  0713 04/24/23  1151   CALCIUM mg/dL 8.7 8.3* 8.0* 8.3* 9.3   ALBUMIN g/dL  --   --   --   --  4.2   MAGNESIUM mg/dL 1.5* 1.7  --  1.5* 1.6   PHOSPHORUS mg/dL 3.1 1.7*  --   --   --        Results from last 7 days   Lab Units 04/24/23  1352 04/24/23  1151   CK TOTAL U/L  --  81   HSTROP T ng/L 16* 16*           Invalid input(s): LDLCALC  Results from last 7 days   Lab Units 04/24/23  1541   BLOODCX  No growth at 4 days       Test Results Pending at Discharge     Pending Labs     Order Current Status    Blood Culture - Blood, Arm, Right Preliminary result          Discharge Details        Discharge Medications      New Medications      Instructions Start Date   vancomycin 125 MG capsule  Commonly known as: VANCOCIN   125 mg, Oral, Every 6 Hours Scheduled         Changes to Medications      Instructions Start Date   losartan 100 MG tablet  Commonly known  as: COZAAR  What changed: how much to take   50 mg, Oral, Daily      potassium chloride 10 MEQ CR tablet  Commonly known as: K-DUR,KLOR-VELVET  What changed: how much to take   20 mEq, Oral, Daily         Continue These Medications      Instructions Start Date   amitriptyline 25 MG tablet  Commonly known as: ELAVIL   25 mg, Oral, Nightly      atorvastatin 10 MG tablet  Commonly known as: LIPITOR   10 mg, Oral, Daily      clopidogrel 75 MG tablet  Commonly known as: PLAVIX   75 mg, Oral, Daily      Dexilant 60 MG capsule  Generic drug: dexlansoprazole   TAKE ONE CAPSULE BY MOUTH EVERY DAY      diphenhydrAMINE 25 mg capsule  Commonly known as: BENADRYL   25 mg, Oral, Nightly PRN      levothyroxine 75 MCG tablet  Commonly known as: Euthyrox   75 mcg, Oral, Daily, Take one po qd except on Sunday take 2.      linaclotide 145 MCG capsule capsule  Commonly known as: LINZESS   145 mcg, Oral, Every Morning Before Breakfast      Melatonin 10 MG capsule   1 capsule, Oral, Nightly PRN      metoprolol succinate XL 50 MG 24 hr tablet  Commonly known as: TOPROL-XL   50 mg, Oral, 2 Times Daily      vitamin B-12 1000 MCG tablet  Commonly known as: CYANOCOBALAMIN   1,000 mcg, Oral, Daily         Stop These Medications    chlorthalidone 25 MG tablet  Commonly known as: HYGROTON            Allergies   Allergen Reactions   • Lisinopril Cough   • Contrast Dye (Echo Or Unknown Ct/Mr) Itching and Rash     IVP Dye   • Tartrazine Hives         Discharge Disposition:  Home or Self Care    Discharge Diet:  Diet Order   Procedures   • Diet: Cardiac Diets; Healthy Heart (2-3 Na+); Texture: Regular Texture (IDDSI 7); Fluid Consistency: Thin (IDDSI 0)       Discharge Activity:   Activity Instructions     Activity as Tolerated            CODE STATUS:    Code Status and Medical Interventions:   Ordered at: 04/24/23 3414     Code Status (Patient has no pulse and is not breathing):    CPR (Attempt to Resuscitate)     Medical Interventions (Patient has  pulse or is breathing):    Full       Future Appointments   Date Time Provider Department Center   5/5/2023 10:30 AM Magui Oliveira APRN [facility]   6/13/2023  3:00 PM Reginald Barker MD [facility]   10/12/2023  1:20 PM LAB CHAIR 1 CBC LAB [facility]   10/12/2023  1:40 PM Dennis Chanel II, MD [facility]     Additional Instructions for the Follow-ups that You Need to Schedule     Discharge Follow-up with PCP   As directed       Currently Documented PCP:    Gudelia Bazzi APRN    PCP Phone Number:    [phone]     Follow Up Details: 1 week post hospital follow up/labs         Discharge Follow-up with Specified Provider: cardiology NINOSKA Kilgore 1 week, Dr. Barker 1 month   As directed      To: cardiology NINOSKA Kilgore 1 week, Dr. Barker 1 month         Discharge Follow-up with Specified Provider: rheumatology at your already scheduled appt.   As directed      To: rheumatology at your already scheduled appt.            Follow-up Information     Gudelia Bazzi APRN .    Specialty: Family Medicine  Why: 1 week post hospital follow up/labs  Contact information:  [address] [place] [address]  [phone]                         Additional Instructions for the Follow-ups that You Need to Schedule     Discharge Follow-up with PCP   As directed       Currently Documented PCP:    Gudelia Bazzi APRN    PCP Phone Number:    [phone]     Follow Up Details: 1 week post hospital follow up/labs         Discharge Follow-up with Specified Provider: cardiology NINOSKA Kilgore 1 week, Dr. Barker 1 month   As directed      To: cardiology NINOSKA Kilgore 1 weekDr. Barker 1 month         Discharge Follow-up with Specified Provider: rheumatology at your already scheduled appt.   As directed      To: rheumatology at your already scheduled appt.           Time Spent on Discharge:  I spent greater than 30 minutes on this discharge activity which included: face-to-face encounter with  the patient, reviewing the data in the system, coordination of the care with the nursing staff as well as consultants, documentation, and entering orders.       Guera García MD  [facility]  04/28/23  17:11 EDT

## 2023-04-28 NOTE — PLAN OF CARE
Problem: Adult Inpatient Plan of Care  Goal: Plan of Care Review  Outcome: Ongoing, Progressing  Flowsheets (Taken 4/28/2023 0421)  Progress: no change  Plan of Care Reviewed With: patient  Outcome Evaluation:   VSS   SR on monitor   daughter at bedside all night   pt slept well   on RA   electrolytes replaced earlier, labs this AM   will cont to monitor   Goal Outcome Evaluation:  Plan of Care Reviewed With: patient        Progress: no change  Outcome Evaluation: VSS; SR on monitor; daughter at bedside all night; pt slept well; on RA; electrolytes replaced earlier, labs this AM; will cont to monitor

## 2023-04-28 NOTE — TELEPHONE ENCOUNTER
----- Message from Reginald Barker MD sent at 4/28/2023  9:48 AM EDT -----  She needs an appointment to see Magui next week and then see me in a month

## 2023-04-28 NOTE — PROGRESS NOTES
"Nutrition Services    Patient Name:  Carlotta Pires  YOB: 1945  MRN: [id number]  Admit Date:  4/24/2023    FOLLOW UP - CLINICAL NUTRITION    Assessment Date:  04/28/23    Encounter Information         Reason for Encounter RD f/u.     Current Issues Pt reports she still has no appetite, eating a few bites of most meals but is drinking Boost Breeze. Reports still having diarrhea but it is improving. She is hopeful for discharge today. Discussed w/ pt and daughter at bedside about recommended foods for diarrhea, handouts provided. Daughter planning to purchase supplements for pt at home. Encouraged PO intake as tolerated.      Current Nutrition Orders & Evaluation of Intake       Oral Nutrition     Current PO Diet Diet: Cardiac Diets; Healthy Heart (2-3 Na+); Texture: Regular Texture (IDDSI 7); Fluid Consistency: Thin (IDDSI 0)   Supplement Boost Breeze - drinking   PO Evaluation     % PO Intake 75% x 1 meal, pt reports minimal intakes overall    # of Days Evaluated     Factors Affecting Intake  decreased appetite, diarrhea   --  Anthropometrics          Height    Weight Height: 158 cm (62.21\")  Weight: 66.4 kg (146 lb 4.8 oz) (04/28/23 0415)    BMI kg/m2 Body mass index is 26.58 kg/m².  Overweight (25 - 29.9)    Weight trend Stable     Labs        Pertinent Labs Reviewed, listed below     Results from last 7 days   Lab Units 04/28/23  0737 04/27/23  0728 04/26/23  0615 04/24/23  1933 04/24/23  1151   SODIUM mmol/L 136 136 133*   < > 133*   POTASSIUM mmol/L 4.0 4.1 3.7   < > 2.8*   CHLORIDE mmol/L 103 106 102   < > 91*   CO2 mmol/L 23.8 23.6 22.7   < > 27.0   BUN mg/dL 3* 3* 5*   < > 12   CREATININE mg/dL 0.60 0.56* 0.62   < > 0.96   CALCIUM mg/dL 8.7 8.3* 8.0*   < > 9.3   BILIRUBIN mg/dL  --   --   --   --  1.2   ALK PHOS U/L  --   --   --   --  88   ALT (SGPT) U/L  --   --   --   --  14   AST (SGOT) U/L  --   --   --   --  19   GLUCOSE mg/dL 77 84 92   < > 122*    < > = values in this interval not " displayed.     Results from last 7 days   Lab Units 04/28/23  0737 04/27/23  0728 04/26/23  0615 04/25/23  0713 04/24/23  1151   MAGNESIUM mg/dL 1.5* 1.7  --  1.5* 1.6   PHOSPHORUS mg/dL 3.1 1.7*   < >  --   --    HEMOGLOBIN g/dL 10.6* 10.2*   < > 10.5* 13.8   HEMATOCRIT % 31.6* 29.1*   < > 29.8* 40.5   WBC 10*3/mm3 5.88 7.02   < > 13.13* 17.11*   ALBUMIN g/dL  --   --   --   --  4.2    < > = values in this interval not displayed.     Results from last 7 days   Lab Units 04/28/23  0737 04/27/23  0728 04/26/23  0615 04/25/23  0713 04/24/23  1151   PLATELETS 10*3/mm3 248 216 188 177 256     COVID19   Date Value Ref Range Status   04/24/2023 Not Detected Not Detected - Ref. Range Final     No results found for: HGBA1C       Medications            Scheduled Medications amitriptyline, 25 mg, Oral, Nightly  atorvastatin, 10 mg, Oral, Daily  clopidogrel, 75 mg, Oral, Daily  levothyroxine, 75 mcg, Oral, Q AM  losartan, 50 mg, Oral, Daily  magnesium sulfate, 4 g, Intravenous, Once  metoprolol succinate XL, 50 mg, Oral, BID  pantoprazole, 40 mg, Oral, BID AC  potassium chloride, 20 mEq, Oral, BID With Meals  vancomycin, 125 mg, Oral, Q6H  vitamin B-12, 1,000 mcg, Oral, Daily        Infusions      PRN Medications •  acetaminophen  •  diphenhydrAMINE  •  melatonin  •  ondansetron **OR** ondansetron  •  potassium chloride **OR** potassium chloride **OR** potassium chloride  •  [COMPLETED] Insert Peripheral IV **AND** sodium chloride     Physical Findings          Physical Appearance agitated, oriented, overweight, room air   Oral/Mouth Cavity tooth or teeth missing   Edema  no edema   Gastrointestinal diarrhea, last bowel movement:4/27   Skin  skin intact   Tubes/Drains/Lines none   NFPE Not applicable at this time   --  NUTRITION INTERVENTION / PLAN OF CARE  Intervention Goal         Intervention Goal(s) Maintain nutrition status, Reduce/improve symptoms, Disease management/therapy, Tolerate PO , Increase intake and Maintain  weight     Nutrition Intervention         RD Action Supplement provided, Encourage intake and Follow Tx Progress     Nutrition Prescription         Diet Prescription     Supplement Prescription Boost Breeze, TID   EN/PN Prescription    New Prescription Ordered? Continue same per protocol   --  Monitor/Evaluation        Monitor Per protocol, PO intake, Supplement intake, Weight, GI status   Discharge Needs No discharge needs identified at this time   Education Will instruct as appropriate, Other: diarrhea nutrition therapy edu given   --    RD to follow up per protocol.    Electronically signed by:  Shari Medina RD  04/28/23 12:08 EDT

## 2023-04-28 NOTE — PLAN OF CARE
Goal Outcome Evaluation:           Progress: improving  Outcome Evaluation: pt being discharged s/p infusing of Mag. Pt eating and drinking well. Pt daughter at bs with questions and concerns addressed. Future apt discussed and will schedule when home

## 2023-04-28 NOTE — PROGRESS NOTES
"Carlotta Pires  1945 78 y.o.  [id number]      Patient Care Team:  Gudelia Bazzi APRN as PCP - General (Family Medicine)  Code, Dennis DUVALL II, MD as Consulting Physician (Hematology and Oncology)  Reginald Barker MD as Consulting Physician (Cardiology)  Code, Dennis DUVALL II, MD as Consulting Physician (Hematology and Oncology)  Emelia Wilcox MD as Consulting Physician (Gastroenterology)    CC: History of hypertension she is and with C. difficile colitis    Interval History: She is improving      Objective   Vital Signs  Temp:  [97.4 °F (36.3 °C)-98.7 °F (37.1 °C)] 97.7 °F (36.5 °C)  Heart Rate:  [76-93] 76  Resp:  [18] 18  BP: (104-145)/(42-66) 145/66    Intake/Output Summary (Last 24 hours) at 4/28/2023 0945  Last data filed at 4/27/2023 1700  Gross per 24 hour   Intake 240 ml   Output --   Net 240 ml     Flowsheet Rows    Flowsheet Row First Filed Value   Admission Height 157.5 cm (62\") Documented at 04/24/2023 1102   Admission Weight 65.8 kg (145 lb) Documented at 04/24/2023 1102          Physical Exam:   General Appearance:    Alert,oriented, in no acute distress   Lungs:     Clear to auscultation,BS are equal    Heart:    Normal S1 and S2, RRR without murmur, gallop or rub   HEENT:    Sclerae are clear, no JVD or adenopathy   Abdomen:     Normal bowel sounds, soft nontender, nondistended, no HSM   Extremities:   Moves all extremities well, no edema, no cyanosis, no             Redness, no rash     Medication Review:      amitriptyline, 25 mg, Oral, Nightly  atorvastatin, 10 mg, Oral, Daily  clopidogrel, 75 mg, Oral, Daily  levothyroxine, 75 mcg, Oral, Q AM  losartan, 50 mg, Oral, Daily  metoprolol succinate XL, 50 mg, Oral, BID  pantoprazole, 40 mg, Oral, BID AC  potassium chloride, 20 mEq, Oral, BID With Meals  vancomycin, 125 mg, Oral, Q6H  vitamin B-12, 1,000 mcg, Oral, Daily             I reviewed the patient's new clinical results.  I personally viewed and interpreted the patient's EKG/Telemetry " data    Assessment/Plan  Active Hospital Problems    Diagnosis  POA   • **Fever in adult [R50.9]  Yes   • C. difficile colitis [A04.72]  Unknown   • Chronic diastolic CHF (congestive heart failure) [I50.32]  Unknown   • Hypothyroidism (acquired) [E03.9]  Yes   • Essential hypertension [I10]  Yes   • Malignant neoplasm of lower-outer quadrant of left female breast [C50.512]  Yes      Resolved Hospital Problems   No resolved problems to display.       She is improving her blood pressure still is well controlled on less medicine it probably will come up as she improves but for right now we will going to hold off on her hypertension meds and not resume her diuretic.  However when her blood pressure does come up we will start her on hydrochlorothiazide.  We also have her on half the dose of losartan that she was on.  From my standpoint she can go home today we can have her see Magui in a week and see me in a month    Reginald Barker MD  04/28/23  09:45 EDT

## 2023-04-29 LAB — BACTERIA SPEC AEROBE CULT: NORMAL

## 2023-04-29 NOTE — OUTREACH NOTE
Prep Survey    Flowsheet Row Responses   [facility] patient discharged from? [place]   Is LACE score < 7 ? No   Eligibility [facility]   Date of Admission 04/24/23   Date of Discharge 04/28/23   Discharge Disposition Home or Self Care   Discharge diagnosis fever, C. difficile colitis, chronic CHF   Does the patient have one of the following disease processes/diagnoses(primary or secondary)? Other   Does the patient have Home health ordered? No   Is there a DME ordered? No   Prep survey completed? Yes          Trisha STEVEN - Registered Nurse

## 2023-05-01 ENCOUNTER — TRANSITIONAL CARE MANAGEMENT TELEPHONE ENCOUNTER (OUTPATIENT)
Dept: CALL CENTER | Facility: HOSPITAL | Age: 78
End: 2023-05-01
Payer: MEDICARE

## 2023-05-01 NOTE — OUTREACH NOTE
Call Center TCM Note    Flowsheet Row Responses   [facility] patient discharged from? [place]   Does the patient have one of the following disease processes/diagnoses(primary or secondary)? Other   TCM attempt successful? Yes   Call start time 1339   Call end time 1345   Discharge diagnosis fever, C. difficile colitis, chronic CHF   Person spoke with today (if not patient) and relationship spouse/pt   Meds reviewed with patient/caregiver? Yes   Is the patient having any side effects they believe may be caused by any medication additions or changes? No   Does the patient have all medications ordered at discharge? Yes   Is the patient taking all medications as directed (includes completed medication regime)? Yes   Comments Cardiology 5/5/23 at 10:30 AM   Does the patient have an appointment with their PCP within 7 days of discharge? Yes  [5/8/2023 2:15 PM]   Psychosocial issues? No   Did the patient receive a copy of their discharge instructions? Yes   Nursing interventions Reviewed instructions with patient   What is the patient's perception of their health status since discharge? Improving   Is the patient/caregiver able to teach back signs and symptoms related to disease process for when to call PCP? Yes   Is the patient/caregiver able to teach back signs and symptoms related to disease process for when to call 911? Yes   Is the patient/caregiver able to teach back the hierarchy of who to call/visit for symptoms/problems? PCP, Specialist, Home health nurse, Urgent Care, ED, 911 Yes   If the patient is a current smoker, are they able to teach back resources for cessation? Not a smoker   TCM call completed? Yes   Wrap up additional comments Pt states she is doing better, and denies fever, diarrhea, SOB. Reviewed AVS/medications with pt. Pt verified PCP hospital fu appt on 5/8/23, and cardiologist fu appt on 5/5/23.   Call end time 1345   Would this patient benefit from a Referral to [facility] Social Work? No   Is  the patient interested in additional calls from an ambulatory ?  NOTE:  applies to high risk patients requiring additional follow-up. Lacie Hull RN    5/1/2023, 13:49 EDT

## 2023-05-02 NOTE — CASE MANAGEMENT/SOCIAL WORK
Case Management Discharge Note      Final Note: home no needs         Selected Continued Care - Discharged on 4/28/2023 Admission date: 4/24/2023 - Discharge disposition: Home or Self Care    Destination    No services have been selected for the patient.              Durable Medical Equipment    No services have been selected for the patient.              Dialysis/Infusion    No services have been selected for the patient.              Home Medical Care    No services have been selected for the patient.              Therapy    No services have been selected for the patient.              Community Resources    No services have been selected for the patient.              Community & DME    No services have been selected for the patient.                  Transportation Services  Private: Car    Final Discharge Disposition Code: 01 - home or self-care

## 2023-05-05 ENCOUNTER — OFFICE VISIT (OUTPATIENT)
Dept: CARDIOLOGY | Facility: CLINIC | Age: 78
End: 2023-05-05
Payer: MEDICARE

## 2023-05-05 ENCOUNTER — HOSPITAL ENCOUNTER (OUTPATIENT)
Dept: CARDIOLOGY | Facility: HOSPITAL | Age: 78
Discharge: HOME | End: 2023-05-05
Payer: MEDICARE

## 2023-05-05 VITALS
DIASTOLIC BLOOD PRESSURE: 63 MMHG | BODY MASS INDEX: 26.5 KG/M2 | SYSTOLIC BLOOD PRESSURE: 148 MMHG | OXYGEN SATURATION: 97 % | WEIGHT: 144 LBS | HEIGHT: 62 IN | HEART RATE: 62 BPM

## 2023-05-05 DIAGNOSIS — I10 ESSENTIAL HYPERTENSION: Primary | ICD-10-CM

## 2023-05-05 DIAGNOSIS — I10 ESSENTIAL HYPERTENSION: ICD-10-CM

## 2023-05-05 LAB
ANION GAP SERPL CALCULATED.3IONS-SCNC: 9.3 MMOL/L (ref 5–15)
BASOPHILS # BLD AUTO: 0.02 10*3/MM3 (ref 0–0.2)
BASOPHILS NFR BLD AUTO: 0.3 % (ref 0–1.5)
BUN SERPL-MCNC: 10 MG/DL (ref 8–23)
BUN/CREAT SERPL: 13.2 (ref 7–25)
CALCIUM SPEC-SCNC: 10.1 MG/DL (ref 8.6–10.5)
CHLORIDE SERPL-SCNC: 103 MMOL/L (ref 98–107)
CO2 SERPL-SCNC: 25.7 MMOL/L (ref 22–29)
CREAT SERPL-MCNC: 0.76 MG/DL (ref 0.57–1)
DEPRECATED RDW RBC AUTO: 42.5 FL (ref 37–54)
EGFRCR SERPLBLD CKD-EPI 2021: 81.3 ML/MIN/1.73
EOSINOPHIL # BLD AUTO: 0.14 10*3/MM3 (ref 0–0.4)
EOSINOPHIL NFR BLD AUTO: 2.3 % (ref 0.3–6.2)
ERYTHROCYTE [DISTWIDTH] IN BLOOD BY AUTOMATED COUNT: 12.5 % (ref 12.3–15.4)
GLUCOSE SERPL-MCNC: 83 MG/DL (ref 65–99)
HCT VFR BLD AUTO: 35.4 % (ref 34–46.6)
HGB BLD-MCNC: 11.8 G/DL (ref 12–15.9)
IMM GRANULOCYTES # BLD AUTO: 0.02 10*3/MM3 (ref 0–0.05)
IMM GRANULOCYTES NFR BLD AUTO: 0.3 % (ref 0–0.5)
LYMPHOCYTES # BLD AUTO: 1.18 10*3/MM3 (ref 0.7–3.1)
LYMPHOCYTES NFR BLD AUTO: 19.7 % (ref 19.6–45.3)
MAGNESIUM SERPL-MCNC: 1.8 MG/DL (ref 1.6–2.4)
MCH RBC QN AUTO: 31.4 PG (ref 26.6–33)
MCHC RBC AUTO-ENTMCNC: 33.3 G/DL (ref 31.5–35.7)
MCV RBC AUTO: 94.1 FL (ref 79–97)
MONOCYTES # BLD AUTO: 0.3 10*3/MM3 (ref 0.1–0.9)
MONOCYTES NFR BLD AUTO: 5 % (ref 5–12)
NEUTROPHILS NFR BLD AUTO: 4.32 10*3/MM3 (ref 1.7–7)
NEUTROPHILS NFR BLD AUTO: 72.4 % (ref 42.7–76)
NRBC BLD AUTO-RTO: 0 /100 WBC (ref 0–0.2)
PHOSPHATE SERPL-MCNC: 3.8 MG/DL (ref 2.5–4.5)
PLATELET # BLD AUTO: 303 10*3/MM3 (ref 140–450)
PMV BLD AUTO: 8.6 FL (ref 6–12)
POTASSIUM SERPL-SCNC: 4.2 MMOL/L (ref 3.5–5.2)
RBC # BLD AUTO: 3.76 10*6/MM3 (ref 3.77–5.28)
SODIUM SERPL-SCNC: 138 MMOL/L (ref 136–145)
WBC NRBC COR # BLD: 5.98 10*3/MM3 (ref 3.4–10.8)

## 2023-05-05 PROCEDURE — 36415 COLL VENOUS BLD VENIPUNCTURE: CPT

## 2023-05-05 PROCEDURE — 85025 COMPLETE CBC W/AUTO DIFF WBC: CPT | Performed by: NURSE PRACTITIONER

## 2023-05-05 PROCEDURE — 84100 ASSAY OF PHOSPHORUS: CPT | Performed by: NURSE PRACTITIONER

## 2023-05-05 PROCEDURE — 80048 BASIC METABOLIC PNL TOTAL CA: CPT | Performed by: NURSE PRACTITIONER

## 2023-05-05 PROCEDURE — 83735 ASSAY OF MAGNESIUM: CPT | Performed by: NURSE PRACTITIONER

## 2023-05-05 RX ORDER — CHLORTHALIDONE 25 MG/1
25 TABLET ORAL DAILY
COMMUNITY
End: 2023-05-05

## 2023-05-05 RX ORDER — LOSARTAN POTASSIUM 100 MG/1
100 TABLET ORAL DAILY
Qty: 90 TABLET | Refills: 3 | Status: SHIPPED | OUTPATIENT
Start: 2023-05-05

## 2023-05-05 RX ORDER — LOSARTAN POTASSIUM 50 MG/1
50 TABLET ORAL DAILY
Qty: 30 TABLET | Refills: 2 | Status: SHIPPED | OUTPATIENT
Start: 2023-05-05 | End: 2023-05-05 | Stop reason: SDUPTHER

## 2023-05-05 NOTE — PROGRESS NOTES
I tried to call patient and could not reach her. Could you try to reach her and let her know I reviewed her labs. Her kidney function and potassium look good. I would increase her losartan to 100mg and stop her potassium supplement. We can repeat her labs in about 2 weeks. I ordered these and she can go to any [facility] lab

## 2023-05-05 NOTE — PROGRESS NOTES
[facility] Follow Up Office Note     Encounter Date:23  Patient:Carlotta Pires  :1945  MRN:[id number]      Chief Complaint:   Chief Complaint   Patient presents with   • Follow-up         History of Presenting Illness:        Carlotta Pires is a 78 y.o. female who is here for follow-up.  He // She is a patient of Dr Barrientos.      Patient has past medical history significant for Adriamycin induced cardiomyopathy, essential hypertension.    Patient's most recent echocardiogram 2023 shows normal LVEF with grade 1 diastolic dysfunction, no significant valvular abnormalities.    Patient had a recent hospitalization where she presented with fever and diarrhea and was found to have C. difficile colitis.  Dr Barker saw her during her hospitalization and recommended holding her diuretic and decreasing her losartan dose.  She is here today for follow-up.    Patient stopped having loose stools about 4 days ago.  Her fatigue has greatly improved since then and she is starting to get back to normal activities but is not back to her baseline.  She is eating and drinking without problems.  She does not have lower extremity edema or dyspnea.  She also denies chest pain, palpitations, presyncope.    Review of Systems:  Review of Systems   Constitutional: Positive for malaise/fatigue.   Cardiovascular: Negative for chest pain, dyspnea on exertion, leg swelling, orthopnea and palpitations.   Respiratory: Negative for shortness of breath.        Current Outpatient Medications on File Prior to Visit   Medication Sig Dispense Refill   • amitriptyline (ELAVIL) 25 MG tablet Take 1 tablet by mouth Every Night. 90 tablet 2   • atorvastatin (LIPITOR) 10 MG tablet Take 1 tablet by mouth Daily. 90 tablet 3   • chlorthalidone (HYGROTON) 25 MG tablet Take 1 tablet by mouth Daily.     • clopidogrel (PLAVIX) 75 MG tablet Take 1 tablet by mouth Daily. 30 tablet 0   • Dexilant 60 MG capsule TAKE ONE CAPSULE BY MOUTH EVERY  DAY 90 capsule 3   • levothyroxine (Euthyrox) 75 MCG tablet Take 1 tablet by mouth Daily. Take one po qd except on Sunday take 2. 110 tablet 0   • metoprolol succinate XL (TOPROL-XL) 50 MG 24 hr tablet Take 1 tablet by mouth 2 (Two) Times a Day. 180 tablet 3   • potassium chloride (K-DUR,KLOR-CON) 10 MEQ CR tablet Take 2 tablets by mouth Daily.     • vancomycin (VANCOCIN) 125 MG capsule Take 1 capsule by mouth Every 6 (Six) Hours for 32 doses. Indications: Colon Inflammation due to Clostridium Bacteria Overgrowth 32 capsule 0   • vitamin B-12 (CYANOCOBALAMIN) 1000 MCG tablet Take 1 tablet by mouth Daily.     • [DISCONTINUED] losartan (COZAAR) 100 MG tablet Take 0.5 tablets by mouth Daily.     • diphenhydrAMINE (BENADRYL) 25 mg capsule Take 1 capsule by mouth At Night As Needed for Itching. (Patient not taking: Reported on 5/5/2023)     • linaclotide (LINZESS) 145 MCG capsule capsule Take 1 capsule by mouth Every Morning Before Breakfast. (Patient not taking: Reported on 5/5/2023) 30 capsule 11   • Melatonin 10 MG capsule Take 1 capsule by mouth At Night As Needed. (Patient not taking: Reported on 5/5/2023)       No current facility-administered medications on file prior to visit.       Allergies   Allergen Reactions   • Lisinopril Cough   • Contrast Dye (Echo Or Unknown Ct/Mr) Itching and Rash     IVP Dye   • Tartrazine Hives       Past Medical History:   Diagnosis Date   • Allergic rhinitis    • Arthritis    • Cancer     Left breast cancer   • CHF (congestive heart failure)    • Cough    • COVID-19 11/2020   • Degenerative arthritis of thumb    • GERD without esophagitis    • H/O TIA (transient ischemic attack) and stroke 2005   • History of bone density study    • Lumbar radiculopathy    • Osteoporosis    • Palpitations    • Peptic ulceration    • Personal history of malignant neoplasm of breast    • Sciatica    • Stroke 2005    CVA--History of storke   • Trigeminal neuralgia        Past Surgical History:    Procedure Laterality Date   • BREAST BIOPSY Left 2011   • CATARACT EXTRACTION, BILATERAL     • COLONOSCOPY  09/28/2012    Adolph Martinez MD   • COLONOSCOPY N/A 11/13/2018    Procedure: COLONOSCOPY to cecum;  Surgeon: Adolph Martinez MD;  Location:  [facility];  Service: Gastroenterology   • COLONOSCOPY N/A 1/27/2021    Procedure: COLONOSCOPY TO CECUM AND TERMINAL ILEUM;  Surgeon: Emelia Wilcox MD;  Location:  [facility];  Service: Gastroenterology;  Laterality: N/A;  RECTAL BLEEDING  --DIVERTICULOSIS, HEMORRHOIDS, TORTUOUS COLON   • ENDOSCOPY N/A 11/13/2018    Procedure: ESOPHAGOGASTRODUODENOSCOPY with bx;  Surgeon: Adolph Martinez MD;  Location:  [facility];  Service: Gastroenterology   • ENDOSCOPY N/A 1/27/2021    Procedure: ESOPHAGOGASTRODUODENOSCOPY WITH COLD BX;  Surgeon: Emelia Wilcox MD;  [facility];  Service: Gastroenterology;  Laterality: N/A;  GERD  --GASTRITIS, HIATAL HERNIA   • EYE SURGERY     • HYSTERECTOMY      At age 31-Not due to cancer   • MASTECTOMY Left 06/30/2011    Dr. Kaitlyn Luna   • TUBAL ABDOMINAL LIGATION  1974   • UPPER GASTROINTESTINAL ENDOSCOPY  09/28/2012    Adolph Martinez MD       Social History     Socioeconomic History   • Marital status:      Spouse name: Jack   • Number of children: 2   • Years of education: High School   Tobacco Use   • Smoking status: Never   • Smokeless tobacco: Never   • Tobacco comments:     CAFFEINE USE: 4-5 CUPS 1/2 & 1/2 COFFEE DAILY   Vaping Use   • Vaping Use: Never used   Substance and Sexual Activity   • Alcohol use: Not Currently   • Drug use: No   • Sexual activity: Defer       Family History   Problem Relation Age of Onset   • Cancer Mother 68        head and neck   • Stroke Mother    • Heart disease Mother    • Gallbladder disease Mother    • Throat cancer Mother 68   • Breast cancer Sister 57   • Heart disease Sister    • Hypertension Sister    • Cancer Sister    • Gallbladder disease Sister    •  "Diabetes Sister    • Cancer Brother         head and neck   • Heart disease Brother    • Hypertension Brother    • Gallbladder disease Brother    • Lung cancer Brother 67   • Throat cancer Brother 67   • Hypertension Father    • Gallbladder disease Father    • Emphysema Father    • Heart disease Father         Enlarged heart   • Cancer Maternal Aunt    • Cancer Maternal Uncle    • Colon cancer Maternal Uncle    • Heart attack Brother    • Alcohol abuse Brother    • Hypertension Sister    • Heart disease Sister    • Hypertension Sister    • Heart disease Sister    • Hypertension Sister    • No Known Problems Other        The following portions of the patient's history were reviewed and updated as appropriate: allergies, current medications, past family history, past medical history, past social history, past surgical history and problem list.       Objective:       Vitals:    05/05/23 1029   BP: 148/63   BP Location: Right arm   Patient Position: Sitting   Cuff Size: Adult   Pulse: 62   SpO2: 97%   Weight: 65.3 kg (144 lb)   Height: 157.5 cm (62\")         Physical Exam:  Constitutional: Well appearing, well developed, no acute distress   HENT: Oropharynx clear and membrane moist  Eyes: Normal conjunctiva, no sclera icterus  Neck: Supple, no carotid bruit bilaterally  Cardiovascular: Regular rate and rhythm, No Murmur, No bilateral lower extremity edema  Pulmonary: Normal respiratory effort, normal lung sounds, no wheezing  Neurological: Alert and orient x 3  Skin: Warm, dry, no ecchymosis, no rash  Psych: Appropriate mood and affect. Normal judgment and insight         Lab Results   Component Value Date     05/05/2023     04/28/2023    K 4.2 05/05/2023    K 4.0 04/28/2023     05/05/2023     04/28/2023    CO2 25.7 05/05/2023    CO2 23.8 04/28/2023    BUN 10 05/05/2023    BUN 3 (L) 04/28/2023    CREATININE 0.76 05/05/2023    CREATININE 0.60 04/28/2023    EGFRIFNONA 74 08/24/2021    EGFRIFNONA 67 " 12/03/2020    EGFRIFAFRI 90 08/24/2021    EGFRIFAFRI 82 12/03/2020    GLUCOSE 83 05/05/2023    GLUCOSE 77 04/28/2023    CALCIUM 10.1 05/05/2023    CALCIUM 8.7 04/28/2023    PROTENTOTREF 6.9 04/12/2023    PROTENTOTREF 6.6 01/24/2023    ALBUMIN 4.2 04/24/2023    ALBUMIN 4.4 04/12/2023    BILITOT 1.2 04/24/2023    BILITOT 0.5 04/12/2023    AST 19 04/24/2023    AST 25 04/12/2023    ALT 14 04/24/2023    ALT 15 04/12/2023     Lab Results   Component Value Date    WBC 5.98 05/05/2023    WBC 5.88 04/28/2023    HGB 11.8 (L) 05/05/2023    HGB 10.6 (L) 04/28/2023    HCT 35.4 05/05/2023    HCT 31.6 (L) 04/28/2023    MCV 94.1 05/05/2023    MCV 93.2 04/28/2023     05/05/2023     04/28/2023     Lab Results   Component Value Date    TRIG 129 01/24/2023    TRIG 226 (H) 06/23/2022    HDL 37 (L) 01/24/2023    HDL 49 06/23/2022    LDL 78 01/24/2023    LDL 72 06/23/2022     No results found for: PROBNP, BNP  Lab Results   Component Value Date    CKTOTAL 81 04/24/2023    TROPONINT 16 (H) 04/24/2023     Lab Results   Component Value Date    TSH 2.110 04/12/2023    TSH 4.770 (H) 01/24/2023           ECG 12 Lead    Date/Time: 5/5/2023 10:43 AM  Performed by: Magui Oliveira APRN  Authorized by: Magui Oliveira APRN   Comparison: compared with previous ECG from 4/24/2023  Rhythm: sinus rhythm  Ectopy: unifocal PVCs  BPM: 63  Conduction: conduction normal  ST Segments: ST segments normal                 Assessment:          Diagnosis Plan   1. Essential hypertension  ECG 12 Lead    Basic Metabolic Panel    CBC & Differential    Magnesium    Phosphorus             Plan:       Essential hypertension - meds adjusted with C. difficile colitis and dehydration.  BP is mildly elevated today but overall she looks good and does not have any swelling.  Check labs, can consider resuming thiazide diuretic if potassium is improved    History of cardiomyopathy - normalized LVEF on most recent echo.  She remains on metoprolol succinate  and losartan    Patient is seen today for follow-up.  I actually think she looks really good.  Increase losartan. Check labs today and can decide about potassium.  Follow-up with Dr. Barker as scheduled.      Orders Placed This Encounter   Procedures   • Basic Metabolic Panel     Standing Status:   Future     Number of Occurrences:   1     Standing Expiration Date:   5/5/2024     Order Specific Question:   Release to patient     Answer:   Routine Release   • Magnesium     Standing Status:   Future     Number of Occurrences:   1     Standing Expiration Date:   5/5/2024     Order Specific Question:   Release to patient     Answer:   Routine Release   • Phosphorus     Standing Status:   Future     Number of Occurrences:   1     Standing Expiration Date:   5/5/2024     Order Specific Question:   Release to patient     Answer:   Routine Release   • ECG 12 Lead     This order was created via procedure documentation     Order Specific Question:   Release to patient     Answer:   Routine Release   • CBC & Differential     Standing Status:   Future     Number of Occurrences:   1     Standing Expiration Date:   5/5/2024     Order Specific Question:   Manual Differential     Answer:   No     Order Specific Question:   Release to patient     Answer:   Routine Release            URBAN Myers  [facility]  05/05/23  15:07 EDT

## 2023-05-08 ENCOUNTER — OFFICE VISIT (OUTPATIENT)
Dept: FAMILY MEDICINE CLINIC | Facility: CLINIC | Age: 78
End: 2023-05-08
Payer: MEDICARE

## 2023-05-08 ENCOUNTER — TELEPHONE (OUTPATIENT)
Dept: CARDIOLOGY | Facility: CLINIC | Age: 78
End: 2023-05-08
Payer: MEDICARE

## 2023-05-08 VITALS
DIASTOLIC BLOOD PRESSURE: 68 MMHG | BODY MASS INDEX: 26.5 KG/M2 | WEIGHT: 144 LBS | OXYGEN SATURATION: 100 % | SYSTOLIC BLOOD PRESSURE: 140 MMHG | HEART RATE: 59 BPM | TEMPERATURE: 98 F | HEIGHT: 62 IN

## 2023-05-08 DIAGNOSIS — I10 ESSENTIAL HYPERTENSION: ICD-10-CM

## 2023-05-08 DIAGNOSIS — E53.8 VITAMIN B12 DEFICIENCY: ICD-10-CM

## 2023-05-08 DIAGNOSIS — K59.00 CONSTIPATION, UNSPECIFIED CONSTIPATION TYPE: ICD-10-CM

## 2023-05-08 DIAGNOSIS — I50.32 CHRONIC DIASTOLIC CHF (CONGESTIVE HEART FAILURE): ICD-10-CM

## 2023-05-08 DIAGNOSIS — K21.9 GASTROESOPHAGEAL REFLUX DISEASE WITHOUT ESOPHAGITIS: ICD-10-CM

## 2023-05-08 DIAGNOSIS — Z09 HOSPITAL DISCHARGE FOLLOW-UP: Primary | ICD-10-CM

## 2023-05-08 DIAGNOSIS — R50.9 FEVER IN ADULT: ICD-10-CM

## 2023-05-08 DIAGNOSIS — D64.9 ANEMIA, UNSPECIFIED TYPE: ICD-10-CM

## 2023-05-08 DIAGNOSIS — R63.0 DECREASED APPETITE: ICD-10-CM

## 2023-05-08 DIAGNOSIS — A04.72 C. DIFFICILE COLITIS: ICD-10-CM

## 2023-05-08 NOTE — PROGRESS NOTES
Subjective   Carlotta Pires is a 78 y.o. female.     Chief Complaint   Patient presents with   • Hospital Follow Up Visit     C-diff        History of Present Illness  Patient presents for hospital follow up; patient went to [facility] on 4/24/23 with c/o fever and weakness; pt had had recurrent fevers; had recently finished Augmentin and had been having bad diarrhea; electrolytes were abnormal likely due to profuse diarrhea; this was repleted with oral and IV supplementation; diagnosed with C. difficile colitis and was admitted for further evaluation; ID was consulted; stool testing revealed positive C. difficile PCR, but antigen was negative; CT abd/pelvis noted regional colitis in distal descending, sigmoid, and rectosigmoid colon, so treated for active C. difficile infection with 10 day course of Vancomycin; symptoms improved with Vancomycin; pt was also seen by cardiology and recommended to continue to hold diuretic (Chlorthalidone) at discharge, but resume Losartan at half dose 50 mg daily; pt to follow up with cardiology in 1 week; also recommended follow up with rheumatology for history of recurrent fevers; pt has had positive GEOVANNI in past with negative work up.    Finished Vancomycin on 5/5/23; no more diarrhea; eating and drinking without problems; had rash in area of where was wearing briefs; rash resolved when stopped wearing briefs; had been seeing GI for abdominal discomfort, but has improved with Vancomycin; has not been taking Linzess since before went to hospital; has been drinking Ensure some; has decreased appetite and nothing tastes good.    Had follow up with cardiology on 5/5/23; BP slightly increased; checked labs and considering resuming diuretic; called patient today and increased Losartan to 100 mg today; will be having repeat labs in 2 weeks; has follow up with Dr. Barker 6/13/23.    Had appointment with rheumatology on 5/5/23; saw Dr. Birch and told has OA; gave cream to rub on hands when  hurt; to come back for labs after things settle down; has follow up in 2 months.    Thinks cough may had been related to Chlorthalidone; no cough since has been off Chlorthalidone.    F/U Vitamin B12 deficiency: takes Vitamin B12 daily.    Was getting low grade fever at times and feeling aching in lower back with fevers; none since has been home from hospital.       Within 48 business hours after discharge our office contacted her via telephone to coordinate her care and needs.        4/28/2023     9:25 PM   Date of TCM Phone Call   [facility]   Date of Admission 4/24/2023   Date of Discharge 4/28/2023   Discharge Disposition Home or Self Care     Risk for Readmission (LACE) Score: 11 (4/28/2023  6:00 AM)      Patient was admitted to [facility]   ALL records were obtained and reviewed.  Date of admission 4/24/23  Date of discharge 4/28/23  Diagnosis: Fever in adult; C. difficile colitis; Chronic diastolic CHF; Hypothyroidism; HTN; Malignant neoplasm of lower-outer quadrant of left female  Medications upon discharge: Reviewed and reconciled  Condition stable    Current outpatient and discharge medications have been reconciled for the patient.    I reviewed and requested records labs and diagnostics from the hospital with the patient and family.  The patient is to follow-up with specialist as discussed and directed.    If any problems arise or further questions develop patient is to call or to contact us for any needs.     The following portions of the patient's history were reviewed and updated as appropriate: allergies, current medications, past family history, past medical history, past social history, past surgical history and problem list.    Current Outpatient Medications   Medication Sig Dispense Refill   • amitriptyline (ELAVIL) 25 MG tablet Take 1 tablet by mouth Every Night. 90 tablet 2   • atorvastatin (LIPITOR) 10 MG tablet Take 1 tablet by mouth Daily. 90 tablet 3   • clopidogrel (PLAVIX) 75  MG tablet Take 1 tablet by mouth Daily. 30 tablet 0   • Dexilant 60 MG capsule TAKE ONE CAPSULE BY MOUTH EVERY DAY 90 capsule 3   • diphenhydrAMINE (BENADRYL) 25 mg capsule Take 1 capsule by mouth At Night As Needed for Itching.     • levothyroxine (Euthyrox) 75 MCG tablet Take 1 tablet by mouth Daily. Take one po qd except on Sunday take 2. 110 tablet 0   • losartan (COZAAR) 100 MG tablet Take 1 tablet by mouth Daily. 90 tablet 3   • Melatonin 10 MG capsule Take 1 capsule by mouth At Night As Needed.     • metoprolol succinate XL (TOPROL-XL) 50 MG 24 hr tablet Take 1 tablet by mouth 2 (Two) Times a Day. 180 tablet 3   • vitamin B-12 (CYANOCOBALAMIN) 1000 MCG tablet Take 1 tablet by mouth Daily.     • linaclotide (LINZESS) 145 MCG capsule capsule Take 1 capsule by mouth Every Morning Before Breakfast. (Patient not taking: Reported on 5/8/2023) 30 capsule 11     No current facility-administered medications for this visit.       Past Medical History:   Diagnosis Date   • Allergic rhinitis    • Arthritis    • Cancer     Left breast cancer   • CHF (congestive heart failure)    • Cough    • COVID-19 11/2020   • Degenerative arthritis of thumb    • GERD without esophagitis    • H/O TIA (transient ischemic attack) and stroke 2005   • History of bone density study    • Lumbar radiculopathy    • Osteoporosis    • Palpitations    • Peptic ulceration    • Personal history of malignant neoplasm of breast    • Sciatica    • Stroke 2005    CVA--History of storke   • Trigeminal neuralgia        Past Surgical History:   Procedure Laterality Date   • BREAST BIOPSY Left 2011   • CATARACT EXTRACTION, BILATERAL     • COLONOSCOPY  09/28/2012    Adolph Martinez MD   • COLONOSCOPY N/A 11/13/2018    Procedure: COLONOSCOPY to cecum;  Surgeon: Adolph Martinez MD;  Location: [facility] ENDOSCOPY;  Service: Gastroenterology   • COLONOSCOPY N/A 1/27/2021    Procedure: COLONOSCOPY TO CECUM AND TERMINAL ILEUM;  Surgeon: Emelia Wilcox MD;   Location:  [facility] ENDOSCOPY;  Service: Gastroenterology;  Laterality: N/A;  RECTAL BLEEDING  --DIVERTICULOSIS, HEMORRHOIDS, TORTUOUS COLON   • ENDOSCOPY N/A 11/13/2018    Procedure: ESOPHAGOGASTRODUODENOSCOPY with bx;  Surgeon: Adolph Martinez MD;  [facility];  Service: Gastroenterology   • ENDOSCOPY N/A 1/27/2021    Procedure: ESOPHAGOGASTRODUODENOSCOPY WITH COLD BX;  Surgeon: Emelia Wilcox MD;  [facility];  Service: Gastroenterology;  Laterality: N/A;  GERD  --GASTRITIS, HIATAL HERNIA   • EYE SURGERY     • HYSTERECTOMY      At age 31-Not due to cancer   • MASTECTOMY Left 06/30/2011    Dr. Kaitlyn Luna   • TUBAL ABDOMINAL LIGATION  1974   • UPPER GASTROINTESTINAL ENDOSCOPY  09/28/2012    Adolph Martinez MD       Family History   Problem Relation Age of Onset   • Cancer Mother 68        head and neck   • Stroke Mother    • Heart disease Mother    • Gallbladder disease Mother    • Throat cancer Mother 68   • Breast cancer Sister 57   • Heart disease Sister    • Hypertension Sister    • Cancer Sister    • Gallbladder disease Sister    • Diabetes Sister    • Cancer Brother         head and neck   • Heart disease Brother    • Hypertension Brother    • Gallbladder disease Brother    • Lung cancer Brother 67   • Throat cancer Brother 67   • Hypertension Father    • Gallbladder disease Father    • Emphysema Father    • Heart disease Father         Enlarged heart   • Cancer Maternal Aunt    • Cancer Maternal Uncle    • Colon cancer Maternal Uncle    • Heart attack Brother    • Alcohol abuse Brother    • Hypertension Sister    • Heart disease Sister    • Hypertension Sister    • Heart disease Sister    • Hypertension Sister    • No Known Problems Other        Social History     Socioeconomic History   • Marital status:      Spouse name: Jack   • Number of children: 2   • Years of education: High School   Tobacco Use   • Smoking status: Never   • Smokeless tobacco: Never   •  "Tobacco comments:     CAFFEINE USE: 4-5 CUPS 1/2 & 1/2 COFFEE DAILY   Vaping Use   • Vaping Use: Never used   Substance and Sexual Activity   • Alcohol use: Not Currently   • Drug use: No   • Sexual activity: Defer       Review of Systems   Constitutional: Positive for fatigue (improving; see HPI). Negative for chills, fever (none since has been home from hospital), unexpected weight gain and unexpected weight loss. Appetite change: see HPI.   HENT: Negative for ear pain, sinus pressure, sore throat and trouble swallowing.    Eyes: Negative for blurred vision.   Respiratory: Negative for cough, chest tightness and shortness of breath.    Cardiovascular: Negative for chest pain and leg swelling. Palpitations: some, has improved.   Gastrointestinal: Negative for abdominal pain, diarrhea, GERD and indigestion. Blood in stool: some with constipation, none in last week. Constipation: see HPI.   Endocrine: Positive for cold intolerance (no change). Negative for heat intolerance. Polydipsia: mild.   Genitourinary: Negative for decreased urine volume, dysuria and frequency.   Musculoskeletal: Negative for arthralgias and back pain (none since has been home from hospital).   Skin: Negative for rash.   Neurological: Negative for dizziness, syncope, light-headedness and headache.   Hematological: Bruises/bleeds easily: mild with Plavix.       Objective   Vitals:    05/08/23 1426   BP: 140/68   BP Location: Left arm   Patient Position: Sitting   Cuff Size: Adult   Pulse: 59   Temp: 98 °F (36.7 °C)   SpO2: 100%   Weight: 65.3 kg (144 lb)   Height: 157.5 cm (62\")     Body mass index is 26.34 kg/m².     Will be changing Losartan to 50 mg BID starting today.    Physical Exam  Vitals and nursing note reviewed.   Constitutional:       General: She is not in acute distress.     Appearance: She is well-developed and well-groomed. She is not diaphoretic.   HENT:      Head: Normocephalic.      Right Ear: External ear normal. No decreased " hearing noted. Right ear middle ear effusion: few fluid bubbles behind TM. Tympanic membrane is not erythematous.      Left Ear: Tympanic membrane and external ear normal. No decreased hearing noted.      Nose: Nose normal.      Right Sinus: No maxillary sinus tenderness or frontal sinus tenderness.      Left Sinus: No maxillary sinus tenderness or frontal sinus tenderness.      Mouth/Throat:      Mouth: Mucous membranes are moist.      Dentition: Dentures: upper.      Pharynx: No oropharyngeal exudate or posterior oropharyngeal erythema.   Eyes:      Conjunctiva/sclera: Conjunctivae normal.   Neck:      Vascular: No carotid bruit.   Cardiovascular:      Rate and Rhythm: Normal rate and regular rhythm.      Pulses: Normal pulses.      Heart sounds: Normal heart sounds. No murmur heard.  Pulmonary:      Effort: Pulmonary effort is normal. No respiratory distress.      Breath sounds: Normal breath sounds.   Abdominal:      General: Bowel sounds are normal.      Palpations: Abdomen is soft. There is no hepatomegaly or splenomegaly.      Tenderness: There is no abdominal tenderness. There is no guarding.   Musculoskeletal:      Cervical back: Normal range of motion and neck supple. No bony tenderness.      Thoracic back: No bony tenderness.      Lumbar back: No bony tenderness.      Right lower leg: No edema.      Left lower leg: No edema.   Lymphadenopathy:      Cervical: No cervical adenopathy.   Skin:     General: Skin is warm and dry.      Findings: No rash.   Neurological:      Mental Status: She is alert and oriented to person, place, and time.      Gait: Gait is intact.   Psychiatric:         Mood and Affect: Mood normal.         Behavior: Behavior normal.         Thought Content: Thought content normal.         Cognition and Memory: Cognition normal.         Judgment: Judgment normal.         Lab Results   Component Value Date    WBC 5.98 05/05/2023    RBC 3.76 (L) 05/05/2023    HGB 11.8 (L) 05/05/2023    HCT  35.4 05/05/2023    MCV 94.1 05/05/2023    MCH 31.4 05/05/2023    MCHC 33.3 05/05/2023    RDW 12.5 05/05/2023    RDWSD 42.5 05/05/2023    MPV 8.6 05/05/2023     05/05/2023    NEUTRORELPCT 72.4 05/05/2023    LYMPHORELPCT 19.7 05/05/2023    MONORELPCT 5.0 05/05/2023    EOSRELPCT 2.3 05/05/2023    BASORELPCT 0.3 05/05/2023    AUTOIGPER 0.3 05/05/2023    NEUTROABS 4.32 05/05/2023    LYMPHSABS 1.18 05/05/2023    MONOSABS 0.30 05/05/2023    EOSABS 0.14 05/05/2023    BASOSABS 0.02 05/05/2023    AUTOIGNUM 0.02 05/05/2023    NRBC 0.0 05/05/2023     Lab Results   Component Value Date    GLUCOSE 83 05/05/2023    BUN 10 05/05/2023    CREATININE 0.76 05/05/2023    EGFRIFNONA 74 08/24/2021    EGFRIFAFRI 90 08/24/2021    BCR 13.2 05/05/2023    K 4.2 05/05/2023    CO2 25.7 05/05/2023    CALCIUM 10.1 05/05/2023    PROTENTOTREF 6.9 04/12/2023    ALBUMIN 4.2 04/24/2023    LABIL2 1.8 04/12/2023    AST 19 04/24/2023    ALT 14 04/24/2023      Lab Results   Component Value Date    CHLPL 138 01/24/2023    TRIG 129 01/24/2023    HDL 37 (L) 01/24/2023    VLDL 23 01/24/2023    LDL 78 01/24/2023     Lab Results   Component Value Date    TSH 2.110 04/12/2023     Records reviewed from [facility] from 4/24/23--4/28/23.    4/25/23 CT chest/abdomen/pelvis:     Impression:       1. There are findings consistent with regional colitis involving the  distal descending, sigmoid and rectosigmoid portions of the colon.  2. There is L2-3 and L5-S1 moderate degenerative disc disease and  abutting endplate osteophytic change.  3. There is stable subpleural scarring within the left upper lobe,  possibly associated with prior radiation therapy. No evidence for an  active or acute abnormality of the chest is appreciated.  4. There has been interval resolution of some hepatic cysts since the  prior examination. Other hepatic cyst remain and are minimally changed  as described. A slightly heterogenous appearance of the liver is noted  and the  appearance is nonspecific but may be seen in the setting of  hepatitis or may be within normal limits. Correlation with liver  specific laboratory values is recommended.     4/24/23 CT head:    Impression:       1. No acute intracranial abnormality is identified.  2. There is mild bilateral ethmoid sinus mucosal thickening and a small  amount of fluid and mucosal thickening in the posterior right maxillary  sinus. The remainder of the head CT is within normal limits.  Specifically, no acute skull fracture or intracranial hemorrhage is  identified.     4/24/23 x-ray right shoulder: Mild hypertrophic degenerative change is seen at the acromioclavicular joint. Small degenerative spurring of the humeral head. No acute fracture, erosion, or dislocation is identified. Follow-up/further evaluation can be obtained as indications persist.    4/24/23 chest x-ray: Heart, mediastinum and pulmonary vasculature are unremarkable.  No focal pulmonary consolidation, pleural effusion, or pneumothorax. No acute osseous process.      Assessment & Plan .  Problem List Items Addressed This Visit     Essential hypertension    Current Assessment & Plan     Hypertension is a little increased since has been off some medications for BP.  Medication changes per orders.  Ambulatory blood pressure monitoring.  Blood pressure will be reassessed 2 months.  Continue Metoprolol twice daily.  Increase Losartan to 100 mg daily as instructed by cardiology.         Relevant Orders    Comprehensive Metabolic Panel    Gastroesophageal reflux disease without esophagitis    Overview     Added automatically from request for surgery [id number]    Formatting of this note might be different from the original.  Added automatically from request for surgery [id number]         Current Assessment & Plan     Continue Dexilant daily.         Decreased appetite    Overview     Added automatically from request for surgery [id number]         Current Assessment & Plan     Continue  Ensure.         Constipation    Current Assessment & Plan     Stable off Linzess.         Vitamin B12 deficiency    Current Assessment & Plan     Continue Vitamin B12 daily.         Fever in adult    Current Assessment & Plan     Follow up as scheduled with rheumatology.         C. difficile colitis    Current Assessment & Plan     Finished Vancomycin.  Diarrhea resolved at this point.         Chronic diastolic CHF (congestive heart failure)    Current Assessment & Plan     Follow up as scheduled with cardiology.         Relevant Orders    Comprehensive Metabolic Panel    Anemia    Relevant Orders    Iron    Ferritin    CBC & Differential   Other Visit Diagnoses     Hospital discharge follow-up    -  Primary    Relevant Orders    Comprehensive Metabolic Panel    Iron    Ferritin    CBC & Differential        Come back for labs on 5/22/23.  Return in about 2 months (around 7/8/2023) for Medicare Wellness, Recheck.or sooner if problems or concerns.

## 2023-05-08 NOTE — PATIENT INSTRUCTIONS
Come back for labs on 5/22/23.  Monitor your blood pressure twice daily and record results.  Increase Losartan to 50 mg twice daily as recommended by cardiology today.  Continue to work on healthy diet and exercise.  Follow up pending lab results.  Follow up in 2 months for Medicare Wellness, or sooner if problems or concerns.   Follow up as scheduled with rheumatology and cardiology.

## 2023-05-08 NOTE — TELEPHONE ENCOUNTER
----- Message from URBAN Maxwell sent at 5/5/2023  4:18 PM EDT -----  I tried to call patient and could not reach her. Could you try to reach her and let her know I reviewed her labs. Her kidney function and potassium look good. I would increase her losartan to 100mg and stop her potassium supplement. We can repeat her labs in about 2 weeks. I ordered these and she can go to any [facility] lab

## 2023-05-08 NOTE — PROGRESS NOTES
Please see telephone note.    Thank you,  Dory LAU RN  Triage Nurse [facility]

## 2023-05-08 NOTE — TELEPHONE ENCOUNTER
Reviewed results and recommendations with Carlotta Pires.  Patient verbalized understanding of results and recommendations, with repeat back of medication changes.    Patient stated she will come in for lab draw the week of May 22.    Thank you,  Dory LAU RN  Triage Nurse [facility]

## 2023-05-09 PROBLEM — D64.9 ANEMIA: Status: ACTIVE | Noted: 2023-05-09

## 2023-05-10 NOTE — ASSESSMENT & PLAN NOTE
Hypertension is a little increased since has been off some medications for BP.  Medication changes per orders.  Ambulatory blood pressure monitoring.  Blood pressure will be reassessed 2 months.  Continue Metoprolol twice daily.  Increase Losartan to 100 mg daily as instructed by cardiology.

## 2023-05-16 DIAGNOSIS — E03.9 HYPOTHYROIDISM (ACQUIRED): ICD-10-CM

## 2023-05-16 RX ORDER — LEVOTHYROXINE SODIUM 0.07 MG/1
75 TABLET ORAL DAILY
Qty: 110 TABLET | Refills: 0 | Status: SHIPPED | OUTPATIENT
Start: 2023-05-16

## 2023-05-16 RX ORDER — LEVOTHYROXINE SODIUM 0.07 MG/1
75 TABLET ORAL DAILY
Qty: 34 TABLET | Refills: 0 | Status: SHIPPED | OUTPATIENT
Start: 2023-05-16 | End: 2023-05-16 | Stop reason: SDUPTHER

## 2023-05-16 NOTE — TELEPHONE ENCOUNTER
PATIENT HAS ONE DAY OF MEDICATION LEFT.    FOR THIS FILL LOCALLY SHE ONLY WANTS A 30 DAY SUPPLY CALLED TO THE [facility] PLEASE.           IN THE FUTURE SHE WOULD LIKE  90 DAY FILLS CALLED TO [facility]   [facility]  [place] - [address] [place] [address] - [phone]  - [phone]  P: [phone]  F: [phone]

## 2023-05-16 NOTE — TELEPHONE ENCOUNTER
Caller: Carlotta Pires    Relationship: Self    Best call back number: [phone]    Requested Prescriptions:   Requested Prescriptions     Pending Prescriptions Disp Refills   • levothyroxine (Euthyrox) 75 MCG tablet 110 tablet 0     Sig: Take 1 tablet by mouth Daily. Take one po qd except on Sunday take 2.        Pharmacy where request should be sent: [facility] [address] [place] [address] [phone] [facility] [phone] FX     Last office visit with prescribing clinician: 5/8/2023   Last telemedicine visit with prescribing clinician: 5/8/2023   Next office visit with prescribing clinician: 7/10/2023     Additional details provided by patient: PATIENT HAS ONE TABLET LEFT    Does the patient have less than a 3 day supply:  [x] Yes  [] No    Would you like a call back once the refill request has been completed: [x] Yes [] No    If the office needs to give you a call back, can they leave a voicemail: [x] Yes [] No    Dakota Jolly Rep   05/16/23 12:53 EDT

## 2023-05-18 ENCOUNTER — HOSPITAL ENCOUNTER (EMERGENCY)
Facility: HOSPITAL | Age: 78
Discharge: LEFT BEFORE DISCHARGE | End: 2023-05-18
Payer: MEDICARE

## 2023-05-18 ENCOUNTER — TELEPHONE (OUTPATIENT)
Dept: FAMILY MEDICINE CLINIC | Facility: CLINIC | Age: 78
End: 2023-05-18

## 2023-05-18 VITALS
TEMPERATURE: 98.7 F | DIASTOLIC BLOOD PRESSURE: 59 MMHG | RESPIRATION RATE: 18 BRPM | BODY MASS INDEX: 26.5 KG/M2 | SYSTOLIC BLOOD PRESSURE: 99 MMHG | HEIGHT: 62 IN | OXYGEN SATURATION: 98 % | HEART RATE: 75 BPM | WEIGHT: 144 LBS

## 2023-05-18 DIAGNOSIS — R50.9 FEVER IN ADULT: ICD-10-CM

## 2023-05-18 DIAGNOSIS — Z86.19 HISTORY OF CLOSTRIDIUM DIFFICILE INFECTION: ICD-10-CM

## 2023-05-18 DIAGNOSIS — R19.7 DIARRHEA, UNSPECIFIED TYPE: Primary | ICD-10-CM

## 2023-05-18 PROCEDURE — 99211 OFF/OP EST MAY X REQ PHY/QHP: CPT

## 2023-05-18 NOTE — TELEPHONE ENCOUNTER
Spoke with patient's daughter over the phone; patient started with diarrhea again a couple of days ago and then woke up with fever 101 this morning; pt recently treated for C.diff and ID told patient to call if diarrhea returns after finishes antibiotic as may need another round of treatment; pt is starting to get weak again similar to how was when was hospitalized; instructed to call ID regarding symptoms; instructed to go to the ER if symptoms worsen.

## 2023-05-18 NOTE — ED NOTES
Patient to ER via car from home for diarrhea, and fever patient was seen here recently and dx with cdiff

## 2023-05-18 NOTE — TELEPHONE ENCOUNTER
Caller:     Relationship:     Best call back number: [phone]  What is the best time to reach you: ANYTIME  Who are you requesting to speak with (clinical staff, provider,  specific staff member):CLINICAL STAFF   Do you know the name of the person who called DAUGHTER  What was the call regarding: PATIENTS DAUGHTER CALLED AND STATED HER MOTHER HAS C-DIFF PLEASE CALL IN THE MEDICATION ASAP!     Do you require a callback: YES ASAP!

## 2023-05-19 ENCOUNTER — TELEPHONE (OUTPATIENT)
Dept: INFECTIOUS DISEASES | Facility: CLINIC | Age: 78
End: 2023-05-19
Payer: MEDICARE

## 2023-05-19 ENCOUNTER — HOSPITAL ENCOUNTER (EMERGENCY)
Facility: HOSPITAL | Age: 78
Discharge: HOME | End: 2023-05-19
Attending: EMERGENCY MEDICINE
Payer: MEDICARE

## 2023-05-19 ENCOUNTER — APPOINTMENT (OUTPATIENT)
Dept: CT IMAGING | Facility: HOSPITAL | Age: 78
End: 2023-05-19
Payer: MEDICARE

## 2023-05-19 VITALS
WEIGHT: 144 LBS | TEMPERATURE: 97.3 F | HEIGHT: 62 IN | OXYGEN SATURATION: 98 % | RESPIRATION RATE: 16 BRPM | HEART RATE: 81 BPM | DIASTOLIC BLOOD PRESSURE: 68 MMHG | BODY MASS INDEX: 26.5 KG/M2 | SYSTOLIC BLOOD PRESSURE: 118 MMHG

## 2023-05-19 DIAGNOSIS — R19.7 DIARRHEA, UNSPECIFIED TYPE: Primary | ICD-10-CM

## 2023-05-19 DIAGNOSIS — E87.6 HYPOKALEMIA: ICD-10-CM

## 2023-05-19 LAB
ALBUMIN SERPL-MCNC: 4.3 G/DL (ref 3.5–5.2)
ALBUMIN/GLOB SERPL: 1.6 G/DL
ALP SERPL-CCNC: 84 U/L (ref 39–117)
ALT SERPL W P-5'-P-CCNC: 9 U/L (ref 1–33)
ANION GAP SERPL CALCULATED.3IONS-SCNC: 11.4 MMOL/L (ref 5–15)
AST SERPL-CCNC: 19 U/L (ref 1–32)
BASOPHILS # BLD AUTO: 0.02 10*3/MM3 (ref 0–0.2)
BASOPHILS NFR BLD AUTO: 0.3 % (ref 0–1.5)
BILIRUB SERPL-MCNC: 0.8 MG/DL (ref 0–1.2)
BUN SERPL-MCNC: 6 MG/DL (ref 8–23)
BUN/CREAT SERPL: 9 (ref 7–25)
CALCIUM SPEC-SCNC: 10.2 MG/DL (ref 8.6–10.5)
CHLORIDE SERPL-SCNC: 101 MMOL/L (ref 98–107)
CO2 SERPL-SCNC: 27.6 MMOL/L (ref 22–29)
CREAT SERPL-MCNC: 0.67 MG/DL (ref 0.57–1)
DEPRECATED RDW RBC AUTO: 43 FL (ref 37–54)
EGFRCR SERPLBLD CKD-EPI 2021: 90.7 ML/MIN/1.73
EOSINOPHIL # BLD AUTO: 0.13 10*3/MM3 (ref 0–0.4)
EOSINOPHIL NFR BLD AUTO: 1.9 % (ref 0.3–6.2)
ERYTHROCYTE [DISTWIDTH] IN BLOOD BY AUTOMATED COUNT: 12.8 % (ref 12.3–15.4)
GLOBULIN UR ELPH-MCNC: 2.7 GM/DL
GLUCOSE SERPL-MCNC: 93 MG/DL (ref 65–99)
HCT VFR BLD AUTO: 37 % (ref 34–46.6)
HGB BLD-MCNC: 12.7 G/DL (ref 12–15.9)
IMM GRANULOCYTES # BLD AUTO: 0.01 10*3/MM3 (ref 0–0.05)
IMM GRANULOCYTES NFR BLD AUTO: 0.1 % (ref 0–0.5)
LYMPHOCYTES # BLD AUTO: 0.85 10*3/MM3 (ref 0.7–3.1)
LYMPHOCYTES NFR BLD AUTO: 12.3 % (ref 19.6–45.3)
MCH RBC QN AUTO: 31.9 PG (ref 26.6–33)
MCHC RBC AUTO-ENTMCNC: 34.3 G/DL (ref 31.5–35.7)
MCV RBC AUTO: 93 FL (ref 79–97)
MONOCYTES # BLD AUTO: 0.53 10*3/MM3 (ref 0.1–0.9)
MONOCYTES NFR BLD AUTO: 7.6 % (ref 5–12)
NEUTROPHILS NFR BLD AUTO: 5.39 10*3/MM3 (ref 1.7–7)
NEUTROPHILS NFR BLD AUTO: 77.8 % (ref 42.7–76)
NRBC BLD AUTO-RTO: 0 /100 WBC (ref 0–0.2)
PLATELET # BLD AUTO: 206 10*3/MM3 (ref 140–450)
PMV BLD AUTO: 9.6 FL (ref 6–12)
POTASSIUM SERPL-SCNC: 3.3 MMOL/L (ref 3.5–5.2)
PROT SERPL-MCNC: 7 G/DL (ref 6–8.5)
RBC # BLD AUTO: 3.98 10*6/MM3 (ref 3.77–5.28)
SODIUM SERPL-SCNC: 140 MMOL/L (ref 136–145)
WBC NRBC COR # BLD: 6.93 10*3/MM3 (ref 3.4–10.8)

## 2023-05-19 PROCEDURE — 74176 CT ABD & PELVIS W/O CONTRAST: CPT

## 2023-05-19 PROCEDURE — 99283 EMERGENCY DEPT VISIT LOW MDM: CPT

## 2023-05-19 PROCEDURE — 80053 COMPREHEN METABOLIC PANEL: CPT | Performed by: EMERGENCY MEDICINE

## 2023-05-19 PROCEDURE — 99282 EMERGENCY DEPT VISIT SF MDM: CPT

## 2023-05-19 PROCEDURE — 85025 COMPLETE CBC W/AUTO DIFF WBC: CPT | Performed by: EMERGENCY MEDICINE

## 2023-05-19 RX ORDER — SODIUM CHLORIDE 0.9 % (FLUSH) 0.9 %
10 SYRINGE (ML) INJECTION AS NEEDED
Status: DISCONTINUED | OUTPATIENT
Start: 2023-05-19 | End: 2023-05-19 | Stop reason: HOSPADM

## 2023-05-19 RX ORDER — ONDANSETRON 2 MG/ML
4 INJECTION INTRAMUSCULAR; INTRAVENOUS ONCE
Status: DISCONTINUED | OUTPATIENT
Start: 2023-05-19 | End: 2023-05-19 | Stop reason: HOSPADM

## 2023-05-19 RX ORDER — POTASSIUM CHLORIDE 750 MG/1
10 TABLET, FILM COATED, EXTENDED RELEASE ORAL 2 TIMES DAILY
Qty: 8 TABLET | Refills: 0 | Status: SHIPPED | OUTPATIENT
Start: 2023-05-19

## 2023-05-19 RX ADMIN — SODIUM CHLORIDE, POTASSIUM CHLORIDE, SODIUM LACTATE AND CALCIUM CHLORIDE 1000 ML: 600; 310; 30; 20 INJECTION, SOLUTION INTRAVENOUS at 12:49

## 2023-05-19 NOTE — TELEPHONE ENCOUNTER
Received call from patient's daughter Susie ([phone]) stating patient has had diarrhea for couple of days (liquid yellow x5-6 times yesterday), fever 101 yesterday and feeling weak. She said that she called the PCP and she suggested to take patient to ER if she got worse. Daughter said she took her to ER yesterday and decided to leave the ER before patient had been seen since patient had been waiting there over 5 hours. Daughter stated patient has gotten progressively weaker, taking very little in by mouth, taking tylenol as needed. Stated that patient has completed oral Vancomycin. Daughter is asking if patient can be seen in office today or asking recommendations. She added that patient was recently treated for C.Diff in hospital. I told her I would give Dr. Ling the message and call her back when he replies.   After receiving reply from Dr. Ling, I called daughter Susie back to inform her that Dr. Ling would not be able to see patient in the office this afternoon and he recommended patient could possibly try the [facility] if prefers to not go back to [facility]. Daughter stated understanding and said she would take her the the Urgent Care.

## 2023-05-19 NOTE — DISCHARGE INSTRUCTIONS
Take Imodium as needed for diarrhea.  Take potassium as prescribed.  Drink plenty of fluids.  Return to the emergency department for worsening symptoms, abdominal pain, fever, vomiting, blood in stool, or other concern.  Follow-up with your primary care provider and with Dr. Low's office (gastroenterology).

## 2023-05-19 NOTE — ED PROVIDER NOTES
EMERGENCY DEPARTMENT ENCOUNTER    Room Number:  33/33  Date seen:  5/19/2023  PCP: Gudelia Bazzi APRN  Historian: Patient, daughter      HPI:  Chief Complaint: Diarrhea  A complete HPI/ROS/PMH/PSH/SH/FH are unobtainable due to: Nothing  Context: Carlotta Pires is a 78 y.o. female who presents to the ED by private vehicle from home c/o diarrhea for the past 3 days.  Patient reports multiple episodes of watery foul-smelling diarrhea.  She also complains of intermittent lower abdominal cramping.  She was incontinent of stool while sleeping last night.  She reports associated nausea, decreased p.o. intake, and decreased urination.  She had a temperature of 101 yesterday.  She was admitted here at the end of last month for C. difficile colitis.  Her symptoms resolved after a 10-day course of vancomycin.  Denies chest pain, shortness of breath, vomiting, or dysuria.            PAST MEDICAL HISTORY  Active Ambulatory Problems     Diagnosis Date Noted   • Malignant neoplasm of lower-outer quadrant of left female breast 05/09/2016   • Osteopenia 05/10/2016   • Encounter for screening for malignant neoplasm of colon 11/28/2017   • Epigastric pain 10/18/2018   • Essential hypertension 04/24/2019   • History of cardiomyopathy 04/24/2019   • Hyperlipidemia, mixed 11/22/2019   • Acute seasonal allergic rhinitis due to pollen 11/22/2019   • Hypothyroidism (acquired) 11/22/2019   • Primary insomnia 11/22/2019   • CHF (congestive heart failure) 11/22/2019   • Encounter for Medicare annual wellness exam 11/22/2019   • Arthritis 11/25/2019   • Trigeminal neuralgia    • Osteoporosis    • Lumbar radiculopathy    • Degenerative arthritis of thumb    • Rectal bleeding 01/05/2021   • Gastroesophageal reflux disease without esophagitis 11/22/2019   • Weight loss, abnormal 01/05/2021   • Decreased appetite 01/05/2021   • History of breast cancer 01/05/2021   • FH: colon cancer 01/05/2021   • Dilated cardiomyopathy 04/24/2019   • Fatigue  01/25/2023   • Acute non-recurrent sinusitis 01/25/2023   • Constipation 01/25/2023   • Anxiety 01/25/2023   • Positive depression screening 01/25/2023   • Vitamin B12 deficiency 04/15/2023   • Cough 04/15/2023   • Fever in adult 04/24/2023   • C. difficile colitis 04/26/2023   • Chronic diastolic CHF (congestive heart failure) 04/26/2023   • Anemia 05/09/2023     Resolved Ambulatory Problems     Diagnosis Date Noted   • GERD without esophagitis 11/22/2019     Past Medical History:   Diagnosis Date   • Allergic rhinitis    • Cancer    • COVID-19 11/2020   • H/O TIA (transient ischemic attack) and stroke 2005   • History of bone density study    • Palpitations    • Peptic ulceration    • Personal history of malignant neoplasm of breast    • Sciatica    • Stroke 2005         PAST SURGICAL HISTORY  Past Surgical History:   Procedure Laterality Date   • BREAST BIOPSY Left 2011   • CATARACT EXTRACTION, BILATERAL     • COLONOSCOPY  09/28/2012    Adolph Martinez MD   • COLONOSCOPY N/A 11/13/2018    Procedure: COLONOSCOPY to cecum;  Surgeon: Adolph Martinez MD;  Location: [facility] ENDOSCOPY;  Service: Gastroenterology   • COLONOSCOPY N/A 1/27/2021    Procedure: COLONOSCOPY TO CECUM AND TERMINAL ILEUM;  Surgeon: Emelia Wilcox MD;  Location: [facility] ENDOSCOPY;  Service: Gastroenterology;  Laterality: N/A;  RECTAL BLEEDING  --DIVERTICULOSIS, HEMORRHOIDS, TORTUOUS COLON   • ENDOSCOPY N/A 11/13/2018    Procedure: ESOPHAGOGASTRODUODENOSCOPY with bx;  Surgeon: Adolph Martinez MD;  Location: [facility] ENDOSCOPY;  Service: Gastroenterology   • ENDOSCOPY N/A 1/27/2021    Procedure: ESOPHAGOGASTRODUODENOSCOPY WITH COLD BX;  Surgeon: Emelia Wilcox MD;  Location: [facility] ENDOSCOPY;  Service: Gastroenterology;  Laterality: N/A;  GERD  --GASTRITIS, HIATAL HERNIA   • EYE SURGERY     • HYSTERECTOMY      At age 31-Not due to cancer   • MASTECTOMY Left 06/30/2011    Dr. Kaitlyn Luna   • TUBAL ABDOMINAL LIGATION  1974   • UPPER  GASTROINTESTINAL ENDOSCOPY  09/28/2012    Adolph Martinez MD         FAMILY HISTORY  Family History   Problem Relation Age of Onset   • Cancer Mother 68        head and neck   • Stroke Mother    • Heart disease Mother    • Gallbladder disease Mother    • Throat cancer Mother 68   • Breast cancer Sister 57   • Heart disease Sister    • Hypertension Sister    • Cancer Sister    • Gallbladder disease Sister    • Diabetes Sister    • Cancer Brother         head and neck   • Heart disease Brother    • Hypertension Brother    • Gallbladder disease Brother    • Lung cancer Brother 67   • Throat cancer Brother 67   • Hypertension Father    • Gallbladder disease Father    • Emphysema Father    • Heart disease Father         Enlarged heart   • Cancer Maternal Aunt    • Cancer Maternal Uncle    • Colon cancer Maternal Uncle    • Heart attack Brother    • Alcohol abuse Brother    • Hypertension Sister    • Heart disease Sister    • Hypertension Sister    • Heart disease Sister    • Hypertension Sister    • No Known Problems Other          SOCIAL HISTORY  Social History     Socioeconomic History   • Marital status:      Spouse name: Jack   • Number of children: 2   • Years of education: High School   Tobacco Use   • Smoking status: Never   • Smokeless tobacco: Never   • Tobacco comments:     CAFFEINE USE: 4-5 CUPS 1/2 & 1/2 COFFEE DAILY   Vaping Use   • Vaping Use: Never used   Substance and Sexual Activity   • Alcohol use: Not Currently   • Drug use: No   • Sexual activity: Defer         ALLERGIES  Lisinopril, Contrast dye (echo or unknown ct/mr), and Tartrazine        REVIEW OF SYSTEMS  Review of Systems     All systems have been reviewed and are negative except as as discussed in the HPI    PHYSICAL EXAM  ED Triage Vitals [05/19/23 1200]   Temp Heart Rate Resp BP SpO2   97.3 °F (36.3 °C) (!) 127 16 -- 98 %      Temp src Heart Rate Source Patient Position BP Location FiO2 (%)   -- -- -- -- --       Physical  Exam      GENERAL: Awake, alert, oriented x3.  Well-developed, well-nourished and nontoxic-appearing elderly female.  Resting comfortably in no acute distress  HENT: NCAT, nares patent, dry mucous membranes  EYES: no scleral icterus  CV: regular rhythm, tachycardic  RESPIRATORY: normal effort, clear to auscultation bilaterally  ABDOMEN: soft, nontender  MUSCULOSKELETAL: Extremities are nontender with full range of motion  NEURO: Speech is normal.  No facial droop.  Follows commands  PSYCH:  calm, cooperative  SKIN: warm, dry    Vital signs and nursing notes reviewed.          LAB RESULTS  Recent Results (from the past 24 hour(s))   Comprehensive Metabolic Panel    Collection Time: 05/19/23 12:45 PM    Specimen: Blood   Result Value Ref Range    Glucose 93 65 - 99 mg/dL    BUN 6 (L) 8 - 23 mg/dL    Creatinine 0.67 0.57 - 1.00 mg/dL    Sodium 140 136 - 145 mmol/L    Potassium 3.3 (L) 3.5 - 5.2 mmol/L    Chloride 101 98 - 107 mmol/L    CO2 27.6 22.0 - 29.0 mmol/L    Calcium 10.2 8.6 - 10.5 mg/dL    Total Protein 7.0 6.0 - 8.5 g/dL    Albumin 4.3 3.5 - 5.2 g/dL    ALT (SGPT) 9 1 - 33 U/L    AST (SGOT) 19 1 - 32 U/L    Alkaline Phosphatase 84 39 - 117 U/L    Total Bilirubin 0.8 0.0 - 1.2 mg/dL    Globulin 2.7 gm/dL    A/G Ratio 1.6 g/dL    BUN/Creatinine Ratio 9.0 7.0 - 25.0    Anion Gap 11.4 5.0 - 15.0 mmol/L    eGFR 90.7 >60.0 mL/min/1.73   CBC Auto Differential    Collection Time: 05/19/23 12:45 PM    Specimen: Blood   Result Value Ref Range    WBC 6.93 3.40 - 10.80 10*3/mm3    RBC 3.98 3.77 - 5.28 10*6/mm3    Hemoglobin 12.7 12.0 - 15.9 g/dL    Hematocrit 37.0 34.0 - 46.6 %    MCV 93.0 79.0 - 97.0 fL    MCH 31.9 26.6 - 33.0 pg    MCHC 34.3 31.5 - 35.7 g/dL    RDW 12.8 12.3 - 15.4 %    RDW-SD 43.0 37.0 - 54.0 fl    MPV 9.6 6.0 - 12.0 fL    Platelets 206 140 - 450 10*3/mm3    Neutrophil % 77.8 (H) 42.7 - 76.0 %    Lymphocyte % 12.3 (L) 19.6 - 45.3 %    Monocyte % 7.6 5.0 - 12.0 %    Eosinophil % 1.9 0.3 - 6.2 %     Basophil % 0.3 0.0 - 1.5 %    Immature Grans % 0.1 0.0 - 0.5 %    Neutrophils, Absolute 5.39 1.70 - 7.00 10*3/mm3    Lymphocytes, Absolute 0.85 0.70 - 3.10 10*3/mm3    Monocytes, Absolute 0.53 0.10 - 0.90 10*3/mm3    Eosinophils, Absolute 0.13 0.00 - 0.40 10*3/mm3    Basophils, Absolute 0.02 0.00 - 0.20 10*3/mm3    Immature Grans, Absolute 0.01 0.00 - 0.05 10*3/mm3    nRBC 0.0 0.0 - 0.2 /100 WBC       Ordered the above labs and reviewed the results.        RADIOLOGY  CT Abdomen Pelvis Without Contrast    Result Date: 5/19/2023  EXAMINATION: CT OF THE ABDOMEN AND PELVIS WITHOUT CONTRAST  TECHNIQUE: Computed tomography of the abdomen and pelvis without contrast per protocol. Radiation dose reduction techniques were utilized, including automated exposure control and exposure modulation based on body size.  HISTORY: Diarrhea, cramping, and C. Difficile colitis  COMPARISON: 04/25/2023  FINDINGS: Limited evaluation of the inferior thorax demonstrates atelectasis, without consolidation, pleural effusion, or pneumothorax. The left breast is absent. The heart is normal in size, without pericardial effusion. There is a small sliding-type hiatal hernia.  Old granulomatous disease is seen in the liver and spleen. There are postsurgical changes in the lateral spleen. Low-attenuation hepatic lesions are technically indeterminate, likely cysts. The uterus is absent. The degree of colonic wall thickening with surrounding inflammatory changes as decreased, with minimal residual stranding and inflammatory change located primarily in the rectum and transverse colon.  The gallbladder, adrenal glands, kidneys, pancreas, small bowel, appendix, urinary bladder, and abdominal vasculature are normal as evaluated on this noncontrast examination. No intraperitoneal fluid collection or free gas are seen. No enlarged lymph nodes are demonstrated.  Bone windows demonstrate degenerative changes, without suspicious osseous lesion seen.       Minimal residual colitis. Additional stable incidental findings as above.  This report was finalized on 5/19/2023 3:14 PM by Dr. Sridhar Berkowitz M.D.        Ordered the above noted radiological studies. Reviewed by me in PACS.            PROCEDURES  Procedures              MEDICATIONS GIVEN IN ER  Medications   sodium chloride 0.9 % flush 10 mL (has no administration in time range)   ondansetron (ZOFRAN) injection 4 mg (0 mg Intravenous Hold 5/19/23 1311)   lactated ringers bolus 1,000 mL (1,000 mL Intravenous New Bag 5/19/23 1249)                   MEDICAL DECISION MAKING, PROGRESS, and CONSULTS    All labs have been independently reviewed by me.  All radiology studies have been reviewed by me and I have also reviewed the radiology report.   EKG's independently viewed and interpreted by me.  Discussion below represents my analysis of pertinent findings related to patient's condition, differential diagnosis, treatment plan and final disposition.      Additional sources:  - Discussed/ obtained information from independent historians: Daughter at bedside    - External (non-ED) record review: Patient was admitted here 4/24 through 4/28/2023 for fever and diarrhea.  She was found to have C. difficile colitis.  She was treated with a 10-day course of vancomycin.  Patient followed up with her PCP on 5/8/2023      - Chronic or social conditions impacting care: N/A          Orders placed during this visit:  Orders Placed This Encounter   Procedures   • Clostridioides difficile Toxin - Stool, Per Rectum   • Clostridioides difficile Toxin, PCR - Stool, Per Rectum   • CT Abdomen Pelvis Without Contrast   • Comprehensive Metabolic Panel   • CBC Auto Differential   • Ambulatory Referral to Gastroenterology   • Patient Isolation Contact Spore   • Insert Peripheral IV   • CBC & Differential         Additional orders considered but not ordered:  N/A        Differential diagnosis:    Differential diagnosis includes but is not  limited to:  - hepatobiliary pathology such as cholecystitis, cholangitis, and symptomatic cholelithiasis  - Pancreatitis  - Dyspepsia  - Small bowel obstruction  - Appendicitis  - Diverticulitis  - UTI including pyelonephritis  - Ureteral stone  - Zoster  - Colitis, including infectious and ischemic  - Atypical ACS        Independent interpretation of labs, radiology studies, and discussions with consultants:  ED Course as of 05/19/23 1559   Fri May 19, 2023   1324 WBC: 6.93 [WH]   1325 Hemoglobin: 12.7 [WH]   1340 BUN(!): 6 [WH]   1340 Creatinine: 0.67 [WH]   1340 Potassium(!): 3.3 [WH]   1515 CT abdomen/pelvis personally interpreted by me.  My personal interpretation is: Lung bases are clear.  No obstructive uropathy. [WH]   1527 Per the radiologist, CT scan shows minimal residual colitis.  See dictated report for details. [WH]   1540 Test results discussed with the patient and her daughter.  Work-up is unremarkable.  CT scan shows nearly resolved colitis.  She is not dehydrated.  She has not had any diarrhea while in the ED.  She has been able to ambulate without difficulty.  She is mildly hypokalemic and will be given a prescription for potassium.  Patient was advised to take Imodium as needed.  I will refer her to GI for follow-up.  Return precautions were discussed. [WH]      ED Course User Index  [WH] Reginald Vinson MD               DIAGNOSIS  Final diagnoses:   Diarrhea, unspecified type   Hypokalemia         DISPOSITION  DISCHARGE    Patient discharged in stable condition.    Reviewed implications of results, diagnosis, meds, responsibility to follow up, warning signs and symptoms of possible worsening, potential complications and reasons to return to ER, including any/persistent symptoms, blood in stool, vomiting, fever, or other concern.    Patient/Family voiced understanding of above instructions.    Discussed plan for discharge, as there is no emergent indication for admission. Patient referred to  primary care provider for BP management due to today's BP. Pt/family is agreeable and understands need for follow up and repeat testing.  Pt is aware that discharge does not mean that nothing is wrong but it indicates no emergency is present that requires admission and they must continue care with follow-up as given below or physician of their choice.     FOLLOW-UP  Emelia Wilcox MD  [address] [facility] [address] [facility] [address]  [facility] [id number] [phone]    Schedule an appointment as soon as possible for a visit       Gudelia Bazzi, APRN  [address]  [facility] [id number] [phone]    Schedule an appointment as soon as possible for a visit       Jori Low MD  [address]  [facility] [id number] [phone]    Schedule an appointment as soon as possible for a visit            Medication List      New Prescriptions    potassium chloride 10 MEQ CR tablet  Take 1 tablet by mouth 2 (Two) Times a Day.           Where to Get Your Medications      These medications were sent to [facility] [address] [place] [address] [address] [facility] - [phone]  - [phone] [place] [id number]    Phone: [phone]   · potassium chloride 10 MEQ CR tablet                   Latest Documented Vital Signs:  As of 15:59 EDT  BP- 118/68 HR- 81 Temp- 97.3 °F (36.3 °C) O2 sat- 98%              --    Please note that portions of this were completed with a voice recognition program.       Note Disclaimer: At [facility], we believe that sharing information builds trust and better relationships. You are receiving this note because you are receiving care at [facility] or recently visited. It is possible you will see health information before a provider has talked with you about it. This kind of information can be easy to misunderstand. To help you fully understand what it means for your health, we urge you to discuss this note with your provider.           Reginald Vinson,  MD  05/19/23 1559     [phone]

## 2023-05-22 ENCOUNTER — TELEPHONE (OUTPATIENT)
Dept: GASTROENTEROLOGY | Facility: CLINIC | Age: 78
End: 2023-05-22

## 2023-05-22 ENCOUNTER — OFFICE VISIT (OUTPATIENT)
Dept: FAMILY MEDICINE CLINIC | Facility: CLINIC | Age: 78
End: 2023-05-22
Payer: MEDICARE

## 2023-05-22 VITALS
WEIGHT: 137 LBS | TEMPERATURE: 97 F | HEART RATE: 81 BPM | BODY MASS INDEX: 25.21 KG/M2 | SYSTOLIC BLOOD PRESSURE: 110 MMHG | DIASTOLIC BLOOD PRESSURE: 60 MMHG | HEIGHT: 62 IN | OXYGEN SATURATION: 96 %

## 2023-05-22 DIAGNOSIS — E87.6 HYPOKALEMIA: ICD-10-CM

## 2023-05-22 DIAGNOSIS — R19.7 DIARRHEA, UNSPECIFIED TYPE: Primary | ICD-10-CM

## 2023-05-22 DIAGNOSIS — D64.9 ANEMIA, UNSPECIFIED TYPE: ICD-10-CM

## 2023-05-22 DIAGNOSIS — R50.9 FEVER IN ADULT: ICD-10-CM

## 2023-05-22 DIAGNOSIS — I50.32 CHRONIC DIASTOLIC CHF (CONGESTIVE HEART FAILURE): ICD-10-CM

## 2023-05-22 RX ORDER — LORATADINE 10 MG/1
1 CAPSULE, LIQUID FILLED ORAL DAILY
COMMUNITY

## 2023-05-22 NOTE — TELEPHONE ENCOUNTER
Hub staff attempted to follow warm transfer process and was unsuccessful     Caller: YECENIA JULIEN (PREFER CONTACT FIRST)    Relationship to patient: Emergency Contact    Best call back number: [phone] (Mobile)    Patient is needing: PT DAUGHTER (ON HIPPA) IS CALLING TO GET HER MOTHER IN TO SEE DR DEION HAQUE. [facility], AND THEY TOLD HER TO FOLLOW UP WITH GASTRO. PT HAS BEEN HAVING A FEVER AND CONSTANT DIARRHEA. THERE WERE NO APPTS AVAIL FOR DR ALBARRAN OR PA TILL AUG. DAUGHTER SAYS PT NEEDS TO BE SEEN ASAP. PLEASE CALL PT BACK ASAP.

## 2023-05-22 NOTE — PATIENT INSTRUCTIONS
Start potassium as prescribed.  Continue to drink plenty of liquids.  Continue bland diet.  Continue to monitor your blood pressure periodically and record results.  Follow up pending lab results.  Follow up in 6 weeks, or sooner if symptoms persist or worsen.   Schedule an appointment with GI.  Follow up as scheduled with rheumatology and infectious disease.

## 2023-05-22 NOTE — PROGRESS NOTES
Subjective   Carlotta Pires is a 78 y.o. female.     Chief Complaint   Patient presents with   • Hospital Follow Up Visit     Diarrhea          History of Present Illness   Patient presents for ER follow up; patient went to [facility] ER on 5/19/23 with c/o diarrhea and fever; had had diarrhea for 3 days and had a lot of fatigue; thought had C.diff again so went to ER; had labs and CT scan; colitis noted to be almost resolved on CT; has had diarrhea last couple of days; no diarrhea yet today; has been taking Imodium on occasion and helps a little; every time eats, will get bad cramps in stomach and will have urge to have BM; has not had a lot of diarrhea--small amount, cramps have been bothering more than anything; still having fevers; Tmax 100-102; fever resolves with Tylenol; has had mucus in stool; has seen GI in past; daughter was calling today to get appointment with Dr. Brenden de la rosa; takes Amitriptyline nightly; has gall bladder; occasional discomfort in LUQ abdomen, but typically only in lower abdomen after eating; has been eating popsicles, bananas, and toast; ER gave Rx for potassium, but pt has not started taking; has appointment with ID 6/7/23; has follow up with rheumatology 6/7/23.    Has had a lot of fatigue and has been sleeping a lot; no SOA; had been feeling so good for about 4 days after taking Vancomycin; finished vancomycin about 2 weeks ago.    Has been off diuretic; no swelling; has follow up with cardiology, Dr. Barker 6/13/23.      The following portions of the patient's history were reviewed and updated as appropriate: allergies, current medications, past family history, past medical history, past social history, past surgical history and problem list.    Current Outpatient Medications on File Prior to Visit   Medication Sig   • amitriptyline (ELAVIL) 25 MG tablet Take 1 tablet by mouth Every Night.   • atorvastatin (LIPITOR) 10 MG tablet Take 1 tablet by mouth Daily.   • clopidogrel  (PLAVIX) 75 MG tablet Take 1 tablet by mouth Daily.   • Dexilant 60 MG capsule TAKE ONE CAPSULE BY MOUTH EVERY DAY   • diphenhydrAMINE (BENADRYL) 25 mg capsule Take 1 capsule by mouth At Night As Needed for Itching.   • levothyroxine (Euthyrox) 75 MCG tablet Take 1 tablet by mouth Daily. Take one po qd except on Sunday take 2.   • Loratadine 10 MG capsule Take 1 capsule by mouth Daily.   • losartan (COZAAR) 100 MG tablet Take 1 tablet by mouth Daily. (Patient taking differently: Take 50 mg by mouth 2 (Two) Times a Day.)   • Melatonin 10 MG capsule Take 1 capsule by mouth At Night As Needed.   • metoprolol succinate XL (TOPROL-XL) 50 MG 24 hr tablet Take 1 tablet by mouth 2 (Two) Times a Day.   • vitamin B-12 (CYANOCOBALAMIN) 1000 MCG tablet Take 1 tablet by mouth Daily.   • potassium chloride 10 MEQ CR tablet Take 1 tablet by mouth 2 (Two) Times a Day. (Patient not taking: Reported on 5/22/2023)     No current facility-administered medications on file prior to visit.        Past Medical History:   Diagnosis Date   • Allergic rhinitis    • Arthritis    • Cancer     Left breast cancer   • CHF (congestive heart failure)    • Cough    • COVID-19 11/2020   • Degenerative arthritis of thumb    • GERD without esophagitis    • H/O TIA (transient ischemic attack) and stroke 2005   • History of bone density study    • Lumbar radiculopathy    • Osteoporosis    • Palpitations    • Peptic ulceration    • Personal history of malignant neoplasm of breast    • Sciatica    • Stroke 2005    CVA--History of storke   • Trigeminal neuralgia        Past Surgical History:   Procedure Laterality Date   • BREAST BIOPSY Left 2011   • CATARACT EXTRACTION, BILATERAL     • COLONOSCOPY  09/28/2012    Adolph Martinez MD   • COLONOSCOPY N/A 11/13/2018    Procedure: COLONOSCOPY to cecum;  Surgeon: Adolph Martinez MD;  Location: [facility] ENDOSCOPY;  Service: Gastroenterology   • COLONOSCOPY N/A 1/27/2021    Procedure: COLONOSCOPY TO CECUM AND  TERMINAL ILEUM;  Surgeon: Emelia Wilcox MD;  Location:  [facility] ENDOSCOPY;  Service: Gastroenterology;  Laterality: N/A;  RECTAL BLEEDING  --DIVERTICULOSIS, HEMORRHOIDS, TORTUOUS COLON   • ENDOSCOPY N/A 11/13/2018    Procedure: ESOPHAGOGASTRODUODENOSCOPY with bx;  Surgeon: Adolph Martinez MD;  Location: [facility] ENDOSCOPY;  Service: Gastroenterology   • ENDOSCOPY N/A 1/27/2021    Procedure: ESOPHAGOGASTRODUODENOSCOPY WITH COLD BX;  Surgeon: Emelia Wilcox MD;  Location: [facility] ENDOSCOPY;  Service: Gastroenterology;  Laterality: N/A;  GERD  --GASTRITIS, HIATAL HERNIA   • EYE SURGERY     • HYSTERECTOMY      At age 31-Not due to cancer   • MASTECTOMY Left 06/30/2011    Dr. Kaitlyn Luna   • TUBAL ABDOMINAL LIGATION  1974   • UPPER GASTROINTESTINAL ENDOSCOPY  09/28/2012    Adolph Martinez MD       Family History   Problem Relation Age of Onset   • Cancer Mother 68        head and neck   • Stroke Mother    • Heart disease Mother    • Gallbladder disease Mother    • Throat cancer Mother 68   • Breast cancer Sister 57   • Heart disease Sister    • Hypertension Sister    • Cancer Sister    • Gallbladder disease Sister    • Diabetes Sister    • Cancer Brother         head and neck   • Heart disease Brother    • Hypertension Brother    • Gallbladder disease Brother    • Lung cancer Brother 67   • Throat cancer Brother 67   • Hypertension Father    • Gallbladder disease Father    • Emphysema Father    • Heart disease Father         Enlarged heart   • Cancer Maternal Aunt    • Cancer Maternal Uncle    • Colon cancer Maternal Uncle    • Heart attack Brother    • Alcohol abuse Brother    • Hypertension Sister    • Heart disease Sister    • Hypertension Sister    • Heart disease Sister    • Hypertension Sister    • No Known Problems Other        Social History     Socioeconomic History   • Marital status:      Spouse name: Jack   • Number of children: 2   • Years of education: High School   Tobacco Use   • Smoking  "status: Never   • Smokeless tobacco: Never   • Tobacco comments:     CAFFEINE USE: 4-5 CUPS 1/2 & 1/2 COFFEE DAILY   Vaping Use   • Vaping Use: Never used   Substance and Sexual Activity   • Alcohol use: Not Currently   • Drug use: No   • Sexual activity: Defer       Review of Systems   Constitutional: Positive for appetite change and fatigue. Negative for unexpected weight gain. Fever: see HPI.   HENT: Negative for ear pain, postnasal drip, rhinorrhea, sinus pressure, sore throat and trouble swallowing.    Eyes: Negative for blurred vision and discharge.   Respiratory: Negative for cough, chest tightness and shortness of breath.    Cardiovascular: Negative for chest pain. Palpitations: some, no change.   Gastrointestinal: Negative for blood in stool (minimal when wiping; see HPI), constipation, GERD (Dexilant works well) and indigestion. Abdominal pain: see HPI. Diarrhea: see HPI.   Endocrine: Polydipsia: some recently.   Genitourinary: Negative for decreased urine volume (some decrease, last urinated this morning), dysuria and frequency.   Musculoskeletal: Negative for back pain.   Skin: Rash: some on left shoulder, resolved at this point; no OTC treatment.   Neurological: Positive for light-headedness. Negative for syncope and headache. Weakness: some in general.   Hematological: Bruises/bleeds easily: no change in easy bruising.   Psychiatric/Behavioral: Negative for sleep disturbance and suicidal ideas. Depressed mood: some related to current symptoms.       Objective   Vitals:    05/22/23 1041   BP: 110/60   BP Location: Left arm   Patient Position: Sitting   Cuff Size: Adult   Pulse: 81   Temp: 97 °F (36.1 °C)   SpO2: 96%   Weight: 62.1 kg (137 lb)   Height: 157.5 cm (62\")     Body mass index is 25.06 kg/m².    Physical Exam  Vitals and nursing note reviewed.   Constitutional:       General: She is not in acute distress.     Appearance: She is well-developed and well-groomed. She is not diaphoretic.   HENT: "      Head: Normocephalic.      Right Ear: External ear normal. No decreased hearing noted. Right ear middle ear effusion: fluid bubbles behind TM. Tympanic membrane is not erythematous.      Left Ear: External ear normal. No decreased hearing noted. Left ear middle ear effusion: TM dull. Tympanic membrane is not erythematous.      Nose: Nose normal.      Right Sinus: No maxillary sinus tenderness or frontal sinus tenderness.      Left Sinus: No maxillary sinus tenderness or frontal sinus tenderness.      Mouth/Throat:      Dentition: Has dentures.      Pharynx: No oropharyngeal exudate or posterior oropharyngeal erythema.   Eyes:      Conjunctiva/sclera: Conjunctivae normal.   Neck:      Vascular: No carotid bruit.   Cardiovascular:      Rate and Rhythm: Normal rate and regular rhythm.      Pulses: Normal pulses.      Heart sounds: Normal heart sounds. No murmur heard.  Pulmonary:      Effort: Pulmonary effort is normal. No respiratory distress.      Breath sounds: Normal breath sounds.   Abdominal:      General: Bowel sounds are normal.      Palpations: Abdomen is soft. There is no hepatomegaly or splenomegaly.      Tenderness: There is abdominal tenderness (bilateral lower abdomen). There is no guarding or rebound.   Musculoskeletal:      Cervical back: Normal range of motion and neck supple.      Right lower leg: No edema.      Left lower leg: No edema.   Lymphadenopathy:      Cervical: No cervical adenopathy.   Skin:     General: Skin is warm and dry.   Neurological:      Mental Status: She is alert and oriented to person, place, and time.      Gait: Gait normal.   Psychiatric:         Mood and Affect: Mood normal.         Behavior: Behavior normal.         Thought Content: Thought content normal.         Cognition and Memory: Cognition normal.         Judgment: Judgment normal.          Lab Results   Component Value Date    CHLPL 138 01/24/2023    TRIG 129 01/24/2023    HDL 37 (L) 01/24/2023    VLDL 23  01/24/2023    LDL 78 01/24/2023     Lab Results   Component Value Date    TSH 2.110 04/12/2023        Records reviewed from [facility] ER on 5/19/23; patient presented with multiple episodes of foul-smelling diarrhea; also c/o intermittent lower abdominal cramping; pt was incontinent of stool while sleeping night before; also c/o nausea, decreased p.o. intake and decreased urination; had temp 101 day before; had been admitted month before with C. diff colitis and those symptoms resolved with 10 day course of vancomycin; no chest pain or SOA; was given IVF; work up unremarkable; discharged home with Rx for KCL; referral to GI for further work up.    5/19/23 CMP WNL except K+ 3.3; CBC WNL except neutrophil 77.8, lymph 12.3    5/19/23 CT abd/pelvis: Limited evaluation of the inferior thorax demonstrates atelectasis, without consolidation, pleural effusion, or pneumothorax. The left breast is absent. The heart is normal in size, without pericardial effusion. There is a small sliding-type hiatal hernia.  Old granulomatous disease is seen in the liver and spleen. There are postsurgical changes in the lateral spleen. Low-attenuation hepatic lesions are technically indeterminate, likely cysts. The uterus is absent. The degree of colonic wall thickening with surrounding inflammatory changes has decreased, with minimal residual stranding and inflammatory change located primarily in the rectum and transverse colon.  The gallbladder, adrenal glands, kidneys, pancreas, small bowel, appendix, urinary bladder, and abdominal vasculature are normal as evaluated on this noncontrast examination. No intraperitoneal fluid collection or free gas are seen. No enlarged lymph nodes are demonstrated.  Bone windows demonstrate degenerative changes, without suspicious osseous lesion seen--minimal residual colitis.         Assessment    Problem List Items Addressed This Visit     Fever in adult    Current Assessment & Plan     Follow up as  scheduled with rheumatology and infectious disease.         Chronic diastolic CHF (congestive heart failure)    Current Assessment & Plan     Follow up as scheduled with cardiology.         Anemia    Relevant Orders    Iron (Completed)    Ferritin (Completed)    CBC & Differential (Completed)    Diarrhea - Primary    Current Assessment & Plan     Continue to drink plenty of liquids.  Continue bland diet.  Schedule an appointment with GI.         Relevant Orders    CBC & Differential (Completed)    Comprehensive Metabolic Panel (Completed)    Lipase (Completed)    Stool Culture (Reference Lab) - Stool, Per Rectum    Clostridioides difficile EIA - Stool, Per Rectum    Hypokalemia    Current Assessment & Plan     Start potassium as prescribed.               Return in about 6 weeks (around 7/3/2023) for Recheck.or sooner if symptoms persist or worsen.         I spent 43 minutes caring for Carlotta on this date of service. This time includes time spent by me in the following activities:reviewing tests, obtaining and/or reviewing a separately obtained history, performing a medically appropriate examination and/or evaluation , counseling and educating the patient/family/caregiver, ordering medications, tests, or procedures and documenting information in the medical record

## 2023-05-23 DIAGNOSIS — R19.7 DIARRHEA, UNSPECIFIED TYPE: ICD-10-CM

## 2023-05-23 PROBLEM — E87.6 HYPOKALEMIA: Status: ACTIVE | Noted: 2023-05-23

## 2023-05-23 LAB
ALBUMIN SERPL-MCNC: 4 G/DL (ref 3.7–4.7)
ALBUMIN/GLOB SERPL: 1.5 {RATIO} (ref 1.2–2.2)
ALP SERPL-CCNC: 85 IU/L (ref 44–121)
ALT SERPL-CCNC: 14 IU/L (ref 0–32)
AST SERPL-CCNC: 23 IU/L (ref 0–40)
BASOPHILS # BLD AUTO: 0 X10E3/UL (ref 0–0.2)
BASOPHILS NFR BLD AUTO: 0 %
BILIRUB SERPL-MCNC: 0.5 MG/DL (ref 0–1.2)
BUN SERPL-MCNC: 6 MG/DL (ref 8–27)
BUN/CREAT SERPL: 9 (ref 12–28)
CALCIUM SERPL-MCNC: 9.8 MG/DL (ref 8.7–10.3)
CHLORIDE SERPL-SCNC: 98 MMOL/L (ref 96–106)
CO2 SERPL-SCNC: 23 MMOL/L (ref 20–29)
CREAT SERPL-MCNC: 0.65 MG/DL (ref 0.57–1)
EGFRCR SERPLBLD CKD-EPI 2021: 91 ML/MIN/1.73
EOSINOPHIL # BLD AUTO: 0.3 X10E3/UL (ref 0–0.4)
EOSINOPHIL NFR BLD AUTO: 4 %
ERYTHROCYTE [DISTWIDTH] IN BLOOD BY AUTOMATED COUNT: 12.4 % (ref 11.7–15.4)
FERRITIN SERPL-MCNC: 76 NG/ML (ref 15–150)
GLOBULIN SER CALC-MCNC: 2.7 G/DL (ref 1.5–4.5)
GLUCOSE SERPL-MCNC: 80 MG/DL (ref 70–99)
HCT VFR BLD AUTO: 37.9 % (ref 34–46.6)
HGB BLD-MCNC: 12.9 G/DL (ref 11.1–15.9)
IMM GRANULOCYTES # BLD AUTO: 0 X10E3/UL (ref 0–0.1)
IMM GRANULOCYTES NFR BLD AUTO: 0 %
IRON SERPL-MCNC: 28 UG/DL (ref 27–139)
LIPASE SERPL-CCNC: 20 U/L (ref 14–85)
LYMPHOCYTES # BLD AUTO: 1.4 X10E3/UL (ref 0.7–3.1)
LYMPHOCYTES NFR BLD AUTO: 19 %
MCH RBC QN AUTO: 31.2 PG (ref 26.6–33)
MCHC RBC AUTO-ENTMCNC: 34 G/DL (ref 31.5–35.7)
MCV RBC AUTO: 92 FL (ref 79–97)
MONOCYTES # BLD AUTO: 0.7 X10E3/UL (ref 0.1–0.9)
MONOCYTES NFR BLD AUTO: 9 %
NEUTROPHILS # BLD AUTO: 5.1 X10E3/UL (ref 1.4–7)
NEUTROPHILS NFR BLD AUTO: 68 %
PLATELET # BLD AUTO: 273 X10E3/UL (ref 150–450)
POTASSIUM SERPL-SCNC: 4.3 MMOL/L (ref 3.5–5.2)
PROT SERPL-MCNC: 6.7 G/DL (ref 6–8.5)
RBC # BLD AUTO: 4.13 X10E6/UL (ref 3.77–5.28)
SODIUM SERPL-SCNC: 139 MMOL/L (ref 134–144)
WBC # BLD AUTO: 7.5 X10E3/UL (ref 3.4–10.8)

## 2023-05-27 LAB
BACTERIA SPEC CULT: NORMAL
BACTERIA SPEC CULT: NORMAL
C DIFF TOX A+B STL QL IA: POSITIVE
CAMPYLOBACTER STL CULT: NORMAL
E COLI SXT STL QL IA: NEGATIVE
SALM + SHIG STL CULT: NORMAL

## 2023-05-28 DIAGNOSIS — A04.72 C. DIFFICILE DIARRHEA: Primary | ICD-10-CM

## 2023-05-28 RX ORDER — VANCOMYCIN HYDROCHLORIDE 125 MG/1
125 CAPSULE ORAL EVERY 6 HOURS SCHEDULED
Qty: 56 CAPSULE | Refills: 0 | Status: SHIPPED | OUTPATIENT
Start: 2023-05-28 | End: 2023-06-11

## 2023-06-01 ENCOUNTER — OFFICE VISIT (OUTPATIENT)
Dept: GASTROENTEROLOGY | Facility: CLINIC | Age: 78
End: 2023-06-01

## 2023-06-01 VITALS
WEIGHT: 139.2 LBS | HEART RATE: 63 BPM | SYSTOLIC BLOOD PRESSURE: 145 MMHG | DIASTOLIC BLOOD PRESSURE: 71 MMHG | OXYGEN SATURATION: 96 % | TEMPERATURE: 97.6 F | BODY MASS INDEX: 25.62 KG/M2 | HEIGHT: 62 IN

## 2023-06-01 DIAGNOSIS — R10.32 BILATERAL LOWER ABDOMINAL CRAMPING: Chronic | ICD-10-CM

## 2023-06-01 DIAGNOSIS — R10.31 BILATERAL LOWER ABDOMINAL CRAMPING: Chronic | ICD-10-CM

## 2023-06-01 DIAGNOSIS — A04.72 COLITIS, CLOSTRIDIUM DIFFICILE: ICD-10-CM

## 2023-06-01 DIAGNOSIS — K58.2 IRRITABLE BOWEL SYNDROME WITH BOTH CONSTIPATION AND DIARRHEA: Primary | Chronic | ICD-10-CM

## 2023-06-01 PROCEDURE — 3078F DIAST BP <80 MM HG: CPT | Performed by: INTERNAL MEDICINE

## 2023-06-01 PROCEDURE — 1160F RVW MEDS BY RX/DR IN RCRD: CPT | Performed by: INTERNAL MEDICINE

## 2023-06-01 PROCEDURE — 99214 OFFICE O/P EST MOD 30 MIN: CPT | Performed by: INTERNAL MEDICINE

## 2023-06-01 PROCEDURE — 1159F MED LIST DOCD IN RCRD: CPT | Performed by: INTERNAL MEDICINE

## 2023-06-01 PROCEDURE — 3077F SYST BP >= 140 MM HG: CPT | Performed by: INTERNAL MEDICINE

## 2023-06-01 NOTE — PROGRESS NOTES
Chief Complaint   Patient presents with   • Clostridioides difficile       Subjective     HPI    Carlotta Pires is a 78 y.o. female with a past medical history noted below who presents for follow-up.  She has been seen in this office for issues with GERD and chronic constipation.    She was hospitalized in April with C diff.  She had been antibiotics for a sinus infection.  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  She completed antibiotics and felt well then had a recurrence of diarrhea, she is now finishing vancomycin again.    Prior to C diff, she has been having issues with constipation followed by diarrhea.  She does get lower abdominal cramping before BMs.    She has about 10 days of antibiotics left.  She is feeling better.  2 BMs yesterday.      She is taking Dexilant daily for heartburn symptoms.    Today's visit was in the office.  Both the patient and I were wearing face masks and proper hand hygiene was performed before and after the physical exam.     Past Medical History:   Diagnosis Date   • Allergic rhinitis    • Arthritis    • Cancer     Left breast cancer   • CHF (congestive heart failure)    • Cough    • COVID-19 11/2020   • Degenerative arthritis of thumb    • GERD without esophagitis    • H/O TIA (transient ischemic attack) and stroke 2005   • History of bone density study    • Lumbar radiculopathy    • Osteoporosis    • Palpitations    • Peptic ulceration    • Personal history of malignant neoplasm of breast    • Sciatica    • Stroke 2005    CVA--History of storke   • Trigeminal neuralgia           Current Outpatient Medications:   •  amitriptyline (ELAVIL) 25 MG tablet, Take 1 tablet by mouth Every Night., Disp: 90 tablet, Rfl: 2  •  atorvastatin (LIPITOR) 10 MG tablet, Take 1 tablet by mouth Daily., Disp: 90 tablet, Rfl: 3  •  clopidogrel (PLAVIX) 75 MG tablet, Take 1 tablet by mouth Daily., Disp: 30 tablet, Rfl: 0  •  Dexilant 60 MG capsule, TAKE ONE CAPSULE BY MOUTH EVERY DAY, Disp: 90 capsule, Rfl:  3  •  diphenhydrAMINE (BENADRYL) 25 mg capsule, Take 1 capsule by mouth At Night As Needed for Itching., Disp: , Rfl:   •  levothyroxine (Euthyrox) 75 MCG tablet, Take 1 tablet by mouth Daily. Take one po qd except on Sunday take 2., Disp: 110 tablet, Rfl: 0  •  Loratadine 10 MG capsule, Take 1 capsule by mouth Daily., Disp: , Rfl:   •  losartan (COZAAR) 100 MG tablet, Take 1 tablet by mouth Daily. (Patient taking differently: Take 50 mg by mouth 2 (Two) Times a Day.), Disp: 90 tablet, Rfl: 3  •  Melatonin 10 MG capsule, Take 1 capsule by mouth At Night As Needed., Disp: , Rfl:   •  metoprolol succinate XL (TOPROL-XL) 50 MG 24 hr tablet, Take 1 tablet by mouth 2 (Two) Times a Day., Disp: 180 tablet, Rfl: 3  •  vancomycin (VANCOCIN) 125 MG capsule, Take 1 capsule by mouth Every 6 (Six) Hours for 14 days. Indications: Clostridium Difficile Infection, Disp: 56 capsule, Rfl: 0  •  vitamin B-12 (CYANOCOBALAMIN) 1000 MCG tablet, Take 1 tablet by mouth Daily., Disp: , Rfl:   •  potassium chloride 10 MEQ CR tablet, Take 1 tablet by mouth 2 (Two) Times a Day. (Patient not taking: Reported on 5/22/2023), Disp: 8 tablet, Rfl: 0      Objective     Vitals:    06/01/23 0800   BP: 145/71   Pulse: 63   Temp: 97.6 °F (36.4 °C)   SpO2: 96%         06/01/23  0800   Weight: 63.1 kg (139 lb 3.2 oz)     Body mass index is 25.45 kg/m².    Physical Exam  Constitutional:       General: She is not in acute distress.  Pulmonary:      Effort: Pulmonary effort is normal.   Neurological:      Mental Status: She is alert and oriented to person, place, and time.   Psychiatric:         Mood and Affect: Mood normal.         Behavior: Behavior normal.         Thought Content: Thought content normal.         Judgment: Judgment normal.             WBC   Date Value Ref Range Status   05/22/2023 7.5 3.4 - 10.8 x10E3/uL Final     RBC   Date Value Ref Range Status   05/22/2023 4.13 3.77 - 5.28 x10E6/uL Final     Hemoglobin   Date Value Ref Range Status    05/22/2023 12.9 11.1 - 15.9 g/dL Final   05/19/2023 12.7 12.0 - 15.9 g/dL Final     Hematocrit   Date Value Ref Range Status   05/22/2023 37.9 34.0 - 46.6 % Final   05/19/2023 37.0 34.0 - 46.6 % Final     MCV   Date Value Ref Range Status   05/22/2023 92 79 - 97 fL Final   05/19/2023 93.0 79.0 - 97.0 fL Final     MCH   Date Value Ref Range Status   05/22/2023 31.2 26.6 - 33.0 pg Final   05/19/2023 31.9 26.6 - 33.0 pg Final     MCHC   Date Value Ref Range Status   05/22/2023 34.0 31.5 - 35.7 g/dL Final   05/19/2023 34.3 31.5 - 35.7 g/dL Final     RDW   Date Value Ref Range Status   05/22/2023 12.4 11.7 - 15.4 % Final   05/19/2023 12.8 12.3 - 15.4 % Final     RDW-SD   Date Value Ref Range Status   05/19/2023 43.0 37.0 - 54.0 fl Final     MPV   Date Value Ref Range Status   05/19/2023 9.6 6.0 - 12.0 fL Final     Platelets   Date Value Ref Range Status   05/22/2023 273 150 - 450 x10E3/uL Final   05/19/2023 206 140 - 450 10*3/mm3 Final     Neutrophil Rel %   Date Value Ref Range Status   05/22/2023 68 Not Estab. % Final     Neutrophil %   Date Value Ref Range Status   05/19/2023 77.8 (H) 42.7 - 76.0 % Final     Lymphocyte Rel %   Date Value Ref Range Status   05/22/2023 19 Not Estab. % Final     Lymphocyte %   Date Value Ref Range Status   05/19/2023 12.3 (L) 19.6 - 45.3 % Final     Monocyte Rel %   Date Value Ref Range Status   05/22/2023 9 Not Estab. % Final     Monocyte %   Date Value Ref Range Status   05/19/2023 7.6 5.0 - 12.0 % Final     Eosinophil Rel %   Date Value Ref Range Status   05/22/2023 4 Not Estab. % Final     Eosinophil %   Date Value Ref Range Status   05/19/2023 1.9 0.3 - 6.2 % Final     Basophil Rel %   Date Value Ref Range Status   05/22/2023 0 Not Estab. % Final     Basophil %   Date Value Ref Range Status   05/19/2023 0.3 0.0 - 1.5 % Final     Immature Grans %   Date Value Ref Range Status   05/19/2023 0.1 0.0 - 0.5 % Final     Neutrophils Absolute   Date Value Ref Range Status   05/22/2023 5.1  1.4 - 7.0 x10E3/uL Final     Neutrophils, Absolute   Date Value Ref Range Status   05/19/2023 5.39 1.70 - 7.00 10*3/mm3 Final     Lymphocytes Absolute   Date Value Ref Range Status   05/22/2023 1.4 0.7 - 3.1 x10E3/uL Final     Lymphocytes, Absolute   Date Value Ref Range Status   05/19/2023 0.85 0.70 - 3.10 10*3/mm3 Final     Monocytes Absolute   Date Value Ref Range Status   05/22/2023 0.7 0.1 - 0.9 x10E3/uL Final     Monocytes, Absolute   Date Value Ref Range Status   05/19/2023 0.53 0.10 - 0.90 10*3/mm3 Final     Eosinophils Absolute   Date Value Ref Range Status   05/22/2023 0.3 0.0 - 0.4 x10E3/uL Final     Eosinophils, Absolute   Date Value Ref Range Status   05/19/2023 0.13 0.00 - 0.40 10*3/mm3 Final     Basophils Absolute   Date Value Ref Range Status   05/22/2023 0.0 0.0 - 0.2 x10E3/uL Final     Basophils, Absolute   Date Value Ref Range Status   05/19/2023 0.02 0.00 - 0.20 10*3/mm3 Final     Immature Grans, Absolute   Date Value Ref Range Status   05/19/2023 0.01 0.00 - 0.05 10*3/mm3 Final     nRBC   Date Value Ref Range Status   05/19/2023 0.0 0.0 - 0.2 /100 WBC Final       Lab Results   Component Value Date    GLUCOSE 80 05/22/2023    BUN 6 (L) 05/22/2023    CREATININE 0.65 05/22/2023    EGFRIFNONA 74 08/24/2021    EGFRIFAFRI 90 08/24/2021    BCR 9 (L) 05/22/2023    CO2 23 05/22/2023    CALCIUM 9.8 05/22/2023    PROTENTOTREF 6.7 05/22/2023    ALBUMIN 4.0 05/22/2023    LABIL2 1.5 05/22/2023    AST 23 05/22/2023    ALT 14 05/22/2023       CT a/p  IMPRESSION:  1. There are findings consistent with regional colitis involving the  distal descending, sigmoid and rectosigmoid portions of the colon.  2. There is L2-3 and L5-S1 moderate degenerative disc disease and  abutting endplate osteophytic change.  3. There is stable subpleural scarring within the left upper lobe,  possibly associated with prior radiation therapy. No evidence for an  active or acute abnormality of the chest is appreciated.  4. There has  been interval resolution of some hepatic cysts since the  prior examination. Other hepatic cyst remain and are minimally changed  as described. A slightly heterogenous appearance of the liver is noted  and the appearance is nonspecific but may be seen in the setting of  hepatitis or may be within normal limits. Correlation with liver  specific laboratory values is recommended.     Radiation dose reduction techniques were utilized, including automated  exposure control and exposure modulation based on body size.     This report was finalized on 4/25/2023 5:50 PM by Dr. Wolf Santiago M.D.    I personally reviewed data as detailed below:     The labs listed above.    The radiology studies listed above.    Office notes from: 5/22/23 pcp    Endoscopy procedures and pathology from: 1/27/21 EGD and colonoscopy    Assessment and Plan     1. Irritable bowel syndrome with both constipation and diarrhea (Primary)    2. Colitis, Clostridium difficile    3. Bilateral lower abdominal cramping    Plan  Complete vancomycin  Add daily fiber supplementation (fiber con or metamucil)  IB Sg as needed for cramping  Minimize Dexilant use to every other day.  She is on a high dose and this can make her more susceptible to C. difficile  I would like for her to get through her C. difficile infection and then see what her new baseline as before trying to make any aggressive medication changes or considering updating endoscopy; I will schedule her for 1 month follow up       Diagnoses and all orders for this visit:    1. Irritable bowel syndrome with both constipation and diarrhea (Primary)    2. Colitis, Clostridium difficile    3. Bilateral lower abdominal cramping          I have discussed the above plan with the patient.  They verbalize understanding and are in agreement with the plan.  They have been advised to contact the office for any questions, concerns, or changes related to their health.    Dictated utilizing Dragon  dictation

## 2023-06-05 ENCOUNTER — TELEPHONE (OUTPATIENT)
Dept: CARDIOLOGY | Facility: CLINIC | Age: 78
End: 2023-06-05

## 2023-06-05 RX ORDER — CLOPIDOGREL BISULFATE 75 MG/1
75 TABLET ORAL DAILY
Qty: 30 TABLET | Refills: 0 | Status: CANCELLED | OUTPATIENT
Start: 2023-06-05

## 2023-06-05 NOTE — TELEPHONE ENCOUNTER
Caller: Pranay Carlotta CRISTEL    Relationship: Self    Best call back number: [phone]     Requested Prescriptions:   Requested Prescriptions     Pending Prescriptions Disp Refills    clopidogrel (PLAVIX) 75 MG tablet 30 tablet 0     Sig: Take 1 tablet by mouth Daily.        Pharmacy where request should be sent: [facility] [address] [place] [address] [address] [place] [address] - [phone]  - [phone]     Last office visit with prescribing clinician: 4/20/2022   Last telemedicine visit with prescribing clinician: Visit date not found   Next office visit with prescribing clinician: 6/13/2023     Additional details provided by patient: PT HAS BEEN IN HOSPITAL AND IS NEEDING AN URGENT REFILL ON HER PLAVIX - PT HAS BEEN OUT OF MEDICINE FOR ALMOST 7 DAYS AND IS TRYING TO GET A 30 DAY SUPPLY TO COVER HER UNTIL HER NEXT VISIT AND NEXT PRESCRIPTION IS PUT IN    Does the patient have less than a 3 day supply:  [x] Yes  [] No    Would you like a call back once the refill request has been completed: [] Yes [] No    If the office needs to give you a call back, can they leave a voicemail: [] Yes [] No    Dakota Chacko   06/05/23 15:26 EDT

## 2023-06-06 RX ORDER — CLOPIDOGREL BISULFATE 75 MG/1
75 TABLET ORAL DAILY
Qty: 90 TABLET | Refills: 3 | Status: SHIPPED | OUTPATIENT
Start: 2023-06-06

## 2023-06-13 ENCOUNTER — OFFICE VISIT (OUTPATIENT)
Dept: CARDIOLOGY | Facility: CLINIC | Age: 78
End: 2023-06-13
Payer: MEDICARE

## 2023-06-13 VITALS
DIASTOLIC BLOOD PRESSURE: 81 MMHG | SYSTOLIC BLOOD PRESSURE: 140 MMHG | HEIGHT: 62 IN | BODY MASS INDEX: 25.54 KG/M2 | OXYGEN SATURATION: 99 % | WEIGHT: 138.8 LBS

## 2023-06-13 DIAGNOSIS — I10 ESSENTIAL HYPERTENSION: Primary | ICD-10-CM

## 2023-06-13 NOTE — PROGRESS NOTES
Date of Office Visit: 23  Encounter Provider: Reginald Barker MD  Place of Service: [facility] CARDIOLOGY  Patient Name: Carlotta Pires  :1945  [id number]    Chief Complaint   Patient presents with   • Congestive Heart Failure   :     HPI: Carlotta Pires is a 78 y.o. female she is here for follow-up recently was hospitalized with C. difficile colitis she has hypertension she was starting to get hypotensive we had to hold some of her medicines mainly stopped her HCTZ she is here for follow up.  She is doing well although sounds like the C. difficile came back and she is on back on antibiotics again.  Blood pressures she is getting number of readings most of them are good occasional high but occasional low otherwise she feels okay sadly her sister  from the last time and she was very close to her in a course her mentally handicapped daughter  earlier this year which has been bad    Past Medical History:   Diagnosis Date   • Allergic rhinitis    • Arthritis    • Cancer     Left breast cancer   • CHF (congestive heart failure)    • Cough    • COVID-19 2020   • Degenerative arthritis of thumb    • GERD without esophagitis    • H/O TIA (transient ischemic attack) and stroke    • History of bone density study    • Lumbar radiculopathy    • Osteoporosis    • Palpitations    • Peptic ulceration    • Personal history of malignant neoplasm of breast    • Sciatica    • Stroke     CVA--History of storke   • Trigeminal neuralgia        Past Surgical History:   Procedure Laterality Date   • BREAST BIOPSY Left    • CATARACT EXTRACTION, BILATERAL     • COLONOSCOPY  2012    Adolph Martinez MD   • COLONOSCOPY N/A 2018    Procedure: COLONOSCOPY to cecum;  Surgeon: Adolph Martinez MD;  Location: [facility] ENDOSCOPY;  Service: Gastroenterology   • COLONOSCOPY N/A 2021    Procedure: COLONOSCOPY TO CECUM AND TERMINAL ILEUM;  Surgeon: Emelia Wilcox MD;   Location:  [facility];  Service: Gastroenterology;  Laterality: N/A;  RECTAL BLEEDING  --DIVERTICULOSIS, HEMORRHOIDS, TORTUOUS COLON   • ENDOSCOPY N/A 11/13/2018    Procedure: ESOPHAGOGASTRODUODENOSCOPY with bx;  Surgeon: Adolph Martinez MD;  Location:  [facility];  Service: Gastroenterology   • ENDOSCOPY N/A 01/27/2021    Procedure: ESOPHAGOGASTRODUODENOSCOPY WITH COLD BX;  Surgeon: Emelia Wilcox MD;  [facility];  Service: Gastroenterology;  Laterality: N/A;  GERD  --GASTRITIS, HIATAL HERNIA   • EYE SURGERY     • HYSTERECTOMY      At age 31-Not due to cancer   • MASTECTOMY Left 06/30/2011    Dr. Kaitlyn Luna   • TUBAL ABDOMINAL LIGATION  1974   • UPPER GASTROINTESTINAL ENDOSCOPY  09/28/2012    Adolph Martinez MD       Social History     Socioeconomic History   • Marital status:      Spouse name: Jack   • Number of children: 2   • Years of education: High School   Tobacco Use   • Smoking status: Never   • Smokeless tobacco: Never   • Tobacco comments:     CAFFEINE USE: 4-5 CUPS 1/2 & 1/2 COFFEE DAILY   Vaping Use   • Vaping Use: Never used   Substance and Sexual Activity   • Alcohol use: Not Currently   • Drug use: No   • Sexual activity: Defer       Family History   Problem Relation Age of Onset   • Cancer Mother 68        head and neck   • Stroke Mother    • Heart disease Mother    • Gallbladder disease Mother    • Throat cancer Mother 68   • Breast cancer Sister 57   • Heart disease Sister    • Hypertension Sister    • Cancer Sister    • Gallbladder disease Sister    • Diabetes Sister    • Cancer Brother         head and neck   • Heart disease Brother    • Hypertension Brother    • Gallbladder disease Brother    • Lung cancer Brother 67   • Throat cancer Brother 67   • Hypertension Father    • Gallbladder disease Father    • Emphysema Father    • Heart disease Father         Enlarged heart   • Cancer Maternal Aunt    • Cancer Maternal Uncle    • Colon cancer Maternal Uncle     • Heart attack Brother    • Alcohol abuse Brother    • Hypertension Sister    • Heart disease Sister    • Hypertension Sister    • Heart disease Sister    • Hypertension Sister    • No Known Problems Other        Review of Systems   Constitutional: Negative for decreased appetite, fever, malaise/fatigue and weight loss.   HENT: Negative for nosebleeds.    Eyes: Negative for double vision.   Cardiovascular: Negative for chest pain, claudication, cyanosis, dyspnea on exertion, irregular heartbeat, leg swelling, near-syncope, orthopnea, palpitations, paroxysmal nocturnal dyspnea and syncope.   Respiratory: Negative for cough, hemoptysis and shortness of breath.    Hematologic/Lymphatic: Negative for bleeding problem.   Skin: Negative for rash.   Musculoskeletal: Negative for falls and myalgias.   Gastrointestinal: Negative for hematochezia, jaundice, melena, nausea and vomiting.   Genitourinary: Negative for hematuria.   Neurological: Negative for dizziness and seizures.   Psychiatric/Behavioral: Negative for altered mental status and memory loss.       Allergies   Allergen Reactions   • Lisinopril Cough   • Contrast Dye (Echo Or Unknown Ct/Mr) Itching and Rash     IVP Dye   • Tartrazine Hives         Current Outpatient Medications:   •  amitriptyline (ELAVIL) 25 MG tablet, Take 1 tablet by mouth Every Night., Disp: 90 tablet, Rfl: 2  •  atorvastatin (LIPITOR) 10 MG tablet, Take 1 tablet by mouth Daily., Disp: 90 tablet, Rfl: 3  •  clopidogrel (PLAVIX) 75 MG tablet, Take 1 tablet by mouth Daily., Disp: 90 tablet, Rfl: 3  •  Dexilant 60 MG capsule, TAKE ONE CAPSULE BY MOUTH EVERY DAY, Disp: 90 capsule, Rfl: 3  •  diphenhydrAMINE (BENADRYL) 25 mg capsule, Take 1 capsule by mouth At Night As Needed for Itching., Disp: , Rfl:   •  levothyroxine (Euthyrox) 75 MCG tablet, Take 1 tablet by mouth Daily. Take one po qd except on Sunday take 2., Disp: 110 tablet, Rfl: 0  •  Loratadine 10 MG capsule, Take 1 capsule by mouth  "Daily., Disp: , Rfl:   •  losartan (COZAAR) 100 MG tablet, Take 1 tablet by mouth Daily. (Patient taking differently: Take 50 mg by mouth 2 (Two) Times a Day.), Disp: 90 tablet, Rfl: 3  •  Melatonin 10 MG capsule, Take 1 capsule by mouth At Night As Needed., Disp: , Rfl:   •  metoprolol succinate XL (TOPROL-XL) 50 MG 24 hr tablet, Take 1 tablet by mouth 2 (Two) Times a Day., Disp: 180 tablet, Rfl: 3  •  potassium chloride 10 MEQ CR tablet, Take 1 tablet by mouth 2 (Two) Times a Day., Disp: 8 tablet, Rfl: 0  •  vitamin B-12 (CYANOCOBALAMIN) 1000 MCG tablet, Take 1 tablet by mouth Daily., Disp: , Rfl:       Objective:     Vitals:    06/13/23 1508   BP: 140/81   BP Location: Left arm   Patient Position: Sitting   Cuff Size: Adult   SpO2: 99%   Weight: 63 kg (138 lb 12.8 oz)   Height: 157.5 cm (62\")     Body mass index is 25.39 kg/m².    Constitutional:       Appearance: Well-developed.   Eyes:      General: No scleral icterus.  HENT:      Head: Normocephalic.   Neck:      Thyroid: No thyromegaly.      Vascular: No JVD.      Lymphadenopathy: No cervical adenopathy.   Pulmonary:      Effort: Pulmonary effort is normal.      Breath sounds: Normal breath sounds. No wheezing. No rales.   Cardiovascular:      Normal rate. Regular rhythm.      No gallop.   Edema:     Peripheral edema absent.   Abdominal:      Palpations: Abdomen is soft.      Tenderness: There is no abdominal tenderness.   Musculoskeletal: Normal range of motion. Skin:     General: Skin is warm and dry.      Findings: No rash.   Neurological:      Mental Status: Alert and oriented to person, place, and time.           ECG 12 Lead    Date/Time: 6/13/2023 4:10 PM  Performed by: Reginald Barker MD  Authorized by: Reginald Barker MD   Comparison: compared with previous ECG   Similar to previous ECG  Rhythm: sinus rhythm  Other findings: non-specific ST-T wave changes    Clinical impression: abnormal EKG             Assessment:       Diagnosis Plan   1. " Essential hypertension               Plan:       I think she is doing pretty well right now Rylie keep her off the HCTZ she get dehydrated with that when with all the diarrhea and I am just going to have her come back and see us in a year if her pressure goes up we may add another medicine to it and I would think about maybe Norvasc    Return in about 2 years (around 6/13/2025) for See Barbie Orta in 1 year.     As always, it has been a pleasure to participate in your patient's care.      Sincerely,       Reginald Barker MD

## 2023-08-07 ENCOUNTER — OFFICE VISIT (OUTPATIENT)
Dept: INFECTIOUS DISEASES | Facility: CLINIC | Age: 78
End: 2023-08-07
Payer: OTHER GOVERNMENT

## 2023-08-07 VITALS
WEIGHT: 133 LBS | TEMPERATURE: 97.6 F | SYSTOLIC BLOOD PRESSURE: 161 MMHG | HEIGHT: 62 IN | DIASTOLIC BLOOD PRESSURE: 79 MMHG | BODY MASS INDEX: 24.48 KG/M2 | HEART RATE: 71 BPM | RESPIRATION RATE: 18 BRPM

## 2023-08-07 DIAGNOSIS — R76.8 ANA POSITIVE: ICD-10-CM

## 2023-08-07 DIAGNOSIS — A04.72 C. DIFFICILE COLITIS: Primary | ICD-10-CM

## 2023-08-07 PROCEDURE — 99213 OFFICE O/P EST LOW 20 MIN: CPT | Performed by: INTERNAL MEDICINE

## 2023-08-07 NOTE — PROGRESS NOTES
"cc: f/u c diff    Pt admitted with fever and possible c diff in April (PCR +/Ag - but CT a/p with colitis) and treated with vanc.  She had a history of positive GEOVANNI and was felt to have possible autoimmune etiology as well as infectious. In May had recurrent C diff and again treated with vanc.    She was seen in clinic on 6/27/2023 with recurrent diarrhea concerning for C. difficile.  Labs revealed elevated white blood cell count with mild hypokalemia, hyponatremia and acute kidney injury.  C. difficile PCR and antigen were positive.  She was started on oral vancomycin and offered admission but declined and subsequently did pretty well.      Seen in clinic on 7/10 and Diarrhea and DEJON had resolved.  Prescribed vowst which took a while to obtain but eventually were successful.    Completed vowst for rCDI ~7/31.  She tolerated this well with just some mild nausea and abdominal pain.  Overall feeling much much better.  No significant diarrhea but did have 1 loose bowel movement today.    Blood pressure 161/79, pulse 71, temperature 97.6 øF (36.4 øC), temperature source Oral, resp. rate 18, height 157.5 cm (62\"), weight 60.3 kg (133 lb).    GENERAL: Awake and alert, nad  HEENT: Oropharynx is clear. Hearing is grossly normal.   EYES: . No conjunctival injection. No lid lag.   LUNGS:normal respiratory effort.   SKIN: no cutaneous eruptions in exposed areas   PSYCHIATRIC: Appropriate mood, affect, insight, and judgment.     DIAGNOSTICS:  4/24 Cdiff PCR +/Ag-  5/23 C diff Ag +; stool cx neg  6/28/2023 C. difficile PCR and antigen positive    WBC 6.1  Cr 0.67    Assessment and Plan  1.  C. difficile, recurrent, doing quite well.   Completed fecal oral capsules (vowst) and discussed with her signs and symptoms of recrudescent infection when to seek medical attention.  We also discussed the natural history of C. difficile, pathogenesis and importance of avoiding antibiotics unless absolutely necessary   2. Positive GEOVANNI, she is " due to follow-up with rheumatology  3. DEJON with mild hyponatremia and hypokalemia, resolved on last check

## 2023-08-09 ENCOUNTER — OFFICE VISIT (OUTPATIENT)
Dept: FAMILY MEDICINE CLINIC | Facility: CLINIC | Age: 78
End: 2023-08-09
Payer: MEDICARE

## 2023-08-09 VITALS
HEIGHT: 62 IN | BODY MASS INDEX: 24.48 KG/M2 | DIASTOLIC BLOOD PRESSURE: 72 MMHG | HEART RATE: 78 BPM | WEIGHT: 133 LBS | OXYGEN SATURATION: 99 % | SYSTOLIC BLOOD PRESSURE: 124 MMHG

## 2023-08-09 DIAGNOSIS — J30.9 ALLERGIC RHINITIS, UNSPECIFIED SEASONALITY, UNSPECIFIED TRIGGER: ICD-10-CM

## 2023-08-09 DIAGNOSIS — A04.72 C. DIFFICILE COLITIS: ICD-10-CM

## 2023-08-09 DIAGNOSIS — I10 ESSENTIAL HYPERTENSION: ICD-10-CM

## 2023-08-09 DIAGNOSIS — E03.9 HYPOTHYROIDISM (ACQUIRED): ICD-10-CM

## 2023-08-09 DIAGNOSIS — M85.80 OSTEOPENIA, UNSPECIFIED LOCATION: ICD-10-CM

## 2023-08-09 DIAGNOSIS — K21.9 GASTROESOPHAGEAL REFLUX DISEASE WITHOUT ESOPHAGITIS: ICD-10-CM

## 2023-08-09 DIAGNOSIS — R76.8 POSITIVE ANA (ANTINUCLEAR ANTIBODY): ICD-10-CM

## 2023-08-09 DIAGNOSIS — Z85.3 HISTORY OF BREAST CANCER: ICD-10-CM

## 2023-08-09 DIAGNOSIS — Z00.00 ENCOUNTER FOR MEDICARE ANNUAL WELLNESS EXAM: Primary | ICD-10-CM

## 2023-08-09 DIAGNOSIS — E78.2 HYPERLIPIDEMIA, MIXED: ICD-10-CM

## 2023-08-09 DIAGNOSIS — G50.0 TRIGEMINAL NEURALGIA: ICD-10-CM

## 2023-08-09 DIAGNOSIS — I50.32 CHRONIC DIASTOLIC CHF (CONGESTIVE HEART FAILURE): ICD-10-CM

## 2023-08-09 DIAGNOSIS — E53.8 VITAMIN B12 DEFICIENCY: ICD-10-CM

## 2023-08-09 NOTE — PATIENT INSTRUCTIONS
Continue to monitor your blood pressure periodically and record results.  Continue to work on healthy diet and exercise.  Resume Claritin daily as needed for allergies.  Follow up pending lab results.  Follow up in 4 months, or sooner if problems or concerns.   Schedule a follow with cardiology and rheumatology.      Medicare Wellness  Personal Prevention Plan of Service     Date of Office Visit:    Encounter Provider:  URBAN Burns  Place of Service:  [facility] PRIMARY CARE  Patient Name: Carlotta Pires  :  1945    As part of the Medicare Wellness portion of your visit today, we are providing you with this personalized preventive plan of services (PPPS). This plan is based upon recommendations of the [place] Preventive Services Task Force (USPSTF) and the Advisory Committee on Immunization Practices (ACIP).    This lists the preventive care services that should be considered, and provides dates of when you are due. Items listed as completed are up-to-date and do not require any further intervention.    Health Maintenance   Topic Date Due    ZOSTER VACCINE (1 of 2) Never done    DXA SCAN  2018    COVID-19 Vaccine (7 - Pfizer series) 2023    ANNUAL WELLNESS VISIT  2023    INFLUENZA VACCINE  10/01/2023    LIPID PANEL  2024    MAMMOGRAM  2024    COLORECTAL CANCER SCREENING  2031    TDAP/TD VACCINES (3 - Tdap) 07/15/2031    HEPATITIS C SCREENING  Completed    Pneumococcal Vaccine 65+  Completed       No orders of the defined types were placed in this encounter.      Return in about 4 months (around 2023) for Recheck.

## 2023-08-09 NOTE — PROGRESS NOTES
The ABCs of the Annual Wellness Visit  Subsequent Medicare Wellness Visit    Subjective    Carlotta Pires is a 78 y.o. female who presents for a Subsequent Medicare Wellness Visit.    The following portions of the patient's history were reviewed and   updated as appropriate: allergies, current medications, past family history, past medical history, past social history, past surgical history, and problem list.    Compared to one year ago, the patient feels her physical   health is worse.    Compared to one year ago, the patient feels her mental   health is worse.    Recent Hospitalizations:  This patient has had a [facility] admission record on file within the last 365 days.    Current Medical Providers:  Patient Care Team:  Gudelia Bazzi APRN as PCP - General (Family Medicine)  Code, Dennis DUVALL II, MD as Consulting Physician (Hematology and Oncology)  Reginald Barker MD as Consulting Physician (Cardiology)  Code, Dennis DUVALL II, MD as Consulting Physician (Hematology and Oncology)  Emelia Wilcox MD as Consulting Physician (Gastroenterology)  Kip Ling MD as Consulting Physician (Infectious Diseases)    Outpatient Medications Prior to Visit   Medication Sig Dispense Refill    amitriptyline (ELAVIL) 25 MG tablet Take 1 tablet by mouth Every Night. 90 tablet 2    atorvastatin (LIPITOR) 10 MG tablet Take 1 tablet by mouth Daily. 90 tablet 3    clopidogrel (PLAVIX) 75 MG tablet Take 1 tablet by mouth Daily. 90 tablet 3    Dexilant 60 MG capsule TAKE ONE CAPSULE BY MOUTH EVERY DAY 90 capsule 3    Melatonin 10 MG capsule Take 1 capsule by mouth At Night As Needed.      metoprolol succinate XL (TOPROL-XL) 50 MG 24 hr tablet Take 1 tablet by mouth 2 (Two) Times a Day. 180 tablet 3    vitamin B-12 (CYANOCOBALAMIN) 1000 MCG tablet Take 1 tablet by mouth Daily.      levothyroxine (Euthyrox) 75 MCG tablet Take 1 tablet by mouth Daily. Take one po qd except on Sunday take 2. 110 tablet 0    diphenhydrAMINE  (BENADRYL) 25 mg capsule Take 1 capsule by mouth At Night As Needed for Itching. (Patient not taking: Reported on 8/9/2023)      Loratadine 10 MG capsule Take 1 capsule by mouth Daily. (Patient not taking: Reported on 8/9/2023)      vancomycin (Vancocin) 125 MG capsule Take 1 capsule by mouth 2 (Two) Times a Day. (Patient not taking: Reported on 8/7/2023) 60 capsule 0     No facility-administered medications prior to visit.       No opioid medication identified on active medication list. I have reviewed chart for other potential  high risk medication/s and harmful drug interactions in the elderly.        Aspirin is not on active medication list.  Aspirin use is not indicated based on review of current medical condition/s. Risk of harm outweighs potential benefits.  .    Patient Active Problem List   Diagnosis    Malignant neoplasm of lower-outer quadrant of left female breast    Osteopenia    Encounter for screening for malignant neoplasm of colon    Epigastric pain    Essential hypertension    History of cardiomyopathy    Hyperlipidemia, mixed    Allergic rhinitis    Hypothyroidism (acquired)    Primary insomnia    CHF (congestive heart failure)    Encounter for Medicare annual wellness exam    Arthritis    Trigeminal neuralgia    Osteoporosis    Lumbar radiculopathy    Degenerative arthritis of thumb    Rectal bleeding    Gastroesophageal reflux disease without esophagitis    Weight loss, abnormal    Decreased appetite    History of breast cancer    FH: colon cancer    Dilated cardiomyopathy    Fatigue    Acute non-recurrent sinusitis    Constipation    Anxiety    Positive depression screening    Vitamin B12 deficiency    Cough    Fever in adult    C. difficile colitis    Chronic diastolic CHF (congestive heart failure)    Anemia    Diarrhea    Hypokalemia    Positive GEOVANNI (antinuclear antibody)     Advance Care Planning   Advance Care Planning     Advance Directive is not on file.  ACP discussion was held with the  "patient during this visit. Patient has an advance directive (not in EMR), copy requested.     Objective    Vitals:    23 0854   BP: 124/72   BP Location: Right arm   Patient Position: Sitting   Cuff Size: Adult   Pulse: 78   SpO2: 99%   Weight: 60.3 kg (133 lb)   Height: 157.5 cm (62\")     Estimated body mass index is 24.33 kg/mý as calculated from the following:    Height as of this encounter: 157.5 cm (62\").    Weight as of this encounter: 60.3 kg (133 lb).    BMI is within normal parameters. No other follow-up for BMI required.      Does the patient have evidence of cognitive impairment? No       HEALTH RISK ASSESSMENT    Smoking Status:  Social History     Tobacco Use   Smoking Status Never   Smokeless Tobacco Never   Tobacco Comments    CAFFEINE USE: 4-5 CUPS 1/2 & 1/2 COFFEE DAILY     Alcohol Consumption:  Social History     Substance and Sexual Activity   Alcohol Use Not Currently     Fall Risk Screen:    VANESSAADI Fall Risk Assessment was completed, and patient is at MODERATE risk for falls. Assessment completed on:2023    Depression Screenin/9/2023     9:01 AM   PHQ-2/PHQ-9 Depression Screening   Little Interest or Pleasure in Doing Things 0-->not at all   Feeling Down, Depressed or Hopeless 0-->not at all   PHQ-9: Brief Depression Severity Measure Score 0       Health Habits and Functional and Cognitive Screenin/9/2023    10:14 AM   Functional & Cognitive Status   Do you have difficulty preparing food and eating? No   Do you have difficulty bathing yourself, getting dressed or grooming yourself? No   Do you have difficulty using the toilet? No   Do you have difficulty moving around from place to place? No   Do you have trouble with steps or getting out of a bed or a chair? No   Current Diet Well Balanced Diet   Dental Exam Not up to date   Eye Exam Not up to date   Exercise (times per week) 7 times per week   Current Exercises Include Walking   Do you need help using the phone?  No "   Are you deaf or do you have serious difficulty hearing?  No   Do you need help to go to places out of walking distance? No   Do you need help shopping? No   Do you need help preparing meals?  No   Do you need help with housework?  No   Do you need help with laundry? No   Do you need help taking your medications? No   Do you need help managing money? No   Do you ever drive or ride in a car without wearing a seat belt? No   Have you felt unusual stress, anger or loneliness in the last month? Yes   Who do you live with? Spouse   If you need help, do you have trouble finding someone available to you? No   Have you been bothered in the last four weeks by sexual problems? No   Do you have difficulty concentrating, remembering or making decisions? Yes       Age-appropriate Screening Schedule:  Refer to the list below for future screening recommendations based on patient's age, sex and/or medical conditions. Orders for these recommended tests are listed in the plan section. The patient has been provided with a written plan.    Health Maintenance   Topic Date Due    ZOSTER VACCINE (1 of 2) Never done    DXA SCAN  05/17/2018    COVID-19 Vaccine (7 - Pfizer series) 02/05/2023    INFLUENZA VACCINE  10/01/2023    MAMMOGRAM  02/21/2024    ANNUAL WELLNESS VISIT  08/09/2024    LIPID PANEL  08/09/2024    COLORECTAL CANCER SCREENING  01/27/2031    TDAP/TD VACCINES (3 - Tdap) 07/15/2031    HEPATITIS C SCREENING  Completed    Pneumococcal Vaccine 65+  Completed       Plans to schedule dental and eye exam.  Has had stress with recent C.diff infection.  Has had some trouble concentrating since had C. diff; hoping will continue to improve.  Will consider Shingrix at pharmacy.           CMS Preventative Services Quick Reference  Risk Factors Identified During Encounter  Fall Risk-High or Moderate: Discussed Fall Prevention in the home  Immunizations Discussed/Encouraged: Shingrix  The above risks/problems have been discussed with the  patient.    Discussed moderate risk for falls and recommended avoiding throw rugs, installing grab bars in bathroom, making sure have handrails on steps, etc.    Pertinent information has been shared with the patient in the After Visit Summary.  An After Visit Summary and PPPS were made available to the patient.    Follow Up:   Next Medicare Wellness visit to be scheduled in 1 year.       Additional E&M Note during same encounter follows:  Patient has multiple medical problems which are significant and separately identifiable that require additional work above and beyond the Medicare Wellness Visit.      Chief Complaint  Annual Exam and Hypertension    Subjective        HPI  Carlotta Pires is also being seen today for follow up HTN: takes Metoprolol twice daily; also takes Plavix daily for history of stroke 25 years ago; no residual deficit other than sometimes flips numbers wrong; walked 2 miles recently and has been feeling better; sees Dr. Barker, cardiology; stopped Chlorthalidone and Losartan and decreased Metoprolol to once daily when BP decreased with C.diff infection; monitors BP, and noted BP and HR increased later in the day; BP had been running 190/110 in afternoon so patient resumed Metoprolol twice daily; BP has improved with taking twice daily; some headaches at night, but none in last couple of nights; no orthostasis recently; no swelling.     F/U Hyperlipidemia: takes Atorvastatin daily; occasional myalgias in legs, particularly if has been sitting for period of time; no problems if stays active; watches intake of saturated fats in diet; fasting today.    F/U Hypothyroidism: takes Levothyroxine daily except 2 tabs on Sundays; takes on empty stomach; no missed doses.    F/U C.diff colitis: had follow up with Dr. Sushil DEJESUS on 8/7/23; has had 3 rounds of Vancomycin and diarrhea resolved 7/2023; would take Vanc for 14 days and then diarrhea would return 2 weeks later; also took Vowst for 3 days after  finished 3rd round of antibiotics due to recurrent infection; completed Vowst on 7/31/23; has finally started feeling better; instructed to call ID if gets diarrhea 3 times in 1 day; has been drinking several provitamin drinks daily; ID recommended follow up with GI.    F/U positive GEOVANNI: saw rheumatology and told to come back after C. diff resolved; has bad OA and thinks something else going on; has still had fevers off and on since had C.diff, but none in last 2 weeks; needs to reschedule follow up with rheumatology.     F/U [place]: takes Dexilant every other day and works well for heartburn; will have heartburn if misses more than 1 day; has had EGD in past; sees GI; was seeing Dr. Brenden BYRNE, but leaving practice.     Sees Dr. Chanel, oncology for history of breast cancer; history of chemo and radiation; completed hormonal therapy 1/31/17; has follow up in October.     Takes Amitriptyline nightly for trigeminal neuralgia; also helps sleep; life change without having to care for daughter any more since she passed; also lost sister recently; overall, mood has improved; patient declines medication for mood at this time; pt feels like typical grief and working through; has started to do things again; no SI/HI.     F/U Vitamin B12 deficiency: started Vitamin B12 daily.     Review of Systems   Constitutional:  Negative for chills and fever. Appetite change: improving. Fatigue: has improved.  HENT:  Negative for ear pain, sinus pressure, sore throat and trouble swallowing. Postnasal drip: some at times.   Eyes:  Negative for visual disturbance. Eye discharge: some watery drainage.  Respiratory:  Negative for shortness of breath. Cough: still has occasional dry cough. Chest tightness: had a few episodes.   Cardiovascular:  Negative for chest pain. Palpitations: some at times, no change.  Gastrointestinal:  Negative for abdominal pain, blood in stool, constipation and diarrhea.   Endocrine: Positive for cold intolerance.  "Negative for heat intolerance. Polydipsia: some.  Genitourinary:  Negative for dysuria and frequency.   Musculoskeletal:  Arthralgias: some in left hip and down leg if lifts too much or overdoes it; resolves with Advil as needed.   Skin:  Negative for rash.   Neurological:  Negative for syncope and weakness.   Hematological:  Bruises/bleeds easily: no change in easy bruising/bleeding with Plavix.   Psychiatric/Behavioral:  Negative for suicidal ideas.      Objective   Vital Signs:  /72 (BP Location: Right arm, Patient Position: Sitting, Cuff Size: Adult)   Pulse 78   Ht 157.5 cm (62\")   Wt 60.3 kg (133 lb)   SpO2 99%   BMI 24.33 kg/mý     Physical Exam  Vitals and nursing note reviewed.   Constitutional:       General: She is not in acute distress.     Appearance: She is well-developed and well-groomed. She is not diaphoretic.   HENT:      Head: Normocephalic and atraumatic.      Jaw: No tenderness or pain on movement.      Right Ear: External ear normal. No decreased hearing noted. Right ear middle ear effusion: fluid bubbles behind TM. Tympanic membrane is not erythematous.      Left Ear: External ear normal. No decreased hearing noted. Impacted cerumen: cerumen partially blocking TM. Tympanic membrane is not erythematous.      Nose: Nose normal.      Right Sinus: No maxillary sinus tenderness or frontal sinus tenderness.      Left Sinus: No maxillary sinus tenderness or frontal sinus tenderness.      Mouth/Throat:      Mouth: Mucous membranes are moist.      Pharynx: No oropharyngeal exudate or posterior oropharyngeal erythema.   Eyes:      Extraocular Movements: Extraocular movements intact.      Conjunctiva/sclera: Conjunctivae normal.      Pupils: Pupils are equal, round, and reactive to light.   Neck:      Thyroid: No thyromegaly.      Vascular: No carotid bruit.      Trachea: No tracheal deviation.   Cardiovascular:      Rate and Rhythm: Normal rate and regular rhythm.      Pulses: Normal pulses. "      Heart sounds: Normal heart sounds. No murmur heard.  Pulmonary:      Effort: Pulmonary effort is normal. No respiratory distress.      Breath sounds: Normal breath sounds.   Abdominal:      General: Bowel sounds are normal.      Palpations: Abdomen is soft. There is no hepatomegaly or splenomegaly.      Tenderness: There is no abdominal tenderness. There is no guarding.   Musculoskeletal:         General: Normal range of motion.      Cervical back: Normal range of motion and neck supple. No bony tenderness.      Thoracic back: No bony tenderness.      Lumbar back: No bony tenderness.      Right lower leg: No edema.      Left lower leg: No edema.   Lymphadenopathy:      Cervical: No cervical adenopathy.   Skin:     General: Skin is warm and dry.      Findings: No rash.   Neurological:      Mental Status: She is alert and oriented to person, place, and time.      Cranial Nerves: No cranial nerve deficit.      Motor: Motor function is intact.      Coordination: Coordination normal.      Gait: Gait normal.      Deep Tendon Reflexes: Reflexes are normal and symmetric.   Psychiatric:         Mood and Affect: Mood normal.         Behavior: Behavior normal.         Thought Content: Thought content normal.         Cognition and Memory: Cognition normal.         Judgment: Judgment normal.        7/10/23 BMP WNL; CBC WNL  6/27/23 BMP WNL except glucose 137, creat 1.14, sodium 131, K+ 3.3, chloride 93, eGFR 50      Lab Results   Component Value Date    WBC 6.11 07/10/2023    RBC 4.02 07/10/2023    HGB 12.3 07/10/2023    HCT 37.0 07/10/2023    MCV 92.0 07/10/2023    MCH 30.6 07/10/2023    MCHC 33.2 07/10/2023    RDW 13.5 07/10/2023    RDWSD 45.6 07/10/2023    MPV 9.2 07/10/2023     07/10/2023    NEUTRORELPCT 68.5 07/10/2023    LYMPHORELPCT 22.4 07/10/2023    MONORELPCT 6.5 07/10/2023    EOSRELPCT 2.1 07/10/2023    BASORELPCT 0.3 07/10/2023    AUTOIGPER 0.2 07/10/2023    NEUTROABS 4.18 07/10/2023    LYMPHSABS 1.37  07/10/2023    MONOSABS 0.40 07/10/2023    EOSABS 0.13 07/10/2023    BASOSABS 0.02 07/10/2023    AUTOIGNUM 0.01 07/10/2023    NRBC 0.0 07/10/2023          Assessment and Plan   Diagnoses and all orders for this visit:    1. Encounter for Medicare annual wellness exam (Primary)  -     Comprehensive Metabolic Panel  -     Lipid Panel With LDL / HDL Ratio  -     TSH Rfx On Abnormal To Free T4  -     Vitamin B12 & Folate  -     Vitamin D,25-Hydroxy    2. Osteopenia, unspecified location  -     Vitamin D,25-Hydroxy    3. Essential hypertension  Assessment & Plan:  Hypertension is  stable .  Continue current medications.  Ambulatory blood pressure monitoring.  Blood pressure will be reassessed  4 months .  Continue Metoprolol twice daily.  Schedule a follow up appointment with cardiology.    Orders:  -     Comprehensive Metabolic Panel  -     Lipid Panel With LDL / HDL Ratio    4. Hyperlipidemia, mixed  Assessment & Plan:  Continue Atorvastatin daily.  Continue to work on healthy diet and exercise as tolerated.    Orders:  -     Comprehensive Metabolic Panel  -     Lipid Panel With LDL / HDL Ratio    5. Vitamin B12 deficiency  -     Vitamin B12 & Folate    6. Hypothyroidism (acquired)  Assessment & Plan:  Continue Levothyroxine daily.    Orders:  -     TSH Rfx On Abnormal To Free T4    7. Allergic rhinitis, unspecified seasonality, unspecified trigger  Assessment & Plan:  Resume Claritin daily as needed for allergies.      8. Chronic diastolic CHF (congestive heart failure)  Assessment & Plan:  Schedule a follow up appointment with cardiology.      9. Gastroesophageal reflux disease without esophagitis  Assessment & Plan:  Stable.  Continue Dexilant every other day.    Orders:  -     Cancel: Ambulatory Referral to Gastroenterology  -     Ambulatory Referral to Gastroenterology    10. Positive GEOVANNI (antinuclear antibody)  Assessment & Plan:  Schedule a follow up with rheumatology.      11. History of breast  cancer  Assessment & Plan:  Follow up as scheduled with oncology.      12. C. difficile colitis  Assessment & Plan:  Resolved at this point.  Follow up as recommended by ID.      13. Trigeminal neuralgia  Assessment & Plan:  Continue Amitriptyline nightly.               Follow Up   Return in about 4 months (around 12/9/2023) for Recheck.or sooner if problems or concerns.  Will send refill of Levothyroxine to mail order [facility] pending lab results.    Patient was given instructions and counseling regarding her condition or for health maintenance advice. Please see specific information pulled into the AVS if appropriate.

## 2023-08-10 DIAGNOSIS — M85.80 OSTEOPENIA, UNSPECIFIED LOCATION: Primary | ICD-10-CM

## 2023-08-10 DIAGNOSIS — E03.9 HYPOTHYROIDISM (ACQUIRED): ICD-10-CM

## 2023-08-10 PROBLEM — J30.9 ALLERGIC RHINITIS: Status: ACTIVE | Noted: 2019-11-22

## 2023-08-10 PROBLEM — R76.8 POSITIVE ANA (ANTINUCLEAR ANTIBODY): Status: ACTIVE | Noted: 2023-08-10

## 2023-08-10 LAB
25(OH)D3+25(OH)D2 SERPL-MCNC: 33.8 NG/ML (ref 30–100)
ALBUMIN SERPL-MCNC: 4.4 G/DL (ref 3.5–5.2)
ALBUMIN/GLOB SERPL: 2 G/DL
ALP SERPL-CCNC: 79 U/L (ref 39–117)
ALT SERPL-CCNC: 7 U/L (ref 1–33)
AST SERPL-CCNC: 20 U/L (ref 1–32)
BILIRUB SERPL-MCNC: 0.5 MG/DL (ref 0–1.2)
BUN SERPL-MCNC: 7 MG/DL (ref 8–23)
BUN/CREAT SERPL: 10.8 (ref 7–25)
CALCIUM SERPL-MCNC: 9.9 MG/DL (ref 8.6–10.5)
CHLORIDE SERPL-SCNC: 103 MMOL/L (ref 98–107)
CHOLEST SERPL-MCNC: 143 MG/DL (ref 0–200)
CO2 SERPL-SCNC: 27.2 MMOL/L (ref 22–29)
CREAT SERPL-MCNC: 0.65 MG/DL (ref 0.57–1)
EGFRCR SERPLBLD CKD-EPI 2021: 91.4 ML/MIN/1.73
FOLATE SERPL-MCNC: >20 NG/ML (ref 4.78–24.2)
GLOBULIN SER CALC-MCNC: 2.2 GM/DL
GLUCOSE SERPL-MCNC: 94 MG/DL (ref 65–99)
HDLC SERPL-MCNC: 51 MG/DL (ref 40–60)
LDLC SERPL CALC-MCNC: 75 MG/DL (ref 0–100)
LDLC/HDLC SERPL: 1.46 {RATIO}
POTASSIUM SERPL-SCNC: 4.1 MMOL/L (ref 3.5–5.2)
PROT SERPL-MCNC: 6.6 G/DL (ref 6–8.5)
SODIUM SERPL-SCNC: 142 MMOL/L (ref 136–145)
TRIGL SERPL-MCNC: 87 MG/DL (ref 0–150)
TSH SERPL DL<=0.005 MIU/L-ACNC: 2.05 UIU/ML (ref 0.27–4.2)
VIT B12 SERPL-MCNC: 569 PG/ML (ref 211–946)
VLDLC SERPL CALC-MCNC: 17 MG/DL (ref 5–40)

## 2023-08-10 RX ORDER — CHOLECALCIFEROL (VITAMIN D3) 50 MCG
2000 TABLET ORAL DAILY
Start: 2023-08-10 | End: 2024-08-09

## 2023-08-10 RX ORDER — LEVOTHYROXINE SODIUM 0.07 MG/1
TABLET ORAL
Qty: 102 TABLET | Refills: 1 | Status: SHIPPED | OUTPATIENT
Start: 2023-08-10

## 2023-08-10 RX ORDER — LEVOTHYROXINE SODIUM 0.07 MG/1
TABLET ORAL
Qty: 34 TABLET | Refills: 0 | Status: SHIPPED | OUTPATIENT
Start: 2023-08-10 | End: 2023-08-10 | Stop reason: SDUPTHER

## 2023-08-11 NOTE — ASSESSMENT & PLAN NOTE
Hypertension is  stable .  Continue current medications.  Ambulatory blood pressure monitoring.  Blood pressure will be reassessed  4 months .  Continue Metoprolol twice daily.  Schedule a follow up appointment with cardiology.

## 2023-08-16 ENCOUNTER — TELEPHONE (OUTPATIENT)
Dept: CARDIOLOGY | Facility: CLINIC | Age: 78
End: 2023-08-16
Payer: MEDICARE

## 2023-08-16 RX ORDER — ATORVASTATIN CALCIUM 10 MG/1
10 TABLET, FILM COATED ORAL DAILY
Qty: 90 TABLET | Refills: 3 | Status: SHIPPED | OUTPATIENT
Start: 2023-08-16

## 2023-08-16 RX ORDER — CLOPIDOGREL BISULFATE 75 MG/1
75 TABLET ORAL DAILY
Qty: 90 TABLET | Refills: 3 | Status: SHIPPED | OUTPATIENT
Start: 2023-08-16

## 2023-08-16 NOTE — TELEPHONE ENCOUNTER
Patient called stating that her B/P has been elevated a few times. She believes that she should go back on her B/P medication (Losartan).She was only able to give me a few B/P readings:    149/72  189/76  110/65    She can be reached at [phone]

## 2023-08-16 NOTE — TELEPHONE ENCOUNTER
I had to leave a message with her if she is going to start the losartan I would take 25 mg a day and not the full 50 and see how she does

## 2023-08-17 NOTE — TELEPHONE ENCOUNTER
Called and spoke with pt. She stated that she is going to go back on the Losartan. She has 50 mg but she believes she can just cut them in half. If not, she will give us a call and we can send in 25 mg for her. She verbalized understanding.

## 2023-09-05 ENCOUNTER — OFFICE VISIT (OUTPATIENT)
Dept: GASTROENTEROLOGY | Facility: CLINIC | Age: 78
End: 2023-09-05
Payer: MEDICARE

## 2023-09-05 VITALS
WEIGHT: 138.8 LBS | DIASTOLIC BLOOD PRESSURE: 83 MMHG | BODY MASS INDEX: 25.54 KG/M2 | SYSTOLIC BLOOD PRESSURE: 158 MMHG | OXYGEN SATURATION: 98 % | HEART RATE: 69 BPM | HEIGHT: 62 IN

## 2023-09-05 DIAGNOSIS — L29.0 ANAL ITCHING: ICD-10-CM

## 2023-09-05 DIAGNOSIS — K59.00 CONSTIPATION, UNSPECIFIED CONSTIPATION TYPE: Primary | ICD-10-CM

## 2023-09-05 DIAGNOSIS — Z86.19 HISTORY OF CLOSTRIDIOIDES DIFFICILE COLITIS: ICD-10-CM

## 2023-09-05 DIAGNOSIS — K21.9 GASTROESOPHAGEAL REFLUX DISEASE WITHOUT ESOPHAGITIS: ICD-10-CM

## 2023-09-05 PROCEDURE — 3079F DIAST BP 80-89 MM HG: CPT | Performed by: NURSE PRACTITIONER

## 2023-09-05 PROCEDURE — 1159F MED LIST DOCD IN RCRD: CPT | Performed by: NURSE PRACTITIONER

## 2023-09-05 PROCEDURE — 99214 OFFICE O/P EST MOD 30 MIN: CPT | Performed by: NURSE PRACTITIONER

## 2023-09-05 PROCEDURE — 3077F SYST BP >= 140 MM HG: CPT | Performed by: NURSE PRACTITIONER

## 2023-09-05 PROCEDURE — 1160F RVW MEDS BY RX/DR IN RCRD: CPT | Performed by: NURSE PRACTITIONER

## 2023-09-05 RX ORDER — CLOTRIMAZOLE AND BETAMETHASONE DIPROPIONATE 10; .64 MG/G; MG/G
1 CREAM TOPICAL 2 TIMES DAILY
Qty: 28 G | Refills: 0 | Status: SHIPPED | OUTPATIENT
Start: 2023-09-05 | End: 2023-09-19

## 2023-09-05 RX ORDER — DEXLANSOPRAZOLE 30 MG/1
30 CAPSULE, DELAYED RELEASE ORAL DAILY
Qty: 90 CAPSULE | Refills: 3 | Status: SHIPPED | OUTPATIENT
Start: 2023-09-05

## 2023-09-05 RX ORDER — LUBIPROSTONE 8 UG/1
8 CAPSULE ORAL 2 TIMES DAILY WITH MEALS
Qty: 60 CAPSULE | Refills: 2 | Status: SHIPPED | OUTPATIENT
Start: 2023-09-05 | End: 2023-12-04

## 2023-09-05 NOTE — PROGRESS NOTES
Chief Complaint   Patient presents with    re-occuring C-diff       HPI    Carlotta Pires is a  78 y.o. female here for a follow up visit for reoccuring Cdiff.     This patient is new to me today and will also follow with Dr. Gongora.     Patient was last seen by Dr. Wilcox in June for recurrent C. difficile.  She was tapering vancomycin at the time and instructed to start fiber supplementation and minimize PPI. 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  She has been treated with Vowst by ID since last office visit with resolution of diarrhea.    Primary complaint today is constipation which was her predominant bowel pattern before she became ill with C. difficile.  She is having small portion stools every 2 or 3 days.  She has been prescribed Linzess which caused diarrhea.  She reports considerable rectal itching.    She has been taking Dexilant 60mg every other day to minimize PPI but still has considerable heartburn as such.  Denies nausea, vomiting, dysphagia, or odynophagia.    Past Medical History:   Diagnosis Date    Allergic rhinitis     Arthritis     Cancer     Left breast cancer    CHF (congestive heart failure)     Cough     COVID-19 11/2020    Degenerative arthritis of thumb     GERD without esophagitis     H/O TIA (transient ischemic attack) and stroke 2005    History of bone density study     Lumbar radiculopathy     Osteoporosis     Palpitations     Peptic ulceration     Personal history of malignant neoplasm of breast     Sciatica     Stroke 2005    CVA--History of storke    Trigeminal neuralgia        Past Surgical History:   Procedure Laterality Date    BREAST BIOPSY Left 2011    CATARACT EXTRACTION, BILATERAL      COLONOSCOPY  09/28/2012    Adolph Martinez MD    COLONOSCOPY N/A 11/13/2018    Procedure: COLONOSCOPY to cecum;  Surgeon: Adolph Martinez MD;  Location: [facility] ENDOSCOPY;  Service: Gastroenterology    COLONOSCOPY N/A 01/27/2021    Procedure: COLONOSCOPY TO CECUM AND  TERMINAL ILEUM;  Surgeon: Emelia Wilcox MD;  Location:  [facility] ENDOSCOPY;  Service: Gastroenterology;  Laterality: N/A;  RECTAL BLEEDING  --DIVERTICULOSIS, HEMORRHOIDS, TORTUOUS COLON    ENDOSCOPY N/A 11/13/2018    Procedure: ESOPHAGOGASTRODUODENOSCOPY with bx;  Surgeon: Adolph Martinez MD;  Location:  [facility];  Service: Gastroenterology    ENDOSCOPY N/A 01/27/2021    Procedure: ESOPHAGOGASTRODUODENOSCOPY WITH COLD BX;  Surgeon: Emelia Wilcox MD;  Location:  [facility] ENDOSCOPY;  Service: Gastroenterology;  Laterality: N/A;  GERD  --GASTRITIS, HIATAL HERNIA    EYE SURGERY      HYSTERECTOMY      At age 31-Not due to cancer    MASTECTOMY Left 06/30/2011    Dr. Kaitlyn Luna    TUBAL ABDOMINAL LIGATION  1974    UPPER GASTROINTESTINAL ENDOSCOPY  09/28/2012    Adolph Martinez MD       Scheduled Meds:     Continuous Infusions: No current facility-administered medications for this visit.      PRN Meds:     Allergies   Allergen Reactions    Lisinopril Cough    Contrast Dye (Echo Or Unknown Ct/Mr) Itching and Rash     IVP Dye    Tartrazine Hives       Social History     Socioeconomic History    Marital status:      Spouse name: Jack    Number of children: 2    Years of education: High School   Tobacco Use    Smoking status: Never    Smokeless tobacco: Never    Tobacco comments:     CAFFEINE USE: 4-5 CUPS 1/2 & 1/2 COFFEE DAILY   Vaping Use    Vaping Use: Never used   Substance and Sexual Activity    Alcohol use: Not Currently    Drug use: No    Sexual activity: Defer       Family History   Problem Relation Age of Onset    Cancer Mother 68        head and neck    Stroke Mother     Heart disease Mother     Gallbladder disease Mother     Throat cancer Mother 68    Breast cancer Sister 57    Heart disease Sister     Hypertension Sister     Cancer Sister     Gallbladder disease Sister     Diabetes Sister     Cancer Brother         head and neck    Heart disease Brother     Hypertension Brother     Gallbladder  disease Brother     Lung cancer Brother 67    Throat cancer Brother 67    Hypertension Father     Gallbladder disease Father     Emphysema Father     Heart disease Father         Enlarged heart    Cancer Maternal Aunt     Cancer Maternal Uncle     Colon cancer Maternal Uncle     Heart attack Brother     Alcohol abuse Brother     Hypertension Sister     Heart disease Sister     Hypertension Sister     Heart disease Sister     Hypertension Sister     No Known Problems Other        Review of Systems   Gastrointestinal:  Positive for constipation.     Vitals:    09/05/23 1308   BP: 158/83   Pulse: 69   SpO2: 98%       Physical Exam  Constitutional:       Appearance: She is well-developed.   Abdominal:      General: Bowel sounds are normal. There is no distension.      Palpations: Abdomen is soft. There is no mass.      Tenderness: There is no abdominal tenderness. There is no guarding.      Hernia: No hernia is present.   Skin:     General: Skin is warm and dry.      Capillary Refill: Capillary refill takes less than 2 seconds.   Neurological:      Mental Status: She is alert and oriented to person, place, and time.   Psychiatric:         Behavior: Behavior normal.     Assessment    Diagnoses and all orders for this visit:    1. Constipation, unspecified constipation type (Primary)    2. Anal itching    3. Gastroesophageal reflux disease without esophagitis  Overview:  Added automatically from request for surgery [id number]      4. History of Clostridioides difficile colitis    Other orders  -     clotrimazole-betamethasone (Lotrisone) 1-0.05 % cream; Apply 1 application  topically to the appropriate area as directed 2 (Two) Times a Day for 14 days.  Dispense: 28 g; Refill: 0  -     dexlansoprazole (Dexilant) 30 MG capsule; Take 1 capsule by mouth Daily.  Dispense: 90 capsule; Refill: 3  -     lubiprostone (Amitiza) 8 MCG capsule; Take 1 capsule by mouth 2 (Two) Times a Day With Meals for 90 days.  Dispense: 60 capsule;  Refill: 2       Plan    Regarding constipation recommend trial of low-dose Amitiza at twice daily dosing  We talked about treatment for constipation including increased physical activity, adequate rest, adequate hydration with 6-8 glasses of water per day, and high fiber diet.     Recommend treatment for anal itching with Lotrisone as above    Regarding GERD recommend reducing Dexilant to 30 mg and having her take daily  Follow an antireflux diet    RTC 3 months         URBAN Schulz  [facility]  [address] [place] [address]  Office: [phone]

## 2023-10-12 ENCOUNTER — LAB (OUTPATIENT)
Dept: OTHER | Facility: HOSPITAL | Age: 78
End: 2023-10-12
Payer: MEDICARE

## 2023-10-12 ENCOUNTER — OFFICE VISIT (OUTPATIENT)
Dept: ONCOLOGY | Facility: CLINIC | Age: 78
End: 2023-10-12
Payer: MEDICARE

## 2023-10-12 VITALS
DIASTOLIC BLOOD PRESSURE: 72 MMHG | TEMPERATURE: 98.1 F | OXYGEN SATURATION: 98 % | HEIGHT: 62 IN | WEIGHT: 138.7 LBS | SYSTOLIC BLOOD PRESSURE: 127 MMHG | BODY MASS INDEX: 25.52 KG/M2 | HEART RATE: 68 BPM | RESPIRATION RATE: 16 BRPM

## 2023-10-12 DIAGNOSIS — C50.512 MALIGNANT NEOPLASM OF LOWER-OUTER QUADRANT OF LEFT BREAST OF FEMALE, ESTROGEN RECEPTOR POSITIVE: ICD-10-CM

## 2023-10-12 DIAGNOSIS — Z17.0 MALIGNANT NEOPLASM OF LOWER-OUTER QUADRANT OF LEFT BREAST OF FEMALE, ESTROGEN RECEPTOR POSITIVE: ICD-10-CM

## 2023-10-12 DIAGNOSIS — Z17.0 MALIGNANT NEOPLASM OF LOWER-OUTER QUADRANT OF LEFT BREAST OF FEMALE, ESTROGEN RECEPTOR POSITIVE: Primary | ICD-10-CM

## 2023-10-12 DIAGNOSIS — C50.512 MALIGNANT NEOPLASM OF LOWER-OUTER QUADRANT OF LEFT BREAST OF FEMALE, ESTROGEN RECEPTOR POSITIVE: Primary | ICD-10-CM

## 2023-10-12 LAB
BASOPHILS # BLD AUTO: 0.04 10*3/MM3 (ref 0–0.2)
BASOPHILS NFR BLD AUTO: 0.6 % (ref 0–1.5)
DEPRECATED RDW RBC AUTO: 43.3 FL (ref 37–54)
EOSINOPHIL # BLD AUTO: 0.32 10*3/MM3 (ref 0–0.4)
EOSINOPHIL NFR BLD AUTO: 4.8 % (ref 0.3–6.2)
ERYTHROCYTE [DISTWIDTH] IN BLOOD BY AUTOMATED COUNT: 13.2 % (ref 12.3–15.4)
HCT VFR BLD AUTO: 37.8 % (ref 34–46.6)
HGB BLD-MCNC: 12.9 G/DL (ref 12–15.9)
IMM GRANULOCYTES # BLD AUTO: 0.02 10*3/MM3 (ref 0–0.05)
IMM GRANULOCYTES NFR BLD AUTO: 0.3 % (ref 0–0.5)
LYMPHOCYTES # BLD AUTO: 1.58 10*3/MM3 (ref 0.7–3.1)
LYMPHOCYTES NFR BLD AUTO: 23.5 % (ref 19.6–45.3)
MCH RBC QN AUTO: 30.4 PG (ref 26.6–33)
MCHC RBC AUTO-ENTMCNC: 34.1 G/DL (ref 31.5–35.7)
MCV RBC AUTO: 88.9 FL (ref 79–97)
MONOCYTES # BLD AUTO: 0.53 10*3/MM3 (ref 0.1–0.9)
MONOCYTES NFR BLD AUTO: 7.9 % (ref 5–12)
NEUTROPHILS NFR BLD AUTO: 4.23 10*3/MM3 (ref 1.7–7)
NEUTROPHILS NFR BLD AUTO: 62.9 % (ref 42.7–76)
NRBC BLD AUTO-RTO: 0 /100 WBC (ref 0–0.2)
PLATELET # BLD AUTO: 246 10*3/MM3 (ref 140–450)
PMV BLD AUTO: 10.1 FL (ref 6–12)
RBC # BLD AUTO: 4.25 10*6/MM3 (ref 3.77–5.28)
WBC NRBC COR # BLD: 6.72 10*3/MM3 (ref 3.4–10.8)

## 2023-10-12 PROCEDURE — 36415 COLL VENOUS BLD VENIPUNCTURE: CPT

## 2023-10-12 PROCEDURE — 85025 COMPLETE CBC W/AUTO DIFF WBC: CPT | Performed by: INTERNAL MEDICINE

## 2023-10-12 NOTE — PROGRESS NOTES
Subjective .     REASONS FOR FOLLOWUP:  Breast cancer    HISTORY OF PRESENT ILLNESS:  The patient is a 78 y.o. year old female  who is here for follow-up with the above-mentioned history.    States she has had a difficult time getting C. difficile resolved but seems to have finally done so after.  Worked with ID, Dr. Ling.    States in the process she fell several times and wonders if that is why she is having intermittent discomfort in her left chest, just lasting for couple minutes at a time.  Now that she is recovered from C. difficile, she wants to see how this goes.    No other issues    Past Medical History:   Diagnosis Date    Allergic rhinitis     Arthritis     Cancer     Left breast cancer    CHF (congestive heart failure)     Cough     COVID-19 11/2020    Degenerative arthritis of thumb     GERD without esophagitis     H/O TIA (transient ischemic attack) and stroke 2005    History of bone density study     Lumbar radiculopathy     Osteoporosis     Palpitations     Peptic ulceration     Personal history of malignant neoplasm of breast     Sciatica     Stroke 2005    CVA--History of storke    Trigeminal neuralgia      Past Surgical History:   Procedure Laterality Date    BREAST BIOPSY Left 2011    CATARACT EXTRACTION, BILATERAL      COLONOSCOPY  09/28/2012    Adolph Martinez MD    COLONOSCOPY N/A 11/13/2018    Procedure: COLONOSCOPY to cecum;  Surgeon: Adolph Martinez MD;  Location: [facility] ENDOSCOPY;  Service: Gastroenterology    COLONOSCOPY N/A 01/27/2021    Procedure: COLONOSCOPY TO CECUM AND TERMINAL ILEUM;  Surgeon: Emelia Wilcox MD;  Location: [facility] ENDOSCOPY;  Service: Gastroenterology;  Laterality: N/A;  RECTAL BLEEDING  --DIVERTICULOSIS, HEMORRHOIDS, TORTUOUS COLON    ENDOSCOPY N/A 11/13/2018    Procedure: ESOPHAGOGASTRODUODENOSCOPY with bx;  Surgeon: Adolph Martinez MD;  Location: [facility] ENDOSCOPY;  Service: Gastroenterology    ENDOSCOPY N/A 01/27/2021    Procedure:  ESOPHAGOGASTRODUODENOSCOPY WITH COLD BX;  Surgeon: Emelia Wilcox MD;  Location: [facility] ENDOSCOPY;  Service: Gastroenterology;  Laterality: N/A;  GERD  --GASTRITIS, HIATAL HERNIA    EYE SURGERY      HYSTERECTOMY      At age 31-Not due to cancer    MASTECTOMY Left 06/30/2011    Dr. Kaitlyn Luna    TUBAL ABDOMINAL LIGATION  1974    UPPER GASTROINTESTINAL ENDOSCOPY  09/28/2012    Adolph Martinez MD       HEMATOLOGIC/ONCOLOGIC HISTORY:  (History from previous dates can be found in the separate document.)    MEDICATIONS    Current Outpatient Medications:     amitriptyline (ELAVIL) 25 MG tablet, Take 1 tablet by mouth Every Night., Disp: 90 tablet, Rfl: 2    atorvastatin (LIPITOR) 10 MG tablet, Take 1 tablet by mouth Daily., Disp: 90 tablet, Rfl: 3    Cholecalciferol (Vitamin D) 50 MCG (2000 UT) tablet, Take 1 tablet by mouth Daily., Disp: , Rfl:     clopidogrel (PLAVIX) 75 MG tablet, Take 1 tablet by mouth Daily., Disp: 90 tablet, Rfl: 3    dexlansoprazole (Dexilant) 30 MG capsule, Take 1 capsule by mouth Daily., Disp: 90 capsule, Rfl: 3    levothyroxine (Euthyrox) 75 MCG tablet, Take one tablet daily except take 2 tablets on Sundays., Disp: 102 tablet, Rfl: 1    lubiprostone (Amitiza) 8 MCG capsule, Take 1 capsule by mouth 2 (Two) Times a Day With Meals for 90 days., Disp: 60 capsule, Rfl: 2    Melatonin 10 MG capsule, Take 1 capsule by mouth At Night As Needed., Disp: , Rfl:     metoprolol succinate XL (TOPROL-XL) 50 MG 24 hr tablet, Take 1 tablet by mouth 2 (Two) Times a Day. (Patient taking differently: Take 2 tablets by mouth 2 (Two) Times a Day.), Disp: 180 tablet, Rfl: 3    vitamin B-12 (CYANOCOBALAMIN) 1000 MCG tablet, Take 1 tablet by mouth Daily., Disp: , Rfl:     ALLERGIES:     Allergies   Allergen Reactions    Lisinopril Cough    Contrast Dye (Echo Or Unknown Ct/Mr) Itching and Rash     IVP Dye    Tartrazine Hives       SOCIAL HISTORY:       Social History     Socioeconomic History    Marital status:       Spouse name: Jack    Number of children: 2    Years of education: High School   Tobacco Use    Smoking status: Never    Smokeless tobacco: Never    Tobacco comments:     CAFFEINE USE: 4-5 CUPS 1/2 & 1/2 COFFEE DAILY   Vaping Use    Vaping Use: Never used   Substance and Sexual Activity    Alcohol use: Not Currently    Drug use: No    Sexual activity: Defer         FAMILY HISTORY:  Family History   Problem Relation Age of Onset    Cancer Mother 68        head and neck    Stroke Mother     Heart disease Mother     Gallbladder disease Mother     Throat cancer Mother 68    Breast cancer Sister 57    Heart disease Sister     Hypertension Sister     Cancer Sister     Gallbladder disease Sister     Diabetes Sister     Cancer Brother         head and neck    Heart disease Brother     Hypertension Brother     Gallbladder disease Brother     Lung cancer Brother 67    Throat cancer Brother 67    Hypertension Father     Gallbladder disease Father     Emphysema Father     Heart disease Father         Enlarged heart    Cancer Maternal Aunt     Cancer Maternal Uncle     Colon cancer Maternal Uncle     Heart attack Brother     Alcohol abuse Brother     Hypertension Sister     Heart disease Sister     Hypertension Sister     Heart disease Sister     Hypertension Sister     No Known Problems Other        REVIEW OF SYSTEMS:  Review of Systems   Constitutional:  Negative for activity change.   HENT:  Negative for nosebleeds and trouble swallowing.    Respiratory:  Negative for shortness of breath and wheezing.    Cardiovascular:  Negative for chest pain and palpitations.   Gastrointestinal:  Negative for constipation, diarrhea and nausea.   Genitourinary:  Negative for dysuria and hematuria.   Musculoskeletal:  Negative for arthralgias and myalgias.   Skin:  Negative for rash and wound.   Neurological:  Negative for seizures and syncope.   Hematological:  Negative for adenopathy. Does not bruise/bleed easily.  "  Psychiatric/Behavioral:  Negative for confusion.            Objective    Vitals:    10/12/23 1324   BP: 127/72   Pulse: 68   Resp: 16   Temp: 98.1 øF (36.7 øC)   TempSrc: Temporal   SpO2: 98%   Weight: 62.9 kg (138 lb 11.2 oz)   Height: 157 cm (61.81\")   PainSc: 0-No pain         10/12/2023     1:28 PM   Current Status   ECOG score 0      PHYSICAL EXAM:        CONSTITUTIONAL:  Vital signs reviewed.  No distress, looks comfortable.  EYES:  Conjunctiva and lids unremarkable.  PERRLA  EARS,NOSE,MOUTH,THROAT:  Ears and nose appear unremarkable.  Lips, teeth, gums appear unremarkable.  RESPIRATORY:  Normal respiratory effort.  Lungs clear to auscultation bilaterally.  CARDIOVASCULAR:  Normal S1, S2.  No murmurs rubs or gallops.  No significant lower extremity edema.  BREAST: no masses by palpation or inspection.  Left mastectomy  GASTROINTESTINAL: Abdomen appears unremarkable.  Nontender.  No hepatomegaly.  No splenomegaly.  LYMPHATIC:  No cervical, supraclavicular, axillary lymphadenopathy.  SKIN:  Warm.  No rashes.  PSYCHIATRIC:  Normal judgment and insight.  Normal mood and affect.        RECENT LABS:        WBC   Date/Time Value Ref Range Status   10/12/2023 01:03 PM 6.72 3.40 - 10.80 10*3/mm3 Final   05/22/2023 11:53 AM 7.5 3.4 - 10.8 x10E3/uL Final     Hemoglobin   Date/Time Value Ref Range Status   10/12/2023 01:03 PM 12.9 12.0 - 15.9 g/dL Final     Platelets   Date/Time Value Ref Range Status   10/12/2023 01:03  140 - 450 10*3/mm3 Final       Assessment/Plan     ASSESSMENT:  Problem List Items Addressed This Visit          High    Malignant neoplasm of lower-outer quadrant of left female breast - Primary    Relevant Orders    CBC & Differential      *Stage IIIA, grade 3, 3.9 cm left breast cancer. Four out of 24 nodes positive. ER 2%, [place] negative, HER2 negative. Completed FEC x3 followed by Taxotere x3, 11/28/2011. Completed radiation in February 2012.   Poor tolerance to anastrozole and letrozole.  " completed 5 years hormonal therapy using Aromasin, 1/31/17.  (She was initially a patient at [facility]. She transferred here as her son-in-law, Jeremiah Dumont, was a patient here).   Suspect she remains in remission.  However, with the bulging/swelling of skin around her mastectomy site on the left and the new onset right-sided chest pain, check a CT of the chest to assess for recurrence.  CT chest 8/10/2020: No evidence of recurrence.  I discussed with Dr. Braxton-she stated we could do an ultrasound if we wanted to evaluate the superficial areas more.  Notably the bulging is on the left side and the right side has chest pain.  She states an ultrasound often shows fat necrosis better than a CT.  She suspects the CT would have picked up malignancy if it was present.  Patient did not want to pursue an ultrasound.  Symptoms resolved.  No evidence of recurrence.  Remission remaining.    *Possible lupus.  Was evaluated by Dr. Rivera for this  10/21/2021 visit: Fever of 100.5 or higher on average every 2 weeks or so for the past 2 months.  I told her sometimes this can occur with a rheumatologic disorder.  I advise she discuss with her PCP or Dr. Rivera about this.  (CBC unremarkable.  Exam unremarkable.  No clear signs of leukemia or lymphoma.  I told her we could do CTs looking for LAD which is not in areas that can be palpated.  However, I think it is unlikely this is the case.  She declines CTs at this time)  4/21/2022: Continues intermittent fever.  Unchanged.  Advised again to follow-up with Dr. Rivera.  10/20/2022: No complaints of fever today  4/ 13/23: Has had recent intermittent fevers up to 100.2.  PCP aware.  (January 2023 had significantly higher fevers.  No specific infection identified).  10/12/2023: States she saw Dr. Rivera again and she states he he does not feel she has a rheumatologic problem     *Chronic right low back/hip pain radiating anteriorly.  In May 2016, CAT scan and bone scan negative for cancer as the  cause of the pain.  Defer further management of this to her PCP.  She did not complain of this today.    *In the past, she has complained of: Forgetfulness and transposition of numbers when she is writing numbers at times. This had been present since around April 2015. She states it is not worsening.   She did not complain of this today.    *History of stroke around 2005. Because of this I do not think she would be a good candidate for tamoxifen.     *Osteopenia, which has improved to normal bone density.  DEXA 5/17/16 normal bone density.  T score -0.6, compared to.   -1.1 2/20/14.  She is on calcium and vitamin D.  Defer further management of this to her PCP now that she is off aromatase inhibitors.    *Right upper quadrant/right lower anterior rib pain.  Present since October 2016.    Due to RUQ abdominal pain/right lower anterior rib pain since October 2016, bone scan and CT abdomen with contrast 2/2/17: No evidence of recurrence.  (8 mm low-attenuation liver lesion seen on the 5/17/16 CT but less conspicuous on order CT.  This was felt to be due to older CT without contrast.  This was felt to likely be benign.  Plan no further scheduled follow-up of this-patient agrees.).  She did not complain of this pain today.    *Hypokalemia. Her PCP manages this.      *IV contrast allergy.  Previous rash.  Receives IV contrast pre-medicines with CT IV contrast.  (This has worked well in the past)    *Left arm lymphedema due to prior surgery for breast cancer.  She was seen by the lymphedema clinic but has had some logistical issues obtaining the sleeve they prescribed.  I encouraged her to continue to call the lymphedema clinic for assistance with this.    *Previously complained of vertigo and was referred to her ENT.    *Heartburn.  Had EGD and colonoscopy by Dr. Martinez November 2018.  No malignancy was found.      *Intermittent chest pain x2 years, SBP's sometimes in the 190s.  Her blood pressure machine has been reading  irregular sometimes for her heart rate.  Coronary calcifications on last CT.  Referred to her cardiologist, Dr. Barker  4/21/2022: She has seen Dr. Barker recently.  No further cardiac work-up planned at this time.    *Social issues  Her sister passed away in 2023  Her disabled daughter passed away September 2022  Her other daughter, Osmin, is dealing with the death of her  which was around January 2019.    *10/12/2023: Left chest discomfort lasting for 3 to 4 minutes at a time, intermittent for the past few months  10/12/2023: She thinks this may be due to several falls while trying to clear up a prolonged cobian with C. difficile.  She declines imaging now but states she will call when if this persists in which case we will order CT of the chest to evaluate this    PLAN:   M.REESE CBC 6 months  Last mammogram 2/21/2023: BI-RADS 1 (Right-sided mammograms)  Off hormonal therapy  If she calls in with continued chest discomfort, we will arrange a CT of the chest without contrast.  If that is negative for malignancy then we will advise she sees her PCP to evaluate possibilities such as cardiac etiologies    Send copy to Dr. Paxton Rivera, rheumatology    40 minutes.  Total time.  Same day.

## 2023-12-10 NOTE — PROGRESS NOTES
Subjective   Carlotta Pires is a 78 y.o. female.     Chief Complaint   Patient presents with    Hypertension     4 mo follow up       Hyperlipidemia    Hypothyroidism       History of Present Illness   Patient presents for follow up HTN: takes Metoprolol twice daily; also takes Plavix daily for history of stroke 25 years ago; no residual deficit other than sometimes flips numbers wrong; monitors BP on occasion, BP has been running higher at times; BP will be 160-180s/70s at times; has been off Losartan and Chlorthalidone since was having low BP when had C.diff; no headaches; some orthostasis if moves too fast; feels like has to move slower since had C.diff; mild swelling in left arm and leg off and on since had mastectomy; sees Dr. Barker cardiology.     F/U Hyperlipidemia: takes Atorvastatin daily; no myalgias; avoids red meat and fried foods; fasting today.     F/U Hypothyroidism: takes Levothyroxine daily except 2 tabs on Sundays; takes med on empty stomach; no missed doses.     F/U C.diff colitis: had follow up with Dr. Sushil DEJESUS on 8/7/23; completed Vowst on 7/31/23; no more problems; no additional follow up needed unless will need to take an antibiotic at some point.     F/U positive GEOVANNI: saw rheumatology and told to come back after C. diff resolved; has bad OA; no recent fevers; had follow up with rheumatology and told did not think had autoimmune problem; no additional follow up with rheumatology unless starts with fevers again.     F/U [place]: takes Dexilant daily and works well; was taking every other day and had symptoms; has had EGD in past; sees GI; was seeing Dr. Wilcox GI, but left practice; saw URBAN Joyner, but having trouble getting another follow up appointment; recently had trouble with constipation; tried Amitiza twice daily and was having diarrhea, so changed to once daily and has been working well; no blood in BM.    Started daily allergy med instead of Benadryl and has been working  well.     Sees Dr. Chanel, oncology for history of breast cancer; history of chemo and radiation; completed hormonal therapy 1/31/17; had follow up with oncology in October and discussed discomfort in chest; considering CT chest, but symptoms have improved.     Takes Amitriptyline nightly for trigeminal neuralgia; also helps sleep; has had life change without having to care for daughter any more since she passed; also lost sister recently; overall, mood has improved; has not seen neurology for long time since off other meds for trigeminal neuralgia     F/U Vitamin B12 deficiency: takes Vitamin B12 daily.    Also, c/o left toe feeling numb for a period of time, now has numbness of right of 1st toe and feeling like numbness is moving up legs at times; symptoms worse with sitting or not wearing shoes; no pain; has had disc problem in lower back for long time; Dr. Chanel said chemo may have caused symptoms; has had trouble with left sided sciatica for years; rest and taking Aleve will resolve symptoms; had weakness in legs when had C.diff and weakness has improved.        The following portions of the patient's history were reviewed and updated as appropriate: allergies, current medications, past family history, past medical history, past social history, past surgical history and problem list.    Current Outpatient Medications on File Prior to Visit   Medication Sig    amitriptyline (ELAVIL) 25 MG tablet Take 1 tablet by mouth Every Night.    atorvastatin (LIPITOR) 10 MG tablet Take 1 tablet by mouth Daily.    Cholecalciferol (Vitamin D) 50 MCG (2000 UT) tablet Take 1 tablet by mouth Daily.    clopidogrel (PLAVIX) 75 MG tablet Take 1 tablet by mouth Daily.    dexlansoprazole (Dexilant) 30 MG capsule Take 1 capsule by mouth Daily.    levothyroxine (Euthyrox) 75 MCG tablet Take one tablet daily except take 2 tablets on Sundays.    lubiprostone (Amitiza) 8 MCG capsule Take 1 capsule by mouth Daily With Breakfast.    Melatonin  10 MG capsule Take 1 capsule by mouth At Night As Needed.    metoprolol succinate XL (TOPROL-XL) 50 MG 24 hr tablet Take 1 tablet by mouth 2 (Two) Times a Day. (Patient taking differently: Take 2 tablets by mouth 2 (Two) Times a Day.)    vitamin B-12 (CYANOCOBALAMIN) 1000 MCG tablet Take 1 tablet by mouth Daily.     No current facility-administered medications on file prior to visit.        Past Medical History:   Diagnosis Date    Allergic rhinitis     Arthritis     C. difficile colitis     Cancer     Left breast cancer    CHF (congestive heart failure)     Cough     COVID-19 11/2020    Degenerative arthritis of thumb     GERD without esophagitis     H/O TIA (transient ischemic attack) and stroke 2005    History of bone density study     Lumbar radiculopathy     Osteoporosis     Palpitations     Peptic ulceration     Personal history of malignant neoplasm of breast     Sciatica     Stroke 2005    CVA--History of storke    Trigeminal neuralgia        Past Surgical History:   Procedure Laterality Date    BREAST BIOPSY Left 2011    CATARACT EXTRACTION, BILATERAL      COLONOSCOPY  09/28/2012    Adolph Martinez MD    COLONOSCOPY N/A 11/13/2018    Procedure: COLONOSCOPY to cecum;  Surgeon: Adolph Martinez MD;  Location: [facility] ENDOSCOPY;  Service: Gastroenterology    COLONOSCOPY N/A 01/27/2021    Procedure: COLONOSCOPY TO CECUM AND TERMINAL ILEUM;  Surgeon: Emelia Wilcox MD;  Location: [facility] ENDOSCOPY;  Service: Gastroenterology;  Laterality: N/A;  RECTAL BLEEDING  --DIVERTICULOSIS, HEMORRHOIDS, TORTUOUS COLON    ENDOSCOPY N/A 11/13/2018    Procedure: ESOPHAGOGASTRODUODENOSCOPY with bx;  Surgeon: Adolph Martinez MD;  Location: [facility] ENDOSCOPY;  Service: Gastroenterology    ENDOSCOPY N/A 01/27/2021    Procedure: ESOPHAGOGASTRODUODENOSCOPY WITH COLD BX;  Surgeon: Emelia Wilcox MD;  Location: [facility] ENDOSCOPY;  Service: Gastroenterology;  Laterality: N/A;  GERD  --GASTRITIS, HIATAL HERNIA    EYE SURGERY       HYSTERECTOMY      At age 31-Not due to cancer    MASTECTOMY Left 06/30/2011    Dr. Kaitlyn Luna    TUBAL ABDOMINAL LIGATION  1974    UPPER GASTROINTESTINAL ENDOSCOPY  09/28/2012    Adolph Martinez MD       Family History   Problem Relation Age of Onset    Cancer Mother 68        head and neck    Stroke Mother     Heart disease Mother     Gallbladder disease Mother     Throat cancer Mother 68    Breast cancer Sister 57    Heart disease Sister     Hypertension Sister     Cancer Sister     Gallbladder disease Sister     Diabetes Sister     Cancer Brother         head and neck    Heart disease Brother     Hypertension Brother     Gallbladder disease Brother     Lung cancer Brother 67    Throat cancer Brother 67    Hypertension Father     Gallbladder disease Father     Emphysema Father     Heart disease Father         Enlarged heart    Cancer Maternal Aunt     Cancer Maternal Uncle     Colon cancer Maternal Uncle     Heart attack Brother     Alcohol abuse Brother     Hypertension Sister     Heart disease Sister     Hypertension Sister     Heart disease Sister     Hypertension Sister     No Known Problems Other        Social History     Socioeconomic History    Marital status:      Spouse name: Jack    Number of children: 2    Years of education: High School   Tobacco Use    Smoking status: Never    Smokeless tobacco: Never    Tobacco comments:     CAFFEINE USE: 4-5 CUPS 1/2 & 1/2 COFFEE DAILY   Vaping Use    Vaping Use: Never used   Substance and Sexual Activity    Alcohol use: Not Currently    Drug use: No    Sexual activity: Defer       Review of Systems   Constitutional:  Positive for fatigue (some, but improving; will have to rest at times). Negative for chills, fever, unexpected weight gain and unexpected weight loss. Appetite change: has improved.  HENT:  Negative for ear pain (has itching in left ear), sinus pressure, sore throat and trouble swallowing.    Eyes:  Blurred vision: some at night, no recent  "eye exam.   Respiratory:  Negative for cough, chest tightness and shortness of breath.    Cardiovascular:  Chest pain: see HPI. Palpitations: rare.   Gastrointestinal:  Negative for blood in stool. Abdominal pain: some at times when eats certain foods; feels like still adjusting back to eating regular diet. Diarrhea: see HPI.  Endocrine: Positive for cold intolerance. Negative for heat intolerance and polydipsia.   Genitourinary:  Negative for dysuria and frequency.   Musculoskeletal:  Back pain: see HPI.   Skin:  Negative for rash.   Neurological:  Negative for syncope. Numbness: see HPI.      Objective   Vitals:    12/11/23 0800   BP: 140/78   BP Location: Left arm   Patient Position: Sitting   Cuff Size: Adult   Pulse: 58   Temp: 97.1 °F (36.2 °C)   SpO2: 99%   Weight: 64.9 kg (143 lb)   Height: 157 cm (61.81\")     Body mass index is 26.32 kg/m².    Physical Exam  Vitals and nursing note reviewed.   Constitutional:       General: She is not in acute distress.     Appearance: She is well-developed and well-groomed. She is not diaphoretic.   HENT:      Head: Normocephalic.      Right Ear: External ear normal. No decreased hearing noted. Right ear middle ear effusion: fluid bubbles behind TM. Tympanic membrane is not erythematous.      Left Ear: External ear normal. No decreased hearing noted. Impacted cerumen: cerumen blocking TM. Tympanic membrane is not erythematous.      Nose: Nose normal.      Right Sinus: No maxillary sinus tenderness or frontal sinus tenderness.      Left Sinus: No maxillary sinus tenderness or frontal sinus tenderness.      Mouth/Throat:      Mouth: Mucous membranes are moist.      Pharynx: No oropharyngeal exudate or posterior oropharyngeal erythema.   Eyes:      Conjunctiva/sclera: Conjunctivae normal.   Neck:      Vascular: No carotid bruit.   Cardiovascular:      Rate and Rhythm: Normal rate and regular rhythm.      Pulses: Normal pulses.           Dorsalis pedis pulses are 2+ on the " right side and 2+ on the left side.        Posterior tibial pulses are 2+ on the right side and 2+ on the left side.      Heart sounds: Normal heart sounds. No murmur heard.  Pulmonary:      Effort: Pulmonary effort is normal. No respiratory distress.      Breath sounds: Normal breath sounds.   Abdominal:      General: Bowel sounds are normal.      Palpations: Abdomen is soft. There is no hepatomegaly or splenomegaly.      Tenderness: There is no abdominal tenderness. There is no guarding.   Musculoskeletal:      Cervical back: Normal range of motion and neck supple. No bony tenderness.      Thoracic back: No bony tenderness.      Lumbar back: No bony tenderness.      Right lower leg: No edema.      Left lower leg: No edema.   Feet:      Right foot:      Protective Sensation: 10 sites tested.  10 sites sensed.      Skin integrity: Skin integrity normal.      Left foot:      Protective Sensation: 10 sites tested.  10 sites sensed.      Skin integrity: Skin integrity normal.   Lymphadenopathy:      Cervical: No cervical adenopathy.   Skin:     General: Skin is warm and dry.      Findings: No rash.   Neurological:      Mental Status: She is alert and oriented to person, place, and time.      Gait: Abnormal gait: guarded gait.      Deep Tendon Reflexes:      Reflex Scores:       Patellar reflexes are 2+ on the right side and 2+ on the left side.  Psychiatric:         Mood and Affect: Mood normal.         Behavior: Behavior normal.         Thought Content: Thought content normal.         Cognition and Memory: Cognition normal.         Judgment: Judgment normal.         Lab Results   Component Value Date    WBC 6.72 10/12/2023    RBC 4.25 10/12/2023    HGB 12.9 10/12/2023    HCT 37.8 10/12/2023    MCV 88.9 10/12/2023    MCH 30.4 10/12/2023    MCHC 34.1 10/12/2023    RDW 13.2 10/12/2023    RDWSD 43.3 10/12/2023    MPV 10.1 10/12/2023     10/12/2023    NEUTRORELPCT 62.9 10/12/2023    LYMPHORELPCT 23.5 10/12/2023     MONORELPCT 7.9 10/12/2023    EOSRELPCT 4.8 10/12/2023    BASORELPCT 0.6 10/12/2023    AUTOIGPER 0.3 10/12/2023    NEUTROABS 4.23 10/12/2023    LYMPHSABS 1.58 10/12/2023    MONOSABS 0.53 10/12/2023    EOSABS 0.32 10/12/2023    BASOSABS 0.04 10/12/2023    AUTOIGNUM 0.02 10/12/2023    NRBC 0.0 10/12/2023     Lab Results   Component Value Date    GLUCOSE 94 08/09/2023    BUN 7 (L) 08/09/2023    CREATININE 0.65 08/09/2023    EGFRIFNONA 74 08/24/2021    EGFRIFAFRI 90 08/24/2021    BCR 10.8 08/09/2023    K 4.1 08/09/2023    CO2 27.2 08/09/2023    CALCIUM 9.9 08/09/2023    PROTENTOTREF 6.6 08/09/2023    ALBUMIN 4.4 08/09/2023    LABIL2 2.0 08/09/2023    AST 20 08/09/2023    ALT 7 08/09/2023      Lab Results   Component Value Date    CHLPL 143 08/09/2023    TRIG 87 08/09/2023    HDL 51 08/09/2023    VLDL 17 08/09/2023    LDL 75 08/09/2023     Lab Results   Component Value Date    TSH 2.050 08/09/2023         Assessment    Problem List Items Addressed This Visit       Essential hypertension - Primary    Current Assessment & Plan     Hypertension is worsening.  Medication changes per orders.  Ambulatory blood pressure monitoring.  Blood pressure will be reassessed in 4 weeks.  Continue Metoprolol twice daily.  Resume Losartan 50 mg half tablet daily.  Call if your blood pressure is consistently greater than 140/90.  Schedule a follow up appointment with Dr. Barker, cardiology.           Relevant Medications    losartan (COZAAR) 50 MG tablet    Hyperlipidemia, mixed    Current Assessment & Plan     Continue Atorvastatin daily.  Continue to work on healthy diet and exercise as tolerated.         Relevant Orders    Comprehensive Metabolic Panel    Lipid Panel With LDL / HDL Ratio    Allergic rhinitis    Current Assessment & Plan     Stable.  Continue daily allergy med.         Hypothyroidism (acquired)    Current Assessment & Plan     Continue Levothyroxine daily.         Relevant Orders    TSH Rfx On Abnormal To Free T4     Trigeminal neuralgia    Current Assessment & Plan     Stable.  Continue Amitriptyline nightly.         Relevant Orders    Ambulatory Referral to Neurology    Gastroesophageal reflux disease without esophagitis    Overview     Added automatically from request for surgery [id number]         Current Assessment & Plan     Stable.  Continue Dexilant daily.         Constipation    Current Assessment & Plan     Stable.  Continue Amitiza daily.         Vitamin B12 deficiency    Relevant Orders    Vitamin B12 & Folate    Postmenopause    Relevant Orders    DEXA Bone Density Axial    Neuropathy    Relevant Orders    Vitamin B12 & Folate    Ambulatory Referral to Neurology    RESOLVED: Fever in adult    RESOLVED: C. difficile colitis          Return in about 1 month (around 1/11/2024) for Recheck.or sooner if symptoms persist or worsen.  BP has been elevated per home BP readings and slightly elevated here today; will resume Losartan 50 mg half tablet daily and continue to monitor BP.  Patient has had worsening of neuropathy in feet and lower legs; will check labs and refer to neurology for further evaluation.

## 2023-12-11 ENCOUNTER — OFFICE VISIT (OUTPATIENT)
Dept: FAMILY MEDICINE CLINIC | Facility: CLINIC | Age: 78
End: 2023-12-11
Payer: MEDICARE

## 2023-12-11 VITALS
WEIGHT: 143 LBS | SYSTOLIC BLOOD PRESSURE: 140 MMHG | BODY MASS INDEX: 26.31 KG/M2 | OXYGEN SATURATION: 99 % | HEIGHT: 62 IN | TEMPERATURE: 97.1 F | DIASTOLIC BLOOD PRESSURE: 78 MMHG | HEART RATE: 58 BPM

## 2023-12-11 DIAGNOSIS — R50.9 FEVER IN ADULT: ICD-10-CM

## 2023-12-11 DIAGNOSIS — G50.0 TRIGEMINAL NEURALGIA: ICD-10-CM

## 2023-12-11 DIAGNOSIS — E03.9 HYPOTHYROIDISM (ACQUIRED): ICD-10-CM

## 2023-12-11 DIAGNOSIS — J30.9 ALLERGIC RHINITIS, UNSPECIFIED SEASONALITY, UNSPECIFIED TRIGGER: ICD-10-CM

## 2023-12-11 DIAGNOSIS — K59.00 CONSTIPATION, UNSPECIFIED CONSTIPATION TYPE: ICD-10-CM

## 2023-12-11 DIAGNOSIS — K21.9 GASTROESOPHAGEAL REFLUX DISEASE WITHOUT ESOPHAGITIS: ICD-10-CM

## 2023-12-11 DIAGNOSIS — Z78.0 POSTMENOPAUSE: ICD-10-CM

## 2023-12-11 DIAGNOSIS — A04.72 C. DIFFICILE COLITIS: ICD-10-CM

## 2023-12-11 DIAGNOSIS — E53.8 VITAMIN B12 DEFICIENCY: ICD-10-CM

## 2023-12-11 DIAGNOSIS — I10 ESSENTIAL HYPERTENSION: Primary | ICD-10-CM

## 2023-12-11 DIAGNOSIS — E78.2 HYPERLIPIDEMIA, MIXED: ICD-10-CM

## 2023-12-11 DIAGNOSIS — G62.9 NEUROPATHY: ICD-10-CM

## 2023-12-11 PROBLEM — R63.0 DECREASED APPETITE: Status: RESOLVED | Noted: 2021-01-05 | Resolved: 2023-12-11

## 2023-12-11 PROBLEM — R76.8 POSITIVE ANA (ANTINUCLEAR ANTIBODY): Status: RESOLVED | Noted: 2023-08-10 | Resolved: 2023-12-11

## 2023-12-11 PROCEDURE — 1159F MED LIST DOCD IN RCRD: CPT | Performed by: NURSE PRACTITIONER

## 2023-12-11 PROCEDURE — 3078F DIAST BP <80 MM HG: CPT | Performed by: NURSE PRACTITIONER

## 2023-12-11 PROCEDURE — 99214 OFFICE O/P EST MOD 30 MIN: CPT | Performed by: NURSE PRACTITIONER

## 2023-12-11 PROCEDURE — 3077F SYST BP >= 140 MM HG: CPT | Performed by: NURSE PRACTITIONER

## 2023-12-11 PROCEDURE — 1160F RVW MEDS BY RX/DR IN RCRD: CPT | Performed by: NURSE PRACTITIONER

## 2023-12-11 RX ORDER — LOSARTAN POTASSIUM 50 MG/1
50 TABLET ORAL DAILY
Qty: 30 TABLET | Refills: 2 | Status: SHIPPED | OUTPATIENT
Start: 2023-12-11

## 2023-12-11 RX ORDER — LUBIPROSTONE 8 UG/1
8 CAPSULE ORAL
COMMUNITY

## 2023-12-11 NOTE — PATIENT INSTRUCTIONS
Resume Losartan 50 mg half tablet daily.  Continue to monitor your blood pressure periodically and record results.  Call if your blood pressure is consistently greater than 140/90.  Continue to work on healthy diet and exercise.  Follow up pending lab results.  Follow up in 1 month, or sooner if symptoms persist or worsen.

## 2023-12-12 NOTE — ASSESSMENT & PLAN NOTE
Hypertension is worsening.  Medication changes per orders.  Ambulatory blood pressure monitoring.  Blood pressure will be reassessed in 4 weeks.  Continue Metoprolol twice daily.  Resume Losartan 50 mg half tablet daily.  Call if your blood pressure is consistently greater than 140/90.  Schedule a follow up appointment with Dr. Barker, cardiology.

## 2023-12-13 LAB
ALBUMIN SERPL-MCNC: 4.3 G/DL (ref 3.5–5.2)
ALBUMIN/GLOB SERPL: 2 G/DL
ALP SERPL-CCNC: 88 U/L (ref 39–117)
ALT SERPL-CCNC: 16 U/L (ref 1–33)
AST SERPL-CCNC: 26 U/L (ref 1–32)
BILIRUB SERPL-MCNC: 0.4 MG/DL (ref 0–1.2)
BUN SERPL-MCNC: 8 MG/DL (ref 8–23)
BUN/CREAT SERPL: 10.7 (ref 7–25)
CALCIUM SERPL-MCNC: 9.5 MG/DL (ref 8.6–10.5)
CHLORIDE SERPL-SCNC: 107 MMOL/L (ref 98–107)
CHOLEST SERPL-MCNC: 145 MG/DL (ref 0–200)
CO2 SERPL-SCNC: 25.2 MMOL/L (ref 22–29)
CREAT SERPL-MCNC: 0.75 MG/DL (ref 0.57–1)
EGFRCR SERPLBLD CKD-EPI 2021: 82.6 ML/MIN/1.73
FOLATE SERPL-MCNC: >20 NG/ML (ref 4.78–24.2)
GLOBULIN SER CALC-MCNC: 2.1 GM/DL
GLUCOSE SERPL-MCNC: 103 MG/DL (ref 65–99)
HDLC SERPL-MCNC: 48 MG/DL (ref 40–60)
LDLC SERPL CALC-MCNC: 76 MG/DL (ref 0–100)
LDLC/HDLC SERPL: 1.55 {RATIO}
POTASSIUM SERPL-SCNC: 4 MMOL/L (ref 3.5–5.2)
PROT SERPL-MCNC: 6.4 G/DL (ref 6–8.5)
SODIUM SERPL-SCNC: 144 MMOL/L (ref 136–145)
TRIGL SERPL-MCNC: 114 MG/DL (ref 0–150)
TSH SERPL DL<=0.005 MIU/L-ACNC: 2.78 UIU/ML (ref 0.27–4.2)
VIT B12 SERPL-MCNC: 784 PG/ML (ref 211–946)
VLDLC SERPL CALC-MCNC: 21 MG/DL (ref 5–40)

## 2024-01-12 ENCOUNTER — APPOINTMENT (OUTPATIENT)
Dept: CT IMAGING | Facility: HOSPITAL | Age: 79
End: 2024-01-12
Payer: MEDICARE

## 2024-01-12 ENCOUNTER — HOSPITAL ENCOUNTER (EMERGENCY)
Facility: HOSPITAL | Age: 79
Discharge: HOME | End: 2024-01-12
Attending: EMERGENCY MEDICINE
Payer: MEDICARE

## 2024-01-12 VITALS
SYSTOLIC BLOOD PRESSURE: 159 MMHG | HEART RATE: 72 BPM | DIASTOLIC BLOOD PRESSURE: 82 MMHG | RESPIRATION RATE: 18 BRPM | OXYGEN SATURATION: 95 % | TEMPERATURE: 96.9 F

## 2024-01-12 DIAGNOSIS — S46.912A LEFT SHOULDER STRAIN, INITIAL ENCOUNTER: ICD-10-CM

## 2024-01-12 DIAGNOSIS — S01.01XA OCCIPITAL SCALP LACERATION, INITIAL ENCOUNTER: Primary | ICD-10-CM

## 2024-01-12 PROCEDURE — 70450 CT HEAD/BRAIN W/O DYE: CPT

## 2024-01-12 PROCEDURE — 72125 CT NECK SPINE W/O DYE: CPT

## 2024-01-12 PROCEDURE — 99284 EMERGENCY DEPT VISIT MOD MDM: CPT

## 2024-01-12 NOTE — ED TRIAGE NOTES
Patient to er from home per ems with c/o she tripped on floor mate resulting in injury to back of head. Patient stated she is unsure loc when this happen. Patient stated she is on plavix. Patient stated some numbness and tingling in both legs. C-collar was placed in triage at this time.

## 2024-01-12 NOTE — ED PROVIDER NOTES
EMERGENCY DEPARTMENT ENCOUNTER    Room Number:  10/10  PCP: Gudelia Bazzi APRN  Historian: Patient      HPI:  Chief Complaint: Fall, head injury  A complete HPI/ROS/PMH/PSH/SH/FH are unobtainable due to: None    Context: Carlotta Pires is a 78 y.o. female who presents to the ED via [facility] from home c/o acute head injury after a fall this morning striking her head on the tile.  Does take Plavix, unclear if any LOC.  Had complained of some tingling in her legs bilaterally.  Arrived in cervical collar.  Some mild shoulder discomfort but able to maintain range of motion.      MEDICAL RECORD REVIEW    External (non-ED) record review: Chart review in epic confirms that the patient does take Plavix              PAST MEDICAL HISTORY  Active Ambulatory Problems     Diagnosis Date Noted    Malignant neoplasm of lower-outer quadrant of left female breast 05/09/2016    Osteopenia 05/10/2016    Encounter for screening for malignant neoplasm of colon 11/28/2017    Epigastric pain 10/18/2018    Essential hypertension 04/24/2019    History of cardiomyopathy 04/24/2019    Hyperlipidemia, mixed 11/22/2019    Allergic rhinitis 11/22/2019    Hypothyroidism (acquired) 11/22/2019    Primary insomnia 11/22/2019    CHF (congestive heart failure) 11/22/2019    Encounter for Medicare annual wellness exam 11/22/2019    Arthritis 11/25/2019    Trigeminal neuralgia     Osteoporosis     Lumbar radiculopathy     Degenerative arthritis of thumb     Rectal bleeding 01/05/2021    Gastroesophageal reflux disease without esophagitis 11/22/2019    Weight loss, abnormal 01/05/2021    History of breast cancer 01/05/2021    FH: colon cancer 01/05/2021    Dilated cardiomyopathy 04/24/2019    Fatigue 01/25/2023    Acute non-recurrent sinusitis 01/25/2023    Constipation 01/25/2023    Anxiety 01/25/2023    Positive depression screening 01/25/2023    Vitamin B12 deficiency 04/15/2023    Cough 04/15/2023    Chronic diastolic CHF (congestive  heart failure) 04/26/2023    Anemia 05/09/2023    Diarrhea 05/23/2023    Hypokalemia 05/23/2023    Postmenopause 12/11/2023    Neuropathy 12/11/2023     Resolved Ambulatory Problems     Diagnosis Date Noted    GERD without esophagitis 11/22/2019    Decreased appetite 01/05/2021    Fever in adult 04/24/2023    C. difficile colitis 04/26/2023    Positive GEOVANNI (antinuclear antibody) 08/10/2023     Past Medical History:   Diagnosis Date    Cancer     COVID-19 11/2020    H/O TIA (transient ischemic attack) and stroke 2005    History of bone density study     Palpitations     Peptic ulceration     Personal history of malignant neoplasm of breast     Sciatica     Stroke 2005         PAST SURGICAL HISTORY  Past Surgical History:   Procedure Laterality Date    BREAST BIOPSY Left 2011    CATARACT EXTRACTION, BILATERAL      COLONOSCOPY  09/28/2012    Adolph Martinez MD    COLONOSCOPY N/A 11/13/2018    Procedure: COLONOSCOPY to cecum;  Surgeon: Adolph Martinez MD;  Location: [facility] ENDOSCOPY;  Service: Gastroenterology    COLONOSCOPY N/A 01/27/2021    Procedure: COLONOSCOPY TO CECUM AND TERMINAL ILEUM;  Surgeon: Emelia Wilcox MD;  Location[facility] ENDOSCOPY;  Service: Gastroenterology;  Laterality: N/A;  RECTAL BLEEDING  --DIVERTICULOSIS, HEMORRHOIDS, TORTUOUS COLON    ENDOSCOPY N/A 11/13/2018    Procedure: ESOPHAGOGASTRODUODENOSCOPY with bx;  Surgeon: Adolph Martinez MD;  Location: [facility] ENDOSCOPY;  Service: Gastroenterology    ENDOSCOPY N/A 01/27/2021    Procedure: ESOPHAGOGASTRODUODENOSCOPY WITH COLD BX;  Surgeon: Emelia Wilcox MD;  Location: [facility] ENDOSCOPY;  Service: Gastroenterology;  Laterality: N/A;  GERD  --GASTRITIS, HIATAL HERNIA    EYE SURGERY      HYSTERECTOMY      At age 31-Not due to cancer    MASTECTOMY Left 06/30/2011    Dr. Kaitlyn Luna    TUBAL ABDOMINAL LIGATION  1974    UPPER GASTROINTESTINAL ENDOSCOPY  09/28/2012    Adolph Martinez MD         FAMILY HISTORY  Family History   Problem Relation  Age of Onset    Cancer Mother 68        head and neck    Stroke Mother     Heart disease Mother     Gallbladder disease Mother     Throat cancer Mother 68    Breast cancer Sister 57    Heart disease Sister     Hypertension Sister     Cancer Sister     Gallbladder disease Sister     Diabetes Sister     Cancer Brother         head and neck    Heart disease Brother     Hypertension Brother     Gallbladder disease Brother     Lung cancer Brother 67    Throat cancer Brother 67    Hypertension Father     Gallbladder disease Father     Emphysema Father     Heart disease Father         Enlarged heart    Cancer Maternal Aunt     Cancer Maternal Uncle     Colon cancer Maternal Uncle     Heart attack Brother     Alcohol abuse Brother     Hypertension Sister     Heart disease Sister     Hypertension Sister     Heart disease Sister     Hypertension Sister     No Known Problems Other          SOCIAL HISTORY  Social History     Socioeconomic History    Marital status:      Spouse name: Jack    Number of children: 2    Years of education: High School   Tobacco Use    Smoking status: Never    Smokeless tobacco: Never    Tobacco comments:     CAFFEINE USE: 4-5 CUPS 1/2 & 1/2 COFFEE DAILY   Vaping Use    Vaping Use: Never used   Substance and Sexual Activity    Alcohol use: Not Currently    Drug use: No    Sexual activity: Defer         ALLERGIES  Lisinopril, Contrast dye (echo or unknown ct/mr), and Fd&c yellow #5 (tartrazine)        REVIEW OF SYSTEMS  Review of Systems     All systems reviewed and negative except for those discussed in HPI.       PHYSICAL EXAM    I have reviewed the triage vital signs and nursing notes.    ED Triage Vitals   Temp Heart Rate Resp BP SpO2   01/12/24 1247 01/12/24 1246 01/12/24 1246 01/12/24 1246 01/12/24 1246   96.9 °F (36.1 °C) 80 18 180/86 99 %      Temp src Heart Rate Source Patient Position BP Location FiO2 (%)   01/12/24 1247 -- -- -- --   Tympanic           Physical Exam  General: No  acute distress, nontoxic  HEENT: Mucous membranes moist, occipital scalp laceration, EOMI  Neck: Cervical collar in place  Pulm: Symmetric chest rise, nonlabored  Cardiovascular: Regular rate and rhythm, intact distal pulses  MSK: Full ROM, no deformity  Skin: Warm, dry  Neuro: Awake, alert, oriented x 4, GCS 15, 5-5 strength sensation bilateral upper and lower extremities, moving all extremities, no focal deficits  Psych: Calm, cooperative        LAB RESULTS  No results found for this or any previous visit (from the past 24 hour(s)).    Ordered the above labs and independently interpreted results. My findings will be discussed in the medical decision making section below        RADIOLOGY  No Radiology Exams Resulted Within Past 24 Hours    Ordered the above noted radiological studies.  Independently interpreted by me and my independent review of findings can be found in the ED Course.  See dictation for official radiology interpretation.      PROCEDURES    Laceration Repair    Date/Time: 1/12/2024 2:51 PM    Performed by: Shamar Blackburn MD  Authorized by: Shamar Blackburn MD    Consent:     Consent obtained:  Verbal    Consent given by:  Patient    Risks discussed:  Pain  Universal protocol:     Patient identity confirmed:  Verbally with patient  Anesthesia:     Anesthesia method:  None  Laceration details:     Location:  Scalp    Scalp location:  Occipital    Length (cm):  5    Depth (mm):  2  Exploration:     Limited defect created (wound extended): no      Hemostasis achieved with:  Direct pressure    Imaging obtained comment:  CT head    Imaging outcome: foreign body not noted      Wound exploration: wound explored through full range of motion and entire depth of wound visualized      Wound extent: no areolar tissue violation noted, no fascia violation noted, no foreign bodies/material noted, no muscle damage noted, no nerve damage noted, no tendon damage noted, no underlying fracture noted and no vascular  damage noted      Contaminated: no    Treatment:     Area cleansed with:  Saline    Amount of cleaning:  Standard    Irrigation solution:  Sterile saline    Debridement:  None    Undermining:  None    Scar revision: no      Layers repaired: epidermis/dermis.  Skin repair:     Repair method:  Staples    Number of staples:  5  Approximation:     Approximation:  Close  Repair type:     Repair type:  Simple  Post-procedure details:     Dressing:  Open (no dressing)    Procedure completion:  Tolerated well, no immediate complications          MEDICATIONS GIVEN IN ER    Medications - No data to display      PROGRESS, DATA ANALYSIS, CONSULTS, AND MEDICAL DECISION MAKING    Please note that this section constitutes my independent interpretation of clinical data including lab results, radiology, EKG's.  This constitutes my independent professional opinion regarding differential diagnosis and management of this patient.  It may include any factors such as history from outside sources, review of external records, social determinants of health, management of medications, response to those treatments, and discussions with other providers.    Differential Diagnosis and Plan: Initial concern for skull fracture, intracranial hemorrhage, cervical fracture, among others.  Plan for CT head, CT C-spine and reevaluation with results.  Plan for laceration repair.    Additional sources:  - Discussed/ obtained information from independent historians:       - (Social Determinants of Health): None     - Shared decision making:  Patient and family at bedside fully updated on and in agreement with the course and plan moving forward    ED Course as of 01/12/24 1455   Fri Jan 12, 2024   1329 CT Head Without Contrast  Per my independent interpretation of the CT head, no overtly obvious intracranial hemorrhage although subtle finding could be missed and will defer to final radiology report [DC]   1436 CT Head Without Contrast  Discussed the CT  head and cervical spine with Dr. Higginbotham, Radiologist, no evidence of skull fracture or intracranial hemorrhage, no cervical fracture or traumatic subluxation [DC]   7740 Patient and family fully updated on the reassuring findings today, cervical collar has been cleared, laceration repaired with staples.  Supportive care measures discussed.  Close outpatient PCP follow-up recommended, ED return for worsening symptoms as needed.  Staple removal in 5 to 7 days. [DC]      ED Course User Index  [DC] Shamar Blackburn MD       Hospitalization Considered?:     Orders Placed During This Visit:  Orders Placed This Encounter   Procedures    Laceration Repair    CT Head Without Contrast    CT Cervical Spine Without Contrast       Additional orders considered but not placed:      Independent interpretation of labs, radiology studies, and discussions with consultants: See ED Course        AS OF 14:55 EST VITALS:    BP - 160/75  HR - 73  TEMP - 96.9 °F (36.1 °C) (Tympanic)  02 SATS - 95%          DIAGNOSIS  Final diagnoses:   Occipital scalp laceration, initial encounter   Left shoulder strain, initial encounter         DISPOSITION  ED Disposition       ED Disposition   Discharge    Condition   Stable    Comment   --                Please note that portions of this document were completed with a voice recognition program.    Note Disclaimer: At [facility], we believe that sharing information builds trust and better relationships. You are receiving this note because you recently visited [facility]. It is possible you will see health information before a provider has talked with you about it. This kind of information can be easy to misunderstand. To help you fully understand what it means for your health, we urge you to discuss this note with your provider.                       Shamar Blackburn MD  01/12/24 0288     DISPLAY PLAN FREE TEXT

## 2024-01-12 NOTE — DISCHARGE INSTRUCTIONS
Staple removal in 5 to 7 days, ice for any pain or swelling, continue current medications, Tylenol for pain control, heat for stiffness or soreness, expect a be stiff and sore for several days up to 1 to 2 weeks, stay well-hydrated.  ED return for worsening symptoms as needed.

## 2024-01-18 ENCOUNTER — TELEPHONE (OUTPATIENT)
Dept: CASE MANAGEMENT | Facility: OTHER | Age: 79
End: 2024-01-18
Payer: MEDICARE

## 2024-01-18 ENCOUNTER — PATIENT OUTREACH (OUTPATIENT)
Dept: CASE MANAGEMENT | Facility: OTHER | Age: 79
End: 2024-01-18
Payer: MEDICARE

## 2024-01-18 DIAGNOSIS — I50.9 CONGESTIVE HEART FAILURE, UNSPECIFIED HF CHRONICITY, UNSPECIFIED HEART FAILURE TYPE: ICD-10-CM

## 2024-01-18 DIAGNOSIS — I10 ESSENTIAL HYPERTENSION: Primary | ICD-10-CM

## 2024-01-18 NOTE — OUTREACH NOTE
AMBULATORY CASE MANAGEMENT NOTE    Name and Relationship of Patient/Support Person: Pranay Carlotta P - Self    CCM Interim Update    Returned call to patient. Introduced self and role. Discussed CCM program, goals and benefits. Reviewed cost and ability to withdraw at any time. Patient refused due to financial reasons. Patient will benefit to CM program for short term monitoring of symptoms, progress and further education from recent fall.     Patient very pleasant and she states she's doing well following an ER visit. She did hit her head, busted open and got staples. Reviewed follow up appointments and schedule. Patient agreed for future outreaches.     Patient lost her AVS copy, [facility] will mail copy of AVS from ER to patient.         Education Documentation  No documentation found.        Teresa NORTON  Ambulatory Case Management    1/18/2024, 12:43 EST

## 2024-01-19 ENCOUNTER — OFFICE VISIT (OUTPATIENT)
Dept: FAMILY MEDICINE CLINIC | Facility: CLINIC | Age: 79
End: 2024-01-19
Payer: MEDICARE

## 2024-01-19 ENCOUNTER — TELEPHONE (OUTPATIENT)
Dept: FAMILY MEDICINE CLINIC | Facility: CLINIC | Age: 79
End: 2024-01-19

## 2024-01-19 VITALS
HEIGHT: 62 IN | DIASTOLIC BLOOD PRESSURE: 82 MMHG | BODY MASS INDEX: 27.05 KG/M2 | OXYGEN SATURATION: 99 % | HEART RATE: 74 BPM | SYSTOLIC BLOOD PRESSURE: 142 MMHG | WEIGHT: 147 LBS

## 2024-01-19 DIAGNOSIS — H53.9 VISION CHANGES: ICD-10-CM

## 2024-01-19 DIAGNOSIS — G62.9 NEUROPATHY: ICD-10-CM

## 2024-01-19 DIAGNOSIS — S46.912D STRAIN OF LEFT SHOULDER, SUBSEQUENT ENCOUNTER: ICD-10-CM

## 2024-01-19 DIAGNOSIS — G50.0 TRIGEMINAL NEURALGIA: ICD-10-CM

## 2024-01-19 DIAGNOSIS — I10 ESSENTIAL HYPERTENSION: ICD-10-CM

## 2024-01-19 DIAGNOSIS — K21.9 GASTROESOPHAGEAL REFLUX DISEASE WITHOUT ESOPHAGITIS: ICD-10-CM

## 2024-01-19 DIAGNOSIS — S01.01XD LACERATION OF OCCIPITAL REGION OF SCALP, SUBSEQUENT ENCOUNTER: Primary | ICD-10-CM

## 2024-01-19 DIAGNOSIS — I65.23 CAROTID ARTERY PLAQUE, BILATERAL: ICD-10-CM

## 2024-01-19 DIAGNOSIS — J30.9 ALLERGIC RHINITIS, UNSPECIFIED SEASONALITY, UNSPECIFIED TRIGGER: ICD-10-CM
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
## 2024-01-19 PROBLEM — S01.01XA LACERATION OF OCCIPITAL REGION OF SCALP: Status: ACTIVE | Noted: 2024-01-19

## 2024-01-19 PROBLEM — S46.912A LEFT SHOULDER STRAIN: Status: ACTIVE | Noted: 2024-01-19

## 2024-01-19 NOTE — PATIENT INSTRUCTIONS
Keep laceration clean and dry.  May apply Vaseline as needed.  Schedule an eye exam.  Schedule a follow up appointment with cardiology sooner.  Continue to monitor your blood pressure periodically and record results.  Try nasal steroid, such as Flonase or Nasacort, for 1-2 weeks to see if helps fluid in right ear.  Follow up pending lab/test results.  Follow up in 3 months, or sooner if problems or concerns.   Follow up as scheduled with neurology.

## 2024-01-19 NOTE — PROGRESS NOTES
Subjective   Carlotta Pires is a 78 y.o. female.     Chief Complaint   Patient presents with    Suture / Staple Removal    ER follow up     Scalp laceration with fall       History of Present Illness   Patient presents for ER follow up; patient was taken to [facility] ER per EMS on 1/12/24 after hitting head with fall; pt was standing in tub with water running and then next thing knew was laying on the floor; no hot shower, no bending over; not sure if mat slipped; not sure how ended up on the floor; pt had hit head on tile floor and was bleeding due to laceration on back of head; whole body went numb while laying on the floor; numbness started in feet, moved up legs, and on up body to lips and hands; ER told may have been due to hard hit; had CT head and cervical spine and negative for fracture or intracranial hemorrhage; takes Plavix daily for history of stroke years ago; unclear if had any loss of consciousness; had laceration repair with 5 staples; to have staples removed in 5-7 days; seems to be healing well, but has one area that feels sore; numbness in body had resolved by next day other than ongoing numbness in toes/feet/legs has had.    Also, had strain of left shoulder and left side of neck due to fall; shoulder has improved; had discomfort in neck and upper back for 3-4 days and then improved; has tried to keep moving; still having some discomfort in left neck with ROM.    F/U HTN: takes Metoprolol twice daily and resumed Losartan daily LOV due to BP higher; noted Losartan caused cough and not feeling well so stopped taking; monitors BP, typically runs 140s/70s in mornings, but will be higher at night; sees Dr. Barker cardiology, but has not scheduled follow up; no headaches; no orthostasis; no change in chronic mild swelling in left leg.    For last 6 weeks, has noted will have trouble reading after reading for period of time (about 1 hour); words will get really small; last eye exam about 7 years  ago.     F/U trigeminal neuralgia/neuropathy: takes Amitriptyline nightly; has upcoming follow up appointment with neurology in March for worsening neuropathy in feet/legs.     F/U [place]: takes Dexilant daily and works well; had symptoms when taking every other day; has had EGD in past; no longer taking Amitiza, takes supplement and helps; sees GI.     Sees Dr. Chanel, oncology for history of breast cancer; history of chemo and radiation; completed hormonal therapy 1/31/17; had follow up with oncology in October and discussed discomfort in left shoulder/chest; considering CT chest, but symptoms have improved; has follow up in April.          The following portions of the patient's history were reviewed and updated as appropriate: allergies, current medications, past family history, past medical history, past social history, past surgical history and problem list.    Current Outpatient Medications on File Prior to Visit   Medication Sig    amitriptyline (ELAVIL) 25 MG tablet Take 1 tablet by mouth Every Night.    atorvastatin (LIPITOR) 10 MG tablet Take 1 tablet by mouth Daily.    Cholecalciferol (Vitamin D) 50 MCG (2000 UT) tablet Take 1 tablet by mouth Daily.    clopidogrel (PLAVIX) 75 MG tablet Take 1 tablet by mouth Daily.    dexlansoprazole (Dexilant) 30 MG capsule Take 1 capsule by mouth Daily.    levothyroxine (Euthyrox) 75 MCG tablet Take one tablet daily except take 2 tablets on Sundays.    Melatonin 10 MG capsule Take 1 capsule by mouth At Night As Needed.    metoprolol succinate XL (TOPROL-XL) 50 MG 24 hr tablet Take 1 tablet by mouth 2 (Two) Times a Day.    vitamin B-12 (CYANOCOBALAMIN) 1000 MCG tablet Take 1 tablet by mouth Daily.    [DISCONTINUED] lubiprostone (Amitiza) 8 MCG capsule Take 1 capsule by mouth Daily With Breakfast. (Patient not taking: Reported on 1/19/2024)     No current facility-administered medications on file prior to visit.        Past Medical History:   Diagnosis Date    Allergic  rhinitis     Arthritis     C. difficile colitis     Cancer     Left breast cancer    CHF (congestive heart failure)     Cough     COVID-19 11/2020    Degenerative arthritis of thumb     GERD without esophagitis     H/O TIA (transient ischemic attack) and stroke 2005    History of bone density study     Lumbar radiculopathy     Osteoporosis     Palpitations     Peptic ulceration     Personal history of malignant neoplasm of breast     Sciatica     Stroke 2005    CVA--History of storke    Trigeminal neuralgia        Past Surgical History:   Procedure Laterality Date    BREAST BIOPSY Left 2011    CATARACT EXTRACTION, BILATERAL      COLONOSCOPY  09/28/2012    Adolph Martinez MD    COLONOSCOPY N/A 11/13/2018    Procedure: COLONOSCOPY to cecum;  Surgeon: Adolph Martinez MD;  Location: [facility] ENDOSCOPY;  Service: Gastroenterology    COLONOSCOPY N/A 01/27/2021    Procedure: COLONOSCOPY TO CECUM AND TERMINAL ILEUM;  Surgeon: Emelia Wilcox MD;  Location: [facility] ENDOSCOPY;  Service: Gastroenterology;  Laterality: N/A;  RECTAL BLEEDING  --DIVERTICULOSIS, HEMORRHOIDS, TORTUOUS COLON    ENDOSCOPY N/A 11/13/2018    Procedure: ESOPHAGOGASTRODUODENOSCOPY with bx;  Surgeon: Adolph Martinez MD;  Location: [facility] ENDOSCOPY;  Service: Gastroenterology    ENDOSCOPY N/A 01/27/2021    Procedure: ESOPHAGOGASTRODUODENOSCOPY WITH COLD BX;  Surgeon: Emelia Wilcox MD;  Location: [facility] ENDOSCOPY;  Service: Gastroenterology;  Laterality: N/A;  GERD  --GASTRITIS, HIATAL HERNIA    EYE SURGERY      HYSTERECTOMY      At age 31-Not due to cancer    MASTECTOMY Left 06/30/2011    Dr. Kaitlyn Luna    TUBAL ABDOMINAL LIGATION  1974    UPPER GASTROINTESTINAL ENDOSCOPY  09/28/2012    Adolph Martinez MD       Family History   Problem Relation Age of Onset    Cancer Mother 68        head and neck    Stroke Mother     Heart disease Mother     Gallbladder disease Mother     Throat cancer Mother 68    Breast cancer Sister 57    Heart disease Sister      Hypertension Sister     Cancer Sister     Gallbladder disease Sister     Diabetes Sister     Cancer Brother         head and neck    Heart disease Brother     Hypertension Brother     Gallbladder disease Brother     Lung cancer Brother 67    Throat cancer Brother 67    Hypertension Father     Gallbladder disease Father     Emphysema Father     Heart disease Father         Enlarged heart    Cancer Maternal Aunt     Cancer Maternal Uncle     Colon cancer Maternal Uncle     Heart attack Brother     Alcohol abuse Brother     Hypertension Sister     Heart disease Sister     Hypertension Sister     Heart disease Sister     Hypertension Sister     No Known Problems Other        Social History     Socioeconomic History    Marital status:      Spouse name: Jack    Number of children: 2    Years of education: High School   Tobacco Use    Smoking status: Never    Smokeless tobacco: Never    Tobacco comments:     CAFFEINE USE: 4-5 CUPS 1/2 & 1/2 COFFEE DAILY   Vaping Use    Vaping Use: Never used   Substance and Sexual Activity    Alcohol use: Not Currently    Drug use: No    Sexual activity: Defer       Review of Systems   Constitutional:  Positive for fatigue. Negative for appetite change, chills, fever, unexpected weight gain and unexpected weight loss.   HENT:  Negative for ear pain (has itching in right ear), sinus pressure, sore throat and trouble swallowing.    Eyes:  Blurred vision: see HPI.   Respiratory:  Negative for cough, chest tightness and shortness of breath.    Cardiovascular:  Negative for chest pain. Palpitations: no change, some on occasion with increased activity.  Gastrointestinal:  Negative for abdominal pain (occasional cramping; has improved), blood in stool and diarrhea (rarely at this point). Constipation: has BM every few days.  Endocrine: Positive for cold intolerance (hands and feet are really cold). Negative for heat intolerance and polydipsia (drinks a lot of water in general).  "  Genitourinary:  Negative for dysuria and frequency.   Musculoskeletal:  Negative for back pain (tries not to overdo it and helps; no limitation of activity). Neck pain: see HPI.  Skin:  Negative for rash.   Neurological:  Negative for dizziness, weakness and light-headedness. Syncope: see HPI.      Objective   Vitals:    01/19/24 1119 01/19/24 1204   BP: 142/82    BP Location: Right arm    Patient Position: Sitting    Cuff Size: Adult    Pulse: 84 74   SpO2:  99%   Weight: 66.7 kg (147 lb)    Height: 157.5 cm (62\")      Body mass index is 26.89 kg/m².    Physical Exam  Vitals and nursing note reviewed.   Constitutional:       General: She is not in acute distress.     Appearance: She is well-developed and well-groomed. She is not diaphoretic.   HENT:      Head: Normocephalic.      Jaw: No tenderness or pain on movement.        Right Ear: External ear normal. No decreased hearing noted. Right ear middle ear effusion: fluid bubbles behind TM. Tympanic membrane is not erythematous.      Left Ear: External ear normal. No decreased hearing noted. There is impacted cerumen.      Nose: Nose normal.      Right Sinus: No maxillary sinus tenderness or frontal sinus tenderness.      Left Sinus: No maxillary sinus tenderness or frontal sinus tenderness.      Mouth/Throat:      Mouth: Mucous membranes are moist.      Dentition: Has dentures (upper).      Pharynx: No oropharyngeal exudate or posterior oropharyngeal erythema.   Eyes:      Extraocular Movements: Extraocular movements intact.      Conjunctiva/sclera: Conjunctivae normal.      Pupils: Pupils are equal, round, and reactive to light.   Neck:      Thyroid: No thyromegaly.      Vascular: No carotid bruit.   Cardiovascular:      Rate and Rhythm: Normal rate and regular rhythm.      Pulses: Normal pulses.      Heart sounds: Normal heart sounds. No murmur heard.  Pulmonary:      Effort: Pulmonary effort is normal. No respiratory distress.      Breath sounds: Normal breath " sounds.   Chest:       Abdominal:      General: Bowel sounds are normal.      Palpations: Abdomen is soft. There is no hepatomegaly or splenomegaly.      Tenderness: There is no abdominal tenderness. There is no guarding.   Musculoskeletal:      Right shoulder: Normal pulse.      Left shoulder: No bony tenderness. Normal range of motion. Normal pulse.      Cervical back: Normal range of motion and neck supple. No bony tenderness. Decreased range of motion: mild with rotation to left.      Thoracic back: No bony tenderness.      Lumbar back: No bony tenderness.      Right lower leg: No edema.      Left lower leg: No edema.   Lymphadenopathy:      Cervical: No cervical adenopathy.   Skin:     General: Skin is warm and dry.      Findings: No rash.   Neurological:      Mental Status: She is alert and oriented to person, place, and time.      Cranial Nerves: Cranial nerves 2-12 are intact.      Gait: Abnormal gait: guarded gait.   Psychiatric:         Mood and Affect: Mood normal.         Behavior: Behavior normal.         Thought Content: Thought content normal.         Judgment: Judgment normal.         Lab Results   Component Value Date    WBC 6.72 10/12/2023    RBC 4.25 10/12/2023    HGB 12.9 10/12/2023    HCT 37.8 10/12/2023    MCV 88.9 10/12/2023    MCH 30.4 10/12/2023    MCHC 34.1 10/12/2023    RDW 13.2 10/12/2023    RDWSD 43.3 10/12/2023    MPV 10.1 10/12/2023     10/12/2023    NEUTRORELPCT 62.9 10/12/2023    LYMPHORELPCT 23.5 10/12/2023    MONORELPCT 7.9 10/12/2023    EOSRELPCT 4.8 10/12/2023    BASORELPCT 0.6 10/12/2023    AUTOIGPER 0.3 10/12/2023    NEUTROABS 4.23 10/12/2023    LYMPHSABS 1.58 10/12/2023    MONOSABS 0.53 10/12/2023    EOSABS 0.32 10/12/2023    BASOSABS 0.04 10/12/2023    AUTOIGNUM 0.02 10/12/2023    NRBC 0.0 10/12/2023     Lab Results   Component Value Date    GLUCOSE 103 (H) 12/11/2023    BUN 8 12/11/2023    CREATININE 0.75 12/11/2023    EGFRIFNONA 74 08/24/2021    EGFRIFAFRI 90  08/24/2021    BCR 10.7 12/11/2023    K 4.0 12/11/2023    CO2 25.2 12/11/2023    CALCIUM 9.5 12/11/2023    PROTENTOTREF 6.4 12/11/2023    ALBUMIN 4.3 12/11/2023    LABIL2 2.0 12/11/2023    AST 26 12/11/2023    ALT 16 12/11/2023      Lab Results   Component Value Date    CHLPL 145 12/11/2023    TRIG 114 12/11/2023    HDL 48 12/11/2023    VLDL 21 12/11/2023    LDL 76 12/11/2023     Lab Results   Component Value Date    TSH 2.780 12/11/2023     Records reviewed from [facility] on 1/12/24; pt presented to the ED via [facility] from home c/o acute head injury after a fall this morning striking her head on the tile.  Does take Plavix, unclear if any LOC.  Had complained of some tingling in her legs bilaterally.  Arrived in cervical collar.  Some mild shoulder discomfort but able to maintain range of motion.  CT head without intracranial hemorrhage or skull fracture and no cervical fracture on CT cervical spine.  Recommended to follow up with PCP and to have staples removed in 5-7 days.    1/12/24 CT head: 1. No acute intracranial abnormality is identified.  2. There is very minimal small vessel disease in the frontal periventricular white matter.  3. There is moderate size scalp hematoma in the posterior left occipital region and multiple bubbles of air in the posterior occipital and suboccipital scalp from associated scalp laceration. The remainder of the head CT is within normal limits with no acute skull fracture or intracranial hemorrhage identified.    1/12/24 CT cervical spine: 1. No acute fractures seen in the cervical spine.  2. There is a scalp hematoma in the left occipital scalp with multiple bubbles of subcutaneous air in the left occipital scalp and tracking into the posterior subcutaneous fat of the upper neck.  3. There is mild cervical spondylosis as described.      Suture Removal    Date/Time: 1/19/2024 12:13 PM    Performed by: Gudelia Bazzi APRN  Authorized by: Gudelia Bazzi APRN  Body  area: head/neck  Location details: scalp  Wound Appearance: clean  Staples Removed: 5  Post-removal: antibiotic ointment applied  Patient tolerance: patient tolerated the procedure well with no immediate complications  Comments: Laceration well approximated with scab post removal of staples; no drainage; mild ecchymosis in area.        Assessment    Problem List Items Addressed This Visit       Essential hypertension    Current Assessment & Plan     Hypertension is  stable .  Continue current medications.  Ambulatory blood pressure monitoring.  Blood pressure will be reassessed in 3 months.  Continue Metoprolol twice daily.  Schedule a follow up appointment with cardiology sooner.         Allergic rhinitis    Current Assessment & Plan     Continue Claritin daily.  Try over the counter nasal steroid, such as Flonase, for 1-2 weeks and see if helps fluid in right ear.         Trigeminal neuralgia    Current Assessment & Plan     Continue Amitriptyline nightly.  Follow up as scheduled with neurology.         Gastroesophageal reflux disease without esophagitis    Overview     Added automatically from request for surgery [id number]         Current Assessment & Plan     Stable.  Continue Dexilant daily.         Neuropathy    Current Assessment & Plan     Follow up as scheduled with neurology.         Left shoulder strain    Current Assessment & Plan     Resolving.         Carotid artery plaque, bilateral    Relevant Orders    Duplex Carotid Ultrasound CAR    Laceration of occipital region of scalp - Primary    Current Assessment & Plan     Continue to keep laceration clean and dry.  May apply Vaseline as needed.         Relevant Orders    CBC & Differential    Suture Removal    Vision changes    Current Assessment & Plan     Schedule an eye exam.          Other Visit Diagnoses       Fall, subsequent encounter        Relevant Orders    Comprehensive Metabolic Panel            Return in about 3 months (around 4/19/2024) for  Recheck.or sooner if symptoms persist or worsen.  Patient S/P unexplained fall with laceration to scalp; recommended to follow up as scheduled with neurology and to schedule appointment with cardiology sooner.  Patient has had changes in vision with prolonged reading for last 6 weeks, prior to fall; no recent eye exam; recommended to schedule eye exam.  Calcified plaques noted in bilateral carotid arteries on CT; will get US carotids for further evaluation.

## 2024-01-19 NOTE — ASSESSMENT & PLAN NOTE
Continue Claritin daily.  Try over the counter nasal steroid, such as Flonase, for 1-2 weeks and see if helps fluid in right ear.

## 2024-01-19 NOTE — ASSESSMENT & PLAN NOTE
Hypertension is  stable .  Continue current medications.  Ambulatory blood pressure monitoring.  Blood pressure will be reassessed in 3 months.  Continue Metoprolol twice daily.  Schedule a follow up appointment with cardiology sooner.

## 2024-01-19 NOTE — TELEPHONE ENCOUNTER
Caller: Carlotta Pires    Relationship: Self    Best call back number: [phone]     What is the best time to reach you: ANYTIME    Who are you requesting to speak with (clinical staff, provider,  specific staff member): UNKNOWN    Do you know the name of the person who called: UNKNOWN    What was the call regarding: PATIENT RETURNING CALL SHE IS UNSURE WHAT THE CALL WAS ABOUT NO MESSAGE LEFT     Is it okay if the provider responds through [facility]: NO

## 2024-01-20 LAB
ALBUMIN SERPL-MCNC: 4.6 G/DL (ref 3.8–4.8)
ALBUMIN/GLOB SERPL: 2 {RATIO} (ref 1.2–2.2)
ALP SERPL-CCNC: 92 IU/L (ref 44–121)
ALT SERPL-CCNC: 17 IU/L (ref 0–32)
AST SERPL-CCNC: 25 IU/L (ref 0–40)
BASOPHILS # BLD AUTO: 0.1 X10E3/UL (ref 0–0.2)
BASOPHILS NFR BLD AUTO: 1 %
BILIRUB SERPL-MCNC: 0.4 MG/DL (ref 0–1.2)
BUN SERPL-MCNC: 8 MG/DL (ref 8–27)
BUN/CREAT SERPL: 12 (ref 12–28)
CALCIUM SERPL-MCNC: 9.8 MG/DL (ref 8.7–10.3)
CHLORIDE SERPL-SCNC: 101 MMOL/L (ref 96–106)
CO2 SERPL-SCNC: 25 MMOL/L (ref 20–29)
CREAT SERPL-MCNC: 0.66 MG/DL (ref 0.57–1)
EGFRCR SERPLBLD CKD-EPI 2021: 91 ML/MIN/1.73
EOSINOPHIL # BLD AUTO: 0.2 X10E3/UL (ref 0–0.4)
EOSINOPHIL NFR BLD AUTO: 3 %
ERYTHROCYTE [DISTWIDTH] IN BLOOD BY AUTOMATED COUNT: 12.8 % (ref 11.7–15.4)
GLOBULIN SER CALC-MCNC: 2.3 G/DL (ref 1.5–4.5)
GLUCOSE SERPL-MCNC: 93 MG/DL (ref 70–99)
HCT VFR BLD AUTO: 39.8 % (ref 34–46.6)
HGB BLD-MCNC: 13.4 G/DL (ref 11.1–15.9)
IMM GRANULOCYTES # BLD AUTO: 0.1 X10E3/UL (ref 0–0.1)
IMM GRANULOCYTES NFR BLD AUTO: 1 %
LYMPHOCYTES # BLD AUTO: 1.6 X10E3/UL (ref 0.7–3.1)
LYMPHOCYTES NFR BLD AUTO: 24 %
MCH RBC QN AUTO: 30.9 PG (ref 26.6–33)
MCHC RBC AUTO-ENTMCNC: 33.7 G/DL (ref 31.5–35.7)
MCV RBC AUTO: 92 FL (ref 79–97)
MONOCYTES # BLD AUTO: 0.6 X10E3/UL (ref 0.1–0.9)
MONOCYTES NFR BLD AUTO: 9 %
NEUTROPHILS # BLD AUTO: 4.3 X10E3/UL (ref 1.4–7)
NEUTROPHILS NFR BLD AUTO: 62 %
PLATELET # BLD AUTO: 279 X10E3/UL (ref 150–450)
POTASSIUM SERPL-SCNC: 4.4 MMOL/L (ref 3.5–5.2)
PROT SERPL-MCNC: 6.9 G/DL (ref 6–8.5)
RBC # BLD AUTO: 4.34 X10E6/UL (ref 3.77–5.28)
SODIUM SERPL-SCNC: 141 MMOL/L (ref 134–144)
WBC # BLD AUTO: 6.9 X10E3/UL (ref 3.4–10.8)

## 2024-02-01 ENCOUNTER — HOSPITAL ENCOUNTER (OUTPATIENT)
Dept: CARDIOLOGY | Facility: HOSPITAL | Age: 79
Discharge: HOME | End: 2024-02-01
Admitting: NURSE PRACTITIONER
Payer: MEDICARE

## 2024-02-01 DIAGNOSIS — I65.23 CAROTID ARTERY PLAQUE, BILATERAL: ICD-10-CM

## 2024-02-01 LAB
BH CV XLRA MEAS LEFT DIST CCA EDV: -18.7 CM/SEC
BH CV XLRA MEAS LEFT DIST CCA PSV: -59.5 CM/SEC
BH CV XLRA MEAS LEFT DIST ICA EDV: -31.1 CM/SEC
BH CV XLRA MEAS LEFT DIST ICA PSV: -84.2 CM/SEC
BH CV XLRA MEAS LEFT ICA/CCA RATIO: 1.61
BH CV XLRA MEAS LEFT MID ICA EDV: -15.4 CM/SEC
BH CV XLRA MEAS LEFT MID ICA PSV: -95.5 CM/SEC
BH CV XLRA MEAS LEFT PROX CCA EDV: 20.1 CM/SEC
BH CV XLRA MEAS LEFT PROX CCA PSV: 79.1 CM/SEC
BH CV XLRA MEAS LEFT PROX ECA EDV: -15.7 CM/SEC
BH CV XLRA MEAS LEFT PROX ECA PSV: -77.2 CM/SEC
BH CV XLRA MEAS LEFT PROX ICA EDV: -14.8 CM/SEC
BH CV XLRA MEAS LEFT PROX ICA PSV: -83.4 CM/SEC
BH CV XLRA MEAS LEFT PROX SCLA PSV: 74.6 CM/SEC
BH CV XLRA MEAS LEFT VERTEBRAL A EDV: -16.1 CM/SEC
BH CV XLRA MEAS LEFT VERTEBRAL A PSV: -53.9 CM/SEC
BH CV XLRA MEAS RIGHT DIST CCA EDV: 18.4 CM/SEC
BH CV XLRA MEAS RIGHT DIST CCA PSV: 60.4 CM/SEC
BH CV XLRA MEAS RIGHT DIST ICA EDV: -31.2 CM/SEC
BH CV XLRA MEAS RIGHT DIST ICA PSV: -85.6 CM/SEC
BH CV XLRA MEAS RIGHT ICA/CCA RATIO: 1.42
BH CV XLRA MEAS RIGHT MID ICA EDV: -21.5 CM/SEC
BH CV XLRA MEAS RIGHT MID ICA PSV: -66.3 CM/SEC
BH CV XLRA MEAS RIGHT PROX CCA EDV: -13.7 CM/SEC
BH CV XLRA MEAS RIGHT PROX CCA PSV: -56.1 CM/SEC
BH CV XLRA MEAS RIGHT PROX ECA EDV: -9 CM/SEC
BH CV XLRA MEAS RIGHT PROX ECA PSV: -57.6 CM/SEC
BH CV XLRA MEAS RIGHT PROX ICA EDV: -27.7 CM/SEC
BH CV XLRA MEAS RIGHT PROX ICA PSV: -80.3 CM/SEC
BH CV XLRA MEAS RIGHT PROX SCLA PSV: 69.3 CM/SEC
BH CV XLRA MEAS RIGHT VERTEBRAL A EDV: -18.8 CM/SEC
BH CV XLRA MEAS RIGHT VERTEBRAL A PSV: -58.8 CM/SEC

## 2024-02-01 PROCEDURE — 93880 EXTRACRANIAL BILAT STUDY: CPT

## 2024-02-02 DIAGNOSIS — I65.23 CAROTID STENOSIS, BILATERAL: Primary | ICD-10-CM

## 2024-02-21 ENCOUNTER — PATIENT OUTREACH (OUTPATIENT)
Dept: CASE MANAGEMENT | Facility: OTHER | Age: 79
End: 2024-02-21
Payer: MEDICARE

## 2024-02-21 ENCOUNTER — APPOINTMENT (OUTPATIENT)
Dept: WOMENS IMAGING | Facility: HOSPITAL | Age: 79
End: 2024-02-21
Payer: MEDICARE

## 2024-02-21 PROCEDURE — 77067 SCR MAMMO BI INCL CAD: CPT | Performed by: RADIOLOGY

## 2024-02-21 PROCEDURE — 77063 BREAST TOMOSYNTHESIS BI: CPT | Performed by: RADIOLOGY

## 2024-02-21 NOTE — OUTREACH NOTE
AMBULATORY CASE MANAGEMENT NOTE    Name and Relationship of Patient/Support Person: Carlotta Pires P - Self    Patient Outreach    Called and spoke to patient for follow up call. She states that she's doing well. She's had her PCP office visit and it went well. She is to monitor her BP at home, trends are about the same she states some are ok and some are high readings. She is taking her medications as prescribed, staying very active and trying her best to eat healthy. She states her sleep has not always been the best, she sleeps about 4 hours. She had a Carotid ultrasound and she states she's not concerned by it. She has f/u appt with Neurology 3/4 and Cardiology but nothing sooner than 6/14 since their office is fully booked. Patient knows worsening symptoms and and when to go to the ER for emergency situations. She states she has no more needs at this time. She is appreciative of the call and will reach out to RN-ACM if any assistance is needed.     Education Documentation  No documentation found.        Teresa NORTON  Ambulatory Case Management    2/21/2024, 09:40 EST

## 2024-03-04 ENCOUNTER — OFFICE VISIT (OUTPATIENT)
Dept: NEUROLOGY | Facility: CLINIC | Age: 79
End: 2024-03-04
Payer: MEDICARE

## 2024-03-04 VITALS
OXYGEN SATURATION: 94 % | DIASTOLIC BLOOD PRESSURE: 70 MMHG | BODY MASS INDEX: 26.31 KG/M2 | HEART RATE: 71 BPM | HEIGHT: 62 IN | WEIGHT: 143 LBS | SYSTOLIC BLOOD PRESSURE: 126 MMHG

## 2024-03-04 DIAGNOSIS — G62.0 CHEMOTHERAPY-INDUCED PERIPHERAL NEUROPATHY: ICD-10-CM

## 2024-03-04 DIAGNOSIS — T45.1X5A CHEMOTHERAPY-INDUCED PERIPHERAL NEUROPATHY: ICD-10-CM

## 2024-03-04 DIAGNOSIS — G50.0 TRIGEMINAL NEURALGIA OF RIGHT SIDE OF FACE: ICD-10-CM

## 2024-03-04 DIAGNOSIS — R26.89 IMPAIRMENT OF BALANCE: Primary | ICD-10-CM

## 2024-03-04 PROCEDURE — 3078F DIAST BP <80 MM HG: CPT | Performed by: PSYCHIATRY & NEUROLOGY

## 2024-03-04 PROCEDURE — 3074F SYST BP LT 130 MM HG: CPT | Performed by: PSYCHIATRY & NEUROLOGY

## 2024-03-04 PROCEDURE — 1160F RVW MEDS BY RX/DR IN RCRD: CPT | Performed by: PSYCHIATRY & NEUROLOGY

## 2024-03-04 PROCEDURE — 1159F MED LIST DOCD IN RCRD: CPT | Performed by: PSYCHIATRY & NEUROLOGY

## 2024-03-04 PROCEDURE — 99204 OFFICE O/P NEW MOD 45 MIN: CPT | Performed by: PSYCHIATRY & NEUROLOGY

## 2024-03-04 NOTE — PROGRESS NOTES
Chief Complaint   Patient presents with    Peripheral Neuropathy    Trigemenal Neuralgia       Patient ID: Carlotta Pires is a 79 y.o. female.    HPI: I had the pleasure of seeing your patient today.  As you may know she is a 79-year-old female here for the evaluation of neuropathy and trigeminal neuralgia.  She is a breast cancer survivor.  She did undergo surgery and chemotherapy.  She began having numbness in her feet soon after chemotherapy was initiated.  The symptoms began in her left foot and spread into the lower leg then traveled into the right foot as well.  She did begin to experience tingling sensations in the feet however no drastic pain.  She does state that her balance has been affected and she has had several falls.  The last 1 was in January.  She fell backwards hitting her head.  She did seek emergent medical attention.  CT of the head showed no acute issues aside from a scalp hematoma.  She does still drive.  She also has a history of trigeminal neuralgia and is here for treatment of that issue as well.  She says that it started on the right side of her face, more specifically the right mandibular region.  She saw a dentist initially however eventually was referred for neurological assessment and treatment.  She apparently was initiated on therapy with carbamazepine.  She says that cause significant sedation.  This was then switched to amitriptyline.  She has been taking that ever since.  She essentially has been very well-controlled at a dose of 25 mg nightly.    The following portions of the patient's history were reviewed and updated as appropriate: allergies, current medications, past family history, past medical history, past social history, past surgical history and problem list.    Review of Systems   Constitutional:  Positive for fatigue.   Neurological:  Positive for tremors, numbness and headaches. Negative for dizziness, seizures, syncope, facial asymmetry, speech difficulty, weakness  and light-headedness.   Psychiatric/Behavioral:  Positive for decreased concentration and sleep disturbance (staying asleep). Negative for agitation, behavioral problems, confusion, dysphoric mood, hallucinations, self-injury and suicidal ideas. The patient is not nervous/anxious and is not hyperactive.       I have reviewed the review of systems above performed by my medical assistant.      Vitals:    24 0926   BP: 126/70   Pulse: 71   SpO2: 94%       Neurologic Exam     Mental Status   Oriented to person, place, and time.   Registration: recalls 3 of 3 objects. Follows 3 step commands.   Attention: normal. Concentration: normal.   Speech: speech is normal   Level of consciousness: alert  Knowledge: consistent with education (No deficits found.).   Normal comprehension.     Cranial Nerves     CN II   Visual fields full to confrontation.     CN III, IV, VI   Pupils are equal, round, and reactive to light.  Extraocular motions are normal.   CN III: no CN III palsy  CN VI: no CN VI palsy  Nystagmus: none   Diplopia: none    CN V   Facial sensation intact.     CN VII   Facial expression full, symmetric.     CN VIII   CN VIII normal.     CN IX, X   CN IX normal.   CN X normal.     CN XI   CN XI normal.     CN XII   CN XII normal.     Motor Exam   Muscle bulk: normal  Right arm tone: normal  Left arm tone: normal  Right leg tone: normal  Left leg tone: normal    Strength   Right neck flexion: 5/5  Left neck flexion: 5/5  Right neck extension: 5/5  Left neck extension: 5/5  Right deltoid: 5/5  Left deltoid: 5/5  Right biceps: 5/5  Left biceps: 5/5  Right triceps: 5/5  Left triceps: 5/5  Right wrist flexion: 5/5  Left wrist flexion: 5/5  Right wrist extension: 5/5  Left wrist extension: 5/5  Right interossei: 5/5  Left interossei: 5/5  Right abdominals: 5/5  Left abdominals: 5/5  Right iliopsoas: 5/5  Left iliopsoas: 5/5  Right quadriceps: 5/5  Left quadriceps: 5/5  Right hamstrin/5  Left hamstrin/5  Right  glutei: 5/5  Left glutei: 5/5  Right anterior tibial: 5/5  Left anterior tibial: 5/5  Right posterior tibial: 5/5  Left posterior tibial: 5/5  Right peroneal: 5/5  Left peroneal: 5/5  Right gastroc: 5/5  Left gastroc: 5/5    Sensory Exam   Right leg light touch: decreased from toes  Left leg light touch: decreased from toes  Right leg vibration: decreased from knee  Left leg vibration: decreased from knee  Proprioception normal.   Right leg pinprick: decreased from ankle  Left leg pinprick: decreased from ankle    Gait, Coordination, and Reflexes     Gait  Gait: wide-based    Coordination   Romberg: negative    Tremor   Resting tremor: absent  Intention tremor: absent    Reflexes   Right brachioradialis: 2+  Left brachioradialis: 2+  Right biceps: 2+  Left biceps: 2+  Right triceps: 2+  Left triceps: 2+  Right patellar: 0  Left patellar: 0  Right achilles: 0  Left achilles: 0  Right : 2+  Left : 2+Station is normal.       Physical Exam  Vitals reviewed.   Constitutional:       General: She is not in acute distress.     Appearance: She is well-developed.   HENT:      Head: Normocephalic and atraumatic.   Eyes:      Extraocular Movements: EOM normal.      Pupils: Pupils are equal, round, and reactive to light.   Cardiovascular:      Rate and Rhythm: Normal rate and regular rhythm.      Heart sounds: Normal heart sounds.   Pulmonary:      Effort: Pulmonary effort is normal. No respiratory distress.      Breath sounds: Normal breath sounds.   Abdominal:      General: Bowel sounds are normal. There is no distension.      Palpations: Abdomen is soft.      Tenderness: There is no abdominal tenderness.   Musculoskeletal:         General: No deformity.      Cervical back: Normal range of motion.   Skin:     General: Skin is warm.      Findings: No rash.   Neurological:      Mental Status: She is oriented to person, place, and time.      Coordination: Romberg Test normal.      Deep Tendon Reflexes:      Reflex  Scores:       Tricep reflexes are 2+ on the right side and 2+ on the left side.       Bicep reflexes are 2+ on the right side and 2+ on the left side.       Brachioradialis reflexes are 2+ on the right side and 2+ on the left side.       Patellar reflexes are 0 on the right side and 0 on the left side.       Achilles reflexes are 0 on the right side and 0 on the left side.  Psychiatric:         Speech: Speech normal.         Judgment: Judgment normal.         Procedures    Assessment/Plan: Given the profound neuropathy likely secondary to chemotherapy we will prescribe physical therapy/gait and balance therapy for her.  We will continue with the current dosing of the amitriptyline for the trigeminal neuralgia.  Will see her back in 6 months or sooner if needed.       Diagnoses and all orders for this visit:    1. Impairment of balance (Primary)  -     Ambulatory Referral to Physical Therapy    2. Chemotherapy-induced peripheral neuropathy  -     Ambulatory Referral to Physical Therapy    3. Trigeminal neuralgia of right side of face           Yair Menezes II, MD

## 2024-03-05 DIAGNOSIS — I10 ESSENTIAL HYPERTENSION: ICD-10-CM

## 2024-03-05 RX ORDER — LOSARTAN POTASSIUM 50 MG/1
50 TABLET ORAL DAILY
Qty: 90 TABLET | Refills: 0 | OUTPATIENT
Start: 2024-03-05

## 2024-03-06 ENCOUNTER — PATIENT ROUNDING (BHMG ONLY) (OUTPATIENT)
Dept: NEUROLOGY | Facility: CLINIC | Age: 79
End: 2024-03-06
Payer: MEDICARE

## 2024-03-18 ENCOUNTER — TELEPHONE (OUTPATIENT)
Dept: CARDIOLOGY | Facility: CLINIC | Age: 79
End: 2024-03-18
Payer: MEDICARE

## 2024-03-18 NOTE — TELEPHONE ENCOUNTER
"Pt called the office to reschedule a sooner appt than her currently scheduled one in June, per the recommendations of her PCP.  I did add her onto Barbie's schedule for tomorrow.    Pt says that for the past 3 months, she's been having episodes of \"irregular heartbeat\", palpitations, and elevated BP.  Pt says that one time her SBP was in the 200s and DBP was in the 100s.  She says that her symptoms include palpitations, and then the skipping makes her SOA.  Pt says she gets shaky when her BP is up.  These episodes have been increasing in frequency, now occurring about every other day, or every 3 days.  Pt doesn't have a method to monitor HR at home besides using her BP machine.    Pt is currently taking metoprolol succinate 50 mg BID.  She's been occasionally taking an extra dose as needed.  Pt took her evening dose at 22:00 last night.  She woke up at 02:00 today having symptoms, and took an extra dose at that time; her BP then was 186/96.  Pt normally takes her AM dose around 8/10 and hasn't taken this yet today.  BP at this time is 132/88, HR 76.    Let me know if you have any recommendations for pt prior to appt tomorrow.    Thank you,    Sandra CROCKETT RN  [facility] Triage  03/18/24  10:22 EDT    "

## 2024-03-19 ENCOUNTER — OFFICE VISIT (OUTPATIENT)
Dept: CARDIOLOGY | Facility: CLINIC | Age: 79
End: 2024-03-19
Payer: MEDICARE

## 2024-03-19 VITALS
HEART RATE: 90 BPM | WEIGHT: 140 LBS | HEIGHT: 62 IN | SYSTOLIC BLOOD PRESSURE: 122 MMHG | DIASTOLIC BLOOD PRESSURE: 74 MMHG | BODY MASS INDEX: 25.76 KG/M2

## 2024-03-19 DIAGNOSIS — I49.3 PVC'S (PREMATURE VENTRICULAR CONTRACTIONS): ICD-10-CM

## 2024-03-19 DIAGNOSIS — R00.2 PALPITATIONS: Primary | ICD-10-CM

## 2024-03-19 DIAGNOSIS — I10 ESSENTIAL HYPERTENSION: ICD-10-CM

## 2024-03-19 PROCEDURE — 3078F DIAST BP <80 MM HG: CPT | Performed by: NURSE PRACTITIONER

## 2024-03-19 PROCEDURE — 1159F MED LIST DOCD IN RCRD: CPT | Performed by: NURSE PRACTITIONER

## 2024-03-19 PROCEDURE — 93000 ELECTROCARDIOGRAM COMPLETE: CPT | Performed by: NURSE PRACTITIONER

## 2024-03-19 PROCEDURE — 99214 OFFICE O/P EST MOD 30 MIN: CPT | Performed by: NURSE PRACTITIONER

## 2024-03-19 PROCEDURE — 3074F SYST BP LT 130 MM HG: CPT | Performed by: NURSE PRACTITIONER

## 2024-03-19 PROCEDURE — 1160F RVW MEDS BY RX/DR IN RCRD: CPT | Performed by: NURSE PRACTITIONER

## 2024-03-19 NOTE — PROGRESS NOTES
Date of Office Visit: 2024  Encounter Provider: URBAN Conde  Place of Service: [facility]  Patient Name: Carlotta Pires  :1945    Chief Complaint   Patient presents with    Cardiomyopathy   :     HPI: Carlotta Pires is a 79 y.o. female patient of Dr. Barker's who has a history of metastatic breast cancer for which she received Adriamycin.  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  She also has a history of stroke.    In 2023, she was hospitalized with C. difficile colitis.  Echocardiogram at that time demonstrated normal LV function.  Some of her antihypertensives were held due to hypotension.    She was last seen in the office by Dr. Barker in 2023 at which time she was doing okay.  Evidently the C. difficile had returned and she was back on antibiotics.  She reported occasional high blood pressures.  Ultimately, no changes were made to her regimen.  She was advised to follow-up in 1 year.    In August, she called office to report elevated blood pressure and was restarted on losartan.    She called the office yesterday to report irregular heartbeats, palpitations, and elevated blood pressure.  She was scheduled to see me today.    Over the last few weeks, she has been experiencing daily palpitations.  Sometimes they last for up to 30 minutes at a time.  Additionally she is reporting simultaneous shortness of breath although she thinks this is mostly related to her nerves.  She otherwise denies any shortness of breath or dyspnea.  She will check her blood pressure when she feels this way and notes that it will be elevated in the 160s to the 180s systolic.  She does not check her blood pressure otherwise.  Reportedly she is no longer on losartan because it caused depression.  She has been experiencing lower extremity numbness for which she recently saw neurology.  She has been diagnosed with  neuropathy.  She denies any chest pain, edema, dizziness, syncope, bleeding difficulties or melena.  Fortunately she was able to take a trial pill for the C. difficile which worked.  She has not had any symptoms since August.  She is going out of town on Friday to [place] for the weekend.    Past Medical History:   Diagnosis Date    Allergic rhinitis     Arthritis     C. difficile colitis     Cancer     Left breast cancer    CHF (congestive heart failure)     Cough     COVID-19 11/2020    Degenerative arthritis of thumb     GERD without esophagitis     H/O TIA (transient ischemic attack) and stroke 2005    History of bone density study     Lumbar radiculopathy     Osteoporosis     Palpitations     Peptic ulceration     Personal history of malignant neoplasm of breast     Sciatica     Stroke 2005    CVA--History of storke    Trigeminal neuralgia        Past Surgical History:   Procedure Laterality Date    BREAST BIOPSY Left 2011    CATARACT EXTRACTION, BILATERAL      COLONOSCOPY  09/28/2012    Adolph Martinez MD    COLONOSCOPY N/A 11/13/2018    Procedure: COLONOSCOPY to cecum;  Surgeon: Adolph Martinez MD;  Location: [facility];  Service: Gastroenterology    COLONOSCOPY N/A 01/27/2021    Procedure: COLONOSCOPY TO CECUM AND TERMINAL ILEUM;  Surgeon: Emelia Wilcox MD;  Location: [facility];  Service: Gastroenterology;  Laterality: N/A;  RECTAL BLEEDING  --DIVERTICULOSIS, HEMORRHOIDS, TORTUOUS COLON    ENDOSCOPY N/A 11/13/2018    Procedure: ESOPHAGOGASTRODUODENOSCOPY with bx;  Surgeon: Adolph Martinez MD;  Location: [facility];  Service: Gastroenterology    ENDOSCOPY N/A 01/27/2021    Procedure: ESOPHAGOGASTRODUODENOSCOPY WITH COLD BX;  Surgeon: Emelia Wilcox MD;  Location: [facility];  Service: Gastroenterology;  Laterality: N/A;  GERD  --GASTRITIS, HIATAL HERNIA    EYE SURGERY      HYSTERECTOMY      At age 31-Not due to cancer    MASTECTOMY Left 06/30/2011    Dr. Kaitlyn Luna     TUBAL ABDOMINAL LIGATION  1974    UPPER GASTROINTESTINAL ENDOSCOPY  09/28/2012    Adolph Martinez MD       Social History     Socioeconomic History    Marital status:      Spouse name: Jack    Number of children: 2    Years of education: High School   Tobacco Use    Smoking status: Never    Smokeless tobacco: Never    Tobacco comments:     CAFFEINE USE: 4-5 CUPS 1/2 & 1/2 COFFEE DAILY   Vaping Use    Vaping status: Never Used   Substance and Sexual Activity    Alcohol use: Not Currently    Drug use: No    Sexual activity: Defer       Family History   Problem Relation Age of Onset    Cancer Mother 68        head and neck    Stroke Mother     Heart disease Mother     Gallbladder disease Mother     Throat cancer Mother 68    Breast cancer Sister 57    Heart disease Sister     Hypertension Sister     Cancer Sister     Gallbladder disease Sister     Diabetes Sister     Cancer Brother         head and neck    Heart disease Brother     Hypertension Brother     Gallbladder disease Brother     Lung cancer Brother 67    Throat cancer Brother 67    Hypertension Father     Gallbladder disease Father     Emphysema Father     Heart disease Father         Enlarged heart    Cancer Maternal Aunt     Cancer Maternal Uncle     Colon cancer Maternal Uncle     Heart attack Brother     Alcohol abuse Brother     Hypertension Sister     Heart disease Sister     Hypertension Sister     Heart disease Sister     Hypertension Sister     No Known Problems Other        Review of Systems   Constitutional: Negative.   Cardiovascular:  Positive for palpitations. Negative for chest pain, dyspnea on exertion, leg swelling, orthopnea, paroxysmal nocturnal dyspnea and syncope.   Respiratory: Negative.     Hematologic/Lymphatic: Negative for bleeding problem.   Musculoskeletal:  Negative for falls.   Gastrointestinal:  Negative for melena.   Neurological:  Negative for dizziness and light-headedness.       Allergies   Allergen Reactions     "Lisinopril Cough    Contrast Dye (Echo Or Unknown Ct/Mr) Itching and Rash     IVP Dye    Fd&C Yellow #5 (Tartrazine) Hives         Current Outpatient Medications:     amitriptyline (ELAVIL) 25 MG tablet, Take 1 tablet by mouth Every Night., Disp: 90 tablet, Rfl: 2    atorvastatin (LIPITOR) 10 MG tablet, Take 1 tablet by mouth Daily., Disp: 90 tablet, Rfl: 3    Cholecalciferol (Vitamin D) 50 MCG (2000 UT) tablet, Take 1 tablet by mouth Daily., Disp: , Rfl:     clopidogrel (PLAVIX) 75 MG tablet, Take 1 tablet by mouth Daily., Disp: 90 tablet, Rfl: 3    dexlansoprazole (Dexilant) 30 MG capsule, Take 1 capsule by mouth Daily., Disp: 90 capsule, Rfl: 3    levothyroxine (Euthyrox) 75 MCG tablet, Take one tablet daily except take 2 tablets on Sundays., Disp: 102 tablet, Rfl: 1    Melatonin 10 MG capsule, Take 1 capsule by mouth At Night As Needed., Disp: , Rfl:     metoprolol succinate XL (TOPROL-XL) 50 MG 24 hr tablet, Take 1 tablet by mouth 2 (Two) Times a Day., Disp: 180 tablet, Rfl: 3    vitamin B-12 (CYANOCOBALAMIN) 1000 MCG tablet, Take 1 tablet by mouth Daily., Disp: , Rfl:       Objective:     Vitals:    03/19/24 1347   BP: 122/74   Pulse: 90   Weight: 63.5 kg (140 lb)   Height: 157.5 cm (62\")     Body mass index is 25.61 kg/m².    PHYSICAL EXAM:    Neck:      Vascular: No JVD.   Pulmonary:      Effort: Pulmonary effort is normal.      Breath sounds: Normal breath sounds.   Cardiovascular:      Normal rate. Regular rhythm.      Murmurs: There is no murmur.      No gallop.  No click. No rub.   Pulses:     Intact distal pulses.           ECG 12 Lead    Date/Time: 3/19/2024 2:00 PM  Performed by: Barbie Orta APRN    Authorized by: Barbie Orta APRN  Comparison: compared with previous ECG from 6/13/2023  Similar to previous ECG  Rhythm: sinus rhythm  Ectopy: unifocal PVCs  Rate: normal  BPM: 90  T inversion: V1            Assessment:       Diagnosis Plan   1. Palpitations  Holter Monitor - 48 Hour    "   2. PVC's (premature ventricular contractions)        3. Essential hypertension  ECG 12 Lead        Orders Placed This Encounter   Procedures    Holter Monitor - 48 Hour     Standing Status:   Future     Standing Expiration Date:   3/19/2025     Order Specific Question:   Reason for exam?     Answer:   Palpitations     Order Specific Question:   Release to patient     Answer:   Routine Release [[id number]]    ECG 12 Lead     This order was created via procedure documentation     Order Specific Question:   Release to patient     Answer:   Routine Release [[id number]]          Plan:       1.  Palpitations.  EKG today demonstrates PVCs.  I suspect this is what she is feeling.  I did recommend a 48-hour Holter for further evaluation.      2.  Hypertension.  Her blood pressure is stable today.  It sounds like she is only checking her blood pressure during episodes of palpitations.  I advised her to start checking her blood pressure every day when she is resting comfortably.  She will keep a log and update me in 1 or 2 weeks.      Overall I think she is stable.  Further recommendations will be made pending results of the Holter.      As always, it has been a pleasure to participate in your patient's care.      Sincerely,         URBAN Pettit

## 2024-03-25 ENCOUNTER — TELEPHONE (OUTPATIENT)
Dept: CARDIOLOGY | Facility: CLINIC | Age: 79
End: 2024-03-25
Payer: MEDICARE

## 2024-03-25 RX ORDER — AMLODIPINE BESYLATE 5 MG/1
5 TABLET ORAL DAILY
Qty: 30 TABLET | Refills: 2 | Status: SHIPPED | OUTPATIENT
Start: 2024-03-25

## 2024-03-25 NOTE — TELEPHONE ENCOUNTER
I spoke with her regarding her blood pressure.  It remains elevated, in the 150s and 160s systolic.  I recommended initiating amlodipine.  She will continue checking her blood pressures and we will touch base in a week's time.

## 2024-03-28 NOTE — TELEPHONE ENCOUNTER
Notified Carlotta Pires of results, patient verbalizes understanding.    Patricia Villalobos RN  [facility] Triage  03/28/24 08:22 EDT

## 2024-03-28 NOTE — TELEPHONE ENCOUNTER
Please let her know the heart monitor demonstrated normal sinus rhythm with rare extra beats.  I would not recommend any changes.  She should call with a blood pressure update next week.

## 2024-04-10 ENCOUNTER — OFFICE VISIT (OUTPATIENT)
Dept: FAMILY MEDICINE CLINIC | Facility: CLINIC | Age: 79
End: 2024-04-10
Payer: MEDICARE

## 2024-04-10 VITALS
TEMPERATURE: 97.7 F | DIASTOLIC BLOOD PRESSURE: 80 MMHG | BODY MASS INDEX: 26.5 KG/M2 | OXYGEN SATURATION: 97 % | SYSTOLIC BLOOD PRESSURE: 130 MMHG | HEART RATE: 72 BPM | HEIGHT: 62 IN | WEIGHT: 144 LBS

## 2024-04-10 DIAGNOSIS — J01.90 ACUTE NON-RECURRENT SINUSITIS, UNSPECIFIED LOCATION: Primary | ICD-10-CM

## 2024-04-10 DIAGNOSIS — R05.9 COUGH, UNSPECIFIED TYPE: ICD-10-CM

## 2024-04-10 DIAGNOSIS — I10 ESSENTIAL HYPERTENSION: ICD-10-CM

## 2024-04-10 DIAGNOSIS — R50.9 FEVER, UNSPECIFIED FEVER CAUSE: ICD-10-CM

## 2024-04-10 DIAGNOSIS — K21.9 GASTROESOPHAGEAL REFLUX DISEASE WITHOUT ESOPHAGITIS: ICD-10-CM

## 2024-04-10 PROCEDURE — 3079F DIAST BP 80-89 MM HG: CPT | Performed by: NURSE PRACTITIONER

## 2024-04-10 PROCEDURE — 1159F MED LIST DOCD IN RCRD: CPT | Performed by: NURSE PRACTITIONER

## 2024-04-10 PROCEDURE — 3075F SYST BP GE 130 - 139MM HG: CPT | Performed by: NURSE PRACTITIONER

## 2024-04-10 PROCEDURE — 87428 SARSCOV & INF VIR A&B AG IA: CPT | Performed by: NURSE PRACTITIONER

## 2024-04-10 PROCEDURE — 1160F RVW MEDS BY RX/DR IN RCRD: CPT | Performed by: NURSE PRACTITIONER

## 2024-04-10 PROCEDURE — 99213 OFFICE O/P EST LOW 20 MIN: CPT | Performed by: NURSE PRACTITIONER

## 2024-04-10 RX ORDER — FLUTICASONE PROPIONATE 50 MCG
2 SPRAY, SUSPENSION (ML) NASAL DAILY
Qty: 18.2 ML | Refills: 2 | Status: SHIPPED | OUTPATIENT
Start: 2024-04-10

## 2024-04-10 RX ORDER — LORATADINE 10 MG/1
10 TABLET ORAL DAILY
COMMUNITY

## 2024-04-10 NOTE — PROGRESS NOTES
"Subjective   Carlotta Pires is a 79 y.o. female.     Chief Complaint   Patient presents with    Cough     X 6 weeks     Fever     Congestion        History of Present Illness   Patient presents with c/o cough x6 weeks as well as fever and congestion off and on; had loss of taste/smell when symptoms first started; thinks had 2 different episodes of illness; had started with runny nose and congestion and was hoarse; then had fever; those symptoms resolved for couple of weeks and then started with increased cough; yesterday had trouble breathing due to cough; has started to have productive cough--yellow; had thought started with sinus infection; has not had fever since 4/8/24; Tmax 101; feels a little better today; no ear pain or sore throat; some chest tightness when having cough; will start coughing with increased activity; has tried Delsym and has helped to be able to sleep; no nausea, vomiting or diarrhea; has been really tired; has been resting and drinking plenty of liquids--water.    Has had irregular heartbeat and increased BP recently; saw Dr. Barker cardiology and ECG noted \"double beat;\" wore heart monitor for 2 days; started on Amlodipine and has really helped; also takes Metoprolol twice daily; BP has been running 130s/80s; no change in some mild swelling in left leg, chronic, attributes to lymphedema.    F/U [place]: takes Dexilant daily and works well; will have symptoms if misses a dose; no problems with reflux recently since has not been eating as much.    Has follow up with oncology next week.      The following portions of the patient's history were reviewed and updated as appropriate: allergies, current medications, past family history, past medical history, past social history, past surgical history and problem list.    Current Outpatient Medications on File Prior to Visit   Medication Sig    amitriptyline (ELAVIL) 25 MG tablet Take 1 tablet by mouth Every Night.    amLODIPine (NORVASC) 5 MG tablet Take " 1 tablet by mouth Daily.    atorvastatin (LIPITOR) 10 MG tablet Take 1 tablet by mouth Daily.    Cholecalciferol (Vitamin D) 50 MCG (2000 UT) tablet Take 1 tablet by mouth Daily.    clopidogrel (PLAVIX) 75 MG tablet Take 1 tablet by mouth Daily.    dexlansoprazole (Dexilant) 30 MG capsule Take 1 capsule by mouth Daily.    levothyroxine (Euthyrox) 75 MCG tablet Take one tablet daily except take 2 tablets on Sundays.    loratadine (Claritin) 10 MG tablet Take 1 tablet by mouth Daily.    Melatonin 10 MG capsule Take 1 capsule by mouth At Night As Needed.    metoprolol succinate XL (TOPROL-XL) 50 MG 24 hr tablet Take 1 tablet by mouth 2 (Two) Times a Day.    vitamin B-12 (CYANOCOBALAMIN) 1000 MCG tablet Take 1 tablet by mouth Daily.     No current facility-administered medications on file prior to visit.        Past Medical History:   Diagnosis Date    Allergic rhinitis     Arthritis     C. difficile colitis     Cancer     Left breast cancer    CHF (congestive heart failure)     Cough     COVID-19 11/2020    Degenerative arthritis of thumb     GERD without esophagitis     H/O TIA (transient ischemic attack) and stroke 2005    History of bone density study     Lumbar radiculopathy     Osteoporosis     Palpitations     Peptic ulceration     Personal history of malignant neoplasm of breast     Sciatica     Stroke 2005    CVA--History of storke    Trigeminal neuralgia        Past Surgical History:   Procedure Laterality Date    BREAST BIOPSY Left 2011    CATARACT EXTRACTION, BILATERAL      COLONOSCOPY  09/28/2012    Adolph Martinez MD    COLONOSCOPY N/A 11/13/2018    Procedure: COLONOSCOPY to cecum;  Surgeon: Adolph Martinez MD;  Location: [facility] ENDOSCOPY;  Service: Gastroenterology    COLONOSCOPY N/A 01/27/2021    Procedure: COLONOSCOPY TO CECUM AND TERMINAL ILEUM;  Surgeon: Emelia Wilcox MD;  Location: [facility] ENDOSCOPY;  Service: Gastroenterology;  Laterality: N/A;  RECTAL BLEEDING  --DIVERTICULOSIS, HEMORRHOIDS,  TORTUOUS COLON    ENDOSCOPY N/A 11/13/2018    Procedure: ESOPHAGOGASTRODUODENOSCOPY with bx;  Surgeon: Adolph Martinez MD;  Location:  [facility];  Service: Gastroenterology    ENDOSCOPY N/A 01/27/2021    Procedure: ESOPHAGOGASTRODUODENOSCOPY WITH COLD BX;  Surgeon: Emelia Wilcox MD;  Location:  [facility] ENDOSCOPY;  Service: Gastroenterology;  Laterality: N/A;  GERD  --GASTRITIS, HIATAL HERNIA    EYE SURGERY      HYSTERECTOMY      At age 31-Not due to cancer    MASTECTOMY Left 06/30/2011    Dr. Kaitlyn Luna    TUBAL ABDOMINAL LIGATION  1974    UPPER GASTROINTESTINAL ENDOSCOPY  09/28/2012    Adolph Martinez MD       Family History   Problem Relation Age of Onset    Cancer Mother 68        head and neck    Stroke Mother     Heart disease Mother     Gallbladder disease Mother     Throat cancer Mother 68    Breast cancer Sister 57    Heart disease Sister     Hypertension Sister     Cancer Sister     Gallbladder disease Sister     Diabetes Sister     Cancer Brother         head and neck    Heart disease Brother     Hypertension Brother     Gallbladder disease Brother     Lung cancer Brother 67    Throat cancer Brother 67    Hypertension Father     Gallbladder disease Father     Emphysema Father     Heart disease Father         Enlarged heart    Cancer Maternal Aunt     Cancer Maternal Uncle     Colon cancer Maternal Uncle     Heart attack Brother     Alcohol abuse Brother     Hypertension Sister     Heart disease Sister     Hypertension Sister     Heart disease Sister     Hypertension Sister     No Known Problems Other        Social History     Socioeconomic History    Marital status:      Spouse name: Jack    Number of children: 2    Years of education: High School   Tobacco Use    Smoking status: Never    Smokeless tobacco: Never    Tobacco comments:     CAFFEINE USE: 4-5 CUPS 1/2 & 1/2 COFFEE DAILY   Vaping Use    Vaping status: Never Used   Substance and Sexual Activity    Alcohol use: Not Currently  "   Drug use: No    Sexual activity: Defer       Review of Systems   Constitutional:  Positive for fatigue (some better today, will feel tired later in the day). Negative for unexpected weight gain and unexpected weight loss. Appetite change: has had decreased appetite recently. Fever: see HPI.  HENT:  Negative for ear pain, postnasal drip, rhinorrhea (has resolved), sinus pressure and trouble swallowing.    Respiratory:  Positive for cough. Negative for chest tightness. Shortness of breath: see HPI.   Cardiovascular:  Negative for chest pain.   Gastrointestinal:  Negative for abdominal pain.   Genitourinary:  Negative for dysuria. Frequency: mild, but has been drinking more liquids.  Musculoskeletal:  Back pain: some in right lower back for last 2 days, may have pulled muscle in lower back with coughing; no radiation of pain down leg; no problems with sciatica on left recently.   Skin:  Negative for rash.   Neurological:  Negative for dizziness, light-headedness and headache.       Objective   Vitals:    04/10/24 1133   BP: 130/80   BP Location: Left arm   Patient Position: Sitting   Cuff Size: Adult   Pulse: 72   Temp: 97.7 °F (36.5 °C)   SpO2: 97%   Weight: 65.3 kg (144 lb)   Height: 157.5 cm (62\")     Body mass index is 26.34 kg/m².    Physical Exam  Vitals and nursing note reviewed.   Constitutional:       General: She is not in acute distress.     Appearance: She is well-developed and well-groomed. She is not diaphoretic.   HENT:      Head: Normocephalic.      Right Ear: External ear normal. Right ear decreased hearing: some decrease to finger rub. Right ear middle ear effusion: fluid bubbles behind TM. Tympanic membrane is not erythematous.      Left Ear: External ear normal. No decreased hearing noted. Impacted cerumen: cerumen blocking TM. Tympanic membrane is not erythematous.      Nose: Nose normal.      Right Sinus: No maxillary sinus tenderness or frontal sinus tenderness.      Left Sinus: No maxillary " sinus tenderness or frontal sinus tenderness.      Mouth/Throat:      Mouth: Mucous membranes are moist.      Pharynx: No oropharyngeal exudate or posterior oropharyngeal erythema.   Eyes:      Conjunctiva/sclera: Conjunctivae normal.   Neck:      Vascular: No carotid bruit.   Cardiovascular:      Rate and Rhythm: Normal rate and regular rhythm.      Pulses: Normal pulses.      Heart sounds: Normal heart sounds. No murmur heard.  Pulmonary:      Effort: Pulmonary effort is normal. No respiratory distress.      Breath sounds: Normal breath sounds. No decreased breath sounds, wheezing or rales.   Abdominal:      General: Bowel sounds are normal.      Palpations: Abdomen is soft. There is no hepatomegaly or splenomegaly.      Tenderness: There is no abdominal tenderness. There is no guarding.   Musculoskeletal:      Cervical back: Normal range of motion and neck supple. No bony tenderness.      Thoracic back: No bony tenderness.      Lumbar back: No bony tenderness.        Back:       Right lower leg: No edema.      Left lower leg: No edema.   Lymphadenopathy:      Cervical: No cervical adenopathy.   Skin:     General: Skin is warm and dry.      Findings: No rash.   Neurological:      Mental Status: She is alert and oriented to person, place, and time.      Gait: Gait normal.   Psychiatric:         Mood and Affect: Mood normal.         Behavior: Behavior normal.         Thought Content: Thought content normal.         Cognition and Memory: Cognition normal.         Judgment: Judgment normal.     1/19/24 CMP WNL; CBC WNL     Lab Results   Component Value Date    CHLPL 145 12/11/2023    TRIG 114 12/11/2023    HDL 48 12/11/2023    VLDL 21 12/11/2023    LDL 76 12/11/2023     Lab Results   Component Value Date    TSH 2.780 12/11/2023         Assessment    Problem List Items Addressed This Visit       Essential hypertension    Current Assessment & Plan     Hypertension is stable and controlled  Continue current treatment  regimen.  Ambulatory blood pressure monitoring.  Blood pressure will be reassessed in 3 months.  Continue Amlodipine daily and Metoprolol twice daily.  Follow up as scheduled with cardiology.         Gastroesophageal reflux disease without esophagitis    Overview     Added automatically from request for surgery [id number]         Current Assessment & Plan     Stable.  Continue Dexilant daily.         Acute non-recurrent sinusitis - Primary    Current Assessment & Plan     Continue increased intake of clear liquids and rest.  Try nasal steroid, such as Flonase or Nasacort.         Relevant Medications    fluticasone (FLONASE) 50 MCG/ACT nasal spray    Cough    Relevant Orders    CBC & Differential (Completed)    Comprehensive Metabolic Panel (Completed)     Other Visit Diagnoses       Fever, unspecified fever cause        Relevant Orders    POCT SARS-CoV-2 + Flu Antigen EFFIE (Completed)            Return if symptoms worsen or fail to improve.  Negative for flu and COVID-19 virus today; discussed may have had COVID-19 virus 6 weeks ago when had loss of taste and smell and then has had post-infectious cough; symptoms much better today; will check labs today; will get chest x-ray if symptoms persist or worsen.       COVID-19 Precautions - Patient was compliant in wearing a mask. When I saw the patient, I used appropriate personal protective equipment (PPE) including mask, gloves, and eye shield (standard procedure).  Hand hygiene was completed before and after seeing the patient.

## 2024-04-10 NOTE — PATIENT INSTRUCTIONS
Continue increased intake of clear liquids and rest.  Try nasal steroid, such as Flonase or Nasacort.  Follow up pending lab results.  Follow up if symptoms persist or worsen.

## 2024-04-11 LAB
ALBUMIN SERPL-MCNC: 4.1 G/DL (ref 3.8–4.8)
ALBUMIN/GLOB SERPL: 2 {RATIO} (ref 1.2–2.2)
ALP SERPL-CCNC: 89 IU/L (ref 44–121)
ALT SERPL-CCNC: 21 IU/L (ref 0–32)
AST SERPL-CCNC: 29 IU/L (ref 0–40)
BASOPHILS # BLD AUTO: 0 X10E3/UL (ref 0–0.2)
BASOPHILS NFR BLD AUTO: 1 %
BILIRUB SERPL-MCNC: 0.3 MG/DL (ref 0–1.2)
BUN SERPL-MCNC: 7 MG/DL (ref 8–27)
BUN/CREAT SERPL: 10 (ref 12–28)
CALCIUM SERPL-MCNC: 9.5 MG/DL (ref 8.7–10.3)
CHLORIDE SERPL-SCNC: 101 MMOL/L (ref 96–106)
CO2 SERPL-SCNC: 28 MMOL/L (ref 20–29)
CREAT SERPL-MCNC: 0.67 MG/DL (ref 0.57–1)
EGFRCR SERPLBLD CKD-EPI 2021: 90 ML/MIN/1.73
EOSINOPHIL # BLD AUTO: 0.3 X10E3/UL (ref 0–0.4)
EOSINOPHIL NFR BLD AUTO: 6 %
ERYTHROCYTE [DISTWIDTH] IN BLOOD BY AUTOMATED COUNT: 12.9 % (ref 11.7–15.4)
GLOBULIN SER CALC-MCNC: 2.1 G/DL (ref 1.5–4.5)
GLUCOSE SERPL-MCNC: 97 MG/DL (ref 70–99)
HCT VFR BLD AUTO: 37.3 % (ref 34–46.6)
HGB BLD-MCNC: 13 G/DL (ref 11.1–15.9)
IMM GRANULOCYTES # BLD AUTO: 0 X10E3/UL (ref 0–0.1)
IMM GRANULOCYTES NFR BLD AUTO: 0 %
LYMPHOCYTES # BLD AUTO: 1.5 X10E3/UL (ref 0.7–3.1)
LYMPHOCYTES NFR BLD AUTO: 37 %
MCH RBC QN AUTO: 31.8 PG (ref 26.6–33)
MCHC RBC AUTO-ENTMCNC: 34.9 G/DL (ref 31.5–35.7)
MCV RBC AUTO: 91 FL (ref 79–97)
MONOCYTES # BLD AUTO: 0.4 X10E3/UL (ref 0.1–0.9)
MONOCYTES NFR BLD AUTO: 9 %
NEUTROPHILS # BLD AUTO: 2 X10E3/UL (ref 1.4–7)
NEUTROPHILS NFR BLD AUTO: 47 %
PLATELET # BLD AUTO: 217 X10E3/UL (ref 150–450)
POTASSIUM SERPL-SCNC: 4.3 MMOL/L (ref 3.5–5.2)
PROT SERPL-MCNC: 6.2 G/DL (ref 6–8.5)
RBC # BLD AUTO: 4.09 X10E6/UL (ref 3.77–5.28)
SODIUM SERPL-SCNC: 141 MMOL/L (ref 134–144)
WBC # BLD AUTO: 4.2 X10E3/UL (ref 3.4–10.8)

## 2024-04-11 NOTE — ASSESSMENT & PLAN NOTE
Continue increased intake of clear liquids and rest.  Try nasal steroid, such as Flonase or Nasacort.

## 2024-04-11 NOTE — ASSESSMENT & PLAN NOTE
Hypertension is stable and controlled  Continue current treatment regimen.  Ambulatory blood pressure monitoring.  Blood pressure will be reassessed in 3 months.  Continue Amlodipine daily and Metoprolol twice daily.  Follow up as scheduled with cardiology.

## 2024-04-18 ENCOUNTER — LAB (OUTPATIENT)
Dept: OTHER | Facility: HOSPITAL | Age: 79
End: 2024-04-18
Payer: MEDICARE

## 2024-04-18 ENCOUNTER — OFFICE VISIT (OUTPATIENT)
Dept: ONCOLOGY | Facility: CLINIC | Age: 79
End: 2024-04-18
Payer: MEDICARE

## 2024-04-18 VITALS
DIASTOLIC BLOOD PRESSURE: 83 MMHG | OXYGEN SATURATION: 99 % | HEIGHT: 62 IN | RESPIRATION RATE: 16 BRPM | BODY MASS INDEX: 27.05 KG/M2 | SYSTOLIC BLOOD PRESSURE: 146 MMHG | WEIGHT: 147 LBS | HEART RATE: 75 BPM | TEMPERATURE: 97.8 F

## 2024-04-18 DIAGNOSIS — C50.512 MALIGNANT NEOPLASM OF LOWER-OUTER QUADRANT OF LEFT BREAST OF FEMALE, ESTROGEN RECEPTOR POSITIVE: Primary | ICD-10-CM

## 2024-04-18 DIAGNOSIS — C50.512 MALIGNANT NEOPLASM OF LOWER-OUTER QUADRANT OF LEFT BREAST OF FEMALE, ESTROGEN RECEPTOR POSITIVE: ICD-10-CM

## 2024-04-18 DIAGNOSIS — Z17.0 MALIGNANT NEOPLASM OF LOWER-OUTER QUADRANT OF LEFT BREAST OF FEMALE, ESTROGEN RECEPTOR POSITIVE: ICD-10-CM

## 2024-04-18 DIAGNOSIS — Z17.0 MALIGNANT NEOPLASM OF LOWER-OUTER QUADRANT OF LEFT BREAST OF FEMALE, ESTROGEN RECEPTOR POSITIVE: Primary | ICD-10-CM

## 2024-04-18 LAB
BASOPHILS # BLD AUTO: 0.03 10*3/MM3 (ref 0–0.2)
BASOPHILS NFR BLD AUTO: 0.4 % (ref 0–1.5)
DEPRECATED RDW RBC AUTO: 42.2 FL (ref 37–54)
EOSINOPHIL # BLD AUTO: 0.19 10*3/MM3 (ref 0–0.4)
EOSINOPHIL NFR BLD AUTO: 2.8 % (ref 0.3–6.2)
ERYTHROCYTE [DISTWIDTH] IN BLOOD BY AUTOMATED COUNT: 12.8 % (ref 12.3–15.4)
HCT VFR BLD AUTO: 36.8 % (ref 34–46.6)
HGB BLD-MCNC: 12.7 G/DL (ref 12–15.9)
IMM GRANULOCYTES # BLD AUTO: 0.03 10*3/MM3 (ref 0–0.05)
IMM GRANULOCYTES NFR BLD AUTO: 0.4 % (ref 0–0.5)
LYMPHOCYTES # BLD AUTO: 1.61 10*3/MM3 (ref 0.7–3.1)
LYMPHOCYTES NFR BLD AUTO: 23.6 % (ref 19.6–45.3)
MCH RBC QN AUTO: 31 PG (ref 26.6–33)
MCHC RBC AUTO-ENTMCNC: 34.5 G/DL (ref 31.5–35.7)
MCV RBC AUTO: 89.8 FL (ref 79–97)
MONOCYTES # BLD AUTO: 0.53 10*3/MM3 (ref 0.1–0.9)
MONOCYTES NFR BLD AUTO: 7.8 % (ref 5–12)
NEUTROPHILS NFR BLD AUTO: 4.44 10*3/MM3 (ref 1.7–7)
NEUTROPHILS NFR BLD AUTO: 65 % (ref 42.7–76)
NRBC BLD AUTO-RTO: 0 /100 WBC (ref 0–0.2)
PLATELET # BLD AUTO: 241 10*3/MM3 (ref 140–450)
PMV BLD AUTO: 9.4 FL (ref 6–12)
RBC # BLD AUTO: 4.1 10*6/MM3 (ref 3.77–5.28)
WBC NRBC COR # BLD AUTO: 6.83 10*3/MM3 (ref 3.4–10.8)

## 2024-04-18 PROCEDURE — 1126F AMNT PAIN NOTED NONE PRSNT: CPT | Performed by: INTERNAL MEDICINE

## 2024-04-18 PROCEDURE — 85025 COMPLETE CBC W/AUTO DIFF WBC: CPT | Performed by: INTERNAL MEDICINE

## 2024-04-18 PROCEDURE — 36415 COLL VENOUS BLD VENIPUNCTURE: CPT

## 2024-04-18 PROCEDURE — 3079F DIAST BP 80-89 MM HG: CPT | Performed by: INTERNAL MEDICINE

## 2024-04-18 PROCEDURE — 99214 OFFICE O/P EST MOD 30 MIN: CPT | Performed by: INTERNAL MEDICINE

## 2024-04-18 PROCEDURE — 3077F SYST BP >= 140 MM HG: CPT | Performed by: INTERNAL MEDICINE

## 2024-04-18 NOTE — PROGRESS NOTES
Subjective .     REASONS FOR FOLLOWUP:  Breast cancer    HISTORY OF PRESENT ILLNESS:  The patient is a 79 y.o. year old female  who is here for follow-up with the above-mentioned history.    No new health problems.  No SOA.  No significant pain.  No problems eating.    Past Medical History:   Diagnosis Date    Allergic rhinitis     Arthritis     C. difficile colitis     Cancer     Left breast cancer    CHF (congestive heart failure)     Cough     COVID-19 11/2020    Degenerative arthritis of thumb     GERD without esophagitis     H/O TIA (transient ischemic attack) and stroke 2005    History of bone density study     Lumbar radiculopathy     Osteoporosis     Palpitations     Peptic ulceration     Personal history of malignant neoplasm of breast     Sciatica     Stroke 2005    CVA--History of storke    Trigeminal neuralgia      Past Surgical History:   Procedure Laterality Date    BREAST BIOPSY Left 2011    CATARACT EXTRACTION, BILATERAL      COLONOSCOPY  09/28/2012    Adolph Martinez MD    COLONOSCOPY N/A 11/13/2018    Procedure: COLONOSCOPY to cecum;  Surgeon: Adolph Martinez MD;  Location: [facility] ENDOSCOPY;  Service: Gastroenterology    COLONOSCOPY N/A 01/27/2021    Procedure: COLONOSCOPY TO CECUM AND TERMINAL ILEUM;  Surgeon: Emelia Wilcox MD;  Location: [facility] ENDOSCOPY;  Service: Gastroenterology;  Laterality: N/A;  RECTAL BLEEDING  --DIVERTICULOSIS, HEMORRHOIDS, TORTUOUS COLON    ENDOSCOPY N/A 11/13/2018    Procedure: ESOPHAGOGASTRODUODENOSCOPY with bx;  Surgeon: Adolph Martinez MD;  Location: [facility] ENDOSCOPY;  Service: Gastroenterology    ENDOSCOPY N/A 01/27/2021    Procedure: ESOPHAGOGASTRODUODENOSCOPY WITH COLD BX;  Surgeon: Emelia Wilcox MD;  Location: [facility] ENDOSCOPY;  Service: Gastroenterology;  Laterality: N/A;  GERD  --GASTRITIS, HIATAL HERNIA    EYE SURGERY      HYSTERECTOMY      At age 31-Not due to cancer    MASTECTOMY Left 06/30/2011    Dr. Kaitlyn Luna    TUBAL ABDOMINAL LIGATION   1974    UPPER GASTROINTESTINAL ENDOSCOPY  09/28/2012    Adolph Martinez MD       HEMATOLOGIC/ONCOLOGIC HISTORY:  (History from previous dates can be found in the separate document.)    MEDICATIONS    Current Outpatient Medications:     amitriptyline (ELAVIL) 25 MG tablet, Take 1 tablet by mouth Every Night., Disp: 90 tablet, Rfl: 2    amLODIPine (NORVASC) 5 MG tablet, Take 1 tablet by mouth Daily., Disp: 30 tablet, Rfl: 2    atorvastatin (LIPITOR) 10 MG tablet, Take 1 tablet by mouth Daily., Disp: 90 tablet, Rfl: 3    clopidogrel (PLAVIX) 75 MG tablet, Take 1 tablet by mouth Daily., Disp: 90 tablet, Rfl: 3    dexlansoprazole (Dexilant) 30 MG capsule, Take 1 capsule by mouth Daily., Disp: 90 capsule, Rfl: 3    levothyroxine (Euthyrox) 75 MCG tablet, Take one tablet daily except take 2 tablets on Sundays., Disp: 102 tablet, Rfl: 1    loratadine (Claritin) 10 MG tablet, Take 1 tablet by mouth Daily., Disp: , Rfl:     Melatonin 10 MG capsule, Take 1 capsule by mouth At Night As Needed., Disp: , Rfl:     metoprolol succinate XL (TOPROL-XL) 50 MG 24 hr tablet, Take 1 tablet by mouth 2 (Two) Times a Day., Disp: 180 tablet, Rfl: 3    Cholecalciferol (Vitamin D) 50 MCG (2000 UT) tablet, Take 1 tablet by mouth Daily. (Patient not taking: Reported on 4/18/2024), Disp: , Rfl:     fluticasone (FLONASE) 50 MCG/ACT nasal spray, 2 sprays into the nostril(s) as directed by provider Daily. (Patient not taking: Reported on 4/18/2024), Disp: 18.2 mL, Rfl: 2    vitamin B-12 (CYANOCOBALAMIN) 1000 MCG tablet, Take 1 tablet by mouth Daily. (Patient not taking: Reported on 4/18/2024), Disp: , Rfl:     ALLERGIES:     Allergies   Allergen Reactions    Lisinopril Cough    Contrast Dye (Echo Or Unknown Ct/Mr) Itching and Rash     IVP Dye    Fd&C Yellow #5 (Tartrazine) Hives       SOCIAL HISTORY:       Social History     Socioeconomic History    Marital status:      Spouse name: Jack    Number of children: 2    Years of education: High  School   Tobacco Use    Smoking status: Never    Smokeless tobacco: Never    Tobacco comments:     CAFFEINE USE: 4-5 CUPS 1/2 & 1/2 COFFEE DAILY   Vaping Use    Vaping status: Never Used   Substance and Sexual Activity    Alcohol use: Not Currently    Drug use: No    Sexual activity: Defer         FAMILY HISTORY:  Family History   Problem Relation Age of Onset    Cancer Mother 68        head and neck    Stroke Mother     Heart disease Mother     Gallbladder disease Mother     Throat cancer Mother 68    Breast cancer Sister 57    Heart disease Sister     Hypertension Sister     Cancer Sister     Gallbladder disease Sister     Diabetes Sister     Cancer Brother         head and neck    Heart disease Brother     Hypertension Brother     Gallbladder disease Brother     Lung cancer Brother 67    Throat cancer Brother 67    Hypertension Father     Gallbladder disease Father     Emphysema Father     Heart disease Father         Enlarged heart    Cancer Maternal Aunt     Cancer Maternal Uncle     Colon cancer Maternal Uncle     Heart attack Brother     Alcohol abuse Brother     Hypertension Sister     Heart disease Sister     Hypertension Sister     Heart disease Sister     Hypertension Sister     No Known Problems Other        REVIEW OF SYSTEMS:  Review of Systems   Constitutional:  Negative for activity change.   HENT:  Negative for nosebleeds and trouble swallowing.    Respiratory:  Negative for shortness of breath and wheezing.    Cardiovascular:  Negative for chest pain and palpitations.   Gastrointestinal:  Negative for constipation, diarrhea and nausea.   Genitourinary:  Negative for dysuria and hematuria.   Musculoskeletal:  Negative for arthralgias and myalgias.   Skin:  Negative for rash and wound.   Neurological:  Negative for seizures and syncope.   Hematological:  Negative for adenopathy. Does not bruise/bleed easily.   Psychiatric/Behavioral:  Negative for confusion.            Objective    Vitals:    04/18/24  "1400   BP: 146/83   Pulse: 75   Resp: 16   Temp: 97.8 °F (36.6 °C)   TempSrc: Temporal   SpO2: 99%   Weight: 66.7 kg (147 lb)   Height: 157 cm (61.81\")   PainSc: 0-No pain         4/18/2024     2:00 PM   Current Status   ECOG score 0      PHYSICAL EXAM:          CONSTITUTIONAL:  Vital signs reviewed.  No distress, looks comfortable.  EYES:  Conjunctiva and lids unremarkable.  PERRLA  EARS,NOSE,MOUTH,THROAT:  Ears and nose appear unremarkable.  Lips, teeth, gums appear unremarkable.  RESPIRATORY:  Normal respiratory effort.  Lungs clear to auscultation bilaterally.  CARDIOVASCULAR:  Normal S1, S2.  No murmurs rubs or gallops.  No significant lower extremity edema.  BREAST: no masses by palpation or inspection   GASTROINTESTINAL: Abdomen appears unremarkable.  Nontender.  No hepatomegaly.  No splenomegaly.  LYMPHATIC:  No cervical, supraclavicular, axillary lymphadenopathy.  SKIN:  Warm.  No rashes.  PSYCHIATRIC:  Normal judgment and insight.  Normal mood and affect.        RECENT LABS:        WBC   Date/Time Value Ref Range Status   04/18/2024 01:57 PM 6.83 3.40 - 10.80 10*3/mm3 Final   04/10/2024 12:12 PM 4.2 3.4 - 10.8 x10E3/uL Final     Hemoglobin   Date/Time Value Ref Range Status   04/18/2024 01:57 PM 12.7 12.0 - 15.9 g/dL Final     Platelets   Date/Time Value Ref Range Status   04/18/2024 01:57  140 - 450 10*3/mm3 Final       Assessment/Plan     ASSESSMENT:  Problem List Items Addressed This Visit          High    Malignant neoplasm of lower-outer quadrant of left female breast - Primary    Relevant Orders    CBC & Differential        *Stage IIIA, grade 3, 3.9 cm left breast cancer. Four out of 24 nodes positive. ER 2%, MS negative, HER2 negative. Completed FEC x3 followed by Taxotere x3, 11/28/2011. Completed radiation in February 2012.   Poor tolerance to anastrozole and letrozole.  completed 5 years hormonal therapy using Aromasin, 1/31/17.  (She was initially a patient at [facility]. She transferred here as " her son-in-law, Jeremiah Dumont, was a patient here).   Suspect she remains in remission.  However, with the bulging/swelling of skin around her mastectomy site on the left and the new onset right-sided chest pain, check a CT of the chest to assess for recurrence.  CT chest 8/10/2020: No evidence of recurrence.  I discussed with Dr. Braxton-she stated we could do an ultrasound if we wanted to evaluate the superficial areas more.  Notably the bulging is on the left side and the right side has chest pain.  She states an ultrasound often shows fat necrosis better than a CT.  She suspects the CT would have picked up malignancy if it was present.  Patient did not want to pursue an ultrasound.  Symptoms resolved.  No sign of recurrence.  Remission remains    *Possible lupus.  Was evaluated by Dr. Rivera for this  10/21/2021 visit: Fever of 100.5 or higher on average every 2 weeks or so for the past 2 months.  I told her sometimes this can occur with a rheumatologic disorder.  I advise she discuss with her PCP or Dr. Rivera about this.  (CBC unremarkable.  Exam unremarkable.  No clear signs of leukemia or lymphoma.  I told her we could do CTs looking for LAD which is not in areas that can be palpated.  However, I think it is unlikely this is the case.  She declines CTs at this time)  4/21/2022: Continues intermittent fever.  Unchanged.  Advised again to follow-up with Dr. Rivera.  10/20/2022: No complaints of fever today  4/ 13/23: Has had recent intermittent fevers up to 100.2.  PCP aware.  (January 2023 had significantly higher fevers.  No specific infection identified).  10/12/2023: States she saw Dr. Rivera again and she states he he does not feel she has a rheumatologic problem     *Chronic right low back/hip pain radiating anteriorly.  In May 2016, CAT scan and bone scan negative for cancer as the cause of the pain.  Defer further management of this to her PCP.  She did not complain of this today.    *In the past, she has  complained of: Forgetfulness and transposition of numbers when she is writing numbers at times. This had been present since around April 2015. She states it is not worsening.   She did not complain of this today.    *History of stroke around 2005. Because of this I do not think she would be a good candidate for tamoxifen.     *Osteopenia, which has improved to normal bone density.  DEXA 5/17/16 normal bone density.  T score -0.6, compared to.   -1.1 2/20/14.  She is on calcium and vitamin D.  Defer further management of this to her PCP now that she is off aromatase inhibitors.    *Right upper quadrant/right lower anterior rib pain.  Present since October 2016.    Due to RUQ abdominal pain/right lower anterior rib pain since October 2016, bone scan and CT abdomen with contrast 2/2/17: No evidence of recurrence.  (8 mm low-attenuation liver lesion seen on the 5/17/16 CT but less conspicuous on order CT.  This was felt to be due to older CT without contrast.  This was felt to likely be benign.  Plan no further scheduled follow-up of this-patient agrees.).  She did not complain of this pain today.    *Hypokalemia. Her PCP manages this.      *IV contrast allergy.  Previous rash.  Receives IV contrast pre-medicines with CT IV contrast.  (This has worked well in the past)    *Left arm lymphedema due to prior surgery for breast cancer.  She was seen by the lymphedema clinic but has had some logistical issues obtaining the sleeve they prescribed.  I encouraged her to continue to call the lymphedema clinic for assistance with this.    *Previously complained of vertigo and was referred to her ENT.    *Heartburn.  Had EGD and colonoscopy by Dr. Martinez November 2018.  No malignancy was found.      *Intermittent chest pain x2 years, SBP's sometimes in the 190s.  Her blood pressure machine has been reading irregular sometimes for her heart rate.  Coronary calcifications on last CT.  Referred to her cardiologist,   [place]  4/21/2022: She has seen Dr. Barker recently.  No further cardiac work-up planned at this time.    *Social issues  Her sister passed away in 2023  Her disabled daughter passed away September 2022  Her other daughter, Osmin, is dealing with the death of her  which was around January 2019.    *10/12/2023: Left chest discomfort lasting for 3 to 4 minutes at a time, intermittent for the past few months  10/12/2023: She thinks this may be due to several falls while trying to clear up a prolonged cobian with C. difficile.  She declines imaging now but states she will call when if this persists in which case we will order CT of the chest to evaluate this    *Overweight.  Being overweight is a risk factor for breast cancer.  Body mass index is 27.05 kg/m².  BMI 25 to <30 is overweight  BMI 30 to <35 is class 1 obesity  BMI 35 to <40 is class 2 obesity  BMI 40 or higher is class 3 obesity   Ideally, lose weight    PLAN:   M.D. CBC 6 months  Last mammogram 2/21/2023: BI-RADS 1 (Right-sided mammograms)  Off hormonal therapy    Send copy to Dr. Paxton Rivera, rheumatology    For this visit I reviewed CBC and CMP from 4/10/2024 which were not ordered by me

## 2024-04-24 DIAGNOSIS — E03.9 HYPOTHYROIDISM (ACQUIRED): ICD-10-CM

## 2024-04-25 RX ORDER — METOPROLOL SUCCINATE 50 MG/1
50 TABLET, EXTENDED RELEASE ORAL 2 TIMES DAILY
Qty: 180 TABLET | Refills: 3 | Status: SHIPPED | OUTPATIENT
Start: 2024-04-25

## 2024-04-25 RX ORDER — LEVOTHYROXINE SODIUM 0.07 MG/1
TABLET ORAL
Qty: 34 TABLET | Refills: 0 | Status: SHIPPED | OUTPATIENT
Start: 2024-04-25

## 2024-05-03 DIAGNOSIS — F51.01 PRIMARY INSOMNIA: ICD-10-CM

## 2024-05-03 RX ORDER — AMITRIPTYLINE HYDROCHLORIDE 25 MG/1
25 TABLET, FILM COATED ORAL NIGHTLY
Qty: 90 TABLET | Refills: 2 | Status: SHIPPED | OUTPATIENT
Start: 2024-05-03

## 2024-05-03 NOTE — TELEPHONE ENCOUNTER
Caller: Carlotta Pires    Relationship: Self    Best call back number: [phone]     Requested Prescriptions:   Requested Prescriptions     Pending Prescriptions Disp Refills    amitriptyline (ELAVIL) 25 MG tablet 90 tablet 2     Sig: Take 1 tablet by mouth Every Night.        Pharmacy where request should be sent: [facility] [address] [facility] - [phone]  - [phone]     Last office visit with prescribing clinician: 4/10/2024   Last telemedicine visit with prescribing clinician: Visit date not found   Next office visit with prescribing clinician: Visit date not found     Additional details provided by patient: PATIENT IS OUT OF MEDICATION    Does the patient have less than a 3 day supply:  [x] Yes  [] No    Would you like a call back once the refill request has been completed: [] Yes [] No    If the office needs to give you a call back, can they leave a voicemail: [] Yes [] No    Dakota Monique   05/03/24 10:08 EDT

## 2024-05-17 DIAGNOSIS — E03.9 HYPOTHYROIDISM (ACQUIRED): ICD-10-CM

## 2024-05-17 RX ORDER — LEVOTHYROXINE SODIUM 0.07 MG/1
TABLET ORAL
Qty: 34 TABLET | Refills: 0 | Status: SHIPPED | OUTPATIENT
Start: 2024-05-17

## 2024-06-13 DIAGNOSIS — E03.9 HYPOTHYROIDISM (ACQUIRED): ICD-10-CM

## 2024-06-13 RX ORDER — LEVOTHYROXINE SODIUM 0.07 MG/1
TABLET ORAL
Qty: 34 TABLET | Refills: 0 | Status: SHIPPED | OUTPATIENT
Start: 2024-06-13

## 2024-06-13 NOTE — TELEPHONE ENCOUNTER
Rx Refill Note  Requested Prescriptions     Pending Prescriptions Disp Refills    levothyroxine (SYNTHROID, LEVOTHROID) 75 MCG tablet [Pharmacy Med Name: Levothyroxine Sodium 75 MCG Oral Tablet] 34 tablet 0     Sig: TAKE 1 TABLET BY MOUTH ONCE DAILY EXCEPT  2  TABLETS  ON  SUNDAYS      Last office visit with prescribing clinician: 4/10/2024   Last telemedicine visit with prescribing clinician: Visit date not found   Next office visit with prescribing clinician: Visit date not found                         Would you like a call back once the refill request has been completed: [] Yes [] No    If the office needs to give you a call back, can they leave a voicemail: [] Yes [] No    Pat Noonan MA  06/13/24, 13:37 EDT

## 2024-06-17 RX ORDER — AMLODIPINE BESYLATE 5 MG/1
5 TABLET ORAL DAILY
Qty: 90 TABLET | Refills: 2 | Status: SHIPPED | OUTPATIENT
Start: 2024-06-17

## 2024-07-03 ENCOUNTER — OFFICE VISIT (OUTPATIENT)
Dept: CARDIOLOGY | Facility: CLINIC | Age: 79
End: 2024-07-03
Payer: MEDICARE

## 2024-07-03 VITALS
BODY MASS INDEX: 27.6 KG/M2 | WEIGHT: 146.2 LBS | HEART RATE: 75 BPM | HEIGHT: 61 IN | DIASTOLIC BLOOD PRESSURE: 90 MMHG | SYSTOLIC BLOOD PRESSURE: 160 MMHG

## 2024-07-03 DIAGNOSIS — I10 ESSENTIAL HYPERTENSION: Primary | ICD-10-CM

## 2024-07-03 DIAGNOSIS — Z86.79 HISTORY OF CARDIOMYOPATHY: ICD-10-CM

## 2024-07-03 PROCEDURE — 3080F DIAST BP >= 90 MM HG: CPT | Performed by: INTERNAL MEDICINE

## 2024-07-03 PROCEDURE — 99214 OFFICE O/P EST MOD 30 MIN: CPT | Performed by: INTERNAL MEDICINE

## 2024-07-03 PROCEDURE — 93000 ELECTROCARDIOGRAM COMPLETE: CPT | Performed by: INTERNAL MEDICINE

## 2024-07-03 PROCEDURE — 3077F SYST BP >= 140 MM HG: CPT | Performed by: INTERNAL MEDICINE

## 2024-07-03 RX ORDER — AMLODIPINE BESYLATE 5 MG/1
5 TABLET ORAL 2 TIMES DAILY
Qty: 180 TABLET | Refills: 3 | Status: SHIPPED | OUTPATIENT
Start: 2024-07-03 | End: 2024-07-05 | Stop reason: SDUPTHER

## 2024-07-03 NOTE — PROGRESS NOTES
Date of Office Visit: 24  Encounter Provider: Reginald Barker MD  Place of Service: [facility] CARDIOLOGY  Patient Name: Carlotta Pires  :1945  [id number]    Chief Complaint   Patient presents with    Cardiomyopathy   :     HPI: Carlotta Pires is a 79 y.o. female she is here for follow-up we have seen her in the past for hypertension.  She did have a echo in  that was essentially normal with mild LVH.  And she had a monitor in  that also was basically normal.    Her story is that she had breast cancer got Adriamycin this is probably in  and at 1 time they thought she had a cardiomyopathy we have looked at her number of times and her LV functions always been normal.  She does have hypertension it seems like it is probably mainly high I did put her on amlodipine she has been count is sensitive to some medicines she has developed neuropathy in her feet.  She has not had any PND orthopnea edema no syncope no palpitations no chest pain    Past Medical History:   Diagnosis Date    Allergic rhinitis     Arthritis     C. difficile colitis     Cancer     Left breast cancer    CHF (congestive heart failure)     Cough     COVID-19 2020    Degenerative arthritis of thumb     GERD without esophagitis     H/O TIA (transient ischemic attack) and stroke     History of bone density study     Lumbar radiculopathy     Osteoporosis     Palpitations     Peptic ulceration     Personal history of malignant neoplasm of breast     Sciatica     Stroke     CVA--History of storke    Trigeminal neuralgia        Past Surgical History:   Procedure Laterality Date    BREAST BIOPSY Left     CATARACT EXTRACTION, BILATERAL      COLONOSCOPY  2012    Adolph Martinez MD    COLONOSCOPY N/A 2018    Procedure: COLONOSCOPY to cecum;  Surgeon: Adolph Martinez MD;  Location: [facility] ENDOSCOPY;  Service: Gastroenterology    COLONOSCOPY N/A 2021    Procedure: COLONOSCOPY TO  CECUM AND TERMINAL ILEUM;  Surgeon: Emelia Wilcox MD;  Location:  [facility];  Service: Gastroenterology;  Laterality: N/A;  RECTAL BLEEDING  --DIVERTICULOSIS, HEMORRHOIDS, TORTUOUS COLON    ENDOSCOPY N/A 11/13/2018    Procedure: ESOPHAGOGASTRODUODENOSCOPY with bx;  Surgeon: Adolph Martinez MD;  Location:  [facility]COPY;  Service: Gastroenterology    ENDOSCOPY N/A 01/27/2021    Procedure: ESOPHAGOGASTRODUODENOSCOPY WITH COLD BX;  Surgeon: Emelia Wilcox MD;  Location:  [facility] ENDOSCOPY;  Service: Gastroenterology;  Laterality: N/A;  GERD  --GASTRITIS, HIATAL HERNIA    EYE SURGERY      HYSTERECTOMY      At age 31-Not due to cancer    MASTECTOMY Left 06/30/2011    Dr. Kaitlyn Luna    TUBAL ABDOMINAL LIGATION  1974    UPPER GASTROINTESTINAL ENDOSCOPY  09/28/2012    Adolph Martinez MD       Social History     Socioeconomic History    Marital status:      Spouse name: Jack    Number of children: 2    Years of education: High School   Tobacco Use    Smoking status: Never    Smokeless tobacco: Never    Tobacco comments:     CAFFEINE USE: 4-5 CUPS 1/2 & 1/2 COFFEE DAILY   Vaping Use    Vaping status: Never Used   Substance and Sexual Activity    Alcohol use: Not Currently    Drug use: No    Sexual activity: Defer       Family History   Problem Relation Age of Onset    Cancer Mother 68        head and neck    Stroke Mother     Heart disease Mother     Gallbladder disease Mother     Throat cancer Mother 68    Breast cancer Sister 57    Heart disease Sister     Hypertension Sister     Cancer Sister     Gallbladder disease Sister     Diabetes Sister     Cancer Brother         head and neck    Heart disease Brother     Hypertension Brother     Gallbladder disease Brother     Lung cancer Brother 67    Throat cancer Brother 67    Hypertension Father     Gallbladder disease Father     Emphysema Father     Heart disease Father         Enlarged heart    Cancer Maternal Aunt     Cancer Maternal Uncle     Colon  cancer Maternal Uncle     Heart attack Brother     Alcohol abuse Brother     Hypertension Sister     Heart disease Sister     Hypertension Sister     Heart disease Sister     Hypertension Sister     No Known Problems Other        Review of Systems   Constitutional: Negative for decreased appetite, fever, malaise/fatigue and weight loss.   HENT:  Negative for nosebleeds.    Eyes:  Negative for double vision.   Cardiovascular:  Negative for chest pain, claudication, cyanosis, dyspnea on exertion, irregular heartbeat, leg swelling, near-syncope, orthopnea, palpitations, paroxysmal nocturnal dyspnea and syncope.   Respiratory:  Negative for cough, hemoptysis and shortness of breath.    Hematologic/Lymphatic: Negative for bleeding problem.   Skin:  Negative for rash.   Musculoskeletal:  Negative for falls and myalgias.   Gastrointestinal:  Negative for hematochezia, jaundice, melena, nausea and vomiting.   Genitourinary:  Negative for hematuria.   Neurological:  Negative for dizziness and seizures.   Psychiatric/Behavioral:  Negative for altered mental status and memory loss.        Allergies   Allergen Reactions    Lisinopril Cough    Contrast Dye (Echo Or Unknown Ct/Mr) Itching and Rash     IVP Dye    Fd&C Yellow #5 (Tartrazine) Hives         Current Outpatient Medications:     amitriptyline (ELAVIL) 25 MG tablet, Take 1 tablet by mouth Every Night., Disp: 90 tablet, Rfl: 2    amLODIPine (NORVASC) 5 MG tablet, Take 1 tablet by mouth Daily., Disp: 90 tablet, Rfl: 2    atorvastatin (LIPITOR) 10 MG tablet, Take 1 tablet by mouth Daily., Disp: 90 tablet, Rfl: 3    Cholecalciferol (Vitamin D) 50 MCG (2000 UT) tablet, Take 1 tablet by mouth Daily., Disp: , Rfl:     clopidogrel (PLAVIX) 75 MG tablet, Take 1 tablet by mouth Daily., Disp: 90 tablet, Rfl: 3    dexlansoprazole (Dexilant) 30 MG capsule, Take 1 capsule by mouth Daily., Disp: 90 capsule, Rfl: 3    levothyroxine (SYNTHROID, LEVOTHROID) 75 MCG tablet, TAKE 1 TABLET BY  "MOUTH ONCE DAILY EXCEPT  2  TABLETS  ON  SUNDAYS, Disp: 34 tablet, Rfl: 0    loratadine (Claritin) 10 MG tablet, Take 1 tablet by mouth Daily., Disp: , Rfl:     Melatonin 10 MG capsule, Take 1 capsule by mouth At Night As Needed., Disp: , Rfl:     metoprolol succinate XL (TOPROL-XL) 50 MG 24 hr tablet, Take 1 tablet by mouth twice daily, Disp: 180 tablet, Rfl: 3    vitamin B-12 (CYANOCOBALAMIN) 1000 MCG tablet, Take 1 tablet by mouth Daily., Disp: , Rfl:     fluticasone (FLONASE) 50 MCG/ACT nasal spray, 2 sprays into the nostril(s) as directed by provider Daily. (Patient not taking: Reported on 7/3/2024), Disp: 18.2 mL, Rfl: 2      Objective:     Vitals:    07/03/24 1427   BP: 160/90   Pulse: 75   Weight: 66.3 kg (146 lb 3.2 oz)   Height: 154.9 cm (61\")     Body mass index is 27.62 kg/m².    Constitutional:       Appearance: Well-developed.   Eyes:      General: No scleral icterus.  HENT:      Head: Normocephalic.   Neck:      Thyroid: No thyromegaly.      Vascular: No JVD.      Lymphadenopathy: No cervical adenopathy.   Pulmonary:      Effort: Pulmonary effort is normal.      Breath sounds: Normal breath sounds. No wheezing. No rales.   Cardiovascular:      Normal rate. Regular rhythm.      No gallop.    Edema:     Peripheral edema absent.   Abdominal:      Palpations: Abdomen is soft.      Tenderness: There is no abdominal tenderness.   Musculoskeletal: Normal range of motion. Skin:     General: Skin is warm and dry.      Findings: No rash.   Neurological:      Mental Status: Alert and oriented to person, place, and time.           ECG 12 Lead    Date/Time: 7/3/2024 3:13 PM  Performed by: Reginald Barker MD    Authorized by: Reginald Barker MD  Comparison: compared with previous ECG   Similar to previous ECG  Rhythm: sinus rhythm  Other findings: non-specific ST-T wave changes    Clinical impression: abnormal EKG           Assessment:       Diagnosis Plan   1. Essential hypertension        2. History of " cardiomyopathy                 Plan:       Well long before I took care of her there was a question about whether she could have had cardiomyopathy related to Adriamycin.  Since I have seen her her LV function has always been normal which is great.  Her blood pressure I think is always a little too high and so I am going to increase her blood pressure medicine but I think she might do better taking it twice a day instead of all at once.  I will have her follow-up with her primary care doc on that as well is she will come back and see us in a year I do not think we need to get an echo at this point    Return for See Barbie Orta in 1 year, See me in 2 years.     As always, it has been a pleasure to participate in your patient's care.      Sincerely,       Reginald Barker MD

## 2024-07-05 RX ORDER — METOPROLOL SUCCINATE 50 MG/1
50 TABLET, EXTENDED RELEASE ORAL 2 TIMES DAILY
Qty: 180 TABLET | Refills: 3 | Status: SHIPPED | OUTPATIENT
Start: 2024-07-05

## 2024-07-05 RX ORDER — AMLODIPINE BESYLATE 5 MG/1
5 TABLET ORAL 2 TIMES DAILY
Qty: 180 TABLET | Refills: 3 | Status: SHIPPED | OUTPATIENT
Start: 2024-07-05

## 2024-07-05 RX ORDER — ATORVASTATIN CALCIUM 10 MG/1
10 TABLET, FILM COATED ORAL DAILY
Qty: 90 TABLET | Refills: 3 | Status: SHIPPED | OUTPATIENT
Start: 2024-07-05

## 2024-07-05 RX ORDER — CLOPIDOGREL BISULFATE 75 MG/1
75 TABLET ORAL DAILY
Qty: 90 TABLET | Refills: 3 | Status: SHIPPED | OUTPATIENT
Start: 2024-07-05

## 2024-07-20 DIAGNOSIS — E03.9 HYPOTHYROIDISM (ACQUIRED): ICD-10-CM

## 2024-07-22 RX ORDER — LEVOTHYROXINE SODIUM 0.07 MG/1
TABLET ORAL
Qty: 34 TABLET | Refills: 1 | Status: SHIPPED | OUTPATIENT
Start: 2024-07-22

## 2024-07-22 NOTE — TELEPHONE ENCOUNTER
LOV 4/10/24  NOV None  LF 6/13/24    Does not meet protocol  Please advise    [facility]

## 2024-08-23 ENCOUNTER — OFFICE VISIT (OUTPATIENT)
Dept: FAMILY MEDICINE CLINIC | Facility: CLINIC | Age: 79
End: 2024-08-23
Payer: MEDICARE

## 2024-08-23 VITALS
HEIGHT: 61 IN | OXYGEN SATURATION: 98 % | WEIGHT: 148.2 LBS | TEMPERATURE: 98.2 F | SYSTOLIC BLOOD PRESSURE: 122 MMHG | DIASTOLIC BLOOD PRESSURE: 80 MMHG | BODY MASS INDEX: 27.98 KG/M2 | HEART RATE: 72 BPM

## 2024-08-23 DIAGNOSIS — Z13.31 POSITIVE DEPRESSION SCREENING: ICD-10-CM

## 2024-08-23 DIAGNOSIS — Z00.00 ENCOUNTER FOR MEDICARE ANNUAL WELLNESS EXAM: Primary | ICD-10-CM

## 2024-08-23 DIAGNOSIS — G50.0 TRIGEMINAL NEURALGIA: ICD-10-CM

## 2024-08-23 DIAGNOSIS — R35.0 URINARY FREQUENCY: ICD-10-CM

## 2024-08-23 DIAGNOSIS — F51.01 PRIMARY INSOMNIA: ICD-10-CM

## 2024-08-23 DIAGNOSIS — I10 ESSENTIAL HYPERTENSION: ICD-10-CM

## 2024-08-23 DIAGNOSIS — M85.80 OSTEOPENIA, UNSPECIFIED LOCATION: ICD-10-CM

## 2024-08-23 DIAGNOSIS — Z78.0 POSTMENOPAUSE: ICD-10-CM

## 2024-08-23 DIAGNOSIS — E78.2 HYPERLIPIDEMIA, MIXED: ICD-10-CM

## 2024-08-23 DIAGNOSIS — Z85.3 HISTORY OF BREAST CANCER: ICD-10-CM

## 2024-08-23 DIAGNOSIS — K21.9 GASTROESOPHAGEAL REFLUX DISEASE WITHOUT ESOPHAGITIS: ICD-10-CM

## 2024-08-23 DIAGNOSIS — R31.29 OTHER MICROSCOPIC HEMATURIA: ICD-10-CM

## 2024-08-23 DIAGNOSIS — M81.0 OSTEOPOROSIS, UNSPECIFIED OSTEOPOROSIS TYPE, UNSPECIFIED PATHOLOGICAL FRACTURE PRESENCE: ICD-10-CM

## 2024-08-23 DIAGNOSIS — G62.9 NEUROPATHY: ICD-10-CM

## 2024-08-23 DIAGNOSIS — E53.8 VITAMIN B12 DEFICIENCY: ICD-10-CM

## 2024-08-23 DIAGNOSIS — E03.9 HYPOTHYROIDISM (ACQUIRED): ICD-10-CM

## 2024-08-23 DIAGNOSIS — Z91.81 AT HIGH RISK FOR FALLS: ICD-10-CM

## 2024-08-23 PROBLEM — S46.912A LEFT SHOULDER STRAIN: Status: RESOLVED | Noted: 2024-01-19 | Resolved: 2024-08-23

## 2024-08-23 PROBLEM — R05.9 COUGH: Status: RESOLVED | Noted: 2023-04-15 | Resolved: 2024-08-23

## 2024-08-23 PROBLEM — E87.6 HYPOKALEMIA: Status: RESOLVED | Noted: 2023-05-23 | Resolved: 2024-08-23

## 2024-08-23 PROBLEM — S01.01XA LACERATION OF OCCIPITAL REGION OF SCALP: Status: RESOLVED | Noted: 2024-01-19 | Resolved: 2024-08-23

## 2024-08-23 PROBLEM — D64.9 ANEMIA: Status: RESOLVED | Noted: 2023-05-09 | Resolved: 2024-08-23

## 2024-08-23 PROBLEM — R19.7 DIARRHEA: Status: RESOLVED | Noted: 2023-05-23 | Resolved: 2024-08-23

## 2024-08-23 LAB
BILIRUB BLD-MCNC: NEGATIVE MG/DL
CLARITY, POC: CLEAR
COLOR UR: YELLOW
EXPIRATION DATE: ABNORMAL
GLUCOSE UR STRIP-MCNC: NEGATIVE MG/DL
KETONES UR QL: NEGATIVE
LEUKOCYTE EST, POC: NEGATIVE
Lab: ABNORMAL
NITRITE UR-MCNC: NEGATIVE MG/ML
PH UR: 6.5 [PH] (ref 5–8)
PROT UR STRIP-MCNC: NEGATIVE MG/DL
RBC # UR STRIP: ABNORMAL /UL
SP GR UR: 1 (ref 1–1.03)
UROBILINOGEN UR QL: NORMAL

## 2024-08-23 RX ORDER — AMITRIPTYLINE HYDROCHLORIDE 25 MG/1
25 TABLET, FILM COATED ORAL NIGHTLY
Qty: 90 TABLET | Refills: 2 | Status: SHIPPED | OUTPATIENT
Start: 2024-08-23

## 2024-08-23 NOTE — PATIENT INSTRUCTIONS
Continue to monitor your blood pressure periodically and record results.  Continue to work on healthy diet and exercise.  Follow up pending lab results.  Follow up in 6 months, or sooner if problems or concerns.   Call GI at [phone] to schedule a follow up appointment.      Medicare Wellness  Personal Prevention Plan of Service     Date of Office Visit:    Encounter Provider:  URBAN Burns  Place of Service:  [facility]  Patient Name: Carlotta Pires  :  1945    As part of the Medicare Wellness portion of your visit today, we are providing you with this personalized preventive plan of services (PPPS). This plan is based upon recommendations of the [place] Preventive Services Task Force (USPSTF) and the Advisory Committee on Immunization Practices (ACIP).    This lists the preventive care services that should be considered, and provides dates of when you are due. Items listed as completed are up-to-date and do not require any further intervention.    Health Maintenance   Topic Date Due   • ZOSTER VACCINE (1 of 2) Never done   • RSV Vaccine - Adults (1 - 1-dose 60+ series) Never done   • DXA SCAN  2018   • ANNUAL WELLNESS VISIT  2024   • INFLUENZA VACCINE  2024   • COVID-19 Vaccine (2023-24 season) 2025 (Originally 3/9/2024)   • LIPID PANEL  2024   • MAMMOGRAM  2025   • BMI FOLLOWUP  2025   • COLORECTAL CANCER SCREENING  2031   • TDAP/TD VACCINES (3 - Tdap) 07/15/2031   • HEPATITIS C SCREENING  Completed   • Pneumococcal Vaccine 65+  Completed       Orders Placed This Encounter   Procedures   • Urine Culture - Urine, Urine, Clean Catch     Order Specific Question:   Release to patient     Answer:   Routine Release [[id number]]   • DEXA Bone Density Axial     Standing Status:   Future     Standing Expiration Date:   2025     Order Specific Question:   Reason for Exam:     Answer:   postmenopause     Order  Mom called stating she needs a letter with directions on miralax medication that he has been taking for a year now mom and dad are going to court about this as how often he should be taking this and now they need a letter from dr caceres to suggest how often he should take it.    Specific Question:   Release to patient     Answer:   Routine Release [[id number]]     Order Specific Question:   Does this patient have a diabetic monitoring/medication delivering device on?     Answer:   No     Order Specific Question:   Is patient taking or have taken long term Glucocorticoid (steroids)?     Answer:   No     Order Specific Question:   Does the patient have rheumatoid arthritis?     Answer:   No     Order Specific Question:   Does the patient have secondary osteoporosis?     Answer:   No   • Comprehensive Metabolic Panel     Order Specific Question:   Release to patient     Answer:   Routine Release [1400000002]   • Lipid Panel With LDL / HDL Ratio     Order Specific Question:   Release to patient     Answer:   Routine Release [1400000002]   • Vitamin D,25-Hydroxy     Order Specific Question:   Release to patient     Answer:   Routine Release [1400000002]   • Vitamin B12 & Folate     Order Specific Question:   Release to patient     Answer:   Routine Release [1400000002]   • TSH Rfx On Abnormal To Free T4     Order Specific Question:   Release to patient     Answer:   Routine Release [1400000002]   • POC Urinalysis Dipstick, Automated     Order Specific Question:   Release to patient     Answer:   Routine Release [1400000002]   • CBC & Differential     Order Specific Question:   Manual Differential     Answer:   No     Order Specific Question:   Release to patient     Answer:   Routine Release [1400000002]       Return in about 6 months (around 2/23/2025) for Recheck.          Fall Prevention in the Home, Adult  Falls can cause injuries and affect people of all ages. There are many simple things that you can do to make your home safe and to help prevent falls.  If you need it, ask for help making these changes.  What actions can I take to prevent falls?  General information  Use good lighting in all rooms. Make sure to:  Replace any light bulbs that burn out.  Turn on lights if it is dark and use  night-lights.  Keep items that you use often in easy-to-reach places. Lower the shelves around your home if needed.  Move furniture so that there are clear paths around it.  Do not keep throw rugs or other things on the floor that can make you trip.  If any of your floors are uneven, fix them.  Add color or contrast paint or tape to clearly leena and help you see:  Grab bars or handrails.  First and last steps of staircases.  Where the edge of each step is.  If you use a ladder or stepladder:  Make sure that it is fully opened. Do not climb a closed ladder.  Make sure the sides of the ladder are locked in place.  Have someone hold the ladder while you use it.  Know where your pets are as you move through your home.  What can I do in the bathroom?         Keep the floor dry. Clean up any water that is on the floor right away.  Remove soap buildup in the bathtub or shower. Buildup makes bathtubs and showers slippery.  Use non-skid mats or decals on the floor of the bathtub or shower.  Attach bath mats securely with double-sided, non-slip rug tape.  If you need to sit down while you are in the shower, use a non-slip stool.  Install grab bars by the toilet and in the bathtub and shower. Do not use towel bars as grab bars.  What can I do in the bedroom?  Make sure that you have a light by your bed that is easy to reach.  Do not use any sheets or blankets on your bed that hang to the floor.  Have a firm bench or chair with side arms that you can use for support when you get dressed.  What can I do in the kitchen?  Clean up any spills right away.  If you need to reach something above you, use a sturdy step stool that has a grab bar.  Keep electrical cables out of the way.  Do not use floor polish or wax that makes floors slippery.  What can I do with my stairs?  Do not leave anything on the stairs.  Make sure that you have a light switch at the top and the bottom of the stairs. Have them installed if you do not have  them.  Make sure that there are handrails on both sides of the stairs. Fix handrails that are broken or loose. Make sure that handrails are as long as the staircases.  Install non-slip stair treads on all stairs in your home if they do not have carpet.  Avoid having throw rugs at the top or bottom of stairs, or secure the rugs with carpet tape to prevent them from moving.  Choose a carpet design that does not hide the edge of steps on the stairs. Make sure that carpet is firmly attached to the stairs. Fix any carpet that is loose or worn.  What can I do on the outside of my home?  Use bright outdoor lighting.  Repair the edges of walkways and driveways and fix any cracks. Clear paths of anything that can make you trip, such as tools or rocks.  Add color or contrast paint or tape to clearly leena and help you see high doorway thresholds.  Trim any bushes or trees on the main path into your home.  Check that handrails are securely fastened and in good repair. Both sides of all steps should have handrails.  Install guardrails along the edges of any raised decks or porches.  Have leaves, snow, and ice cleared regularly. Use sand, salt, or ice melt on walkways during winter months if you live where there is ice and snow.  In the garage, clean up any spills right away, including grease or oil spills.  What other actions can I take?  Review your medicines with your health care provider. Some medicines can make you confused or feel dizzy. This can increase your chance of falling.  Wear closed-toe shoes that fit well and support your feet. Wear shoes that have rubber soles and low heels.  Use a cane, walker, scooter, or crutches that help you move around if needed.  Talk with your provider about other ways that you can decrease your risk of falls. This may include seeing a physical therapist to learn to do exercises to improve movement and strength.  Where to find more information  Centers for Disease Control and Prevention,  [place]: cdc.gov  National [place] on Aging: marg[place].nih.gov  National [place] on Aging: [place].nih.gov  Contact a health care provider if:  You are afraid of falling at home.  You feel weak, drowsy, or dizzy at home.  You fall at home.  Get help right away if you:  Lose consciousness or have trouble moving after a fall.  Have a fall that causes a head injury.  These symptoms may be an emergency. Get help right away. Call 911.  Do not wait to see if the symptoms will go away.  Do not drive yourself to the hospital.  This information is not intended to replace advice given to you by your health care provider. Make sure you discuss any questions you have with your health care provider.  Document Revised: 08/21/2023 Document Reviewed: 08/21/2023  Elsevier Patient Education © 2024 [employer]

## 2024-08-23 NOTE — ASSESSMENT & PLAN NOTE
Patient declines medication at this time.    Has good support.    Will call if would like to try medication.
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
## 2024-08-23 NOTE — ASSESSMENT & PLAN NOTE
Hypertension is stable and controlled  Continue current treatment regimen.  Regular aerobic exercise.  Ambulatory blood pressure monitoring.  Blood pressure will be reassessed in 6 months.  Continue Amlodipine and Metoprolol twice daily.  Follow-up as scheduled with cardiology.

## 2024-08-24 LAB
25(OH)D3+25(OH)D2 SERPL-MCNC: 29.8 NG/ML (ref 30–100)
ALBUMIN SERPL-MCNC: 4.6 G/DL (ref 3.8–4.8)
ALP SERPL-CCNC: 113 IU/L (ref 44–121)
ALT SERPL-CCNC: 19 IU/L (ref 0–32)
AST SERPL-CCNC: 28 IU/L (ref 0–40)
BASOPHILS # BLD AUTO: 0 X10E3/UL (ref 0–0.2)
BASOPHILS NFR BLD AUTO: 0 %
BILIRUB SERPL-MCNC: 0.4 MG/DL (ref 0–1.2)
BUN SERPL-MCNC: 7 MG/DL (ref 8–27)
BUN/CREAT SERPL: 9 (ref 12–28)
CALCIUM SERPL-MCNC: 9.8 MG/DL (ref 8.7–10.3)
CHLORIDE SERPL-SCNC: 101 MMOL/L (ref 96–106)
CHOLEST SERPL-MCNC: 164 MG/DL (ref 100–199)
CO2 SERPL-SCNC: 23 MMOL/L (ref 20–29)
CREAT SERPL-MCNC: 0.76 MG/DL (ref 0.57–1)
EGFRCR SERPLBLD CKD-EPI 2021: 81 ML/MIN/1.73
EOSINOPHIL # BLD AUTO: 0.2 X10E3/UL (ref 0–0.4)
EOSINOPHIL NFR BLD AUTO: 3 %
ERYTHROCYTE [DISTWIDTH] IN BLOOD BY AUTOMATED COUNT: 12.7 % (ref 11.7–15.4)
FOLATE SERPL-MCNC: >20 NG/ML
GLOBULIN SER CALC-MCNC: 2.6 G/DL (ref 1.5–4.5)
GLUCOSE SERPL-MCNC: 100 MG/DL (ref 70–99)
HCT VFR BLD AUTO: 42.5 % (ref 34–46.6)
HDLC SERPL-MCNC: 53 MG/DL
HGB BLD-MCNC: 13.7 G/DL (ref 11.1–15.9)
IMM GRANULOCYTES # BLD AUTO: 0 X10E3/UL (ref 0–0.1)
IMM GRANULOCYTES NFR BLD AUTO: 0 %
LDLC SERPL CALC-MCNC: 87 MG/DL (ref 0–99)
LDLC/HDLC SERPL: 1.6 RATIO (ref 0–3.2)
LYMPHOCYTES # BLD AUTO: 1.8 X10E3/UL (ref 0.7–3.1)
LYMPHOCYTES NFR BLD AUTO: 26 %
MCH RBC QN AUTO: 30.9 PG (ref 26.6–33)
MCHC RBC AUTO-ENTMCNC: 32.2 G/DL (ref 31.5–35.7)
MCV RBC AUTO: 96 FL (ref 79–97)
MONOCYTES # BLD AUTO: 0.6 X10E3/UL (ref 0.1–0.9)
MONOCYTES NFR BLD AUTO: 8 %
NEUTROPHILS # BLD AUTO: 4.2 X10E3/UL (ref 1.4–7)
NEUTROPHILS NFR BLD AUTO: 63 %
PLATELET # BLD AUTO: 296 X10E3/UL (ref 150–450)
POTASSIUM SERPL-SCNC: 4 MMOL/L (ref 3.5–5.2)
PROT SERPL-MCNC: 7.2 G/DL (ref 6–8.5)
RBC # BLD AUTO: 4.43 X10E6/UL (ref 3.77–5.28)
SODIUM SERPL-SCNC: 142 MMOL/L (ref 134–144)
TRIGL SERPL-MCNC: 135 MG/DL (ref 0–149)
TSH SERPL DL<=0.005 MIU/L-ACNC: 2.79 UIU/ML (ref 0.45–4.5)
VIT B12 SERPL-MCNC: 1103 PG/ML (ref 232–1245)
VLDLC SERPL CALC-MCNC: 24 MG/DL (ref 5–40)
WBC # BLD AUTO: 6.8 X10E3/UL (ref 3.4–10.8)

## 2024-08-25 LAB
BACTERIA UR CULT: NORMAL
BACTERIA UR CULT: NORMAL

## 2024-08-26 DIAGNOSIS — E55.9 VITAMIN D DEFICIENCY: Primary | ICD-10-CM

## 2024-08-26 DIAGNOSIS — E03.9 HYPOTHYROIDISM (ACQUIRED): ICD-10-CM

## 2024-08-26 RX ORDER — LEVOTHYROXINE SODIUM 75 UG/1
TABLET ORAL
Qty: 105 TABLET | Refills: 1 | Status: SHIPPED | OUTPATIENT
Start: 2024-08-26

## 2024-08-26 RX ORDER — FAMOTIDINE 20 MG
1000 TABLET ORAL DAILY
Start: 2024-08-26

## 2024-09-04 ENCOUNTER — HOSPITAL ENCOUNTER (OUTPATIENT)
Dept: BONE DENSITY | Facility: HOSPITAL | Age: 79
Discharge: HOME | End: 2024-09-04
Admitting: NURSE PRACTITIONER
Payer: MEDICARE

## 2024-09-06 ENCOUNTER — HOSPITAL ENCOUNTER (OUTPATIENT)
Facility: HOSPITAL | Age: 79
Discharge: HOME | End: 2024-09-06
Payer: MEDICARE

## 2024-09-06 DIAGNOSIS — Z78.0 POSTMENOPAUSE: ICD-10-CM

## 2024-09-06 PROCEDURE — 77080 DXA BONE DENSITY AXIAL: CPT

## 2024-09-10 DIAGNOSIS — E55.9 VITAMIN D DEFICIENCY: ICD-10-CM

## 2024-09-10 RX ORDER — FAMOTIDINE 20 MG
2000 TABLET ORAL DAILY
Start: 2024-09-10

## 2024-09-13 DIAGNOSIS — E03.9 HYPOTHYROIDISM (ACQUIRED): ICD-10-CM

## 2024-09-13 RX ORDER — LEVOTHYROXINE SODIUM 75 UG/1
TABLET ORAL
Qty: 100 TABLET | Refills: 1 | Status: SHIPPED | OUTPATIENT
Start: 2024-09-13

## 2024-09-18 ENCOUNTER — OFFICE VISIT (OUTPATIENT)
Dept: NEUROLOGY | Facility: CLINIC | Age: 79
End: 2024-09-18
Payer: MEDICARE

## 2024-09-18 VITALS
WEIGHT: 147 LBS | DIASTOLIC BLOOD PRESSURE: 80 MMHG | HEIGHT: 61 IN | OXYGEN SATURATION: 96 % | BODY MASS INDEX: 27.75 KG/M2 | SYSTOLIC BLOOD PRESSURE: 122 MMHG | HEART RATE: 76 BPM

## 2024-09-18 DIAGNOSIS — R26.89 IMPAIRMENT OF BALANCE: ICD-10-CM

## 2024-09-18 DIAGNOSIS — G50.0 TRIGEMINAL NEURALGIA OF RIGHT SIDE OF FACE: ICD-10-CM

## 2024-09-18 DIAGNOSIS — T45.1X5A CHEMOTHERAPY-INDUCED PERIPHERAL NEUROPATHY: Primary | ICD-10-CM

## 2024-09-18 DIAGNOSIS — G62.0 CHEMOTHERAPY-INDUCED PERIPHERAL NEUROPATHY: Primary | ICD-10-CM

## 2024-09-18 RX ORDER — AMITRIPTYLINE HYDROCHLORIDE 50 MG/1
50 TABLET ORAL NIGHTLY
Qty: 30 TABLET | Refills: 4 | Status: SHIPPED | OUTPATIENT
Start: 2024-09-18 | End: 2024-10-18

## 2024-10-01 ENCOUNTER — HOSPITAL ENCOUNTER (OUTPATIENT)
Dept: PHYSICAL THERAPY | Facility: HOSPITAL | Age: 79
Setting detail: THERAPIES SERIES
Discharge: HOME | End: 2024-10-01
Payer: MEDICARE

## 2024-10-01 DIAGNOSIS — T45.1X5A CHEMOTHERAPY-INDUCED PERIPHERAL NEUROPATHY: Primary | ICD-10-CM

## 2024-10-01 DIAGNOSIS — G62.0 CHEMOTHERAPY-INDUCED PERIPHERAL NEUROPATHY: Primary | ICD-10-CM

## 2024-10-01 DIAGNOSIS — R26.89 IMPAIRMENT OF BALANCE: ICD-10-CM

## 2024-10-01 DIAGNOSIS — Z91.81 RISK FOR FALLS: ICD-10-CM

## 2024-10-01 PROCEDURE — 97162 PT EVAL MOD COMPLEX 30 MIN: CPT

## 2024-10-01 NOTE — THERAPY EVALUATION
.Outpatient Physical Therapy Neuro Initial Evaluation  [facility]     Patient Name: Carlotta Pires  : 1945  MRN: [id number]  Today's Date: 10/1/2024      Visit Date: 10/01/2024    Patient Active Problem List   Diagnosis    Malignant neoplasm of lower-outer quadrant of left female breast    Osteopenia    Encounter for screening for malignant neoplasm of colon    Epigastric pain    Essential hypertension    History of cardiomyopathy    Hyperlipidemia, mixed    Allergic rhinitis    Hypothyroidism (acquired)    Primary insomnia    CHF (congestive heart failure)    Encounter for Medicare annual wellness exam    Arthritis    Trigeminal neuralgia    Lumbar radiculopathy    Degenerative arthritis of thumb    Rectal bleeding    Gastroesophageal reflux disease without esophagitis    Weight loss, abnormal    History of breast cancer    FH: colon cancer    Dilated cardiomyopathy    Fatigue    Acute non-recurrent sinusitis    Constipation    Anxiety    Positive depression screening    Vitamin B12 deficiency    Chronic diastolic CHF (congestive heart failure)    Postmenopause    Neuropathy    Carotid stenosis, bilateral    Vision changes    Palpitations    PVC's (premature ventricular contractions)    At high risk for falls        Past Medical History:   Diagnosis Date    Allergic rhinitis     Anemia 2023    Arthritis     C. difficile colitis     Cancer     Left breast cancer    CHF (congestive heart failure)     Cough     COVID-19 2020    Degenerative arthritis of thumb     GERD without esophagitis     H/O TIA (transient ischemic attack) and stroke     History of bone density study     Laceration of occipital region of scalp 2024    Lumbar radiculopathy     Osteoporosis     Palpitations     Peptic ulceration     Personal history of malignant neoplasm of breast     Sciatica     Stroke     CVA--History of storke    Trigeminal neuralgia         Past Surgical History:   Procedure Laterality Date     BREAST BIOPSY Left 2011    CATARACT EXTRACTION, BILATERAL      COLONOSCOPY  09/28/2012    Adolph Martinez MD    COLONOSCOPY N/A 11/13/2018    Procedure: COLONOSCOPY to cecum;  Surgeon: Adolph Martinez MD;  Location:  [facility];  Service: Gastroenterology    COLONOSCOPY N/A 01/27/2021    Procedure: COLONOSCOPY TO CECUM AND TERMINAL ILEUM;  Surgeon: Emelia Wilcox MD;  Location:  [facility];  Service: Gastroenterology;  Laterality: N/A;  RECTAL BLEEDING  --DIVERTICULOSIS, HEMORRHOIDS, TORTUOUS COLON    ENDOSCOPY N/A 11/13/2018    Procedure: ESOPHAGOGASTRODUODENOSCOPY with bx;  Surgeon: Adolph Martinez MD;  Location:  [facility];  Service: Gastroenterology    ENDOSCOPY N/A 01/27/2021    Procedure: ESOPHAGOGASTRODUODENOSCOPY WITH COLD BX;  Surgeon: Emelia Wilcox MD;  Location:  [facility];  Service: Gastroenterology;  Laterality: N/A;  GERD  --GASTRITIS, HIATAL HERNIA    EYE SURGERY      HYSTERECTOMY      At age 31-Not due to cancer    MASTECTOMY Left 06/30/2011    Dr. Kaitlyn Luna    TUBAL ABDOMINAL LIGATION  1974    UPPER GASTROINTESTINAL ENDOSCOPY  09/28/2012    Adolph Martinez MD         Visit Dx:     ICD-10-CM ICD-9-CM   1. Chemotherapy-induced peripheral neuropathy  G62.0 357.7    T45.1X5A E933.1   2. Impairment of balance  R26.89 781.2   3. Risk for falls  Z91.81 V15.88        Patient History       Row Name 10/01/24 1050             History    Chief Complaint Falls/history of falls;Balance Problems  -LB      Date Current Problem(s) Began 09/18/24  -LB      Brief Description of Current Complaint Ms. Pires is a pleasant 78 y/o female who was referred to OP for balance impairment due to chemo-induced PD. Pt reports discomfort and impaired sensation from bilateral knee distally to toes  -LB      Patient/Caregiver Goals Improve mobility  -LB         Fall Risk Assessment    Any falls in the past year: Yes  -LB      Number of falls reported in the last 12 months 3  -LB      Factors that  contributed to the fall: Lost balance  -LB         Services    Are you currently receiving Home Health services No  -LB      Do you plan to receive Home Health services in the near future No  -LB         Daily Activities    Pt Participated in POC and Goals Yes  -LB                User Key  (r) = Recorded By, (t) = Taken By, (c) = Cosigned By      Initials Name Provider Type    Whitney Bashir, PT Physical Therapist                         PT Neuro       Row Name 10/01/24 1050             Subjective    Subjective Comments I agreed to therapy finally  -LB         Precautions and Contraindications    Precautions/Limitations fall precautions  -LB         Subjective Pain    Able to rate subjective pain? yes  -LB      Subjective Pain Comment Does not report her peripheral neuropathy as pain but is tingling and annoying  -LB         Home Living    Current Living Arrangements home  -LB         Cognition    Overall Cognitive Status WFL  -LB      Arousal/Alertness Appropriate responses to stimuli  -LB      Memory Appears intact  -LB      Orientation Level Oriented X4  -LB      Safety Judgment Good awareness of safety precautions  -LB      Deficits Fully aware of deficits  -LB         Posture/Observations    Posture/Observations Comments Pt ambulated up to unit nbo AD  -LB         Coordination    Coordination WNL? WFL  -LB         General ROM    GENERAL ROM COMMENTS ROM is WFL  -LB         MMT (Manual Muscle Testing)    Rt Lower Ext Rt Hip Flexion;Rt Hip ABduction;Rt Knee WFL;Rt Ankle Plantarflexion;Rt Ankle Dorsiflexion;Rt Ankle Subtalar Inversion;Rt Ankle Subtalar Eversion  -LB      Lt Lower Ext Lt Hip Flexion;Lt Hip ABduction;Lt Knee WFL;Lt Ankle Plantarflexion;Lt Ankle Dorsiflexion;Lt Ankle Subtalar Inversion;Lt Ankle Subtalar Eversion  -LB         MMT Right Lower Ext    Rt Hip Flexion MMT, Gross Movement (4/5) good  -LB      Rt Hip ABduction MMT, Gross Movement (3+/5) fair plus  -LB      Rt Ankle Plantarflexion MMT,  Gross Movement (3+/5) fair plus  -LB      Rt Ankle Dorsiflexion MMT, Gross Movement (3+/5) fair plus  -LB      Rt Ankle Subtalar Inversion MT, Gross Movement (3+/5) fair plus  -LB      Rt Ankle Subtalar Eversion MMT, Gross Movement (3+/5) fair plus  -LB         MMT Left Lower Ext    Lt Hip Flexion MMT, Gross Movement (4-/5) good minus  -LB      Lt Hip ABduction MMT, Gross Movement (3/5) fair  -LB      Lt Ankle Plantarflexion MMT, Gross Movement (3+/5) fair plus  -LB      Lt Ankle Dorsiflexion MMT, Gross Movement (3+/5) fair plus  -LB      Lt Ankle Subtalar Inversion MMT, Gross Movement (3+/5) fair plus  -LB      Lt Ankle Subtalar Eversion MMT, Gross Movement (3+/5) fair plus  -LB         Bed Mobility    Bed Mobility rolling left;rolling right;supine-sit-supine  -LB      Rolling Left Kennan (Bed Mobility) independent  -LB      Rolling Right Kennan (Bed Mobility) independent  -LB      Supine-Sit-Supine Kennan (Bed Mobility) independent  -LB         Transfers    Sit-Stand Kennan (Transfers) independent  -LB      Stand-Sit Kennan (Transfers) independent  -LB         Gait/Stairs (Locomotion)    Kennan Level (Gait) standby assist  -LB      Assistive Device (Gait) --  no AD  -LB      Distance in Feet (Gait) 200  -LB      Pattern (Gait) step-through  -LB      Deviations/Abnormal Patterns (Gait) stride length decreased;base of support, narrow  -LB      Bilateral Gait Deviations forward flexed posture  -LB      Left Sided Gait Deviations heel strike decreased;Trendelenburg sign  left foot did not clear times 1  -LB      Right Sided Gait Deviations Trendelenburg sign  -LB      Kennan Level (Stairs) modified independence  -LB      Handrail Location (Stairs) left side (ascending);right side (descending)  -LB      Number of Steps (Stairs) 4  -LB      Ascending Technique (Stairs) step-over-step  -LB      Descending Technique (Stairs) step-over-step  -LB         Balance Skills Training     Balance Comments see PRASAD and FGA  -LB                User Key  (r) = Recorded By, (t) = Taken By, (c) = Cosigned By      Initials Name Provider Type    Whitney Bashir PT Physical Therapist                            Therapy Education  Education Details: PT POC, strengthening and balance training  Given: Other (comment)  Program: New  How Provided: Verbal  Provided to: Patient  Level of Understanding: Verbalized     PT OP Goals       Row Name 10/01/24 1500          PT Short Term Goals    STG 1 Patient to be independent with HEP to manage symptoms and conditions with emphasis on strengthening  -LB     STG 2 Patient to be independent/mod I with sit to stand from various chairs and heights without UE assist  -LB     STG 3 Patient to perform semitandem position for 30 seconds with CGA  -LB        Long Term Goals    LTG Date to Achieve 11/26/24  -LB     LTG 1 Patient to be independent with comprehensive HEP symptoms and conditions  -LB     LTG 2 Patient to improve FGA score to 23/30 to indicated improved balance and less risk for falls  -LB     LTG 3 Patient to improve Prasad balance score to 50 out of 56 indicating improved standing balance and less risk for falls  -LB     LTG 4 Patient to perform 5 STS and 20 seconds to indicate improved power and ease of transfers at home  -LB     LTG 5 Patient to perform SLS  8 seconds to indicate less risk for falls and injuries  -LB        Time Calculation    PT Goal Re-Cert Due Date 10/29/24  -LB               User Key  (r) = Recorded By, (t) = Taken By, (c) = Cosigned By      Initials Name Provider Type    Whitney Bashir PT Physical Therapist                     PT Assessment/Plan       Row Name 10/01/24 1526          PT Assessment    Functional Limitations Decreased safety during functional activities;Impaired gait;Impaired locomotion  -LB     Impairments Balance;Locomotion;Muscle strength;Sensation;Peripheral nerve integrity  -LB     Assessment Comments Ms. Pranay wang oropeza  pleasant 79-year-old female who has balance impairment related to chemo-induced peripheral neuropathy as well as bilateral lower extremity weakness from ankles proximally to hips.PMH of HTN, cardiomyopathy, hyperlipidemia, CHF, trigeminal neuralgia and breast cancer.  Patient is followed by Dr. Menezes for chemo induced peripheral neuropathy.  Patient does report approximately 3 falls within the last 6 months, with 1 explained to fall outside on uneven surfaces.  Patient is at risk for falls as indicated by Calvillo and FGA.  Patient has weakness of bilateral ankles with left slightly worse than right particularly with greater difficulty performing tandem or single limb stance on the left.  Patient static standing balance slightly better than dynamic as the score on FTA was 19 out of 30 indicating a moderate risk for falls.  Patient will benefit from physical therapy at this time to work on balance and strengthening of bilateral lower extremities  -LB     Rehab Potential Good  -LB     Patient/caregiver participated in establishment of treatment plan and goals Yes  -LB     Patient would benefit from skilled therapy intervention Yes  -LB        PT Plan    PT Frequency 2x/week;1x/week  -LB     Predicted Duration of Therapy Intervention (PT) 12 visits 8-12 weeks  -LB     Planned CPT's? PT EVAL MOD COMPLELITY: 15488;PT THER PROC EA 15 MIN: 84865;PT THER ACT EA 15 MIN: 49691;PT NEUROMUSC RE-EDUCATION EA 15 MIN: 10592;PT SELF CARE/HOME MGMT/TRAIN EA 15: 31552;PT RE-EVAL: 85415  -LB     Physical Therapy Interventions (Optional Details) balance training;gross motor skills;gait training;home exercise program;neuromuscular re-education;patient/family education;stair training;strengthening;transfer training  -LB               User Key  (r) = Recorded By, (t) = Taken By, (c) = Cosigned By      Initials Name Provider Type    LB Whitney Madrigal, PT Physical Therapist                        OP Exercises       Row Name 10/01/24 0662              Subjective    Subjective Comments I agreed to therapy finally  -LB         Subjective Pain    Able to rate subjective pain? yes  -LB      Subjective Pain Comment Does not report her peripheral neuropathy as pain but is tingling and annoying  -LB                User Key  (r) = Recorded By, (t) = Taken By, (c) = Cosigned By      Initials Name Provider Type    Whitney Bashir, PT Physical Therapist                                Outcome Measure Options: Calvillo Balance, FGA (Functional Gait Assessment)  Calvillo Balance Scale  Sitting to Standing: able to stand without using hands and stabilize independently  Standing Unsupported: able to stand safely for 2 minutes  Sitting with Back Unsupported but Feet Supported on Floor or on Stool: able to sit safely and securely for 2 minutes  Standing to Sitting: sits safely with minimal use of hands  Transfers: able to transfer safely with minor use of hands  Standing Unsupported with Eyes Closed: able to stand 10 seconds with supervision  Standing Unsupported with Feet Together: able to place feet together independently and stand 1 minute safely  Reaching Forward with Outstretched Arm While Standing: can reach forward 5 cm (2 inches)   Object From the Floor From a Standing Position: able to  object safely and easily  Turning to Look Behind Over Left and Right Shoulders While Standing: looks behind one side only other side shows less weight shift  Turn 360 Degrees: able to turn 360 degrees safely but slowly  Place Alternate Foot on Step or Stool While Standing Unsupported: able to complete 4 steps without aid with supervision  Standing Unsupported with One Foot in Front: able to take small step independently and hold 30 seconds  Standing on One Leg: able to lift leg independently and hold 5-10 seconds  Calvillo Total Score: 45  Functional Gait Assessment (FGA)  Gait Level Surface: Normal  Change in Gait Speed: Mild Impairment  Gait with Horizontal Head Turns:  Normal  Gait with Vertical Head Turns: Mild Impairment  Gait and Pivot Turn: Normal  Step Over Obstacle: Moderate Impairment  Gait with Narrow Base of Support: Severe Impairment  Gait with Eyes Closed: Moderate Impairment  Ambulating Backwards: Mild Impairment  Steps: Mild Impairment  FGA Total Score: 19    Time Calculation:   Start Time: 1015  Stop Time: 1100  Time Calculation (min): 45 min  Untimed Charges  PT Eval/Re-eval Minutes: 45  Total Minutes  Untimed Charges Total Minutes: 45   Total Minutes: 45   Therapy Charges for Today       Code Description Service Date Service Provider Modifiers Qty    20934698444 HC PT EVAL MOD COMPLEXITY 4 10/1/2024 Whitney Madrigal, PT GP 1            PT G-Codes  Outcome Measure Options: Calvillo Balance, FGA (Functional Gait Assessment)  Calvillo Total Score: 45  FGA Total Score: 19         Whitney Madrigal, RADHA  10/1/2024

## 2024-10-10 ENCOUNTER — TELEPHONE (OUTPATIENT)
Dept: CARDIOLOGY | Facility: CLINIC | Age: 79
End: 2024-10-10
Payer: MEDICARE

## 2024-10-10 NOTE — TELEPHONE ENCOUNTER
Patient calling stating she is taking 1mlodipine 5mg 1 po bid  States her feet are really swollen can't get her shoes on. BP  has been good.   110/52. Per Barbie patient to go back to amlodipine 5mg daily and see if swelling goes away. Monitor her BP and let us know.

## 2024-10-15 ENCOUNTER — HOSPITAL ENCOUNTER (OUTPATIENT)
Dept: PHYSICAL THERAPY | Facility: HOSPITAL | Age: 79
Setting detail: THERAPIES SERIES
Discharge: HOME | End: 2024-10-15
Payer: MEDICARE

## 2024-10-15 DIAGNOSIS — Z91.81 RISK FOR FALLS: ICD-10-CM

## 2024-10-15 DIAGNOSIS — T45.1X5A CHEMOTHERAPY-INDUCED PERIPHERAL NEUROPATHY: Primary | ICD-10-CM

## 2024-10-15 DIAGNOSIS — G62.0 CHEMOTHERAPY-INDUCED PERIPHERAL NEUROPATHY: Primary | ICD-10-CM

## 2024-10-15 DIAGNOSIS — R26.89 IMPAIRMENT OF BALANCE: ICD-10-CM

## 2024-10-15 PROCEDURE — 97112 NEUROMUSCULAR REEDUCATION: CPT

## 2024-10-15 PROCEDURE — 97110 THERAPEUTIC EXERCISES: CPT

## 2024-10-15 NOTE — THERAPY DISCHARGE NOTE
Outpatient Physical Therapy Neuro Treatment Note/Discharge Summary  [facility]     Patient Name: Carlotta Pires  : 1945  MRN: [id number]  Today's Date: 10/15/2024      Visit Date: 10/15/2024    Visit Dx:    ICD-10-CM ICD-9-CM   1. Chemotherapy-induced peripheral neuropathy  G62.0 357.7    T45.1X5A E933.1   2. Impairment of balance  R26.89 781.2   3. Risk for falls  Z91.81 V15.88       Patient Active Problem List   Diagnosis    Malignant neoplasm of lower-outer quadrant of left female breast    Osteopenia    Encounter for screening for malignant neoplasm of colon    Epigastric pain    Essential hypertension    History of cardiomyopathy    Hyperlipidemia, mixed    Allergic rhinitis    Hypothyroidism (acquired)    Primary insomnia    CHF (congestive heart failure)    Encounter for Medicare annual wellness exam    Arthritis    Trigeminal neuralgia    Lumbar radiculopathy    Degenerative arthritis of thumb    Rectal bleeding    Gastroesophageal reflux disease without esophagitis    Weight loss, abnormal    History of breast cancer    FH: colon cancer    Dilated cardiomyopathy    Fatigue    Acute non-recurrent sinusitis    Constipation    Anxiety    Positive depression screening    Vitamin B12 deficiency    Chronic diastolic CHF (congestive heart failure)    Postmenopause    Neuropathy    Carotid stenosis, bilateral    Vision changes    Palpitations    PVC's (premature ventricular contractions)    At high risk for falls             PT Neuro       Row Name 10/15/24 4578             Subjective    Subjective Comments I just has so much going on with my heart  -LB         Precautions and Contraindications    Precautions/Limitations fall precautions  -LB         Subjective Pain    Able to rate subjective pain? yes  -LB         Cognition    Overall Cognitive Status WFL  -LB      Arousal/Alertness Appropriate responses to stimuli  -LB      Memory Appears intact  -LB      Orientation Level Oriented X4  -LB       Safety Judgment Good awareness of safety precautions  -LB      Deficits Fully aware of deficits  -LB         Posture/Observations    Posture/Observations Comments Pt ambulated up to unit  AD  -LB         Transfers    Sit-Stand Rice (Transfers) independent  -LB      Stand-Sit Rice (Transfers) independent  -LB         Gait/Stairs (Locomotion)    Rice Level (Gait) independent  -LB      Distance in Feet (Gait) 150  200  -LB      Pattern (Gait) step-through  -LB      Deviations/Abnormal Patterns (Gait) stride length decreased;base of support, narrow  -LB      Bilateral Gait Deviations forward flexed posture  -LB      Left Sided Gait Deviations heel strike decreased;Trendelenburg sign  -LB         Balance Skills Training    Standing-Level of Assistance Close supervision  -LB      Static Standing Balance Support No upper extremity supported  -LB      Standing-Balance Activities Semi-tandum  -LB      SLS pt able to maintain for about 3-5 secs on each LE without UE support  -LB                User Key  (r) = Recorded By, (t) = Taken By, (c) = Cosigned By      Initials Name Provider Type    Whitney Bashir PT Physical Therapist                             PT Assessment/Plan       Row Name 10/15/24 1287          PT Assessment    Assessment Comments Patient reports that she feels like she does not have time to do physical therapy at this time.  Patient states she is having more cardiac issues with having an unknown rhythm problem.  Patient did agree to a home exercise program with both lower extremity strengthening and a few balance activities.  Advised patient to discontinue any activity that it was painful particularly on her left hip as she is not returning for further assessment.  Patient had not met goals due to only 1 session passed initial evaluation.  -LB               User Key  (r) = Recorded By, (t) = Taken By, (c) = Cosigned By      Initials Name Provider Type    Whitney Bashir PT  Physical Therapist                          OP Exercises       Row Name 10/15/24 1549 10/15/24 1430          Subjective    Subjective Comments -- I just has so much going on with my heart  -LB        Subjective Pain    Able to rate subjective pain? -- yes  -LB        Total Minutes    06640 - PT Therapeutic Exercise Minutes 30  -LB --     38267 -  PT Neuromuscular Reeducation Minutes 15  -LB --        Exercise 1    Exercise Name 1 -- standing heelraises  -LB     Reps 1 -- 10  -LB        Exercise 2    Exercise Name 2 -- standing hip abd  -LB     Reps 2 -- 10  -LB        Exercise 3    Exercise Name 3 -- standing hip extension  -LB     Reps 3 -- 10  -LB        Exercise 4    Exercise Name 4 -- standing knee flexion  -LB     Reps 4 -- 10  -LB        Exercise 5    Exercise Name 5 -- standing MIP  -LB     Reps 5 -- 10  -LB               User Key  (r) = Recorded By, (t) = Taken By, (c) = Cosigned By      Initials Name Provider Type    LB Whitney Madrigal, PT Physical Therapist                                   PT OP Goals       Row Name 10/15/24 1500          PT Short Term Goals    STG 1 Patient to be independent with HEP to manage symptoms and conditions with emphasis on strengthening  -LB     STG 1 Progress Met  -LB     STG 2 Patient to be independent/mod I with sit to stand from various chairs and heights without UE assist  -LB     STG 2 Progress Not Met  -LB     STG 3 Patient to perform semitandem position for 30 seconds with CGA  -LB     STG 3 Progress Met  -LB        Long Term Goals    LTG 1 Patient to be independent with comprehensive HEP symptoms and conditions  -LB     LTG 1 Progress Not Met  -LB     LTG 2 Patient to improve FGA score to 23/30 to indicated improved balance and less risk for falls  -LB     LTG 2 Progress Not Met  -LB     LTG 3 Patient to improve Calvillo balance score to 50 out of 56 indicating improved standing balance and less risk for falls  -LB     LTG 3 Progress Not Met  -LB     LTG 4 Patient to  perform 5 STS and 20 seconds to indicate improved power and ease of transfers at home  -LB     LTG 4 Progress Not Met  -LB     LTG 5 Patient to perform SLS  8 seconds to indicate less risk for falls and injuries  -LB     LTG 5 Progress Not Met  -LB               User Key  (r) = Recorded By, (t) = Taken By, (c) = Cosigned By      Initials Name Provider Type    Whitney Bashir, PT Physical Therapist                    Therapy Education  Education Details: HEP  Given: HEP  Program: New  How Provided: Verbal, Demonstration, Written  Provided to: Patient  Level of Understanding: Verbalized, Teach back education performed, Demonstrated            Time Calculation:   Start Time: 1330  Stop Time: 1415  Time Calculation (min): 45 min  Timed Charges  05560 - PT Therapeutic Exercise Minutes: 30  99763 -  PT Neuromuscular Reeducation Minutes: 15  Total Minutes  Timed Charges Total Minutes: 45   Total Minutes: 45     Therapy Charges for Today       Code Description Service Date Service Provider Modifiers Qty    86137866200 HC PT NEUROMUSC RE EDUCATION EA 15 MIN 10/15/2024 Whitney Madrigal, PT GP 1    63459644060 HC PT THER PROC EA 15 MIN 10/15/2024 Whitney Madrigal, PT GP 2                  OP PT Discharge Summary  Date of Discharge: 10/15/24  Reason for Discharge: Patient/Caregiver request  Outcomes Achieved: Refer to plan of care for updates on goals achieved  Discharge Destination: Home with home program        Whitney Madrigal PT  10/15/2024

## 2024-10-17 ENCOUNTER — APPOINTMENT (OUTPATIENT)
Dept: PHYSICAL THERAPY | Facility: HOSPITAL | Age: 79
End: 2024-10-17
Payer: MEDICARE

## 2024-10-18 ENCOUNTER — OFFICE VISIT (OUTPATIENT)
Dept: ONCOLOGY | Facility: CLINIC | Age: 79
End: 2024-10-18
Payer: MEDICARE

## 2024-10-18 ENCOUNTER — LAB (OUTPATIENT)
Dept: OTHER | Facility: HOSPITAL | Age: 79
End: 2024-10-18
Payer: MEDICARE

## 2024-10-18 VITALS
HEIGHT: 61 IN | RESPIRATION RATE: 16 BRPM | TEMPERATURE: 98.2 F | BODY MASS INDEX: 27.66 KG/M2 | SYSTOLIC BLOOD PRESSURE: 128 MMHG | DIASTOLIC BLOOD PRESSURE: 80 MMHG | WEIGHT: 146.5 LBS | HEART RATE: 67 BPM | OXYGEN SATURATION: 97 %

## 2024-10-18 DIAGNOSIS — Z17.0 MALIGNANT NEOPLASM OF LOWER-OUTER QUADRANT OF LEFT BREAST OF FEMALE, ESTROGEN RECEPTOR POSITIVE: Primary | ICD-10-CM

## 2024-10-18 DIAGNOSIS — C50.512 MALIGNANT NEOPLASM OF LOWER-OUTER QUADRANT OF LEFT BREAST OF FEMALE, ESTROGEN RECEPTOR POSITIVE: ICD-10-CM

## 2024-10-18 DIAGNOSIS — Z17.0 MALIGNANT NEOPLASM OF LOWER-OUTER QUADRANT OF LEFT BREAST OF FEMALE, ESTROGEN RECEPTOR POSITIVE: ICD-10-CM

## 2024-10-18 DIAGNOSIS — C50.512 MALIGNANT NEOPLASM OF LOWER-OUTER QUADRANT OF LEFT BREAST OF FEMALE, ESTROGEN RECEPTOR POSITIVE: Primary | ICD-10-CM

## 2024-10-18 LAB
BASOPHILS # BLD AUTO: 0.05 10*3/MM3 (ref 0–0.2)
BASOPHILS NFR BLD AUTO: 0.7 % (ref 0–1.5)
DEPRECATED RDW RBC AUTO: 42.9 FL (ref 37–54)
EOSINOPHIL # BLD AUTO: 0.23 10*3/MM3 (ref 0–0.4)
EOSINOPHIL NFR BLD AUTO: 3.1 % (ref 0.3–6.2)
ERYTHROCYTE [DISTWIDTH] IN BLOOD BY AUTOMATED COUNT: 13.2 % (ref 12.3–15.4)
HCT VFR BLD AUTO: 38 % (ref 34–46.6)
HGB BLD-MCNC: 13 G/DL (ref 12–15.9)
IMM GRANULOCYTES # BLD AUTO: 0.02 10*3/MM3 (ref 0–0.05)
IMM GRANULOCYTES NFR BLD AUTO: 0.3 % (ref 0–0.5)
LYMPHOCYTES # BLD AUTO: 1.58 10*3/MM3 (ref 0.7–3.1)
LYMPHOCYTES NFR BLD AUTO: 21.3 % (ref 19.6–45.3)
MCH RBC QN AUTO: 30.4 PG (ref 26.6–33)
MCHC RBC AUTO-ENTMCNC: 34.2 G/DL (ref 31.5–35.7)
MCV RBC AUTO: 89 FL (ref 79–97)
MONOCYTES # BLD AUTO: 0.56 10*3/MM3 (ref 0.1–0.9)
MONOCYTES NFR BLD AUTO: 7.5 % (ref 5–12)
NEUTROPHILS NFR BLD AUTO: 4.98 10*3/MM3 (ref 1.7–7)
NEUTROPHILS NFR BLD AUTO: 67.1 % (ref 42.7–76)
NRBC BLD AUTO-RTO: 0 /100 WBC (ref 0–0.2)
PLATELET # BLD AUTO: 278 10*3/MM3 (ref 140–450)
PMV BLD AUTO: 9.6 FL (ref 6–12)
RBC # BLD AUTO: 4.27 10*6/MM3 (ref 3.77–5.28)
WBC NRBC COR # BLD AUTO: 7.42 10*3/MM3 (ref 3.4–10.8)

## 2024-10-18 PROCEDURE — 36415 COLL VENOUS BLD VENIPUNCTURE: CPT

## 2024-10-18 PROCEDURE — 85025 COMPLETE CBC W/AUTO DIFF WBC: CPT | Performed by: INTERNAL MEDICINE

## 2024-10-18 NOTE — PROGRESS NOTES
Subjective .     REASONS FOR FOLLOWUP:  Breast cancer    HISTORY OF PRESENT ILLNESS:  The patient is a 79 y.o. year old female  who is here for follow-up with the above-mentioned history.    No new problems.  Feeling fine.  No nausea.  No unusual areas of pain.  No problems eating    Past Medical History:   Diagnosis Date    Allergic rhinitis     Anemia 05/09/2023    Arthritis     C. difficile colitis     Cancer     Left breast cancer    CHF (congestive heart failure)     Cough     COVID-19 11/2020    Degenerative arthritis of thumb     GERD without esophagitis     H/O TIA (transient ischemic attack) and stroke 2005    History of bone density study     Laceration of occipital region of scalp 01/19/2024    Lumbar radiculopathy     Osteoporosis     Palpitations     Peptic ulceration     Personal history of malignant neoplasm of breast     Sciatica     Stroke 2005    CVA--History of storke    Trigeminal neuralgia      Past Surgical History:   Procedure Laterality Date    BREAST BIOPSY Left 2011    CATARACT EXTRACTION, BILATERAL      COLONOSCOPY  09/28/2012    Adolph Martinez MD    COLONOSCOPY N/A 11/13/2018    Procedure: COLONOSCOPY to cecum;  Surgeon: Adolph Martinez MD;  Location: [facility] ENDOSCOPY;  Service: Gastroenterology    COLONOSCOPY N/A 01/27/2021    Procedure: COLONOSCOPY TO CECUM AND TERMINAL ILEUM;  Surgeon: Emelia Wilcox MD;  Location: [facility] ENDOSCOPY;  Service: Gastroenterology;  Laterality: N/A;  RECTAL BLEEDING  --DIVERTICULOSIS, HEMORRHOIDS, TORTUOUS COLON    ENDOSCOPY N/A 11/13/2018    Procedure: ESOPHAGOGASTRODUODENOSCOPY with bx;  Surgeon: Adolph Martinez MD;  Location: [facility] ENDOSCOPY;  Service: Gastroenterology    ENDOSCOPY N/A 01/27/2021    Procedure: ESOPHAGOGASTRODUODENOSCOPY WITH COLD BX;  Surgeon: Emelia Wilcox MD;  Location: [facility] ENDOSCOPY;  Service: Gastroenterology;  Laterality: N/A;  GERD  --GASTRITIS, HIATAL HERNIA    EYE SURGERY      HYSTERECTOMY      At age 31-Not due to  cancer    MASTECTOMY Left 06/30/2011    Dr. Kaitlyn Luna    TUBAL ABDOMINAL LIGATION  1974    UPPER GASTROINTESTINAL ENDOSCOPY  09/28/2012    Adolph Martinez MD       HEMATOLOGIC/ONCOLOGIC HISTORY:  (History from previous dates can be found in the separate document.)    MEDICATIONS    Current Outpatient Medications:     amitriptyline (ELAVIL) 50 MG tablet, Take 1 tablet by mouth Every Night for 30 days., Disp: 30 tablet, Rfl: 4    amLODIPine (NORVASC) 5 MG tablet, Take 1 tablet by mouth 2 (Two) Times a Day., Disp: 180 tablet, Rfl: 3    atorvastatin (LIPITOR) 10 MG tablet, Take 1 tablet by mouth Daily., Disp: 90 tablet, Rfl: 3    Calcium Polycarbophil (FIBERCON PO), Take  by mouth Daily., Disp: , Rfl:     clopidogrel (PLAVIX) 75 MG tablet, Take 1 tablet by mouth Daily., Disp: 90 tablet, Rfl: 3    dexlansoprazole (Dexilant) 30 MG capsule, Take 1 capsule by mouth Daily., Disp: 90 capsule, Rfl: 3    levothyroxine (SYNTHROID, LEVOTHROID) 75 MCG tablet, TAKE 1 TABLET BY MOUTH ONCE DAILY EXCEPT ON SUNDAYS TAKE 2 TABLETS., Disp: 100 tablet, Rfl: 1    loratadine (Claritin) 10 MG tablet, Take 1 tablet by mouth Daily., Disp: , Rfl:     Melatonin 10 MG capsule, Take 1 capsule by mouth At Night As Needed., Disp: , Rfl:     metoprolol succinate XL (TOPROL-XL) 50 MG 24 hr tablet, Take 1 tablet by mouth 2 (Two) Times a Day., Disp: 180 tablet, Rfl: 3    vitamin B-12 (CYANOCOBALAMIN) 1000 MCG tablet, Take 1 tablet by mouth Daily., Disp: , Rfl:     Vitamin D, Cholecalciferol, 25 MCG (1000 UT) capsule, Take 2 capsules by mouth Daily., Disp: , Rfl:     ALLERGIES:     Allergies   Allergen Reactions    Lisinopril Cough    Fentanyl Unknown - High Severity    Contrast Dye (Echo Or Unknown Ct/Mr) Itching and Rash     IVP Dye    Fd&C Yellow #5 (Tartrazine) Hives       SOCIAL HISTORY:       Social History     Socioeconomic History    Marital status:      Spouse name: Jack    Number of children: 2    Years of education: High School    Tobacco Use    Smoking status: Never    Smokeless tobacco: Never    Tobacco comments:     CAFFEINE USE: 4-5 CUPS 1/2 & 1/2 COFFEE DAILY   Vaping Use    Vaping status: Never Used   Substance and Sexual Activity    Alcohol use: Not Currently    Drug use: No    Sexual activity: Defer         FAMILY HISTORY:  Family History   Problem Relation Age of Onset    Cancer Mother 68        head and neck    Stroke Mother     Heart disease Mother     Gallbladder disease Mother     Throat cancer Mother 68    Breast cancer Sister 57    Heart disease Sister     Hypertension Sister     Cancer Sister     Gallbladder disease Sister     Diabetes Sister     Cancer Brother         head and neck    Heart disease Brother     Hypertension Brother     Gallbladder disease Brother     Lung cancer Brother 67    Throat cancer Brother 67    Hypertension Father     Gallbladder disease Father     Emphysema Father     Heart disease Father         Enlarged heart    Cancer Maternal Aunt     Cancer Maternal Uncle     Colon cancer Maternal Uncle     Heart attack Brother     Alcohol abuse Brother     Hypertension Sister     Heart disease Sister     Hypertension Sister     Heart disease Sister     Hypertension Sister     No Known Problems Other        REVIEW OF SYSTEMS:  Review of Systems   Constitutional:  Negative for activity change.   HENT:  Negative for nosebleeds and trouble swallowing.    Respiratory:  Negative for shortness of breath and wheezing.    Cardiovascular:  Negative for chest pain and palpitations.   Gastrointestinal:  Negative for constipation, diarrhea and nausea.   Genitourinary:  Negative for dysuria and hematuria.   Musculoskeletal:  Negative for arthralgias and myalgias.   Skin:  Negative for rash and wound.   Neurological:  Negative for seizures and syncope.   Hematological:  Negative for adenopathy. Does not bruise/bleed easily.   Psychiatric/Behavioral:  Negative for confusion.            Objective    Vitals:    10/18/24 1052  "  BP: 128/80   Pulse: 67   Resp: 16   Temp: 98.2 °F (36.8 °C)   TempSrc: Oral   SpO2: 97%   Weight: 66.5 kg (146 lb 8 oz)   Height: 154 cm (60.63\")   PainSc: 0-No pain         10/18/2024    10:53 AM   Current Status   ECOG score 0      PHYSICAL EXAM:        CONSTITUTIONAL:  Vital signs reviewed.  No distress, looks comfortable.  EYES:  Conjunctiva and lids unremarkable.  PERRLA  EARS,NOSE,MOUTH,THROAT:  Ears and nose appear unremarkable.  Lips, teeth, gums appear unremarkable.  RESPIRATORY:  Normal respiratory effort.  Lungs clear to auscultation bilaterally.  CARDIOVASCULAR:  Normal S1, S2.  No murmurs rubs or gallops.  No significant lower extremity edema.  BREAST: no masses by palpation or inspection  GASTROINTESTINAL: Abdomen appears unremarkable.  Nontender.  No hepatomegaly.  No splenomegaly.  LYMPHATIC:  No cervical, supraclavicular, axillary lymphadenopathy.  SKIN:  Warm.  No rashes.  PSYCHIATRIC:  Normal judgment and insight.  Normal mood and affect.        RECENT LABS:        WBC   Date/Time Value Ref Range Status   10/18/2024 10:50 AM 7.42 3.40 - 10.80 10*3/mm3 Final   08/23/2024 02:19 PM 6.8 3.4 - 10.8 x10E3/uL Final     Hemoglobin   Date/Time Value Ref Range Status   10/18/2024 10:50 AM 13.0 12.0 - 15.9 g/dL Final     Platelets   Date/Time Value Ref Range Status   10/18/2024 10:50  140 - 450 10*3/mm3 Final       Assessment/Plan     ASSESSMENT:  Problem List Items Addressed This Visit          High    Malignant neoplasm of lower-outer quadrant of left female breast - Primary    Relevant Orders    CBC & Differential          *Stage IIIA, grade 3, 3.9 cm left breast cancer. Four out of 24 nodes positive. ER 2%, MD negative, HER2 negative. Completed FEC x3 followed by Taxotere x3, 11/28/2011. Completed radiation in February 2012.   Poor tolerance to anastrozole and letrozole.  completed 5 years hormonal therapy using Aromasin, 1/31/17.  (She was initially a patient at [facility]. She transferred here as her " son-in-law, Jeremiah Dumont, was a patient here).   Suspect she remains in remission.  However, with the bulging/swelling of skin around her mastectomy site on the left and the new onset right-sided chest pain, check a CT of the chest to assess for recurrence.  CT chest 8/10/2020: No evidence of recurrence.  I discussed with Dr. Braxton-she stated we could do an ultrasound if we wanted to evaluate the superficial areas more.  Notably the bulging is on the left side and the right side has chest pain.  She states an ultrasound often shows fat necrosis better than a CT.  She suspects the CT would have picked up malignancy if it was present.  Patient did not want to pursue an ultrasound.  Symptoms resolved.  No evidence of recurrence.  Remission remaining    *Possible lupus.  Was evaluated by Dr. Rivera for this  10/21/2021 visit: Fever of 100.5 or higher on average every 2 weeks or so for the past 2 months.  I told her sometimes this can occur with a rheumatologic disorder.  I advise she discuss with her PCP or Dr. Rivera about this.  (CBC unremarkable.  Exam unremarkable.  No clear signs of leukemia or lymphoma.  I told her we could do CTs looking for LAD which is not in areas that can be palpated.  However, I think it is unlikely this is the case.  She declines CTs at this time)  4/21/2022: Continues intermittent fever.  Unchanged.  Advised again to follow-up with Dr. Rivera.  10/20/2022: No complaints of fever today  4/ 13/23: Has had recent intermittent fevers up to 100.2.  PCP aware.  (January 2023 had significantly higher fevers.  No specific infection identified).  10/12/2023: States she saw Dr. Rivera again and she states he he does not feel she has a rheumatologic problem     *Chronic right low back/hip pain radiating anteriorly.  In May 2016, CAT scan and bone scan negative for cancer as the cause of the pain.  Defer further management of this to her PCP.  She did not complain of this today.    *In the past, she has  complained of: Forgetfulness and transposition of numbers when she is writing numbers at times. This had been present since around April 2015. She states it is not worsening.   She did not complain of this today.    *History of stroke around 2005. Because of this I do not think she would be a good candidate for tamoxifen.     *Osteopenia, which has improved to normal bone density.  DEXA 5/17/16 normal bone density.  T score -0.6, compared to.   -1.1 2/20/14.  She is on calcium and vitamin D.  Defer further management of this to her PCP now that she is off aromatase inhibitors.    *Right upper quadrant/right lower anterior rib pain.  Present since October 2016.    Due to RUQ abdominal pain/right lower anterior rib pain since October 2016, bone scan and CT abdomen with contrast 2/2/17: No evidence of recurrence.  (8 mm low-attenuation liver lesion seen on the 5/17/16 CT but less conspicuous on order CT.  This was felt to be due to older CT without contrast.  This was felt to likely be benign.  Plan no further scheduled follow-up of this-patient agrees.).  She did not complain of this pain today.    *Hypokalemia. Her PCP manages this.      *IV contrast allergy.  Previous rash.  Receives IV contrast pre-medicines with CT IV contrast.  (This has worked well in the past)    *Left arm lymphedema due to prior surgery for breast cancer.  She was seen by the lymphedema clinic but has had some logistical issues obtaining the sleeve they prescribed.  I encouraged her to continue to call the lymphedema clinic for assistance with this.    *Previously complained of vertigo and was referred to her ENT.    *Heartburn.  Had EGD and colonoscopy by Dr. Martinez November 2018.  No malignancy was found.      *Intermittent chest pain x2 years, SBP's sometimes in the 190s.  Her blood pressure machine has been reading irregular sometimes for her heart rate.  Coronary calcifications on last CT.  Referred to her cardiologist,   [place]  4/21/2022: She has seen Dr. Barker recently.  No further cardiac work-up planned at this time.  No complaints of this today    *Social issues  Her sister passed away in 2023  Her disabled daughter passed away September 2022  Her other daughter, Osmin, is dealing with the death of her  which was around January 2019.    *10/12/2023: Left chest discomfort lasting for 3 to 4 minutes at a time, intermittent for the past few months  10/12/2023: She thinks this may be due to several falls while trying to clear up a prolonged cobian with C. difficile.  She declines imaging now but states she will call when if this persists in which case we will order CT of the chest to evaluate this    *Overweight.  Being overweight is a risk factor for breast cancer.  Body mass index is 28.02 kg/m².  BMI 25 to <30 is overweight  BMI 30 to <35 is class 1 obesity  BMI 35 to <40 is class 2 obesity  BMI 40 or higher is class 3 obesity   Remains overweight.  Ideally, lose weight    PLAN:   M.D. CBC 6 months  Last mammogram 2/21/2024: BI-RADS 1 (Right-sided mammograms)  Off hormonal therapy    Send copy to Dr. Paxton Rivera, rheumatology    For this visit I reviewed CMP and TSH from 8/23/2024 which were not ordered by me

## 2024-10-22 ENCOUNTER — APPOINTMENT (OUTPATIENT)
Dept: PHYSICAL THERAPY | Facility: HOSPITAL | Age: 79
End: 2024-10-22
Payer: MEDICARE

## 2024-10-24 ENCOUNTER — APPOINTMENT (OUTPATIENT)
Dept: PHYSICAL THERAPY | Facility: HOSPITAL | Age: 79
End: 2024-10-24
Payer: MEDICARE

## 2024-10-29 ENCOUNTER — APPOINTMENT (OUTPATIENT)
Dept: PHYSICAL THERAPY | Facility: HOSPITAL | Age: 79
End: 2024-10-29
Payer: MEDICARE

## 2024-10-31 ENCOUNTER — APPOINTMENT (OUTPATIENT)
Dept: PHYSICAL THERAPY | Facility: HOSPITAL | Age: 79
End: 2024-10-31
Payer: MEDICARE

## 2024-12-02 DIAGNOSIS — K21.9 GASTROESOPHAGEAL REFLUX DISEASE WITHOUT ESOPHAGITIS: Primary | ICD-10-CM

## 2024-12-02 RX ORDER — DEXLANSOPRAZOLE 30 MG/1
30 CAPSULE, DELAYED RELEASE ORAL DAILY
Qty: 90 CAPSULE | Refills: 0 | Status: SHIPPED | OUTPATIENT
Start: 2024-12-02

## 2024-12-02 NOTE — TELEPHONE ENCOUNTER
Caller: Pranay Carlotta CRISTEL    Relationship: Self    Best call back number: [phone]     Requested Prescriptions:   Requested Prescriptions     Pending Prescriptions Disp Refills    dexlansoprazole (Dexilant) 30 MG capsule 90 capsule 3     Sig: Take 1 capsule by mouth Daily.        Pharmacy where request should be sent: [facility] [address] - [phone]  - [phone]     Last office visit with prescribing clinician: 8/23/2024   Last telemedicine visit with prescribing clinician: Visit date not found   Next office visit with prescribing clinician: Visit date not found     Additional details provided by patient: OUT OF MEDICATION/PATIENT STATES PCP AGREED TO FILL UNTIL SHE COULD SEE ANOTHER SPECIALIST     Does the patient have less than a 3 day supply:  [x] Yes  [] No    Would you like a call back once the refill request has been completed: [] Yes [x] No    If the office needs to give you a call back, can they leave a voicemail: [] Yes [x] No    Dakota Danielson Rep   12/02/24 13:20 EST

## 2025-01-27 ENCOUNTER — HOSPITAL ENCOUNTER (OUTPATIENT)
Facility: HOSPITAL | Age: 80
Discharge: HOME | End: 2025-01-27
Admitting: NURSE PRACTITIONER
Payer: MEDICARE

## 2025-01-27 DIAGNOSIS — I65.23 CAROTID STENOSIS, BILATERAL: ICD-10-CM

## 2025-01-27 LAB
BH CV XLRA MEAS LEFT CAROTID BULB EDV: 23.4 CM/SEC
BH CV XLRA MEAS LEFT CAROTID BULB PSV: 65.9 CM/SEC
BH CV XLRA MEAS LEFT DIST CCA EDV: -19.9 CM/SEC
BH CV XLRA MEAS LEFT DIST CCA PSV: -58.9 CM/SEC
BH CV XLRA MEAS LEFT DIST ICA EDV: -26 CM/SEC
BH CV XLRA MEAS LEFT DIST ICA PSV: -80.6 CM/SEC
BH CV XLRA MEAS LEFT ICA/CCA RATIO: 1.37
BH CV XLRA MEAS LEFT MID CCA EDV: -22.5 CM/SEC
BH CV XLRA MEAS LEFT MID CCA PSV: -73.7 CM/SEC
BH CV XLRA MEAS LEFT MID ICA EDV: -27.7 CM/SEC
BH CV XLRA MEAS LEFT MID ICA PSV: -80.6 CM/SEC
BH CV XLRA MEAS LEFT PROX CCA EDV: 22.5 CM/SEC
BH CV XLRA MEAS LEFT PROX CCA PSV: 83.2 CM/SEC
BH CV XLRA MEAS LEFT PROX ECA EDV: -19.9 CM/SEC
BH CV XLRA MEAS LEFT PROX ECA PSV: -83.2 CM/SEC
BH CV XLRA MEAS LEFT PROX ICA EDV: 24.3 CM/SEC
BH CV XLRA MEAS LEFT PROX ICA PSV: 75.4 CM/SEC
BH CV XLRA MEAS LEFT PROX SCLA PSV: 80.6 CM/SEC
BH CV XLRA MEAS LEFT VERTEBRAL A EDV: 20.8 CM/SEC
BH CV XLRA MEAS LEFT VERTEBRAL A PSV: 64.1 CM/SEC
BH CV XLRA MEAS RIGHT CAROTID BULB EDV: -21.7 CM/SEC
BH CV XLRA MEAS RIGHT CAROTID BULB PSV: -54.6 CM/SEC
BH CV XLRA MEAS RIGHT DIST CCA EDV: -19.9 CM/SEC
BH CV XLRA MEAS RIGHT DIST CCA PSV: -56.3 CM/SEC
BH CV XLRA MEAS RIGHT DIST ICA EDV: -31.7 CM/SEC
BH CV XLRA MEAS RIGHT DIST ICA PSV: -97.3 CM/SEC
BH CV XLRA MEAS RIGHT ICA/CCA RATIO: 1.72
BH CV XLRA MEAS RIGHT MID CCA EDV: 18.2 CM/SEC
BH CV XLRA MEAS RIGHT MID CCA PSV: 58.9 CM/SEC
BH CV XLRA MEAS RIGHT MID ICA EDV: -23.4 CM/SEC
BH CV XLRA MEAS RIGHT MID ICA PSV: -66.7 CM/SEC
BH CV XLRA MEAS RIGHT PROX CCA EDV: -17.3 CM/SEC
BH CV XLRA MEAS RIGHT PROX CCA PSV: -64.1 CM/SEC
BH CV XLRA MEAS RIGHT PROX ECA EDV: -15.6 CM/SEC
BH CV XLRA MEAS RIGHT PROX ECA PSV: -75.4 CM/SEC
BH CV XLRA MEAS RIGHT PROX ICA EDV: -24.3 CM/SEC
BH CV XLRA MEAS RIGHT PROX ICA PSV: -67.6 CM/SEC
BH CV XLRA MEAS RIGHT PROX SCLA PSV: 94.4 CM/SEC
BH CV XLRA MEAS RIGHT VERTEBRAL A EDV: -14.7 CM/SEC
BH CV XLRA MEAS RIGHT VERTEBRAL A PSV: -61.5 CM/SEC
LEFT ARM BP: NORMAL MMHG
RIGHT ARM BP: NORMAL MMHG

## 2025-01-27 PROCEDURE — 93880 EXTRACRANIAL BILAT STUDY: CPT

## 2025-01-27 PROCEDURE — 93880 EXTRACRANIAL BILAT STUDY: CPT | Performed by: SURGERY

## 2025-02-11 DIAGNOSIS — K21.9 GASTROESOPHAGEAL REFLUX DISEASE WITHOUT ESOPHAGITIS: ICD-10-CM

## 2025-02-12 RX ORDER — DEXLANSOPRAZOLE 30 MG/1
1 CAPSULE, DELAYED RELEASE ORAL DAILY
Qty: 90 CAPSULE | Refills: 0 | Status: SHIPPED | OUTPATIENT
Start: 2025-02-12

## 2025-02-24 ENCOUNTER — APPOINTMENT (OUTPATIENT)
Dept: WOMENS IMAGING | Facility: HOSPITAL | Age: 80
End: 2025-02-24
Payer: MEDICARE

## 2025-02-24 PROCEDURE — 77063 BREAST TOMOSYNTHESIS BI: CPT | Performed by: RADIOLOGY

## 2025-02-24 PROCEDURE — 77067 SCR MAMMO BI INCL CAD: CPT | Performed by: RADIOLOGY

## 2025-03-12 DIAGNOSIS — E03.9 HYPOTHYROIDISM (ACQUIRED): ICD-10-CM

## 2025-03-12 RX ORDER — LEVOTHYROXINE SODIUM 75 UG/1
TABLET ORAL
Qty: 100 TABLET | Refills: 0 | Status: SHIPPED | OUTPATIENT
Start: 2025-03-12

## 2025-03-19 ENCOUNTER — OFFICE VISIT (OUTPATIENT)
Dept: NEUROLOGY | Facility: CLINIC | Age: 80
End: 2025-03-19
Payer: MEDICARE

## 2025-03-19 VITALS
OXYGEN SATURATION: 98 % | HEART RATE: 69 BPM | WEIGHT: 144 LBS | BODY MASS INDEX: 27.19 KG/M2 | DIASTOLIC BLOOD PRESSURE: 62 MMHG | HEIGHT: 61 IN | SYSTOLIC BLOOD PRESSURE: 124 MMHG

## 2025-03-19 DIAGNOSIS — R26.89 IMPAIRMENT OF BALANCE: ICD-10-CM

## 2025-03-19 DIAGNOSIS — G50.0 TRIGEMINAL NEURALGIA OF RIGHT SIDE OF FACE: ICD-10-CM

## 2025-03-19 DIAGNOSIS — G62.0 CHEMOTHERAPY-INDUCED PERIPHERAL NEUROPATHY: Primary | ICD-10-CM

## 2025-03-19 DIAGNOSIS — T45.1X5A CHEMOTHERAPY-INDUCED PERIPHERAL NEUROPATHY: Primary | ICD-10-CM

## 2025-03-19 RX ORDER — AMITRIPTYLINE HYDROCHLORIDE 50 MG/1
50 TABLET ORAL NIGHTLY
Qty: 90 TABLET | Refills: 1 | Status: SHIPPED | OUTPATIENT
Start: 2025-03-19

## 2025-03-19 RX ORDER — AMITRIPTYLINE HYDROCHLORIDE 50 MG/1
50 TABLET ORAL NIGHTLY
COMMUNITY
Start: 2025-02-11 | End: 2025-03-19 | Stop reason: SDUPTHER

## 2025-03-19 NOTE — PROGRESS NOTES
DOS: 3/19/2025  NAME: Carlotta Pires   : 1945  PCP: Gudelia Bazzi APRN    Chief Complaint   Patient presents with    Chemotherapy-induced peripheral neuropathy      SUBJECTIVE  Neurological Problem:  80 y.o. right-handed female with a past medical history of dCHF, bilateral carotid stenosis, hypertension, hyperlipidemia, dilated cardiomyopathy, hypothyroid, B12 deficiency, h/o left breast cancer s/p mastectomy and chemotherapy, history of trigeminal neuralgia, lumbar radiculopathy, chemo-induced peripheral neuropathy. They are seen in follow up today for peripheral neuropathy and balance impairment, however the problem is new to the examiner. Patient last seen by Dr. Yair Menezes in 2024, with a summary of the history taken from the previous note with additions/modifications as indicated. She is unaccompanied.    Interval History:   Mrs. Pires initially presented to our clinic in 2024 for trigeminal neuralgia, peripheral neuropathy and balance impairment.  Per review of chart she reported undergoing mastectomy and chemotherapy for breast cancer.  Soon after initiating chemotherapy she developed numbness in her feet that started in the left foot and spread up into her lower leg then into her right foot.  She also described tingling sensations but no pain.  She felt her balance was affected, did experience a few falls, 1 where she fell backward hitting her head.  She was seen emergently, CT of the head showed a scalp hematoma.  She also reported a history of right sided trigeminal neuralgia, was initiated on carbamazepine however that caused significant sedation and she was switched to amitriptyline 25 mg.  She was prescribed physical therapy for balance and gait.  At follow-up in 2024 she reported worsening paresthesias in her feet that disrupted her ability to sleep.  Her amitriptyline was increased to 50 mg nightly and she was sent back to physical therapy as she was unable to  "complete assessment with them prior.    She presents today in follow-up, reports she is tolerating the amitriptyline 50 mg nightly without issue and it allows her to rest.  She denies any significant change or worsening to her symptoms.  She describes any significant painful sensations in her legs, describes it more of a \"crawling\" sensation.  She says she did meet with physical therapy and was given home exercises which she is doing.  She reports 2 falls since her last office visit but says this is due to moving too quickly.  As for her trigeminal neuralgia, she says she has not had any breakthrough pain from that for years.  She denies any focal symptoms, no visual or speech deficit, no facial droop or difficulty swallowing, no unilateral weakness or numbness to her extremities.    Review of Systems   Musculoskeletal:  Negative for gait problem.   Neurological:  Negative for dizziness, tremors, seizures, syncope, facial asymmetry, speech difficulty, weakness, light-headedness, numbness and headaches.   Psychiatric/Behavioral:  Negative for agitation, behavioral problems, confusion, decreased concentration, dysphoric mood, hallucinations, self-injury, sleep disturbance and suicidal ideas. The patient is not nervous/anxious and is not hyperactive.         The following portions of the patient's history were reviewed and updated as appropriate: allergies, current medications, past family history, past medical history, past social history, past surgical history and problem list.    Current Medications:   Current Outpatient Medications:     amitriptyline (ELAVIL) 50 MG tablet, Take 1 tablet by mouth Every Night., Disp: 90 tablet, Rfl: 1    amLODIPine (NORVASC) 5 MG tablet, Take 1 tablet by mouth 2 (Two) Times a Day., Disp: 180 tablet, Rfl: 3    atorvastatin (LIPITOR) 10 MG tablet, Take 1 tablet by mouth Daily., Disp: 90 tablet, Rfl: 3    Calcium Polycarbophil (FIBERCON PO), Take  by mouth Daily., Disp: , Rfl:     " "clopidogrel (PLAVIX) 75 MG tablet, Take 1 tablet by mouth Daily., Disp: 90 tablet, Rfl: 3    dexlansoprazole (DEXILANT) 30 MG capsule, Take 1 capsule by mouth once daily, Disp: 90 capsule, Rfl: 0    levothyroxine (SYNTHROID, LEVOTHROID) 75 MCG tablet, TAKE 1 TABLET BY MOUTH ONCE DAILY EXCEPT  ON  SUNDAYS  TAKE  2  TABLETS, Disp: 100 tablet, Rfl: 0    loratadine (Claritin) 10 MG tablet, Take 1 tablet by mouth Daily., Disp: , Rfl:     Melatonin 10 MG capsule, Take 1 capsule by mouth At Night As Needed., Disp: , Rfl:     metoprolol succinate XL (TOPROL-XL) 50 MG 24 hr tablet, Take 1 tablet by mouth 2 (Two) Times a Day., Disp: 180 tablet, Rfl: 3    vitamin B-12 (CYANOCOBALAMIN) 1000 MCG tablet, Take 1 tablet by mouth Daily., Disp: , Rfl:     Vitamin D, Cholecalciferol, 25 MCG (1000 UT) capsule, Take 2 capsules by mouth Daily., Disp: , Rfl:   **I did not stop or change the above medications.  Patient's medication list was updated to reflect medications they have reported as currently taking, including medication changes made by other providers.    Objective   Vital Signs:  /62   Pulse 69   Ht 154 cm (60.63\")   Wt 65.3 kg (144 lb)   SpO2 98%   BMI 27.54 kg/m²   Body mass index is 27.54 kg/m².    Physical Exam   Physical Exam:  GENERAL: NAD, alert  HEENT: Normocephalic, atraumatic   Resp: Even and unlabored  Extremities: No edema    Neurological:   MS: AOx3, recent/remote memory intact, normal attention/concentration, language intact, no neglect  CN: visual acuity grossly normal, PERRL, EOMI, no nystagmus, no facial droop, no dysarthria  Motor: Normal tone and bulk. No tremor or abnormal movements noted.  Deltoid: 5/5 right; 5/5 left  Biceps: 5/5 right; 5/5 left  Triceps: 5/5 right; 5/5 left   Left  2+  Right  2+  Hip abduction: 5/5 right; 5/5 left  Hip adduction: 5/5 right; 5/5 left  Iliopsoas: 5/5 right; 5/5 left  Quadriceps: 5/5 right; 5/5 left  Hamstrin/5 right; 5/5 left  Tibialis interior: 5/5 " "right; 5/5 left  Toe extensors: 5/5 LLE, 5/5 RLE  Toe flexors: 5/5 LLE, 5/5 RLE  Sensory: Intact to crude touch in all four ext., subjective decrease from knee down  Gait and station: Normal gait and station    Result Review :  The following data was reviewed by: URBAN Law on 03/19/2025:  Laboratory Results:         Lab Results   Component Value Date    WBC 7.42 10/18/2024    HGB 13.0 10/18/2024    HCT 38.0 10/18/2024    MCV 89.0 10/18/2024     10/18/2024     Lab Results   Component Value Date    GLUCOSE 100 (H) 08/23/2024    BUN 7 (L) 08/23/2024    CREATININE 0.76 08/23/2024    EGFRIFNONA 74 08/24/2021    EGFRIFAFRI 90 08/24/2021    BCR 9 (L) 08/23/2024    K 4.0 08/23/2024    CO2 23 08/23/2024    CALCIUM 9.8 08/23/2024    ALBUMIN 4.6 08/23/2024    AST 28 08/23/2024    ALT 19 08/23/2024     No results found for: \"HGBA1C\"  No results found for: \"CHOL\"  Lab Results   Component Value Date    HDL 53 08/23/2024    HDL 48 12/11/2023    HDL 51 08/09/2023     Lab Results   Component Value Date    LDL 87 08/23/2024    LDL 76 12/11/2023    LDL 75 08/09/2023     Lab Results   Component Value Date    TRIG 135 08/23/2024    TRIG 114 12/11/2023    TRIG 87 08/09/2023     No results found for: \"RPR\"  Lab Results   Component Value Date    TSH 2.790 08/23/2024     Lab Results   Component Value Date    ADASYAYM66 1,103 08/23/2024       Data reviewed : Radiologic studies           Assessment and Plan   Diagnoses and all orders for this visit:    1. Chemotherapy-induced peripheral neuropathy (Primary)  -     amitriptyline (ELAVIL) 50 MG tablet; Take 1 tablet by mouth Every Night.  Dispense: 90 tablet; Refill: 1    2. Impairment of balance    3. Trigeminal neuralgia of right side of face      We will continue on amitriptyline 50 mg nightly as this is containing her symptoms from both TN and peripheral neuropathy, also allowing her to get rest at night.  Encouraged to continue PT exercises at home. I have asked her to " take her time with position changes and slow down to help prevent falls.     We will see Carlotta back in 6 months with Dr. Menezes, sooner if symptoms warrant.     URBAN Law  [facility]   03/19/25       I spent 40 minutes caring for Carlotta on this date of service. This time includes time spent by me in the following activities:preparing for the visit, reviewing tests, obtaining and/or reviewing a separately obtained history, performing a medically appropriate examination and/or evaluation , counseling and educating the patient/family/caregiver, ordering medications, tests, or procedures, referring and communicating with other health care professionals , documenting information in the medical record, independently interpreting results and communicating that information with the patient/family/caregiver, and care coordination  Follow Up   Return in about 6 months (around 9/19/2025).  Patient was given instructions and counseling regarding her condition or for health maintenance advice. Please see specific information pulled into the AVS if appropriate.       Dictated using Dragon Dictation    As of April 2021, as required by the Federal 21st Century Cures Act, medical records (including provider notes and laboratory/imaging results) are to be made available to patient’s and/or their designees as soon as the documents are signed/resulted. While the intention is to ensure transparency and to engage the patient in their healthcare, this immediate access may create unintended consequences as this document uses language intended for communication between medical experts and diagnostic results are interpreted with the entirety of the patient’s clinical picture in mind. It is recommended that patients and/or their designees review all available information with their primary or specialist providers for explanation and guidance to avoid misinterpretation based on layperson understanding, non-medical expert opinions,  or Internet searches.

## 2025-03-21 ENCOUNTER — OFFICE VISIT (OUTPATIENT)
Dept: FAMILY MEDICINE CLINIC | Facility: CLINIC | Age: 80
End: 2025-03-21
Payer: MEDICARE

## 2025-03-21 VITALS
HEIGHT: 61 IN | HEART RATE: 78 BPM | BODY MASS INDEX: 27.83 KG/M2 | TEMPERATURE: 97.3 F | OXYGEN SATURATION: 99 % | SYSTOLIC BLOOD PRESSURE: 110 MMHG | WEIGHT: 147.4 LBS | DIASTOLIC BLOOD PRESSURE: 70 MMHG

## 2025-03-21 DIAGNOSIS — R31.29 OTHER MICROSCOPIC HEMATURIA: Primary | ICD-10-CM

## 2025-03-21 DIAGNOSIS — Z85.3 HISTORY OF BREAST CANCER: ICD-10-CM

## 2025-03-21 DIAGNOSIS — R00.2 PALPITATIONS: ICD-10-CM

## 2025-03-21 DIAGNOSIS — K21.9 GASTROESOPHAGEAL REFLUX DISEASE WITHOUT ESOPHAGITIS: ICD-10-CM

## 2025-03-21 DIAGNOSIS — G50.0 TRIGEMINAL NEURALGIA: ICD-10-CM

## 2025-03-21 DIAGNOSIS — E53.8 VITAMIN B12 DEFICIENCY: ICD-10-CM

## 2025-03-21 DIAGNOSIS — E03.9 HYPOTHYROIDISM (ACQUIRED): ICD-10-CM

## 2025-03-21 DIAGNOSIS — G62.9 NEUROPATHY: ICD-10-CM

## 2025-03-21 DIAGNOSIS — R31.29 OTHER MICROSCOPIC HEMATURIA: ICD-10-CM

## 2025-03-21 DIAGNOSIS — F51.01 PRIMARY INSOMNIA: ICD-10-CM

## 2025-03-21 DIAGNOSIS — E55.9 VITAMIN D DEFICIENCY: ICD-10-CM

## 2025-03-21 DIAGNOSIS — I10 ESSENTIAL HYPERTENSION: Primary | ICD-10-CM

## 2025-03-21 DIAGNOSIS — K59.00 CONSTIPATION, UNSPECIFIED CONSTIPATION TYPE: ICD-10-CM

## 2025-03-21 DIAGNOSIS — E78.2 HYPERLIPIDEMIA, MIXED: ICD-10-CM

## 2025-03-21 LAB
BILIRUB BLD-MCNC: NEGATIVE MG/DL
CLARITY, POC: CLEAR
COLOR UR: YELLOW
EXPIRATION DATE: ABNORMAL
GLUCOSE UR STRIP-MCNC: NEGATIVE MG/DL
KETONES UR QL: NEGATIVE
LEUKOCYTE EST, POC: NEGATIVE
Lab: ABNORMAL
NITRITE UR-MCNC: NEGATIVE MG/ML
PH UR: 6 [PH] (ref 5–8)
PROT UR STRIP-MCNC: NEGATIVE MG/DL
RBC # UR STRIP: ABNORMAL /UL
SP GR UR: 1.01 (ref 1–1.03)
UROBILINOGEN UR QL: NORMAL

## 2025-03-21 RX ORDER — DEXLANSOPRAZOLE 30 MG/1
1 CAPSULE, DELAYED RELEASE ORAL DAILY
Qty: 90 CAPSULE | Refills: 1 | Status: SHIPPED | OUTPATIENT
Start: 2025-03-21

## 2025-03-21 NOTE — ASSESSMENT & PLAN NOTE
Improving with treatment.  Continue Amitriptyline nightly.  Follow-up as scheduled with neurology.

## 2025-03-21 NOTE — PROGRESS NOTES
"Subjective   Carlotta Pires is a 80 y.o. female.     Chief Complaint   Patient presents with    Hypertension     Follow up     Hyperlipidemia    Hypothyroidism       History of Present Illness   Patient presents for follow up HTN: takes Metoprolol and Amlodipine twice daily; also takes Plavix daily; monitors BP, typically runs 120s/70-80s; no headaches; no orthostasis; no swelling other than some mild swelling in left foot in evening on occasion when has been on feet more during the day; no change in irregular heart beat at times, not sure what triggers symptoms; pt will have sensation of heart skipping a beat, not necessarily increased HR; taking Amlodipine seems to help; sees cardiology.     F/U Hyperlipidemia: takes Atorvastatin daily; ran out of medication about 2 weeks ago; will check with pharmacy; watches saturated fats in diet; some exercise, tries to walk at mall or outdoors when weather is warmer; fasting today.     F/U Hypothyroidism: takes Levothyroxine daily on empty stomach; no missed doses.     F/U trigeminal neuralgia/neuropathy: takes Amitriptyline nightly and helps; has neuropathy in feet/legs below knee; told may be related to past radiation/chemo; sees neurology; had recent follow up last week and increased Amitriptyline to 50 mg nightly and has helped; legs \"able to rest\" at night with Amitriptyline; neuro also referred to physical therapy for balance; went to physical therapy and gave home exercises; has been doing home PT exercises regularly and have helped.     F/U [place]: takes Dexilant daily and works well; will have symptoms if misses a dose; has not been able to schedule follow up with GI due to things going on in family.     Sees oncology Dr. Chanel every 6 months for history of breast cancer; had chemo and radiation in past; completed hormonal therapy 1/31/17; last follow up with oncology 10/2024; has another follow up soon.     Has been feeling better since has been taking Vitamin B12; " mental function has improved with Vitamin B12.     History of C.diff; sees Dr. Ling ID only as needed now.      The following portions of the patient's history were reviewed and updated as appropriate: allergies, current medications, past family history, past medical history, past social history, past surgical history and problem list.      Current Outpatient Medications   Medication Sig Dispense Refill    amitriptyline (ELAVIL) 50 MG tablet Take 1 tablet by mouth Every Night. 90 tablet 1    amLODIPine (NORVASC) 5 MG tablet Take 1 tablet by mouth 2 (Two) Times a Day. 180 tablet 3    atorvastatin (LIPITOR) 10 MG tablet Take 1 tablet by mouth Daily. 90 tablet 3    Calcium Polycarbophil (FIBERCON PO) Take  by mouth Daily.      clopidogrel (PLAVIX) 75 MG tablet Take 1 tablet by mouth Daily. 90 tablet 3    dexlansoprazole (DEXILANT) 30 MG capsule Take 1 capsule by mouth Daily. 90 capsule 1    levothyroxine (SYNTHROID, LEVOTHROID) 75 MCG tablet TAKE 1 TABLET BY MOUTH ONCE DAILY EXCEPT  ON  SUNDAYS  TAKE  2  TABLETS 100 tablet 0    loratadine (Claritin) 10 MG tablet Take 1 tablet by mouth Daily.      Melatonin 10 MG capsule Take 1 capsule by mouth At Night As Needed.      metoprolol succinate XL (TOPROL-XL) 50 MG 24 hr tablet Take 1 tablet by mouth 2 (Two) Times a Day. 180 tablet 3    vitamin B-12 (CYANOCOBALAMIN) 1000 MCG tablet Take 1 tablet by mouth Daily.      Vitamin D, Cholecalciferol, 25 MCG (1000 UT) capsule Take 2 capsules by mouth Daily.       No current facility-administered medications for this visit.       Past Medical History:   Diagnosis Date    Allergic rhinitis     Anemia 05/09/2023    Arthritis     C. difficile colitis 4/26/2023    Cancer     Left breast cancer    CHF (congestive heart failure)     Cough     COVID-19 11/2020    Degenerative arthritis of thumb     GERD without esophagitis     H/O TIA (transient ischemic attack) and stroke 2005    History of bone density study     Laceration of  occipital region of scalp 01/19/2024    Lumbar radiculopathy     Osteoporosis     Palpitations     Peptic ulceration     Personal history of malignant neoplasm of breast     Sciatica     Stroke 2005    CVA--History of storke    Trigeminal neuralgia        Past Surgical History:   Procedure Laterality Date    BREAST BIOPSY Left 2011    CATARACT EXTRACTION, BILATERAL      COLONOSCOPY  09/28/2012    Adolph Martinez MD    COLONOSCOPY N/A 11/13/2018    Procedure: COLONOSCOPY to cecum;  Surgeon: Adolph Martinez MD;  Location:  [facility];  Service: Gastroenterology    COLONOSCOPY N/A 01/27/2021    Procedure: COLONOSCOPY TO CECUM AND TERMINAL ILEUM;  Surgeon: Emelia Wilcox MD;  Location:  [facility];  Service: Gastroenterology;  Laterality: N/A;  RECTAL BLEEDING  --DIVERTICULOSIS, HEMORRHOIDS, TORTUOUS COLON    ENDOSCOPY N/A 11/13/2018    Procedure: ESOPHAGOGASTRODUODENOSCOPY with bx;  Surgeon: Adolph Martinez MD;  [facility];  Service: Gastroenterology    ENDOSCOPY N/A 01/27/2021    Procedure: ESOPHAGOGASTRODUODENOSCOPY WITH COLD BX;  Surgeon: Emelia Wilcox MD;  [facility];  Service: Gastroenterology;  Laterality: N/A;  GERD  --GASTRITIS, HIATAL HERNIA    EYE SURGERY      HYSTERECTOMY      At age 31-Not due to cancer    MASTECTOMY Left 06/30/2011    Dr. Kaitlyn Luna    TUBAL ABDOMINAL LIGATION  1974    UPPER GASTROINTESTINAL ENDOSCOPY  09/28/2012    Adolph Martinez MD       Family History   Problem Relation Age of Onset    Cancer Mother 68        head and neck    Stroke Mother     Heart disease Mother     Gallbladder disease Mother     Throat cancer Mother 68    Breast cancer Sister 57    Heart disease Sister     Hypertension Sister     Cancer Sister     Gallbladder disease Sister     Diabetes Sister     Cancer Brother         head and neck    Heart disease Brother     Hypertension Brother     Gallbladder disease Brother     Lung cancer Brother 67    Throat cancer  Brother 67    Hypertension Father     Gallbladder disease Father     Emphysema Father     Heart disease Father         Enlarged heart    Cancer Maternal Aunt     Cancer Maternal Uncle     Colon cancer Maternal Uncle     Heart attack Brother     Alcohol abuse Brother     Hypertension Sister     Heart disease Sister     Hypertension Sister     Heart disease Sister     Hypertension Sister     No Known Problems Other        Social History     Socioeconomic History    Marital status:      Spouse name: Jack    Number of children: 2    Years of education: High School   Tobacco Use    Smoking status: Never     Passive exposure: Never    Smokeless tobacco: Never    Tobacco comments:     CAFFEINE USE: 4-5 CUPS 1/2 & 1/2 COFFEE DAILY   Vaping Use    Vaping status: Never Used   Substance and Sexual Activity    Alcohol use: Not Currently    Drug use: No    Sexual activity: Defer       Review of Systems   Constitutional:  Positive for fatigue (attributes to medications). Negative for appetite change, chills, fever, unexpected weight gain and unexpected weight loss.   HENT:  Positive for postnasal drip. Negative for ear pain, sore throat and trouble swallowing.    Eyes:  Negative for blurred vision.   Respiratory:  Negative for cough, chest tightness and shortness of breath.    Cardiovascular:  Negative for chest pain. Palpitations: see HPI.  Gastrointestinal:  Negative for abdominal pain (some bloating at times). Blood in stool: some at times when wipes after episode of constipation. Constipation: some at times; FiberCon helps. Diarrhea: some at times after flares of constipation.  Endocrine: Positive for cold intolerance. Negative for heat intolerance and polydipsia.   Genitourinary:  Negative for dysuria and frequency.   Musculoskeletal:  Negative for back pain.   Skin:  Negative for rash (some on chest recently, but has resolved with Neoporin).   Neurological:  Negative for syncope and weakness.   Hematological:   "Bruises/bleeds easily: no change in easy bruising with Plavix.   Psychiatric/Behavioral:  Negative for suicidal ideas.        Objective   Vitals:    03/21/25 1312   BP: 110/70   BP Location: Left arm   Patient Position: Sitting   Cuff Size: Adult   Pulse: 78   Temp: 97.3 °F (36.3 °C)   SpO2: 99%   Weight: 66.9 kg (147 lb 6.4 oz)   Height: 154 cm (60.63\")     Body mass index is 28.19 kg/m².    Physical Exam  Vitals and nursing note reviewed.   Constitutional:       General: She is not in acute distress.     Appearance: She is well-developed and well-groomed. She is not diaphoretic.   HENT:      Head: Normocephalic.      Right Ear: External ear normal. No decreased hearing noted. Right ear middle ear effusion: mild. Tympanic membrane is not erythematous.      Left Ear: External ear normal. No decreased hearing noted. Left ear middle ear effusion: mild. Tympanic membrane is not erythematous.      Nose: Nose normal.      Right Sinus: No maxillary sinus tenderness or frontal sinus tenderness.      Left Sinus: No maxillary sinus tenderness or frontal sinus tenderness.      Mouth/Throat:      Mouth: Mucous membranes are moist.      Pharynx: No oropharyngeal exudate or posterior oropharyngeal erythema. Postnasal drip: mild.  Eyes:      Conjunctiva/sclera: Conjunctivae normal.   Neck:      Vascular: No carotid bruit.   Cardiovascular:      Rate and Rhythm: Normal rate and regular rhythm.      Pulses: Normal pulses.      Heart sounds: Normal heart sounds. No murmur heard.  Pulmonary:      Effort: Pulmonary effort is normal. No respiratory distress.      Breath sounds: Normal breath sounds.   Abdominal:      General: Bowel sounds are normal.      Palpations: Abdomen is soft. There is no hepatomegaly or splenomegaly.      Tenderness: There is no abdominal tenderness. There is no guarding.   Musculoskeletal:      Cervical back: Normal range of motion and neck supple. No bony tenderness.      Thoracic back: No bony tenderness.    "   Lumbar back: No bony tenderness.      Right lower leg: No edema.      Left lower leg: No edema.   Lymphadenopathy:      Cervical: No cervical adenopathy.   Skin:     General: Skin is warm and dry.      Findings: No rash.   Neurological:      Mental Status: She is alert and oriented to person, place, and time.      Gait: Gait normal.   Psychiatric:         Mood and Affect: Mood normal.         Behavior: Behavior normal.         Thought Content: Thought content normal.         Cognition and Memory: Cognition normal.         Judgment: Judgment normal.         Lab Results   Component Value Date    WBC 7.42 10/18/2024    RBC 4.27 10/18/2024    HGB 13.0 10/18/2024    HCT 38.0 10/18/2024    MCV 89.0 10/18/2024    MCH 30.4 10/18/2024    MCHC 34.2 10/18/2024    RDW 13.2 10/18/2024    RDWSD 42.9 10/18/2024    MPV 9.6 10/18/2024     10/18/2024    NEUTRORELPCT 67.1 10/18/2024    LYMPHORELPCT 21.3 10/18/2024    MONORELPCT 7.5 10/18/2024    EOSRELPCT 3.1 10/18/2024    BASORELPCT 0.7 10/18/2024    AUTOIGPER 0.3 10/18/2024    NEUTROABS 4.98 10/18/2024    LYMPHSABS 1.58 10/18/2024    MONOSABS 0.56 10/18/2024    EOSABS 0.23 10/18/2024    BASOSABS 0.05 10/18/2024    AUTOIGNUM 0.02 10/18/2024    NRBC 0.0 10/18/2024     Lab Results   Component Value Date    GLUCOSE 100 (H) 08/23/2024    BUN 7 (L) 08/23/2024    CREATININE 0.76 08/23/2024    EGFRIFNONA 74 08/24/2021    EGFRIFAFRI 90 08/24/2021    BCR 9 (L) 08/23/2024    K 4.0 08/23/2024    CO2 23 08/23/2024    CALCIUM 9.8 08/23/2024    ALBUMIN 4.6 08/23/2024    AST 28 08/23/2024    ALT 19 08/23/2024      Lab Results   Component Value Date    CHLPL 164 08/23/2024    TRIG 135 08/23/2024    HDL 53 08/23/2024    VLDL 24 08/23/2024    LDL 87 08/23/2024     Lab Results   Component Value Date    TSH 2.790 08/23/2024         Assessment    Problem List Items Addressed This Visit       Essential hypertension - Primary    Current Assessment & Plan   Hypertension is stable and  controlled  Continue current treatment regimen.  Regular aerobic exercise.  Ambulatory blood pressure monitoring.  Blood pressure will be reassessed in 6 months.  Continue Metoprolol and Amlodipine twice daily.  Follow-up as scheduled with cardiology.         Relevant Orders    Comprehensive Metabolic Panel    CBC & Differential    Lipid Panel With LDL / HDL Ratio    Hyperlipidemia, mixed    Current Assessment & Plan   Continue Atorvastatin daily.  Continue to work on healthy diet and exercise as tolerated.         Relevant Orders    Comprehensive Metabolic Panel    Lipid Panel With LDL / HDL Ratio    Hypothyroidism (acquired)    Current Assessment & Plan   Continue Levothyroxine daily.         Relevant Orders    TSH Rfx On Abnormal To Free T4    Primary insomnia    Current Assessment & Plan   Stable.  Continue Amitriptyline nightly.         Trigeminal neuralgia    Current Assessment & Plan   Stable.  Continue Amitriptyline nightly.  Follow-up as scheduled with neurology.         Gastroesophageal reflux disease without esophagitis    Overview   Added automatically from request for surgery [id number]         Current Assessment & Plan   Stable.  Continue Dexilant daily.  Schedule follow-up appointment with GI.         Relevant Medications    dexlansoprazole (DEXILANT) 30 MG capsule    Other Relevant Orders    CBC & Differential    History of breast cancer    Overview   Added automatically from request for surgery [id number]         Current Assessment & Plan   Follow-up as scheduled with oncology.         Constipation    Current Assessment & Plan   Stable.  Continue FiberCon daily.         Vitamin B12 deficiency    Relevant Orders    Vitamin B12 & Folate    Neuropathy    Current Assessment & Plan   Improving with treatment.  Continue Amitriptyline nightly.  Follow-up as scheduled with neurology.         Palpitations    Current Assessment & Plan   Continue Metoprolol and Amlodipine twice daily.  Follow-up as scheduled with  cardiology.         Vitamin D deficiency    Relevant Orders    Vitamin D,25-Hydroxy     Other Visit Diagnoses         Other microscopic hematuria        Relevant Orders    POC Urinalysis Dipstick, Automated (Completed)    Urine Culture - Urine, Urine, Clean Catch             Return in about 6 months (around 9/21/2025) for Medicare Wellness, Recheck. or sooner if symptoms persist or worsen.  Will send refill of Levothyroxine for 90 day supply to [facility] pending lab results.

## 2025-03-21 NOTE — ASSESSMENT & PLAN NOTE
Hypertension is stable and controlled  Continue current treatment regimen.  Regular aerobic exercise.  Ambulatory blood pressure monitoring.  Blood pressure will be reassessed in 6 months.  Continue Metoprolol and Amlodipine twice daily.  Follow-up as scheduled with cardiology.

## 2025-03-21 NOTE — PATIENT INSTRUCTIONS
Resume Claritin daily.  Continue to monitor your blood pressure periodically and record results.  Continue to work on healthy diet and exercise.  Follow up pending lab results.  Follow up in 6 months for Medicare Wellness, or sooner if problems or concerns.

## 2025-03-22 LAB
25(OH)D3+25(OH)D2 SERPL-MCNC: 25.4 NG/ML (ref 30–100)
ALBUMIN SERPL-MCNC: 4.5 G/DL (ref 3.8–4.8)
ALP SERPL-CCNC: 124 IU/L (ref 44–121)
ALT SERPL-CCNC: 14 IU/L (ref 0–32)
AST SERPL-CCNC: 25 IU/L (ref 0–40)
BASOPHILS # BLD AUTO: 0 X10E3/UL (ref 0–0.2)
BASOPHILS NFR BLD AUTO: 1 %
BILIRUB SERPL-MCNC: 0.4 MG/DL (ref 0–1.2)
BUN SERPL-MCNC: 7 MG/DL (ref 8–27)
BUN/CREAT SERPL: 9 (ref 12–28)
CALCIUM SERPL-MCNC: 10 MG/DL (ref 8.7–10.3)
CHLORIDE SERPL-SCNC: 102 MMOL/L (ref 96–106)
CHOLEST SERPL-MCNC: 206 MG/DL (ref 100–199)
CO2 SERPL-SCNC: 24 MMOL/L (ref 20–29)
CREAT SERPL-MCNC: 0.77 MG/DL (ref 0.57–1)
EGFRCR SERPLBLD CKD-EPI 2021: 79 ML/MIN/1.73
EOSINOPHIL # BLD AUTO: 0.2 X10E3/UL (ref 0–0.4)
EOSINOPHIL NFR BLD AUTO: 3 %
ERYTHROCYTE [DISTWIDTH] IN BLOOD BY AUTOMATED COUNT: 12.3 % (ref 11.7–15.4)
FOLATE SERPL-MCNC: 16.6 NG/ML
GLOBULIN SER CALC-MCNC: 2.7 G/DL (ref 1.5–4.5)
GLUCOSE SERPL-MCNC: 91 MG/DL (ref 70–99)
HCT VFR BLD AUTO: 40 % (ref 34–46.6)
HDLC SERPL-MCNC: 46 MG/DL
HGB BLD-MCNC: 13.6 G/DL (ref 11.1–15.9)
IMM GRANULOCYTES # BLD AUTO: 0 X10E3/UL (ref 0–0.1)
IMM GRANULOCYTES NFR BLD AUTO: 0 %
LDLC SERPL CALC-MCNC: 129 MG/DL (ref 0–99)
LDLC/HDLC SERPL: 2.8 RATIO (ref 0–3.2)
LYMPHOCYTES # BLD AUTO: 2 X10E3/UL (ref 0.7–3.1)
LYMPHOCYTES NFR BLD AUTO: 29 %
MCH RBC QN AUTO: 31.3 PG (ref 26.6–33)
MCHC RBC AUTO-ENTMCNC: 34 G/DL (ref 31.5–35.7)
MCV RBC AUTO: 92 FL (ref 79–97)
MONOCYTES # BLD AUTO: 0.6 X10E3/UL (ref 0.1–0.9)
MONOCYTES NFR BLD AUTO: 8 %
NEUTROPHILS # BLD AUTO: 4.2 X10E3/UL (ref 1.4–7)
NEUTROPHILS NFR BLD AUTO: 59 %
PLATELET # BLD AUTO: 311 X10E3/UL (ref 150–450)
POTASSIUM SERPL-SCNC: 4.3 MMOL/L (ref 3.5–5.2)
PROT SERPL-MCNC: 7.2 G/DL (ref 6–8.5)
RBC # BLD AUTO: 4.35 X10E6/UL (ref 3.77–5.28)
SODIUM SERPL-SCNC: 141 MMOL/L (ref 134–144)
TRIGL SERPL-MCNC: 173 MG/DL (ref 0–149)
TSH SERPL DL<=0.005 MIU/L-ACNC: 4.33 UIU/ML (ref 0.45–4.5)
VIT B12 SERPL-MCNC: 1173 PG/ML (ref 232–1245)
VLDLC SERPL CALC-MCNC: 31 MG/DL (ref 5–40)
WBC # BLD AUTO: 7.1 X10E3/UL (ref 3.4–10.8)

## 2025-03-23 LAB
BACTERIA UR CULT: NO GROWTH
BACTERIA UR CULT: NORMAL

## 2025-03-24 DIAGNOSIS — R31.29 OTHER MICROSCOPIC HEMATURIA: ICD-10-CM

## 2025-03-24 DIAGNOSIS — E78.2 HYPERLIPIDEMIA, MIXED: Primary | ICD-10-CM

## 2025-03-24 RX ORDER — ATORVASTATIN CALCIUM 10 MG/1
10 TABLET, FILM COATED ORAL DAILY
Qty: 90 TABLET | Refills: 3 | Status: SHIPPED | OUTPATIENT
Start: 2025-03-24

## 2025-06-10 DIAGNOSIS — E03.9 HYPOTHYROIDISM (ACQUIRED): ICD-10-CM

## 2025-06-10 RX ORDER — LEVOTHYROXINE SODIUM 75 UG/1
TABLET ORAL
Qty: 100 TABLET | Refills: 1 | Status: SHIPPED | OUTPATIENT
Start: 2025-06-10

## 2025-06-25 ENCOUNTER — TELEPHONE (OUTPATIENT)
Dept: ONCOLOGY | Facility: CLINIC | Age: 80
End: 2025-06-25
Payer: MEDICARE

## 2025-06-25 NOTE — TELEPHONE ENCOUNTER
Caller: Carlotta Pires    Relationship to patient: Self    Best call back number: [phone]    Chief complaint: RESCHEDULE     Type of visit: LAB AND FOLLOW UP     Requested date: ANY     If rescheduling, when is the original appointment: 5-20     Additional notes:PLEASE ADVISE

## 2025-07-09 ENCOUNTER — RESULTS FOLLOW-UP (OUTPATIENT)
Age: 80
End: 2025-07-09
Payer: MEDICARE

## 2025-07-09 ENCOUNTER — LAB (OUTPATIENT)
Dept: LAB | Facility: HOSPITAL | Age: 80
End: 2025-07-09
Payer: MEDICARE

## 2025-07-09 ENCOUNTER — OFFICE VISIT (OUTPATIENT)
Age: 80
End: 2025-07-09
Payer: MEDICARE

## 2025-07-09 VITALS
HEIGHT: 61 IN | HEART RATE: 108 BPM | BODY MASS INDEX: 27.93 KG/M2 | SYSTOLIC BLOOD PRESSURE: 138 MMHG | WEIGHT: 147.9 LBS | DIASTOLIC BLOOD PRESSURE: 78 MMHG

## 2025-07-09 DIAGNOSIS — E78.2 HYPERLIPIDEMIA, MIXED: ICD-10-CM

## 2025-07-09 DIAGNOSIS — R00.0 TACHYCARDIA: ICD-10-CM

## 2025-07-09 DIAGNOSIS — I10 ESSENTIAL HYPERTENSION: Primary | ICD-10-CM

## 2025-07-09 LAB
ALBUMIN SERPL-MCNC: 3.9 G/DL (ref 3.5–5.2)
ALBUMIN/GLOB SERPL: 1.3 G/DL
ALP SERPL-CCNC: 107 U/L (ref 39–117)
ALT SERPL W P-5'-P-CCNC: 14 U/L (ref 1–33)
ANION GAP SERPL CALCULATED.3IONS-SCNC: 11.7 MMOL/L (ref 5–15)
AST SERPL-CCNC: 22 U/L (ref 1–32)
BILIRUB SERPL-MCNC: 0.3 MG/DL (ref 0–1.2)
BUN SERPL-MCNC: 6 MG/DL (ref 8–23)
BUN/CREAT SERPL: 8.1 (ref 7–25)
CALCIUM SPEC-SCNC: 9.5 MG/DL (ref 8.6–10.5)
CHLORIDE SERPL-SCNC: 103 MMOL/L (ref 98–107)
CO2 SERPL-SCNC: 24.3 MMOL/L (ref 22–29)
CREAT SERPL-MCNC: 0.74 MG/DL (ref 0.57–1)
DEPRECATED RDW RBC AUTO: 44.8 FL (ref 37–54)
EGFRCR SERPLBLD CKD-EPI 2021: 82.9 ML/MIN/1.73
ERYTHROCYTE [DISTWIDTH] IN BLOOD BY AUTOMATED COUNT: 13.3 % (ref 12.3–15.4)
GLOBULIN UR ELPH-MCNC: 3.1 GM/DL
GLUCOSE SERPL-MCNC: 96 MG/DL (ref 65–99)
HCT VFR BLD AUTO: 37.5 % (ref 34–46.6)
HGB BLD-MCNC: 12.5 G/DL (ref 12–15.9)
MCH RBC QN AUTO: 30.7 PG (ref 26.6–33)
MCHC RBC AUTO-ENTMCNC: 33.3 G/DL (ref 31.5–35.7)
MCV RBC AUTO: 92.1 FL (ref 79–97)
PLATELET # BLD AUTO: 267 10*3/MM3 (ref 140–450)
PMV BLD AUTO: 9.6 FL (ref 6–12)
POTASSIUM SERPL-SCNC: 3.6 MMOL/L (ref 3.5–5.2)
PROT SERPL-MCNC: 7 G/DL (ref 6–8.5)
RBC # BLD AUTO: 4.07 10*6/MM3 (ref 3.77–5.28)
SODIUM SERPL-SCNC: 139 MMOL/L (ref 136–145)
TSH SERPL DL<=0.05 MIU/L-ACNC: 2.5 UIU/ML (ref 0.27–4.2)
WBC NRBC COR # BLD AUTO: 6.29 10*3/MM3 (ref 3.4–10.8)

## 2025-07-09 PROCEDURE — 99214 OFFICE O/P EST MOD 30 MIN: CPT | Performed by: NURSE PRACTITIONER

## 2025-07-09 PROCEDURE — 93000 ELECTROCARDIOGRAM COMPLETE: CPT | Performed by: NURSE PRACTITIONER

## 2025-07-09 PROCEDURE — 3075F SYST BP GE 130 - 139MM HG: CPT | Performed by: NURSE PRACTITIONER

## 2025-07-09 PROCEDURE — 3078F DIAST BP <80 MM HG: CPT | Performed by: NURSE PRACTITIONER

## 2025-07-09 PROCEDURE — 85027 COMPLETE CBC AUTOMATED: CPT

## 2025-07-09 PROCEDURE — 1159F MED LIST DOCD IN RCRD: CPT | Performed by: NURSE PRACTITIONER

## 2025-07-09 PROCEDURE — 36415 COLL VENOUS BLD VENIPUNCTURE: CPT

## 2025-07-09 PROCEDURE — 80053 COMPREHEN METABOLIC PANEL: CPT

## 2025-07-09 PROCEDURE — 84443 ASSAY THYROID STIM HORMONE: CPT

## 2025-07-09 PROCEDURE — 1160F RVW MEDS BY RX/DR IN RCRD: CPT | Performed by: NURSE PRACTITIONER

## 2025-07-09 NOTE — PROGRESS NOTES
"  Date of Office Visit: 2025  Encounter Provider: URBAN Conde  Place of Service: [facility] CARDIOLOGY  Patient Name: Carlotta Pires  :1945    Chief Complaint   Patient presents with    History of cardiomyopathy    Essential hypertension     1 year follow up    :     HPI: Carlotta Pires is a 80 y.o. female patient of Dr. Barker's who has a history of metastatic breast cancer for which she received Adriamycin.  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  She also has a history of stroke.     In 2023, she was hospitalized with C. difficile colitis.  Echocardiogram at that time demonstrated normal LV function.      She has a lot of medication sensitivities so managing her blood pressure has been difficult.    She was last seen in the office by Dr. Barker in 2024 at which time she was overall doing well.  Her blood pressure was elevated.  Dr. Barker noted that it was always a little too high and he thought she would do better taking her medicine twice a day instead of all at once.  She was advised to follow-up in 1 year.    From a cardiac standpoint, she is feeling well.  She denies any chest pain, shortness of breath, palpitations, edema, dizziness, or syncope.  She stopped the amlodipine about 2 months ago secondary to swelling.  About 6 weeks ago, she noticed that her heart rates dropped lower than usual and she noticed increased palpitations.  That seems to have resolved.  She does feel worn out all the time.  Her bigger concern is related to a recent diagnosis of microscopic hematuria noted by her PCP.  Subsequently she was referred to urology who reportedly thinks that she either has cancer or some \"weird disease.\"  They have recommended further testing she is hesitant with proceeding.  She is very nervous.    Past Medical History:   Diagnosis Date    Allergic rhinitis     Anemia 2023    Arthritis     C. " difficile colitis 4/26/2023    Cancer     Left breast cancer    CHF (congestive heart failure)     Cough     COVID-19 11/2020    Degenerative arthritis of thumb     GERD without esophagitis     H/O TIA (transient ischemic attack) and stroke 2005    History of bone density study     Laceration of occipital region of scalp 01/19/2024    Lumbar radiculopathy     Osteoporosis     Palpitations     Peptic ulceration     Personal history of malignant neoplasm of breast     Sciatica     Stroke 2005    CVA--History of storke    Trigeminal neuralgia        Past Surgical History:   Procedure Laterality Date    BREAST BIOPSY Left 2011    CATARACT EXTRACTION, BILATERAL      COLONOSCOPY  09/28/2012    Adolph Martinez MD    COLONOSCOPY N/A 11/13/2018    Procedure: COLONOSCOPY to cecum;  Surgeon: Adolph Martinez MD;  [facility];  Service: Gastroenterology    COLONOSCOPY N/A 01/27/2021    Procedure: COLONOSCOPY TO CECUM AND TERMINAL ILEUM;  Surgeon: Emelia Wilcox MD;  Location: [facility] ENDOSCOPY;  Service: Gastroenterology;  Laterality: N/A;  RECTAL BLEEDING  --DIVERTICULOSIS, HEMORRHOIDS, TORTUOUS COLON    ENDOSCOPY N/A 11/13/2018    Procedure: ESOPHAGOGASTRODUODENOSCOPY with bx;  Surgeon: Adolph Martinez MD;  Location: [facility] ENDOSCOPY;  Service: Gastroenterology    ENDOSCOPY N/A 01/27/2021    Procedure: ESOPHAGOGASTRODUODENOSCOPY WITH COLD BX;  Surgeon: Emelia Wilcox MD;  Location: [facility] ENDOSCOPY;  Service: Gastroenterology;  Laterality: N/A;  GERD  --GASTRITIS, HIATAL HERNIA    EYE SURGERY      HYSTERECTOMY      At age 31-Not due to cancer    MASTECTOMY Left 06/30/2011    Dr. Kaitlyn Luna    TUBAL ABDOMINAL LIGATION  1974    UPPER GASTROINTESTINAL ENDOSCOPY  09/28/2012    Adolph Martinez MD       Social History     Socioeconomic History    Marital status:      Spouse name: Jack    Number of children: 2    Years of education: High School   Tobacco Use    Smoking status: Never     Passive  exposure: Never    Smokeless tobacco: Never    Tobacco comments:     CAFFEINE USE: 4-5 CUPS 1/2 & 1/2 COFFEE DAILY   Vaping Use    Vaping status: Never Used   Substance and Sexual Activity    Alcohol use: Not Currently    Drug use: No    Sexual activity: Defer       Family History   Problem Relation Age of Onset    Cancer Mother 68        head and neck    Stroke Mother     Heart disease Mother     Gallbladder disease Mother     Throat cancer Mother 68    Breast cancer Sister 57    Heart disease Sister     Hypertension Sister     Cancer Sister     Gallbladder disease Sister     Diabetes Sister     Cancer Brother         head and neck    Heart disease Brother     Hypertension Brother     Gallbladder disease Brother     Lung cancer Brother 67    Throat cancer Brother 67    Hypertension Father     Gallbladder disease Father     Emphysema Father     Heart disease Father         Enlarged heart    Cancer Maternal Aunt     Cancer Maternal Uncle     Colon cancer Maternal Uncle     Heart attack Brother     Alcohol abuse Brother     Hypertension Sister     Heart disease Sister     Hypertension Sister     Heart disease Sister     Hypertension Sister     No Known Problems Other        Review of Systems   Constitutional: Positive for malaise/fatigue.   Cardiovascular: Negative.  Negative for chest pain, dyspnea on exertion, leg swelling, orthopnea, paroxysmal nocturnal dyspnea and syncope.   Respiratory: Negative.     Hematologic/Lymphatic: Negative for bleeding problem.   Musculoskeletal:  Negative for falls.   Gastrointestinal:  Negative for melena.   Neurological:  Negative for dizziness and light-headedness.       Allergies   Allergen Reactions    Lisinopril Cough    Fentanyl Unknown - High Severity    Contrast Dye (Echo Or Unknown Ct/Mr) Itching and Rash     IVP Dye    Fd&C Yellow #5 (Tartrazine) Hives         Current Outpatient Medications:     amitriptyline (ELAVIL) 50 MG tablet, Take 1 tablet by mouth Every Night., Disp:  "90 tablet, Rfl: 1    atorvastatin (LIPITOR) 10 MG tablet, Take 1 tablet by mouth Daily., Disp: 90 tablet, Rfl: 3    Calcium Polycarbophil (FIBERCON PO), Take  by mouth Daily., Disp: , Rfl:     clopidogrel (PLAVIX) 75 MG tablet, Take 1 tablet by mouth Daily., Disp: 90 tablet, Rfl: 3    dexlansoprazole (DEXILANT) 30 MG capsule, Take 1 capsule by mouth Daily., Disp: 90 capsule, Rfl: 1    levothyroxine (SYNTHROID, LEVOTHROID) 75 MCG tablet, TAKE 1 TABLET BY MOUTH ONCE DAILY EXCEPT ON SUNDAYS TAKE 2 TABLETS. *OVERDUE FOR A 6 MONTH FOLLOW UP, PLEASE SCHEDULE AN APPOINTMENT*, Disp: 100 tablet, Rfl: 1    loratadine (Claritin) 10 MG tablet, Take 1 tablet by mouth Daily., Disp: , Rfl:     metoprolol succinate XL (TOPROL-XL) 50 MG 24 hr tablet, Take 1 tablet by mouth 2 (Two) Times a Day., Disp: 180 tablet, Rfl: 3    vitamin B-12 (CYANOCOBALAMIN) 1000 MCG tablet, Take 1 tablet by mouth Daily., Disp: , Rfl:     Vitamin D, Cholecalciferol, 25 MCG (1000 UT) capsule, Take 2 capsules by mouth Daily., Disp: , Rfl:       Objective:     Vitals:    07/09/25 1046   BP: 138/78   Pulse: 108   Weight: 67.1 kg (147 lb 14.4 oz)   Height: 154 cm (60.63\")     Body mass index is 28.29 kg/m².    PHYSICAL EXAM:    Neck:      Vascular: No JVD.   Pulmonary:      Effort: Pulmonary effort is normal.      Breath sounds: Normal breath sounds.   Cardiovascular:      Tachycardia present. Regular rhythm.      Murmurs: There is no murmur.      No gallop.  No click. No rub.   Pulses:     Intact distal pulses.           ECG 12 Lead    Date/Time: 7/9/2025 11:00 AM  Performed by: Barbie Orta APRN    Authorized by: Barbie Orta APRN  Comparison: compared with previous ECG from 7/3/2024  Similar to previous ECG  Rhythm: sinus tachycardia  Ectopy: unifocal PVCs  Rate: tachycardic  BPM: 108            Assessment:       Diagnosis Plan   1. Essential hypertension  ECG 12 Lead      2. Hyperlipidemia, mixed        3. Tachycardia  TSH    CBC (No Diff) "    Comprehensive Metabolic Panel        Orders Placed This Encounter   Procedures    TSH     Standing Status:   Future     Expected Date:   7/14/2025     Expiration Date:   7/9/2026     Release to patient:   Routine Release [[id number]]    CBC (No Diff)     Standing Status:   Future     Expected Date:   7/14/2025     Expiration Date:   7/9/2026     Release to patient:   Routine Release [[id number]]    Comprehensive Metabolic Panel     Standing Status:   Future     Expected Date:   7/14/2025     Expiration Date:   7/9/2026     Release to patient:   Routine Release [[id number]]    ECG 12 Lead     This order was created via procedure documentation     Release to patient:   Routine Release [[id number]]          Plan:       1.  Hypertension.  Her blood pressure is actually stable today.  I would continue the current regimen      2.  Hyperlipidemia.  Continue atorvastatin.      3.  Tachycardia.  I am not sure why she is tachycardic.  I think a general lab evaluation is warranted.  Especially in light of her recent issues with microscopic hematuria.      Overall I think she is stable.  I will call her with her lab results.  Otherwise, she will follow-up with Dr. Barker in 1 year.      As always, it has been a pleasure to participate in your patient's care.      Sincerely,         URBAN Pettit

## 2025-07-09 NOTE — TELEPHONE ENCOUNTER
Left voicemail for Carlotta Pires requesting callback.    HUB: please transfer to Triage if patient returns call    Thank you,  Dory LAU RN  Triage Nurse BRIANA  07/09/25   14:24 EDT

## 2025-07-09 NOTE — LETTER
Carlotta Oroy  [address] [facility] [address]    July 9, 2025     Dear Ms. Pires:    Below are the results from your recent visit:    Resulted Orders   CBC (No Diff)   Result Value Ref Range    WBC 6.29 3.40 - 10.80 10*3/mm3    RBC 4.07 3.77 - 5.28 10*6/mm3    Hemoglobin 12.5 12.0 - 15.9 g/dL    Hematocrit 37.5 34.0 - 46.6 %    MCV 92.1 79.0 - 97.0 fL    MCH 30.7 26.6 - 33.0 pg    MCHC 33.3 31.5 - 35.7 g/dL    RDW 13.3 12.3 - 15.4 %    RDW-SD 44.8 37.0 - 54.0 fl    MPV 9.6 6.0 - 12.0 fL    Platelets 267 140 - 450 10*3/mm3       Your labs are in normal range.     If you have any questions or concerns, please don't hesitate to call.         Sincerely,        Mirela Garcia MD

## 2025-07-12 NOTE — PROGRESS NOTES
Subjective .     REASONS FOR FOLLOWUP:  Breast cancer    HISTORY OF PRESENT ILLNESS:  The patient is a 80 y.o. year old female  who is here for follow-up with the above-mentioned history.    Here for follow up.  She is seen with her  present.  Denies nausea, abdominal pain, cough/shortness of breath, bone pain.  Denies any new breast mass.    She does discuss concern over being told recently by a [facility] that she has either cancer or a very serious autoimmune condition.  She was following with her PCP and noted to have hematuria on 2 separate occasions, which she reports led to her being referred for astrology.  She cannot recall the name of the physician she spoke to or what type of cancer/autoimmune condition they believe she may have.  She reports she is scheduled for additional workup for this on 8/12/2025.  We will work on obtaining records.    She also discusses having a lot of issues with her heart lately.  Follows closely with cardiology, last seen 7/9/2025.    Past Medical History:   Diagnosis Date    Allergic rhinitis     Anemia 05/09/2023    Arthritis     C. difficile colitis 4/26/2023    Cancer     Left breast cancer    CHF (congestive heart failure)     Cough     COVID-19 11/2020    Degenerative arthritis of thumb     GERD without esophagitis     H/O TIA (transient ischemic attack) and stroke 2005    History of bone density study     Laceration of occipital region of scalp 01/19/2024    Lumbar radiculopathy     Osteoporosis     Palpitations     Peptic ulceration     Personal history of malignant neoplasm of breast     Sciatica     Stroke 2005    CVA--History of storke    Trigeminal neuralgia      Past Surgical History:   Procedure Laterality Date    BREAST BIOPSY Left 2011    CATARACT EXTRACTION, BILATERAL      COLONOSCOPY  09/28/2012    Adolph Martinez MD    COLONOSCOPY N/A 11/13/2018    Procedure: COLONOSCOPY to cecum;  Surgeon: Adolph Martinez MD;  Location: [facility]  ENDOSCOPY;  Service: Gastroenterology    COLONOSCOPY N/A 01/27/2021    Procedure: COLONOSCOPY TO CECUM AND TERMINAL ILEUM;  Surgeon: Emelia Wilcox MD;  Location:  [facility];  Service: Gastroenterology;  Laterality: N/A;  RECTAL BLEEDING  --DIVERTICULOSIS, HEMORRHOIDS, TORTUOUS COLON    ENDOSCOPY N/A 11/13/2018    Procedure: ESOPHAGOGASTRODUODENOSCOPY with bx;  Surgeon: Adolph Martinez MD;  Location:  [facility];  Service: Gastroenterology    ENDOSCOPY N/A 01/27/2021    Procedure: ESOPHAGOGASTRODUODENOSCOPY WITH COLD BX;  Surgeon: Emelia Wilcox MD;  Location:  [facility];  Service: Gastroenterology;  Laterality: N/A;  GERD  --GASTRITIS, HIATAL HERNIA    EYE SURGERY      HYSTERECTOMY      At age 31-Not due to cancer    MASTECTOMY Left 06/30/2011    Dr. Kaitlyn Luna    TUBAL ABDOMINAL LIGATION  1974    UPPER GASTROINTESTINAL ENDOSCOPY  09/28/2012    Adolph Martinez MD       HEMATOLOGIC/ONCOLOGIC HISTORY:  (History from previous dates can be found in the separate document.)    MEDICATIONS    Current Outpatient Medications:     amitriptyline (ELAVIL) 50 MG tablet, Take 1 tablet by mouth Every Night., Disp: 90 tablet, Rfl: 1    atorvastatin (LIPITOR) 10 MG tablet, Take 1 tablet by mouth Daily., Disp: 90 tablet, Rfl: 3    Calcium Polycarbophil (FIBERCON PO), Take  by mouth Daily., Disp: , Rfl:     clopidogrel (PLAVIX) 75 MG tablet, Take 1 tablet by mouth Daily., Disp: 90 tablet, Rfl: 3    dexlansoprazole (DEXILANT) 30 MG capsule, Take 1 capsule by mouth Daily., Disp: 90 capsule, Rfl: 1    levothyroxine (SYNTHROID, LEVOTHROID) 75 MCG tablet, TAKE 1 TABLET BY MOUTH ONCE DAILY EXCEPT ON SUNDAYS TAKE 2 TABLETS. *OVERDUE FOR A 6 MONTH FOLLOW UP, PLEASE SCHEDULE AN APPOINTMENT*, Disp: 100 tablet, Rfl: 1    loratadine (Claritin) 10 MG tablet, Take 1 tablet by mouth Daily., Disp: , Rfl:     metoprolol succinate XL (TOPROL-XL) 50 MG 24 hr tablet, Take 1 tablet by mouth 2 (Two) Times a Day., Disp: 180  tablet, Rfl: 3    vitamin B-12 (CYANOCOBALAMIN) 1000 MCG tablet, Take 1 tablet by mouth Daily., Disp: , Rfl:     Vitamin D, Cholecalciferol, 25 MCG (1000 UT) capsule, Take 2 capsules by mouth Daily., Disp: , Rfl:     ALLERGIES:     Allergies   Allergen Reactions    Lisinopril Cough    Fentanyl Unknown - High Severity    Contrast Dye (Echo Or Unknown Ct/Mr) Itching and Rash     IVP Dye    Fd&C Yellow #5 (Tartrazine) Hives       SOCIAL HISTORY:       Social History     Socioeconomic History    Marital status:      Spouse name: Jack    Number of children: 2    Years of education: High School   Tobacco Use    Smoking status: Never     Passive exposure: Never    Smokeless tobacco: Never    Tobacco comments:     CAFFEINE USE: 4-5 CUPS 1/2 & 1/2 COFFEE DAILY   Vaping Use    Vaping status: Never Used   Substance and Sexual Activity    Alcohol use: Not Currently    Drug use: No    Sexual activity: Defer         FAMILY HISTORY:  Family History   Problem Relation Age of Onset    Cancer Mother 68        head and neck    Stroke Mother     Heart disease Mother     Gallbladder disease Mother     Throat cancer Mother 68    Breast cancer Sister 57    Heart disease Sister     Hypertension Sister     Cancer Sister     Gallbladder disease Sister     Diabetes Sister     Cancer Brother         head and neck    Heart disease Brother     Hypertension Brother     Gallbladder disease Brother     Lung cancer Brother 67    Throat cancer Brother 67    Hypertension Father     Gallbladder disease Father     Emphysema Father     Heart disease Father         Enlarged heart    Cancer Maternal Aunt     Cancer Maternal Uncle     Colon cancer Maternal Uncle     Heart attack Brother     Alcohol abuse Brother     Hypertension Sister     Heart disease Sister     Hypertension Sister     Heart disease Sister     Hypertension Sister     No Known Problems Other        REVIEW OF SYSTEMS: per HPI.    Objective    Vitals:    07/15/25 1325   BP: 145/69  "  Pulse: 97   Temp: 98.4 °F (36.9 °C)   TempSrc: Skin   SpO2: 95%   Weight: 67.3 kg (148 lb 4.8 oz)   Height: 154 cm (60.63\")   PainSc: 0-No pain           7/15/2025     1:27 PM   Current Status   ECOG score 0      PHYSICAL EXAM:      CONSTITUTIONAL:  Vital signs reviewed.  No distress, looks comfortable.  EYES:  Conjunctiva and lids unremarkable.  PERRLA  EARS,NOSE,MOUTH,THROAT:  Ears and nose appear unremarkable.  Lips, teeth, gums appear unremarkable.  RESPIRATORY:  Normal respiratory effort.  Lungs clear to auscultation bilaterally.  CARDIOVASCULAR:  Normal S1, S2.  No murmurs rubs or gallops.  No significant lower extremity edema.  BREAST: no masses by palpation or inspection  GASTROINTESTINAL: Abdomen appears unremarkable.  Nontender.  No hepatomegaly.  No splenomegaly.  LYMPHATIC:  No cervical, supraclavicular, axillary lymphadenopathy.  SKIN:  Warm.  No rashes.  PSYCHIATRIC:  Normal judgment and insight.  Normal mood and affect.        RECENT LABS:        WBC   Date/Time Value Ref Range Status   07/15/2025 01:01 PM 6.22 3.40 - 10.80 10*3/mm3 Final   03/21/2025 01:57 PM 7.1 3.4 - 10.8 x10E3/uL Final     Hemoglobin   Date/Time Value Ref Range Status   07/15/2025 01:01 PM 12.1 12.0 - 15.9 g/dL Final     Platelets   Date/Time Value Ref Range Status   07/15/2025 01:01  140 - 450 10*3/mm3 Final       Assessment/Plan     ASSESSMENT:  Problem List Items Addressed This Visit          Other    Malignant neoplasm of lower-outer quadrant of left female breast - Primary    Relevant Orders    Mammo Screening Digital Tomosynthesis Bilateral With CAD     Other Visit Diagnoses         Encounter for screening mammogram for malignant neoplasm of breast        Relevant Orders    Mammo Screening Digital Tomosynthesis Bilateral With CAD                 *Stage IIIA, grade 3, 3.9 cm left breast cancer. Four out of 24 nodes positive. ER 2%, DE negative, HER2 negative. Completed FEC x3 followed by Taxotere x3, 11/28/2011. " Completed radiation in February 2012.   Poor tolerance to anastrozole and letrozole.  completed 5 years hormonal therapy using Aromasin, 1/31/17.  (She was [facility] a [facility]. She transferred here as her son-in-law, Jeremiah Dumont, was a patient here).   Suspect she remains in remission.  However, with the bulging/swelling of skin around her mastectomy site on the left and the new onset right-sided chest pain, check a CT of the chest to assess for recurrence.  CT chest 8/10/2020: No evidence of recurrence.  I discussed with Dr. Braxton-she stated we could do an ultrasound if we wanted to evaluate the superficial areas more.  Notably the bulging is on the left side and the right side has chest pain.  She states an ultrasound often shows fat necrosis better than a CT.  She suspects the CT would have picked up malignancy if it was present.  Patient did not want to pursue an ultrasound.  Symptoms resolved.  Screening mammogram February 2025 benign.  No evidence of recurrence.  Remission remaining     *Possible lupus.  Was evaluated by Dr. Rivera for this  10/21/2021 visit: Fever of 100.5 or higher on average every 2 weeks or so for the past 2 months.  I told her sometimes this can occur with a rheumatologic disorder.  I advise she discuss with her PCP or Dr. Rivera about this.  (CBC unremarkable.  Exam unremarkable.  No clear signs of leukemia or lymphoma.  I told her we could do CTs looking for LAD which is not in areas that can be palpated.  However, I think it is unlikely this is the case.  She declines CTs at this time)  4/21/2022: Continues intermittent fever.  Unchanged.  Advised again to follow-up with Dr. Rivera.  10/20/2022: No complaints of fever today  4/ 13/23: Has had recent intermittent fevers up to 100.2.  PCP aware.  (January 2023 had significantly higher fevers.  No specific infection identified).  10/12/2023: States she saw Dr. Rivera again and she states he he does not feel she has a rheumatologic  problem     *Chronic right low back/hip pain radiating anteriorly.  In May 2016, CAT scan and bone scan negative for cancer as the cause of the pain.  Defer further management of this to her PCP.  She did not complain of this today.    *In the past, she has complained of: Forgetfulness and transposition of numbers when she is writing numbers at times. This had been present since around April 2015. She states it is not worsening.   She did not complain of this today.    *History of stroke around 2005. Because of this I do not think she would be a good candidate for tamoxifen.     *Osteopenia, which has improved to normal bone density.  DEXA 5/17/16 normal bone density.  T score -0.6, compared to.   -1.1 2/20/14.  She is on calcium and vitamin D.  Defer further management of this to her PCP now that she is off aromatase inhibitors.    *Right upper quadrant/right lower anterior rib pain.  Present since October 2016.    Due to RUQ abdominal pain/right lower anterior rib pain since October 2016, bone scan and CT abdomen with contrast 2/2/17: No evidence of recurrence.  (8 mm low-attenuation liver lesion seen on the 5/17/16 CT but less conspicuous on order CT.  This was felt to be due to older CT without contrast.  This was felt to likely be benign.  Plan no further scheduled follow-up of this-patient agrees.).  She did not complain of this pain today.    *Hypokalemia. Her PCP manages this.      *IV contrast allergy.  Previous rash.  Receives IV contrast pre-medicines with CT IV contrast.  (This has worked well in the past)    *Left arm lymphedema due to prior surgery for breast cancer.  She was seen by the lymphedema clinic but has had some logistical issues obtaining the sleeve they prescribed.  I encouraged her to continue to call the lymphedema clinic for assistance with this.    *Previously complained of vertigo and was referred to her ENT.    *Heartburn.  Had EGD and colonoscopy by Dr. Martinez November 2018.  No  malignancy was found.      *Intermittent chest pain x2 years, SBP's sometimes in the 190s.  Her blood pressure machine has been reading irregular sometimes for her heart rate.  Coronary calcifications on last CT.  Referred to her cardiologist, Dr. Barker  4/21/2022: She has seen Dr. Barker recently.  No further cardiac work-up planned at this time.  No complaints of this today    *Social issues  Her sister passed away in 2023  Her disabled daughter passed away September 2022  Her other daughter, Osmin, is dealing with the death of her  which was around January 2019.    *10/12/2023: Left chest discomfort lasting for 3 to 4 minutes at a time, intermittent for the past few months  10/12/2023: She thinks this may be due to several falls while trying to clear up a prolonged cobian with C. difficile.  She declines imaging now but states she will call when if this persists in which case we will order CT of the chest to evaluate this    *Overweight.  Being overweight is a risk factor for breast cancer.  Body mass index is 28.36 kg/m².  BMI 25 to <30 is overweight  BMI 30 to <35 is class 1 obesity  BMI 35 to <40 is class 2 obesity  BMI 40 or higher is class 3 obesity   Remains overweight.  Ideally, lose weight    *Tachycardia-following with cardiology who is pursuing workup of this.  Most recently seen by cardiology 7/9/2025.    *Hematuria-refer to [facility] urology for further evaluation by PCP.  Patient reports she was told by [facility] physician that she either has cancer or a serious autoimmune condition, she is unsure what type of cancer or autoimmune condition.  She is scheduled for further evaluation of this on 8/12/2025.  Will request records.     PLAN:   M.D. CBC 6 months  Last mammogram 2/24/2025: benign  Screening mammogram next due February 2026, ordered today.  Will request records from [facility].  In the meantime, she will continue workup with [facility].  Continue follow-up with cardiology.  Off  hormonal therapy    I spent 37 minutes caring for Carlotta on this date of service. This time includes time spent by me in the following activities: preparing for the visit, reviewing tests, obtaining and/or reviewing a separately obtained history, performing a medically appropriate examination and/or evaluation, counseling and educating the patient/family/caregiver, documenting information in the medical record, and care coordination.  Reviewed cardiology and PCP records.    Send copy to Dr. Paxton Rivera, rheumatology

## 2025-07-15 ENCOUNTER — LAB (OUTPATIENT)
Dept: OTHER | Facility: HOSPITAL | Age: 80
End: 2025-07-15
Payer: MEDICARE

## 2025-07-15 ENCOUNTER — OFFICE VISIT (OUTPATIENT)
Dept: ONCOLOGY | Facility: CLINIC | Age: 80
End: 2025-07-15
Payer: MEDICARE

## 2025-07-15 VITALS
DIASTOLIC BLOOD PRESSURE: 69 MMHG | OXYGEN SATURATION: 95 % | WEIGHT: 148.3 LBS | HEART RATE: 97 BPM | SYSTOLIC BLOOD PRESSURE: 145 MMHG | HEIGHT: 61 IN | TEMPERATURE: 98.4 F | BODY MASS INDEX: 28 KG/M2

## 2025-07-15 DIAGNOSIS — Z17.0 MALIGNANT NEOPLASM OF LOWER-OUTER QUADRANT OF LEFT BREAST OF FEMALE, ESTROGEN RECEPTOR POSITIVE: Primary | ICD-10-CM

## 2025-07-15 DIAGNOSIS — C50.512 MALIGNANT NEOPLASM OF LOWER-OUTER QUADRANT OF LEFT BREAST OF FEMALE, ESTROGEN RECEPTOR POSITIVE: ICD-10-CM

## 2025-07-15 DIAGNOSIS — C50.512 MALIGNANT NEOPLASM OF LOWER-OUTER QUADRANT OF LEFT BREAST OF FEMALE, ESTROGEN RECEPTOR POSITIVE: Primary | ICD-10-CM

## 2025-07-15 DIAGNOSIS — Z12.31 ENCOUNTER FOR SCREENING MAMMOGRAM FOR MALIGNANT NEOPLASM OF BREAST: ICD-10-CM

## 2025-07-15 DIAGNOSIS — Z17.0 MALIGNANT NEOPLASM OF LOWER-OUTER QUADRANT OF LEFT BREAST OF FEMALE, ESTROGEN RECEPTOR POSITIVE: ICD-10-CM

## 2025-07-15 LAB
BASOPHILS # BLD AUTO: 0.03 10*3/MM3 (ref 0–0.2)
BASOPHILS NFR BLD AUTO: 0.5 % (ref 0–1.5)
DEPRECATED RDW RBC AUTO: 44.4 FL (ref 37–54)
EOSINOPHIL # BLD AUTO: 0.23 10*3/MM3 (ref 0–0.4)
EOSINOPHIL NFR BLD AUTO: 3.7 % (ref 0.3–6.2)
ERYTHROCYTE [DISTWIDTH] IN BLOOD BY AUTOMATED COUNT: 13.2 % (ref 12.3–15.4)
HCT VFR BLD AUTO: 36.5 % (ref 34–46.6)
HGB BLD-MCNC: 12.1 G/DL (ref 12–15.9)
IMM GRANULOCYTES # BLD AUTO: 0.05 10*3/MM3 (ref 0–0.05)
IMM GRANULOCYTES NFR BLD AUTO: 0.8 % (ref 0–0.5)
LYMPHOCYTES # BLD AUTO: 1.72 10*3/MM3 (ref 0.7–3.1)
LYMPHOCYTES NFR BLD AUTO: 27.7 % (ref 19.6–45.3)
MCH RBC QN AUTO: 30.2 PG (ref 26.6–33)
MCHC RBC AUTO-ENTMCNC: 33.2 G/DL (ref 31.5–35.7)
MCV RBC AUTO: 91 FL (ref 79–97)
MONOCYTES # BLD AUTO: 0.59 10*3/MM3 (ref 0.1–0.9)
MONOCYTES NFR BLD AUTO: 9.5 % (ref 5–12)
NEUTROPHILS NFR BLD AUTO: 3.6 10*3/MM3 (ref 1.7–7)
NEUTROPHILS NFR BLD AUTO: 57.8 % (ref 42.7–76)
NRBC BLD AUTO-RTO: 0 /100 WBC (ref 0–0.2)
PLATELET # BLD AUTO: 259 10*3/MM3 (ref 140–450)
PMV BLD AUTO: 9.7 FL (ref 6–12)
RBC # BLD AUTO: 4.01 10*6/MM3 (ref 3.77–5.28)
WBC NRBC COR # BLD AUTO: 6.22 10*3/MM3 (ref 3.4–10.8)

## 2025-07-15 PROCEDURE — 36415 COLL VENOUS BLD VENIPUNCTURE: CPT

## 2025-07-15 PROCEDURE — 85025 COMPLETE CBC W/AUTO DIFF WBC: CPT | Performed by: INTERNAL MEDICINE

## 2025-08-05 ENCOUNTER — TRANSCRIBE ORDERS (OUTPATIENT)
Dept: ADMINISTRATIVE | Facility: HOSPITAL | Age: 80
End: 2025-08-05
Payer: MEDICARE

## 2025-08-05 DIAGNOSIS — R31.21 ASYMPTOMATIC MICROSCOPIC HEMATURIA: Primary | ICD-10-CM

## 2025-08-08 ENCOUNTER — HOSPITAL ENCOUNTER (OUTPATIENT)
Dept: CT IMAGING | Facility: HOSPITAL | Age: 80
Discharge: HOME | End: 2025-08-08
Payer: MEDICARE

## 2025-08-08 DIAGNOSIS — R31.21 ASYMPTOMATIC MICROSCOPIC HEMATURIA: ICD-10-CM

## 2025-08-08 PROCEDURE — 74178 CT ABD&PLV WO CNTR FLWD CNTR: CPT

## 2025-08-08 PROCEDURE — 25510000001 IOPAMIDOL 61 % SOLUTION: Performed by: UROLOGY

## 2025-08-08 RX ORDER — IOPAMIDOL 612 MG/ML
100 INJECTION, SOLUTION INTRAVASCULAR
Status: COMPLETED | OUTPATIENT
Start: 2025-08-08 | End: 2025-08-08

## 2025-08-08 RX ADMIN — IOPAMIDOL 85 ML: 612 INJECTION, SOLUTION INTRAVENOUS at 16:41

## (undated) DEVICE — SENSR O2 OXIMAX FNGR A/ 18IN NONSTR

## (undated) DEVICE — FRCP BX RADJAW4 NDL 2.8 240CM LG OG BX40

## (undated) DEVICE — CANN NASL CO2 TRULINK W/O2 A/

## (undated) DEVICE — ADAPT CLN BIOGUARD AIR/H2O DISP

## (undated) DEVICE — Device: Brand: DEFENDO AIR/WATER/SUCTION AND BIOPSY VALVE

## (undated) DEVICE — THE TORRENT IRRIGATION SCOPE CONNECTOR IS USED WITH THE TORRENT IRRIGATION TUBING TO PROVIDE IRRIGATION FLUIDS SUCH AS STERILE WATER DURING GASTROINTESTINAL ENDOSCOPIC PROCEDURES WHEN USED IN CONJUNCTION WITH AN IRRIGATION PUMP (OR ELECTROSURGICAL UNIT).: Brand: TORRENT

## (undated) DEVICE — TUBING, SUCTION, 1/4" X 10', STRAIGHT: Brand: MEDLINE

## (undated) DEVICE — BITEBLOCK OMNI BLOC

## (undated) DEVICE — MSK ENDO PORT O2 POM ELITE CURAPLEX A/

## (undated) DEVICE — CANN O2 ETCO2 FITS ALL CONN CO2 SMPL A/ 7IN DISP LF

## (undated) DEVICE — SINGLE-USE BIOPSY FORCEPS: Brand: RADIAL JAW 4

## (undated) DEVICE — KT ORCA ORCAPOD DISP STRL

## (undated) DEVICE — LN SMPL CO2 SHTRM SD STREAM W/M LUER